# Patient Record
Sex: FEMALE | Race: WHITE | NOT HISPANIC OR LATINO | Employment: FULL TIME | URBAN - METROPOLITAN AREA
[De-identification: names, ages, dates, MRNs, and addresses within clinical notes are randomized per-mention and may not be internally consistent; named-entity substitution may affect disease eponyms.]

---

## 2021-06-30 ENCOUNTER — APPOINTMENT (OUTPATIENT)
Dept: GENERAL RADIOLOGY | Facility: HOSPITAL | Age: 51
End: 2021-06-30

## 2021-06-30 ENCOUNTER — APPOINTMENT (OUTPATIENT)
Dept: CT IMAGING | Facility: HOSPITAL | Age: 51
End: 2021-06-30

## 2021-06-30 ENCOUNTER — HOSPITAL ENCOUNTER (OUTPATIENT)
Facility: HOSPITAL | Age: 51
Setting detail: OBSERVATION
Discharge: HOME | End: 2021-07-02
Attending: EMERGENCY MEDICINE | Admitting: FAMILY MEDICINE

## 2021-06-30 ENCOUNTER — APPOINTMENT (OUTPATIENT)
Dept: CARDIOLOGY | Facility: HOSPITAL | Age: 51
End: 2021-06-30

## 2021-06-30 DIAGNOSIS — R11.0 NAUSEA: ICD-10-CM

## 2021-06-30 DIAGNOSIS — I26.90 ACUTE SEPTIC PULMONARY EMBOLISM WITHOUT ACUTE COR PULMONALE (HCC): ICD-10-CM

## 2021-06-30 DIAGNOSIS — I26.99 OTHER ACUTE PULMONARY EMBOLISM, UNSPECIFIED WHETHER ACUTE COR PULMONALE PRESENT (HCC): Primary | ICD-10-CM

## 2021-06-30 LAB
ALBUMIN SERPL-MCNC: 4 G/DL (ref 3.5–5.2)
ALBUMIN/GLOB SERPL: 1.4 G/DL
ALP SERPL-CCNC: 93 U/L (ref 39–117)
ALT SERPL W P-5'-P-CCNC: 7 U/L (ref 1–33)
ANION GAP SERPL CALCULATED.3IONS-SCNC: 9 MMOL/L (ref 5–15)
AST SERPL-CCNC: 15 U/L (ref 1–32)
B-HCG UR QL: NEGATIVE
BASOPHILS # BLD AUTO: 0.06 10*3/MM3 (ref 0–0.2)
BASOPHILS NFR BLD AUTO: 0.9 % (ref 0–1.5)
BH CV ECHO MEAS - BSA(HAYCOCK): 2.1 M^2
BH CV ECHO MEAS - BSA: 2 M^2
BH CV ECHO MEAS - BZI_BMI: 32.3 KILOGRAMS/M^2
BH CV ECHO MEAS - BZI_METRIC_HEIGHT: 167.6 CM
BH CV ECHO MEAS - BZI_METRIC_WEIGHT: 90.7 KG
BH CV LOW VAS LEFT PERONEAL VESSEL: 1
BH CV LOWER VASCULAR LEFT COMMON FEMORAL AUGMENT: NORMAL
BH CV LOWER VASCULAR LEFT COMMON FEMORAL COMPRESS: NORMAL
BH CV LOWER VASCULAR LEFT COMMON FEMORAL PHASIC: NORMAL
BH CV LOWER VASCULAR LEFT COMMON FEMORAL SPONT: NORMAL
BH CV LOWER VASCULAR LEFT DISTAL FEMORAL AUGMENT: NORMAL
BH CV LOWER VASCULAR LEFT DISTAL FEMORAL COMPRESS: NORMAL
BH CV LOWER VASCULAR LEFT DISTAL FEMORAL PHASIC: NORMAL
BH CV LOWER VASCULAR LEFT DISTAL FEMORAL SPONT: NORMAL
BH CV LOWER VASCULAR LEFT GASTRONEMIUS COMPRESS: NORMAL
BH CV LOWER VASCULAR LEFT GREATER SAPH AK COMPRESS: NORMAL
BH CV LOWER VASCULAR LEFT GREATER SAPH BK COMPRESS: NORMAL
BH CV LOWER VASCULAR LEFT LESSER SAPH COMPRESS: NORMAL
BH CV LOWER VASCULAR LEFT MID FEMORAL AUGMENT: NORMAL
BH CV LOWER VASCULAR LEFT MID FEMORAL COMPRESS: NORMAL
BH CV LOWER VASCULAR LEFT MID FEMORAL PHASIC: NORMAL
BH CV LOWER VASCULAR LEFT MID FEMORAL SPONT: NORMAL
BH CV LOWER VASCULAR LEFT PERONEAL AUGMENT: NORMAL
BH CV LOWER VASCULAR LEFT PERONEAL COMPRESS: NORMAL
BH CV LOWER VASCULAR LEFT PERONEAL THROMBUS: NORMAL
BH CV LOWER VASCULAR LEFT POPLITEAL AUGMENT: NORMAL
BH CV LOWER VASCULAR LEFT POPLITEAL COMPRESS: NORMAL
BH CV LOWER VASCULAR LEFT POPLITEAL PHASIC: NORMAL
BH CV LOWER VASCULAR LEFT POPLITEAL SPONT: NORMAL
BH CV LOWER VASCULAR LEFT POSTERIOR TIBIAL AUGMENT: NORMAL
BH CV LOWER VASCULAR LEFT POSTERIOR TIBIAL COMPRESS: NORMAL
BH CV LOWER VASCULAR LEFT PROFUNDA FEMORAL AUGMENT: NORMAL
BH CV LOWER VASCULAR LEFT PROFUNDA FEMORAL PHASIC: NORMAL
BH CV LOWER VASCULAR LEFT PROFUNDA FEMORAL SPONT: NORMAL
BH CV LOWER VASCULAR LEFT PROXIMAL FEMORAL AUGMENT: NORMAL
BH CV LOWER VASCULAR LEFT PROXIMAL FEMORAL COMPRESS: NORMAL
BH CV LOWER VASCULAR LEFT PROXIMAL FEMORAL PHASIC: NORMAL
BH CV LOWER VASCULAR LEFT PROXIMAL FEMORAL SPONT: NORMAL
BH CV LOWER VASCULAR LEFT SAPHENOFEMORAL JUNCTION AUGMENT: NORMAL
BH CV LOWER VASCULAR LEFT SAPHENOFEMORAL JUNCTION COMPRESS: NORMAL
BH CV LOWER VASCULAR LEFT SAPHENOFEMORAL JUNCTION PHASIC: NORMAL
BH CV LOWER VASCULAR LEFT SAPHENOFEMORAL JUNCTION SPONT: NORMAL
BH CV LOWER VASCULAR RIGHT COMMON FEMORAL AUGMENT: NORMAL
BH CV LOWER VASCULAR RIGHT COMMON FEMORAL COMPRESS: NORMAL
BH CV LOWER VASCULAR RIGHT COMMON FEMORAL PHASIC: NORMAL
BH CV LOWER VASCULAR RIGHT COMMON FEMORAL SPONT: NORMAL
BH CV LOWER VASCULAR RIGHT DISTAL FEMORAL AUGMENT: NORMAL
BH CV LOWER VASCULAR RIGHT DISTAL FEMORAL COMPRESS: NORMAL
BH CV LOWER VASCULAR RIGHT DISTAL FEMORAL PHASIC: NORMAL
BH CV LOWER VASCULAR RIGHT DISTAL FEMORAL SPONT: NORMAL
BH CV LOWER VASCULAR RIGHT GASTRONEMIUS COMPRESS: NORMAL
BH CV LOWER VASCULAR RIGHT GREATER SAPH AK COMPRESS: NORMAL
BH CV LOWER VASCULAR RIGHT GREATER SAPH BK COMPRESS: NORMAL
BH CV LOWER VASCULAR RIGHT LESSER SAPH COMPRESS: NORMAL
BH CV LOWER VASCULAR RIGHT MID FEMORAL AUGMENT: NORMAL
BH CV LOWER VASCULAR RIGHT MID FEMORAL COMPRESS: NORMAL
BH CV LOWER VASCULAR RIGHT MID FEMORAL PHASIC: NORMAL
BH CV LOWER VASCULAR RIGHT MID FEMORAL SPONT: NORMAL
BH CV LOWER VASCULAR RIGHT PERONEAL AUGMENT: NORMAL
BH CV LOWER VASCULAR RIGHT PERONEAL COMPRESS: NORMAL
BH CV LOWER VASCULAR RIGHT POPLITEAL AUGMENT: NORMAL
BH CV LOWER VASCULAR RIGHT POPLITEAL COMPRESS: NORMAL
BH CV LOWER VASCULAR RIGHT POPLITEAL PHASIC: NORMAL
BH CV LOWER VASCULAR RIGHT POPLITEAL SPONT: NORMAL
BH CV LOWER VASCULAR RIGHT POSTERIOR TIBIAL AUGMENT: NORMAL
BH CV LOWER VASCULAR RIGHT POSTERIOR TIBIAL COMPRESS: NORMAL
BH CV LOWER VASCULAR RIGHT PROFUNDA FEMORAL AUGMENT: NORMAL
BH CV LOWER VASCULAR RIGHT PROFUNDA FEMORAL PHASIC: NORMAL
BH CV LOWER VASCULAR RIGHT PROFUNDA FEMORAL SPONT: NORMAL
BH CV LOWER VASCULAR RIGHT PROXIMAL FEMORAL AUGMENT: NORMAL
BH CV LOWER VASCULAR RIGHT PROXIMAL FEMORAL COMPRESS: NORMAL
BH CV LOWER VASCULAR RIGHT PROXIMAL FEMORAL PHASIC: NORMAL
BH CV LOWER VASCULAR RIGHT PROXIMAL FEMORAL SPONT: NORMAL
BH CV LOWER VASCULAR RIGHT SAPHENOFEMORAL JUNCTION AUGMENT: NORMAL
BH CV LOWER VASCULAR RIGHT SAPHENOFEMORAL JUNCTION COMPRESS: NORMAL
BH CV LOWER VASCULAR RIGHT SAPHENOFEMORAL JUNCTION PHASIC: NORMAL
BH CV LOWER VASCULAR RIGHT SAPHENOFEMORAL JUNCTION SPONT: NORMAL
BH CV LOWER VASCULAR RIGHT SOLEAL COMPRESS: NORMAL
BILIRUB SERPL-MCNC: 1.3 MG/DL (ref 0–1.2)
BUN SERPL-MCNC: 8 MG/DL (ref 6–20)
BUN/CREAT SERPL: 10.7 (ref 7–25)
CALCIUM SPEC-SCNC: 9.2 MG/DL (ref 8.6–10.5)
CHLORIDE SERPL-SCNC: 108 MMOL/L (ref 98–107)
CK SERPL-CCNC: 32 U/L (ref 20–180)
CO2 SERPL-SCNC: 26 MMOL/L (ref 22–29)
CREAT SERPL-MCNC: 0.75 MG/DL (ref 0.57–1)
CRP SERPL-MCNC: 2.15 MG/DL (ref 0–0.5)
D DIMER PPP FEU-MCNC: 0.69 MCGFEU/ML (ref 0–0.56)
DEPRECATED RDW RBC AUTO: 42.7 FL (ref 37–54)
EOSINOPHIL # BLD AUTO: 0.14 10*3/MM3 (ref 0–0.4)
EOSINOPHIL NFR BLD AUTO: 2.1 % (ref 0.3–6.2)
ERYTHROCYTE [DISTWIDTH] IN BLOOD BY AUTOMATED COUNT: 12.3 % (ref 12.3–15.4)
GFR SERPL CREATININE-BSD FRML MDRD: 81 ML/MIN/1.73
GLOBULIN UR ELPH-MCNC: 2.8 GM/DL
GLUCOSE SERPL-MCNC: 103 MG/DL (ref 65–99)
HCT VFR BLD AUTO: 43.1 % (ref 34–46.6)
HGB BLD-MCNC: 14.3 G/DL (ref 12–15.9)
HOLD SPECIMEN: NORMAL
IMM GRANULOCYTES # BLD AUTO: 0.03 10*3/MM3 (ref 0–0.05)
IMM GRANULOCYTES NFR BLD AUTO: 0.4 % (ref 0–0.5)
INTERNAL NEGATIVE CONTROL: NORMAL
INTERNAL POSITIVE CONTROL: NORMAL
LIPASE SERPL-CCNC: 25 U/L (ref 13–60)
LYMPHOCYTES # BLD AUTO: 2.04 10*3/MM3 (ref 0.7–3.1)
LYMPHOCYTES NFR BLD AUTO: 30 % (ref 19.6–45.3)
Lab: NORMAL
MAGNESIUM SERPL-MCNC: 1.9 MG/DL (ref 1.6–2.6)
MCH RBC QN AUTO: 31.3 PG (ref 26.6–33)
MCHC RBC AUTO-ENTMCNC: 33.2 G/DL (ref 31.5–35.7)
MCV RBC AUTO: 94.3 FL (ref 79–97)
MONOCYTES # BLD AUTO: 0.88 10*3/MM3 (ref 0.1–0.9)
MONOCYTES NFR BLD AUTO: 13 % (ref 5–12)
NEUTROPHILS NFR BLD AUTO: 3.64 10*3/MM3 (ref 1.7–7)
NEUTROPHILS NFR BLD AUTO: 53.6 % (ref 42.7–76)
NRBC BLD AUTO-RTO: 0 /100 WBC (ref 0–0.2)
NT-PROBNP SERPL-MCNC: 83 PG/ML (ref 0–900)
PLATELET # BLD AUTO: 206 10*3/MM3 (ref 140–450)
PMV BLD AUTO: 10.1 FL (ref 6–12)
POTASSIUM SERPL-SCNC: 4 MMOL/L (ref 3.5–5.2)
PROT SERPL-MCNC: 6.8 G/DL (ref 6–8.5)
QT INTERVAL: 394 MS
QT INTERVAL: 416 MS
QTC INTERVAL: 445 MS
QTC INTERVAL: 445 MS
RBC # BLD AUTO: 4.57 10*6/MM3 (ref 3.77–5.28)
SODIUM SERPL-SCNC: 143 MMOL/L (ref 136–145)
TROPONIN T SERPL-MCNC: <0.01 NG/ML (ref 0–0.03)
TROPONIN T SERPL-MCNC: <0.01 NG/ML (ref 0–0.03)
WBC # BLD AUTO: 6.79 10*3/MM3 (ref 3.4–10.8)
WHOLE BLOOD HOLD SPECIMEN: NORMAL

## 2021-06-30 PROCEDURE — 99284 EMERGENCY DEPT VISIT MOD MDM: CPT

## 2021-06-30 PROCEDURE — 93005 ELECTROCARDIOGRAM TRACING: CPT | Performed by: EMERGENCY MEDICINE

## 2021-06-30 PROCEDURE — 0 IOPAMIDOL PER 1 ML: Performed by: EMERGENCY MEDICINE

## 2021-06-30 PROCEDURE — 93970 EXTREMITY STUDY: CPT

## 2021-06-30 PROCEDURE — G0378 HOSPITAL OBSERVATION PER HR: HCPCS

## 2021-06-30 PROCEDURE — 93970 EXTREMITY STUDY: CPT | Performed by: INTERNAL MEDICINE

## 2021-06-30 PROCEDURE — 93005 ELECTROCARDIOGRAM TRACING: CPT

## 2021-06-30 PROCEDURE — 81025 URINE PREGNANCY TEST: CPT | Performed by: EMERGENCY MEDICINE

## 2021-06-30 PROCEDURE — 85379 FIBRIN DEGRADATION QUANT: CPT | Performed by: NURSE PRACTITIONER

## 2021-06-30 PROCEDURE — 83880 ASSAY OF NATRIURETIC PEPTIDE: CPT | Performed by: EMERGENCY MEDICINE

## 2021-06-30 PROCEDURE — 86140 C-REACTIVE PROTEIN: CPT | Performed by: NURSE PRACTITIONER

## 2021-06-30 PROCEDURE — 71045 X-RAY EXAM CHEST 1 VIEW: CPT

## 2021-06-30 PROCEDURE — 83690 ASSAY OF LIPASE: CPT | Performed by: EMERGENCY MEDICINE

## 2021-06-30 PROCEDURE — 85025 COMPLETE CBC W/AUTO DIFF WBC: CPT | Performed by: EMERGENCY MEDICINE

## 2021-06-30 PROCEDURE — 80053 COMPREHEN METABOLIC PANEL: CPT | Performed by: EMERGENCY MEDICINE

## 2021-06-30 PROCEDURE — 99220 PR INITIAL OBSERVATION CARE/DAY 70 MINUTES: CPT | Performed by: FAMILY MEDICINE

## 2021-06-30 PROCEDURE — 83735 ASSAY OF MAGNESIUM: CPT | Performed by: NURSE PRACTITIONER

## 2021-06-30 PROCEDURE — 63710000001 ONDANSETRON PER 8 MG: Performed by: EMERGENCY MEDICINE

## 2021-06-30 PROCEDURE — 82550 ASSAY OF CK (CPK): CPT | Performed by: NURSE PRACTITIONER

## 2021-06-30 PROCEDURE — 84484 ASSAY OF TROPONIN QUANT: CPT | Performed by: EMERGENCY MEDICINE

## 2021-06-30 PROCEDURE — 71275 CT ANGIOGRAPHY CHEST: CPT

## 2021-06-30 RX ORDER — OXYCODONE AND ACETAMINOPHEN 7.5; 325 MG/1; MG/1
1 TABLET ORAL ONCE
Status: COMPLETED | OUTPATIENT
Start: 2021-06-30 | End: 2021-06-30

## 2021-06-30 RX ORDER — ONDANSETRON 2 MG/ML
4 INJECTION INTRAMUSCULAR; INTRAVENOUS EVERY 6 HOURS PRN
Status: DISCONTINUED | OUTPATIENT
Start: 2021-06-30 | End: 2021-07-02 | Stop reason: HOSPADM

## 2021-06-30 RX ORDER — OLANZAPINE 10 MG/1
10 TABLET ORAL NIGHTLY
Status: DISCONTINUED | OUTPATIENT
Start: 2021-06-30 | End: 2021-07-02 | Stop reason: HOSPADM

## 2021-06-30 RX ORDER — LISINOPRIL 10 MG/1
10 TABLET ORAL DAILY
COMMUNITY

## 2021-06-30 RX ORDER — SODIUM CHLORIDE 0.9 % (FLUSH) 0.9 %
10 SYRINGE (ML) INJECTION AS NEEDED
Status: DISCONTINUED | OUTPATIENT
Start: 2021-06-30 | End: 2021-07-02 | Stop reason: HOSPADM

## 2021-06-30 RX ORDER — ONDANSETRON 4 MG/1
4 TABLET, FILM COATED ORAL ONCE
Status: COMPLETED | OUTPATIENT
Start: 2021-06-30 | End: 2021-06-30

## 2021-06-30 RX ORDER — SODIUM CHLORIDE 0.9 % (FLUSH) 0.9 %
10 SYRINGE (ML) INJECTION EVERY 12 HOURS SCHEDULED
Status: DISCONTINUED | OUTPATIENT
Start: 2021-06-30 | End: 2021-07-02 | Stop reason: HOSPADM

## 2021-06-30 RX ORDER — ACETAMINOPHEN 160 MG/5ML
650 SOLUTION ORAL EVERY 4 HOURS PRN
Status: DISCONTINUED | OUTPATIENT
Start: 2021-06-30 | End: 2021-07-02 | Stop reason: HOSPADM

## 2021-06-30 RX ORDER — ONDANSETRON 4 MG/1
4 TABLET, FILM COATED ORAL EVERY 6 HOURS PRN
Status: DISCONTINUED | OUTPATIENT
Start: 2021-06-30 | End: 2021-07-02 | Stop reason: HOSPADM

## 2021-06-30 RX ORDER — ESZOPICLONE 3 MG/1
3 TABLET, FILM COATED ORAL NIGHTLY
Status: DISCONTINUED | OUTPATIENT
Start: 2021-06-30 | End: 2021-07-02 | Stop reason: HOSPADM

## 2021-06-30 RX ORDER — LISINOPRIL 10 MG/1
10 TABLET ORAL ONCE
Status: COMPLETED | OUTPATIENT
Start: 2021-06-30 | End: 2021-06-30

## 2021-06-30 RX ORDER — TRAMADOL HYDROCHLORIDE 50 MG/1
50 TABLET ORAL EVERY 6 HOURS PRN
Status: DISCONTINUED | OUTPATIENT
Start: 2021-06-30 | End: 2021-07-02 | Stop reason: HOSPADM

## 2021-06-30 RX ORDER — ASPIRIN 81 MG/1
324 TABLET, CHEWABLE ORAL ONCE
Status: COMPLETED | OUTPATIENT
Start: 2021-06-30 | End: 2021-06-30

## 2021-06-30 RX ORDER — ACETAMINOPHEN 325 MG/1
650 TABLET ORAL EVERY 4 HOURS PRN
Status: DISCONTINUED | OUTPATIENT
Start: 2021-06-30 | End: 2021-07-02 | Stop reason: HOSPADM

## 2021-06-30 RX ORDER — ACETAMINOPHEN 650 MG/1
650 SUPPOSITORY RECTAL EVERY 4 HOURS PRN
Status: DISCONTINUED | OUTPATIENT
Start: 2021-06-30 | End: 2021-07-02 | Stop reason: HOSPADM

## 2021-06-30 RX ORDER — NITROGLYCERIN 0.4 MG/1
0.4 TABLET SUBLINGUAL
Status: DISCONTINUED | OUTPATIENT
Start: 2021-06-30 | End: 2021-07-02 | Stop reason: HOSPADM

## 2021-06-30 RX ORDER — LISINOPRIL 10 MG/1
10 TABLET ORAL DAILY
Status: DISCONTINUED | OUTPATIENT
Start: 2021-07-01 | End: 2021-07-02 | Stop reason: HOSPADM

## 2021-06-30 RX ADMIN — NITROGLYCERIN 0.4 MG: 0.4 TABLET SUBLINGUAL at 08:22

## 2021-06-30 RX ADMIN — LIDOCAINE HYDROCHLORIDE: 20 SOLUTION ORAL; TOPICAL at 09:05

## 2021-06-30 RX ADMIN — SODIUM CHLORIDE 500 ML: 9 INJECTION, SOLUTION INTRAVENOUS at 11:03

## 2021-06-30 RX ADMIN — TRAMADOL HYDROCHLORIDE 50 MG: 50 TABLET, FILM COATED ORAL at 23:04

## 2021-06-30 RX ADMIN — LISINOPRIL 10 MG: 10 TABLET ORAL at 08:17

## 2021-06-30 RX ADMIN — OLANZAPINE 10 MG: 10 TABLET, FILM COATED ORAL at 20:30

## 2021-06-30 RX ADMIN — IOPAMIDOL 70 ML: 755 INJECTION, SOLUTION INTRAVENOUS at 11:21

## 2021-06-30 RX ADMIN — OXYCODONE HYDROCHLORIDE AND ACETAMINOPHEN 1 TABLET: 7.5; 325 TABLET ORAL at 11:03

## 2021-06-30 RX ADMIN — SODIUM CHLORIDE, PRESERVATIVE FREE 10 ML: 5 INJECTION INTRAVENOUS at 20:30

## 2021-06-30 RX ADMIN — ESZOPICLONE 3 MG: 3 TABLET ORAL at 20:30

## 2021-06-30 RX ADMIN — APIXABAN 5 MG: 5 TABLET, FILM COATED ORAL at 20:30

## 2021-06-30 RX ADMIN — ONDANSETRON HYDROCHLORIDE 4 MG: 4 TABLET, FILM COATED ORAL at 11:03

## 2021-06-30 RX ADMIN — ASPIRIN 81 MG CHEWABLE TABLET 324 MG: 81 TABLET CHEWABLE at 07:08

## 2021-06-30 RX ADMIN — TRAMADOL HYDROCHLORIDE 50 MG: 50 TABLET, FILM COATED ORAL at 15:56

## 2021-07-01 LAB
ANION GAP SERPL CALCULATED.3IONS-SCNC: 8 MMOL/L (ref 5–15)
BASOPHILS # BLD AUTO: 0.03 10*3/MM3 (ref 0–0.2)
BASOPHILS NFR BLD AUTO: 0.5 % (ref 0–1.5)
BUN SERPL-MCNC: 8 MG/DL (ref 6–20)
BUN/CREAT SERPL: 11.4 (ref 7–25)
CALCIUM SPEC-SCNC: 9 MG/DL (ref 8.6–10.5)
CHLORIDE SERPL-SCNC: 105 MMOL/L (ref 98–107)
CO2 SERPL-SCNC: 26 MMOL/L (ref 22–29)
CREAT SERPL-MCNC: 0.7 MG/DL (ref 0.57–1)
DEPRECATED RDW RBC AUTO: 43.8 FL (ref 37–54)
EOSINOPHIL # BLD AUTO: 0.13 10*3/MM3 (ref 0–0.4)
EOSINOPHIL NFR BLD AUTO: 2 % (ref 0.3–6.2)
ERYTHROCYTE [DISTWIDTH] IN BLOOD BY AUTOMATED COUNT: 12.3 % (ref 12.3–15.4)
GFR SERPL CREATININE-BSD FRML MDRD: 88 ML/MIN/1.73
GLUCOSE SERPL-MCNC: 97 MG/DL (ref 65–99)
HCT VFR BLD AUTO: 39 % (ref 34–46.6)
HCT VFR BLD AUTO: 41.1 % (ref 34–46.6)
HGB BLD-MCNC: 12.6 G/DL (ref 12–15.9)
HGB BLD-MCNC: 13 G/DL (ref 12–15.9)
IMM GRANULOCYTES # BLD AUTO: 0.02 10*3/MM3 (ref 0–0.05)
IMM GRANULOCYTES NFR BLD AUTO: 0.3 % (ref 0–0.5)
LYMPHOCYTES # BLD AUTO: 1.66 10*3/MM3 (ref 0.7–3.1)
LYMPHOCYTES NFR BLD AUTO: 25.7 % (ref 19.6–45.3)
MCH RBC QN AUTO: 31 PG (ref 26.6–33)
MCHC RBC AUTO-ENTMCNC: 32.3 G/DL (ref 31.5–35.7)
MCV RBC AUTO: 95.8 FL (ref 79–97)
MONOCYTES # BLD AUTO: 0.81 10*3/MM3 (ref 0.1–0.9)
MONOCYTES NFR BLD AUTO: 12.5 % (ref 5–12)
NEUTROPHILS NFR BLD AUTO: 3.81 10*3/MM3 (ref 1.7–7)
NEUTROPHILS NFR BLD AUTO: 59 % (ref 42.7–76)
NRBC BLD AUTO-RTO: 0 /100 WBC (ref 0–0.2)
PLATELET # BLD AUTO: 192 10*3/MM3 (ref 140–450)
PMV BLD AUTO: 10.4 FL (ref 6–12)
POTASSIUM SERPL-SCNC: 4 MMOL/L (ref 3.5–5.2)
RBC # BLD AUTO: 4.07 10*6/MM3 (ref 3.77–5.28)
SODIUM SERPL-SCNC: 139 MMOL/L (ref 136–145)
WBC # BLD AUTO: 6.46 10*3/MM3 (ref 3.4–10.8)

## 2021-07-01 PROCEDURE — 25010000002 ONDANSETRON PER 1 MG: Performed by: FAMILY MEDICINE

## 2021-07-01 PROCEDURE — 96374 THER/PROPH/DIAG INJ IV PUSH: CPT

## 2021-07-01 PROCEDURE — 99225 PR SBSQ OBSERVATION CARE/DAY 25 MINUTES: CPT | Performed by: FAMILY MEDICINE

## 2021-07-01 PROCEDURE — 85018 HEMOGLOBIN: CPT | Performed by: FAMILY MEDICINE

## 2021-07-01 PROCEDURE — G0378 HOSPITAL OBSERVATION PER HR: HCPCS

## 2021-07-01 PROCEDURE — 85014 HEMATOCRIT: CPT | Performed by: FAMILY MEDICINE

## 2021-07-01 PROCEDURE — 85025 COMPLETE CBC W/AUTO DIFF WBC: CPT | Performed by: FAMILY MEDICINE

## 2021-07-01 PROCEDURE — 80048 BASIC METABOLIC PNL TOTAL CA: CPT | Performed by: FAMILY MEDICINE

## 2021-07-01 RX ORDER — ONDANSETRON 4 MG/1
4 TABLET, ORALLY DISINTEGRATING ORAL EVERY 8 HOURS PRN
Qty: 30 TABLET | Refills: 0 | Status: SHIPPED | OUTPATIENT
Start: 2021-07-01 | End: 2022-01-12 | Stop reason: HOSPADM

## 2021-07-01 RX ORDER — TRAMADOL HYDROCHLORIDE 50 MG/1
50 TABLET ORAL EVERY 6 HOURS PRN
Qty: 24 TABLET | Refills: 0 | Status: SHIPPED | OUTPATIENT
Start: 2021-07-01 | End: 2021-07-08

## 2021-07-01 RX ADMIN — LISINOPRIL 10 MG: 10 TABLET ORAL at 09:32

## 2021-07-01 RX ADMIN — TRAMADOL HYDROCHLORIDE 50 MG: 50 TABLET, FILM COATED ORAL at 19:22

## 2021-07-01 RX ADMIN — APIXABAN 10 MG: 5 TABLET, FILM COATED ORAL at 09:32

## 2021-07-01 RX ADMIN — ONDANSETRON 4 MG: 2 INJECTION INTRAMUSCULAR; INTRAVENOUS at 11:13

## 2021-07-01 RX ADMIN — OLANZAPINE 10 MG: 10 TABLET, FILM COATED ORAL at 20:02

## 2021-07-01 RX ADMIN — TRAMADOL HYDROCHLORIDE 50 MG: 50 TABLET, FILM COATED ORAL at 05:23

## 2021-07-01 RX ADMIN — SODIUM CHLORIDE, PRESERVATIVE FREE 10 ML: 5 INJECTION INTRAVENOUS at 20:03

## 2021-07-01 RX ADMIN — ESZOPICLONE 3 MG: 3 TABLET ORAL at 20:02

## 2021-07-01 RX ADMIN — APIXABAN 10 MG: 5 TABLET, FILM COATED ORAL at 20:02

## 2021-07-01 RX ADMIN — SODIUM CHLORIDE, PRESERVATIVE FREE 10 ML: 5 INJECTION INTRAVENOUS at 09:33

## 2021-07-01 RX ADMIN — TRAMADOL HYDROCHLORIDE 50 MG: 50 TABLET, FILM COATED ORAL at 13:24

## 2021-07-01 NOTE — NURSING NOTE
Patient scheduled to discharge today but patient did not feel comfortable leaving, set to discharge tomorrow.  C/o of pain and nausea/vomiting throughout the shift, medicated per MAR.  Remains on RA, NSR on tele, VSS.  H&H completed, WNL.  Continue to monitor.

## 2021-07-01 NOTE — PLAN OF CARE
Goal Outcome Evaluation:    VSS. Patient treated with PRN pain medication x1. Patient rested well after PRN pain medication and verbalized no further needs. Will continue to monitor.      Problem: Adult Inpatient Plan of Care  Goal: Plan of Care Review  Outcome: Ongoing, Progressing  Goal: Patient-Specific Goal (Individualized)  Outcome: Ongoing, Progressing  Goal: Absence of Hospital-Acquired Illness or Injury  Outcome: Ongoing, Progressing  Intervention: Identify and Manage Fall Risk  Recent Flowsheet Documentation  Taken 7/1/2021 0200 by Edmond Wheeler RN  Safety Promotion/Fall Prevention:   activity supervised   assistive device/personal items within reach   clutter free environment maintained   safety round/check completed   room organization consistent  Taken 7/1/2021 0000 by Edmond Wheeler RN  Safety Promotion/Fall Prevention:   activity supervised   clutter free environment maintained   assistive device/personal items within reach   safety round/check completed   room organization consistent  Taken 6/30/2021 2200 by Edmond Wheeler RN  Safety Promotion/Fall Prevention:   activity supervised   assistive device/personal items within reach   clutter free environment maintained   safety round/check completed   room organization consistent  Taken 6/30/2021 2000 by Edmond Wheeler RN  Safety Promotion/Fall Prevention:   activity supervised   assistive device/personal items within reach   clutter free environment maintained   safety round/check completed   room organization consistent  Intervention: Prevent Skin Injury  Recent Flowsheet Documentation  Taken 7/1/2021 0200 by Edmond Wheeler RN  Body Position: position changed independently  Skin Protection: adhesive use limited  Taken 7/1/2021 0000 by Edmond Wheeler RN  Body Position: position changed independently  Skin Protection: adhesive use limited  Taken 6/30/2021 2200 by Edmond Wheeler RN  Body Position: position changed independently  Skin Protection:  adhesive use limited  Taken 6/30/2021 2000 by Edmond Wheeler RN  Body Position: position changed independently  Skin Protection: adhesive use limited  Intervention: Prevent and Manage VTE (venous thromboembolism) Risk  Recent Flowsheet Documentation  Taken 6/30/2021 2000 by Edmond Wheeler RN  VTE Prevention/Management: bleeding risk factor(s) identified  Goal: Optimal Comfort and Wellbeing  Outcome: Ongoing, Progressing  Intervention: Provide Person-Centered Care  Recent Flowsheet Documentation  Taken 6/30/2021 2000 by Edmond Wheeler RN  Trust Relationship/Rapport: care explained  Goal: Readiness for Transition of Care  Outcome: Ongoing, Progressing     Problem: Hypertension Comorbidity  Goal: Blood Pressure in Desired Range  Outcome: Ongoing, Progressing

## 2021-07-01 NOTE — PROGRESS NOTES
[facility]  Clinical Decision Unit  PROGRESS NOTE    Patient Name: Kamila Garcia  : 1970  MRN: [id number]    Admission Date and Time: 2021  7:08 AM  Primary Care Physician: Latricia Mishra APRN    Subjective   Subjective     CC:  F/u PE    HPI:  Patient states she is nauseated, she is vomiting blood (x1) and she is anxious about going home. She continues to have pain on her left side.     Review of Systems  Gen- No fevers, chills  CV- No chest pain, palpitations  Resp- No cough, dyspnea  GI- + N/V / No D, abd pain          Objective   Objective     Vital Signs:   Temp:  [99.3 °F (37.4 °C)-100.5 °F (38.1 °C)] 99.3 °F (37.4 °C)  Heart Rate:  [76-84] 76  Resp:  [16-18] 18  BP: (112-136)/(65-80) 120/80     Physical Exam:  Constitutional: No acute distress, awake, alert  HENT: NCAT, mucous membranes moist  Respiratory: Clear to auscultation bilaterally, respiratory effort normal   Cardiovascular: RRR, no murmurs, rubs, or gallops  Gastrointestinal: Positive bowel sounds, soft, nontender, nondistended  Musculoskeletal: No bilateral ankle edema  Psychiatric: Flat affect  Neurologic: Oriented x 3, strength symmetric in all extremities, speech clear  Skin: No rashes      Result Review:  I have personally reviewed the results from the time of admission and agree with these findings:  [x]  Laboratory  []  Radiology  []  EKG/Telemetry   []  Pathology  []  Old records  []  Other:  Most notable findings include:    Assessment/Plan   Assessment / Plan     Active Hospital Problems    Diagnosis  POA   • Acute pulmonary embolism (CMS/HCC) [I26.99]  Yes      Resolved Hospital Problems   No resolved problems to display.        Brief course to date:  Kamila Garcia is a 51 y.o. female who presented with PE. Started on eliquis. Had vomiting and nausea and the following day.     Plan:  Continue eliquis  PRN zofran     DVT prophylaxis:  Medical DVT prophylaxis orders are  present.     Discharge readiness criteria:   1. Tomorrow if tolerating po     Yazmin Whitley,   07/01/21

## 2021-07-01 NOTE — PROGRESS NOTES
Patient is on Apixaban.  Education provided on apixaban on 7/1/21 verbally and in writing.  Information provided includes effects of medication, drug-drug and drug-food interactions, and signs/symptoms of bleeding and clotting.  Patient verbalized understanding through teach back.  All pertinent questions were answered.     Portia Lujan, PharmD

## 2021-07-01 NOTE — PLAN OF CARE
Problem: Adult Inpatient Plan of Care  Goal: Plan of Care Review  Outcome: Ongoing, Progressing  Flowsheets (Taken 7/1/2021 1007)  Outcome Summary: Pt discharged to home with eliquis  Goal: Patient-Specific Goal (Individualized)  Outcome: Ongoing, Progressing  Goal: Absence of Hospital-Acquired Illness or Injury  Outcome: Ongoing, Progressing  Goal: Optimal Comfort and Wellbeing  Outcome: Ongoing, Progressing  Goal: Readiness for Transition of Care  Outcome: Ongoing, Progressing   Goal Outcome Evaluation:              Outcome Summary: Pt discharged to home with eliquis

## 2021-07-02 VITALS
HEART RATE: 75 BPM | WEIGHT: 200 LBS | OXYGEN SATURATION: 94 % | HEIGHT: 66 IN | SYSTOLIC BLOOD PRESSURE: 107 MMHG | BODY MASS INDEX: 32.14 KG/M2 | DIASTOLIC BLOOD PRESSURE: 66 MMHG | TEMPERATURE: 99.6 F | RESPIRATION RATE: 16 BRPM

## 2021-07-02 PROCEDURE — G0378 HOSPITAL OBSERVATION PER HR: HCPCS

## 2021-07-02 PROCEDURE — 99217 PR OBSERVATION CARE DISCHARGE MANAGEMENT: CPT | Performed by: FAMILY MEDICINE

## 2021-07-02 RX ADMIN — TRAMADOL HYDROCHLORIDE 50 MG: 50 TABLET, FILM COATED ORAL at 13:18

## 2021-07-02 RX ADMIN — SODIUM CHLORIDE, PRESERVATIVE FREE 10 ML: 5 INJECTION INTRAVENOUS at 09:10

## 2021-07-02 RX ADMIN — ACETAMINOPHEN 650 MG: 325 TABLET, FILM COATED ORAL at 00:12

## 2021-07-02 RX ADMIN — APIXABAN 10 MG: 5 TABLET, FILM COATED ORAL at 09:10

## 2021-07-02 RX ADMIN — TRAMADOL HYDROCHLORIDE 50 MG: 50 TABLET, FILM COATED ORAL at 07:29

## 2021-07-02 RX ADMIN — LISINOPRIL 10 MG: 10 TABLET ORAL at 09:10

## 2021-07-02 NOTE — PLAN OF CARE
Goal Outcome Evaluation:  Pt resting comfortably at this time with no c/o pain. PRN pain medication being administered as requested and ordered.    NSR per telemetry. VSS.

## 2021-07-02 NOTE — CASE MANAGEMENT/SOCIAL WORK
Discharge Planning Assessment  [facility]     Patient Name: Kamila Garcia  MRN: [id number]  Today's Date: 7/2/2021    Admit Date: 6/30/2021    Discharge Needs Assessment     Row Name 07/02/21 1227       Living Environment    Lives With  spouse    Current Living Arrangements  home/apartment/condo    Primary Care Provided by  self    Provides Primary Care For  no one    Family Caregiver if Needed  spouse    Quality of Family Relationships  helpful;involved;supportive    Able to Return to Prior Arrangements  yes       Transition Planning    Patient/Family Anticipates Transition to  home with family    Patient/Family Anticipated Services at Transition  none    Transportation Anticipated  family or friend will provide       Discharge Needs Assessment    Readmission Within the Last 30 Days  no previous admission in last 30 days    Equipment Currently Used at Home  none    Concerns to be Addressed  discharge planning    Equipment Needed After Discharge  none        Discharge Plan     Row Name 07/02/21 1229       Plan    Plan  Home    Patient/Family in Agreement with Plan  yes    Plan Comments  Patient has discharge orders for home. She is being discharged on Eliquis for PEs. Met with patient in the room and provided patient a coupon for a $10 copay. She denies other discharge needs. No other DC needs noted.    Final Discharge Disposition Code  01 - home or self-care        Continued Care and Services - Admitted Since 6/30/2021    Coordination has not been started for this encounter.       Expected Discharge Date and Time     Expected Discharge Date Expected Discharge Time    Jul 1, 2021         Demographic Summary     Row Name 07/02/21 1226       General Information    Admission Type  observation    Referral Source  admission list    Reason for Consult  discharge planning    General Information Comments  Patient confirms her PCP is RAFFY Garduno, and that she has medical and rx coverage through [facility]. She  denies having a POA or LW.        Functional Status     Row Name 07/02/21 1227       Functional Status    Usual Activity Tolerance  good        Psychosocial    No documentation.       Abuse/Neglect    No documentation.       Legal    No documentation.       Substance Abuse    No documentation.       Patient Forms    No documentation.           Lani Ansari RN

## 2021-07-08 NOTE — DISCHARGE SUMMARY
[facility] Medicine Services  Clinical Decision Unit  DISCHARGE SUMMARY    Patient Name: Kamila Garcia  : 1970  MRN: [id number]    Admission Date and Time: 2021  7:08 AM  Discharge Date and Time:  21    Primary Care Physician: Latricia Mishra APRN    Hospital Course     Active Hospital Problems    Diagnosis  POA   • Acute pulmonary embolism (CMS/HCC) [I26.99]  Yes      Resolved Hospital Problems   No resolved problems to display.          Brief CDU Course:  Kamila Garcia is a 51 y.o. female with PMH of insomnia that presented to the ED with chest pain. She was found to have PE on CTA. Doppler of Left Lower Extremity showed DVT. She was started on eliquis and tolerated well.       Key Discharge Recommendations:  Patient may need hypercoagulable work up at some point.     Discharge Evaluation     Vital Signs:        Physical Exam:  Constitutional: No acute distress, awake, alert  HENT: NCAT, mucous membranes moist  Respiratory: Clear to auscultation bilaterally, respiratory effort normal   Cardiovascular: RRR, no murmurs, rubs, or gallops  Gastrointestinal: Positive bowel sounds, soft, nontender, nondistended  Musculoskeletal: No bilateral ankle edema  Psychiatric: Appropriate affect, cooperative  Neurologic: Oriented x 3, strength symmetric in all extremities, Cranial Nerves grossly intact to confrontation, speech clear  Skin: No rashes      Pertinent  and/or Most Recent Results     Result Review:  I have personally reviewed the results from the time of this admission to 21 9:30 AM EDT and agree with these findings:  [x]  Laboratory  []  Microbiology  [x]  Radiology  []  EKG/Telemetry   []  Cardiology/Vascular   []  Pathology  []  Old records  []  Other:  Most notable findings include:LAB RESULTS:      Lab 21  1435   HEMOGLOBIN 13.0   HEMATOCRIT 41.1                               Microbiology Results (last 10 days)     ** No results found for the last  240 hours. **          No radiology results for the last 3 days    Results for orders placed during the hospital encounter of 06/30/21    Duplex Venous Lower Extremity - Bilateral CAR    Interpretation Summary  · Left lower extremity deep vein thrombosis noted in the peroneal vein. Chronicity of the thrombosis is uncertain.      Results for orders placed during the hospital encounter of 06/30/21    Duplex Venous Lower Extremity - Bilateral CAR    Interpretation Summary  · Left lower extremity deep vein thrombosis noted in the peroneal vein. Chronicity of the thrombosis is uncertain.            Plan for Follow-up of Pending Labs/Results:       Discharge Medications and Follow-Up        Discharge Medications      New Medications      Instructions Start Date   Eliquis 5 MG tablet tablet  Generic drug: apixaban   10 mg, Oral, Every 12 Hours Scheduled      Eliquis 5 MG tablet tablet  Generic drug: apixaban   Start On 7/8: Take 1 tablet by mouth Every 12 (Twelve) Hours.      traMADol 50 MG tablet  Commonly known as: ULTRAM   50 mg, Oral, Every 6 Hours PRN         Continue These Medications      Instructions Start Date   lisinopril 10 MG tablet  Commonly known as: PRINIVIL,ZESTRIL   10 mg, Oral, Daily      Lunesta 3 MG tablet  Generic drug: eszopiclone   3 mg, Oral, Nightly, Take immediately before bedtime       OLANZapine 10 MG tablet  Commonly known as: zyPREXA   10 mg, Oral, Nightly      ondansetron ODT 4 MG disintegrating tablet  Commonly known as: Zofran ODT   4 mg, Translingual, Every 8 Hours PRN               No future appointments.    Additional Instructions for the Follow-ups that You Need to Schedule     Discharge Follow-up with PCP   As directed       Currently Documented PCP:    Latricia Mishra APRN    PCP Phone Number:    [phone]     Follow Up Details: 1 week         Discharge Follow-up with PCP   As directed       Currently Documented PCP:    Latricia Mishra APRN    PCP Phone Number:    [phone]      Follow Up Details: 1-2 weeks                     Time Spent on Discharge: I spent  40  minutes on this discharge activity which included: face-to-face encounter with the patient, reviewing the data in the system, coordination of the care with the nursing staff as well as consultants, documentation, and entering orders.

## 2021-07-09 ENCOUNTER — APPOINTMENT (OUTPATIENT)
Dept: GENERAL RADIOLOGY | Facility: HOSPITAL | Age: 51
End: 2021-07-09

## 2021-07-09 ENCOUNTER — HOSPITAL ENCOUNTER (EMERGENCY)
Facility: HOSPITAL | Age: 51
Discharge: HOME | End: 2021-07-09
Attending: EMERGENCY MEDICINE | Admitting: EMERGENCY MEDICINE

## 2021-07-09 ENCOUNTER — APPOINTMENT (OUTPATIENT)
Dept: CARDIOLOGY | Facility: HOSPITAL | Age: 51
End: 2021-07-09

## 2021-07-09 VITALS
WEIGHT: 200 LBS | OXYGEN SATURATION: 99 % | HEART RATE: 55 BPM | BODY MASS INDEX: 32.14 KG/M2 | DIASTOLIC BLOOD PRESSURE: 87 MMHG | HEIGHT: 66 IN | RESPIRATION RATE: 18 BRPM | TEMPERATURE: 97 F | SYSTOLIC BLOOD PRESSURE: 136 MMHG

## 2021-07-09 DIAGNOSIS — M79.605 PAIN OF LEFT LOWER EXTREMITY: Primary | ICD-10-CM

## 2021-07-09 DIAGNOSIS — I82.4Z2 DEEP VEIN THROMBOSIS (DVT) OF DISTAL VEIN OF LEFT LOWER EXTREMITY, UNSPECIFIED CHRONICITY (HCC): ICD-10-CM

## 2021-07-09 DIAGNOSIS — Z86.711 HISTORY OF PULMONARY EMBOLUS (PE): ICD-10-CM

## 2021-07-09 LAB
ALBUMIN SERPL-MCNC: 4 G/DL (ref 3.5–5.2)
ALBUMIN/GLOB SERPL: 1 G/DL
ALP SERPL-CCNC: 93 U/L (ref 39–117)
ALT SERPL W P-5'-P-CCNC: 12 U/L (ref 1–33)
ANION GAP SERPL CALCULATED.3IONS-SCNC: 8 MMOL/L (ref 5–15)
AST SERPL-CCNC: 27 U/L (ref 1–32)
BASOPHILS # BLD AUTO: 0.06 10*3/MM3 (ref 0–0.2)
BASOPHILS NFR BLD AUTO: 1 % (ref 0–1.5)
BILIRUB SERPL-MCNC: 0.4 MG/DL (ref 0–1.2)
BUN SERPL-MCNC: 9 MG/DL (ref 6–20)
BUN/CREAT SERPL: 10.2 (ref 7–25)
CALCIUM SPEC-SCNC: 9.7 MG/DL (ref 8.6–10.5)
CHLORIDE SERPL-SCNC: 109 MMOL/L (ref 98–107)
CO2 SERPL-SCNC: 26 MMOL/L (ref 22–29)
CREAT SERPL-MCNC: 0.88 MG/DL (ref 0.57–1)
D DIMER PPP FEU-MCNC: 2.2 MCGFEU/ML (ref 0–0.56)
DEPRECATED RDW RBC AUTO: 39.2 FL (ref 37–54)
EOSINOPHIL # BLD AUTO: 0.3 10*3/MM3 (ref 0–0.4)
EOSINOPHIL NFR BLD AUTO: 5 % (ref 0.3–6.2)
ERYTHROCYTE [DISTWIDTH] IN BLOOD BY AUTOMATED COUNT: 11.9 % (ref 12.3–15.4)
GFR SERPL CREATININE-BSD FRML MDRD: 67 ML/MIN/1.73
GLOBULIN UR ELPH-MCNC: 4 GM/DL
GLUCOSE SERPL-MCNC: 173 MG/DL (ref 65–99)
HCT VFR BLD AUTO: 44.8 % (ref 34–46.6)
HGB BLD-MCNC: 14.9 G/DL (ref 12–15.9)
HOLD SPECIMEN: NORMAL
IMM GRANULOCYTES # BLD AUTO: 0.01 10*3/MM3 (ref 0–0.05)
IMM GRANULOCYTES NFR BLD AUTO: 0.2 % (ref 0–0.5)
LYMPHOCYTES # BLD AUTO: 1.68 10*3/MM3 (ref 0.7–3.1)
LYMPHOCYTES NFR BLD AUTO: 28 % (ref 19.6–45.3)
MCH RBC QN AUTO: 30.1 PG (ref 26.6–33)
MCHC RBC AUTO-ENTMCNC: 33.3 G/DL (ref 31.5–35.7)
MCV RBC AUTO: 90.5 FL (ref 79–97)
MONOCYTES # BLD AUTO: 0.55 10*3/MM3 (ref 0.1–0.9)
MONOCYTES NFR BLD AUTO: 9.2 % (ref 5–12)
NEUTROPHILS NFR BLD AUTO: 3.41 10*3/MM3 (ref 1.7–7)
NEUTROPHILS NFR BLD AUTO: 56.6 % (ref 42.7–76)
NRBC BLD AUTO-RTO: 0 /100 WBC (ref 0–0.2)
NT-PROBNP SERPL-MCNC: 195.8 PG/ML (ref 0–900)
PLATELET # BLD AUTO: 395 10*3/MM3 (ref 140–450)
PMV BLD AUTO: 10.4 FL (ref 6–12)
POTASSIUM SERPL-SCNC: 4 MMOL/L (ref 3.5–5.2)
PROT SERPL-MCNC: 8 G/DL (ref 6–8.5)
QT INTERVAL: 410 MS
QTC INTERVAL: 439 MS
RBC # BLD AUTO: 4.95 10*6/MM3 (ref 3.77–5.28)
SODIUM SERPL-SCNC: 143 MMOL/L (ref 136–145)
TROPONIN T SERPL-MCNC: <0.01 NG/ML (ref 0–0.03)
WBC # BLD AUTO: 6.01 10*3/MM3 (ref 3.4–10.8)
WHOLE BLOOD HOLD SPECIMEN: NORMAL

## 2021-07-09 PROCEDURE — 83880 ASSAY OF NATRIURETIC PEPTIDE: CPT | Performed by: EMERGENCY MEDICINE

## 2021-07-09 PROCEDURE — 93971 EXTREMITY STUDY: CPT | Performed by: INTERNAL MEDICINE

## 2021-07-09 PROCEDURE — 93005 ELECTROCARDIOGRAM TRACING: CPT

## 2021-07-09 PROCEDURE — 85379 FIBRIN DEGRADATION QUANT: CPT | Performed by: EMERGENCY MEDICINE

## 2021-07-09 PROCEDURE — 71045 X-RAY EXAM CHEST 1 VIEW: CPT

## 2021-07-09 PROCEDURE — 93971 EXTREMITY STUDY: CPT

## 2021-07-09 PROCEDURE — 93005 ELECTROCARDIOGRAM TRACING: CPT | Performed by: EMERGENCY MEDICINE

## 2021-07-09 PROCEDURE — 85025 COMPLETE CBC W/AUTO DIFF WBC: CPT | Performed by: EMERGENCY MEDICINE

## 2021-07-09 PROCEDURE — 80053 COMPREHEN METABOLIC PANEL: CPT | Performed by: EMERGENCY MEDICINE

## 2021-07-09 PROCEDURE — 99284 EMERGENCY DEPT VISIT MOD MDM: CPT

## 2021-07-09 PROCEDURE — 84484 ASSAY OF TROPONIN QUANT: CPT | Performed by: EMERGENCY MEDICINE

## 2021-07-09 RX ORDER — SODIUM CHLORIDE 0.9 % (FLUSH) 0.9 %
10 SYRINGE (ML) INJECTION AS NEEDED
Status: DISCONTINUED | OUTPATIENT
Start: 2021-07-09 | End: 2021-07-09 | Stop reason: HOSPADM

## 2021-07-09 NOTE — ED PROVIDER NOTES
Subjective   Patient is a 51 y.o. female presenting to the ED complaining of leg pain. The patient was seen in the ER on 6/30 for left-sided chest pain and shortness of breath. She was diagnosed with a pulmonary embolism, hospitalized, and placed on Eliquis. During her hospitalization, she was found to have deep vein thrombosis in her left lower extremity. Her primary care physician told her to take off work for a week. She has been limiting her weight bearing on her left leg and performing elevation. She present with to the ED with pain and swelling on her left leg from her mid-calf to mid-thigh. She also reports shortness of breath, but states that this is no worse or better than normal. She denies any chest pain. There are no other acute symptoms at this time.      History provided by:  Patient  Leg Pain  Lower extremity pain location: left leg, mid-calf to mid-thigh.  Injury: no    Pain details:     Radiates to:  Does not radiate    Severity:  Moderate    Onset quality:  Sudden    Timing:  Constant    Progression:  Unchanged  Chronicity:  New  Dislocation: no    Relieved by:  None tried  Worsened by:  Nothing  Ineffective treatments:  None tried      Review of Systems   Respiratory: Positive for shortness of breath.    Musculoskeletal:        Left leg pain from mid-calf to mid-thigh.   All other systems reviewed and are negative.      Past Medical History:   Diagnosis Date   • Hypertension        Allergies   Allergen Reactions   • Hydrocodone Hives       Past Surgical History:   Procedure Laterality Date   • GASTRIC BYPASS  2007       History reviewed. No pertinent family history.    Social History     Socioeconomic History   • Marital status:      Spouse name: Not on file   • Number of children: Not on file   • Years of education: Not on file   • Highest education level: Not on file   Tobacco Use   • Smoking status: Never Smoker   • Smokeless tobacco: Never Used   Vaping Use   • Vaping Use: Never used    Substance and Sexual Activity   • Alcohol use: Yes     Alcohol/week: 2.0 standard drinks     Types: 2 Cans of beer per week   • Drug use: Never   • Sexual activity: Defer         Objective   Physical Exam  Vitals and nursing note reviewed.   Constitutional:       General: She is not in acute distress.     Appearance: Normal appearance.      Comments: Excellent Historian.   HENT:      Head: Normocephalic and atraumatic.      Right Ear: External ear normal.      Left Ear: External ear normal.      Nose: Nose normal.   Eyes:      General: No scleral icterus.     Conjunctiva/sclera: Conjunctivae normal.   Neck:      Comments: No jugular vein distension.  Cardiovascular:      Rate and Rhythm: Normal rate and regular rhythm.      Pulses: Normal pulses.      Heart sounds: Normal heart sounds.   Pulmonary:      Effort: Pulmonary effort is normal.      Breath sounds: Normal breath sounds.   Abdominal:      General: There is no distension.      Palpations: Abdomen is soft.      Tenderness: There is no abdominal tenderness.   Musculoskeletal:         General: Normal range of motion.      Cervical back: Normal range of motion and neck supple.      Comments: Left leg and right leg appear identical in circumference.  No gross circumferential increase to either leg.  Localized tenerness to palpation in left calf, thigh, and behind knee   Skin:     General: Skin is warm and dry.      Findings: No rash.      Comments: No ulceration, redness, streaking, cord, or rash.   Neurological:      Mental Status: She is alert and oriented to person, place, and time.   Psychiatric:         Mood and Affect: Mood normal.         Behavior: Behavior normal.         Procedures         ED Course  ED Course as of Jul 09 1950 Fri Jul 09, 2021 1940 Patient's work-up is reassuring.  Her imaging of her leg shows that her clot though present is now compressible in one portion.  She has good pulses.  Her vital signs have been stable throughout her ED  course, including oxygen saturations in the high 90s on room air.  I discussed with her parameters for concern that would warrant return to the emergency department.  Patient understands and concurs with his outpatient plan of care and close follow-up.  She assures she has ample supply of tramadol at home to help control her pain.  She will investigate acquiring a good set of compression hose for comfort.     [MS]      ED Course User Index  [MS] Diana Lr Nina, RAFFY     Recent Results (from the past 24 hour(s))   ECG 12 Lead    Collection Time: 07/09/21  5:10 PM   Result Value Ref Range    QT Interval 410 ms    QTC Interval 439 ms   Comprehensive Metabolic Panel    Collection Time: 07/09/21  5:58 PM    Specimen: Blood   Result Value Ref Range    Glucose 173 (H) 65 - 99 mg/dL    BUN 9 6 - 20 mg/dL    Creatinine 0.88 0.57 - 1.00 mg/dL    Sodium 143 136 - 145 mmol/L    Potassium 4.0 3.5 - 5.2 mmol/L    Chloride 109 (H) 98 - 107 mmol/L    CO2 26.0 22.0 - 29.0 mmol/L    Calcium 9.7 8.6 - 10.5 mg/dL    Total Protein 8.0 6.0 - 8.5 g/dL    Albumin 4.00 3.50 - 5.20 g/dL    ALT (SGPT) 12 1 - 33 U/L    AST (SGOT) 27 1 - 32 U/L    Alkaline Phosphatase 93 39 - 117 U/L    Total Bilirubin 0.4 0.0 - 1.2 mg/dL    eGFR Non African Amer 67 >60 mL/min/1.73    Globulin 4.0 gm/dL    A/G Ratio 1.0 g/dL    BUN/Creatinine Ratio 10.2 7.0 - 25.0    Anion Gap 8.0 5.0 - 15.0 mmol/L   BNP    Collection Time: 07/09/21  5:58 PM    Specimen: Blood   Result Value Ref Range    proBNP 195.8 0.0 - 900.0 pg/mL   Troponin    Collection Time: 07/09/21  5:58 PM    Specimen: Blood   Result Value Ref Range    Troponin T <0.010 0.000 - 0.030 ng/mL   D-dimer, Quantitative    Collection Time: 07/09/21  5:58 PM    Specimen: Blood   Result Value Ref Range    D-Dimer, Quantitative 2.20 (H) 0.00 - 0.56 MCGFEU/mL   Green Top (Gel)    Collection Time: 07/09/21  5:58 PM   Result Value Ref Range    Extra Tube Hold for add-ons.    Lavender Top    Collection Time:  07/09/21  5:58 PM   Result Value Ref Range    Extra Tube hold for add-on    Gold Top - SST    Collection Time: 07/09/21  5:58 PM   Result Value Ref Range    Extra Tube Hold for add-ons.    CBC Auto Differential    Collection Time: 07/09/21  5:58 PM    Specimen: Blood   Result Value Ref Range    WBC 6.01 3.40 - 10.80 10*3/mm3    RBC 4.95 3.77 - 5.28 10*6/mm3    Hemoglobin 14.9 12.0 - 15.9 g/dL    Hematocrit 44.8 34.0 - 46.6 %    MCV 90.5 79.0 - 97.0 fL    MCH 30.1 26.6 - 33.0 pg    MCHC 33.3 31.5 - 35.7 g/dL    RDW 11.9 (L) 12.3 - 15.4 %    RDW-SD 39.2 37.0 - 54.0 fl    MPV 10.4 6.0 - 12.0 fL    Platelets 395 140 - 450 10*3/mm3    Neutrophil % 56.6 42.7 - 76.0 %    Lymphocyte % 28.0 19.6 - 45.3 %    Monocyte % 9.2 5.0 - 12.0 %    Eosinophil % 5.0 0.3 - 6.2 %    Basophil % 1.0 0.0 - 1.5 %    Immature Grans % 0.2 0.0 - 0.5 %    Neutrophils, Absolute 3.41 1.70 - 7.00 10*3/mm3    Lymphocytes, Absolute 1.68 0.70 - 3.10 10*3/mm3    Monocytes, Absolute 0.55 0.10 - 0.90 10*3/mm3    Eosinophils, Absolute 0.30 0.00 - 0.40 10*3/mm3    Basophils, Absolute 0.06 0.00 - 0.20 10*3/mm3    Immature Grans, Absolute 0.01 0.00 - 0.05 10*3/mm3    nRBC 0.0 0.0 - 0.2 /100 WBC   Duplex venous lower extremity left    Collection Time: 07/09/21  7:27 PM   Result Value Ref Range    BSA 2.0 m^2     CV ECHO ABIODUN - BZI_BMI 32.3 kilograms/m^2     CV ECHO ABIODUN - BSA(HAYCOCK) 2.1 m^2     CV ECHO ABIODUN - BZI_METRIC_WEIGHT 90.7 kg    BH CV ECHO ABIODUN - BZI_METRIC_HEIGHT 167.6 cm    Target HR (85%) 142 bpm    Max. Pred. HR (100%) 167 bpm    Right Common Femoral Spont Y     Right Common Femoral Phasic Y     Right Common Femoral Augment Y     Right Common Femoral Compress C     Right Saphenofemoral Junction Spont Y     Right Saphenofemoral Junction Phasic Y     Right Saphenofemoral Junction Augment Y     Right Saphenofemoral Junction Compress C     Left Common Femoral Spont Y     Left Common Femoral Phasic Y     Left Common Femoral Augment Y     Left  "Common Femoral Compress C     Left Saphenofemoral Junction Spont Y     Left Saphenofemoral Junction Phasic Y     Left Saphenofemoral Junction Augment Y     Left Saphenofemoral Junction Compress C     Left Profunda Femoral Spont Y     Left Profunda Femoral Phasic Y     Left Profunda Femoral Augment Y     Left Proximal Femoral Spont Y     Left Proximal Femoral Phasic Y     Left Proximal Femoral Augment Y     Left Proximal Femoral Compress C     Left Mid Femoral Spont Y     Left Mid Femoral Phasic Y     Left Mid Femoral Augment Y     Left Mid Femoral Compress C     Left Distal Femoral Spont Y     Left Distal Femoral Phasic Y     Left Distal Femoral Augment Y     Left Distal Femoral Compress C     Left Popliteal Spont Y     Left Popliteal Phasic Y     Left Popliteal Augment Y     Left Popliteal Compress C     Left Posterior Tibial Augment Y     Left Posterior Tibial Compress C     Left Peroneal Compress P     Left Peroneal Thrombus C     Left GastronemiusSoleal Compress C     Left Greater Saph AK Compress C     Left Greater Saph BK Compress C     Left Lesser Saph Compress C      Note: In addition to lab results from this visit, the labs listed above may include labs taken at another facility or during a different encounter within the last 24 hours. Please correlate lab times with ED admission and discharge times for further clarification of the services performed during this visit.    XR Chest 1 View   Preliminary Result   1. Mild new pulmonary vascular congestion.   2. Small new left pleural effusion and minimal right effusion. Mild left   basilar discoid atelectasis.                 Vitals:    07/09/21 1659 07/09/21 1810 07/09/21 1939   BP: 138/82     BP Location: Left arm     Patient Position: Sitting     Pulse: 74 69 55   Resp: 18 18 18   Temp: 97 °F (36.1 °C)     TempSrc: Oral     SpO2: 99% 98% 98%   Weight: 90.7 kg (200 lb)     Height: 167.6 cm (66\")       Medications   sodium chloride 0.9 % flush 10 mL (has no " administration in time range)     ECG/EMG Results (last 24 hours)     Procedure Component Value Units Date/Time    ECG 12 Lead [[id number]] Collected: 07/09/21 1710     Updated: 07/09/21 1709        ECG 12 Lead   Final Result   Test Reason : SOA Protocol   Blood Pressure :   */*   mmHG   Vent. Rate :  69 BPM     Atrial Rate :  69 BPM      P-R Int : 144 ms          QRS Dur :  82 ms       QT Int : 410 ms       P-R-T Axes :  25   3  21 degrees      QTc Int : 439 ms      ** Poor data quality, interpretation may be adversely affected   Sinus rhythm with premature atrial complexes   Anterior infarct (cited on or before 30-JUN-2021)   Abnormal ECG   When compared with ECG of 30-JUN-2021 09:01,   premature atrial complexes are now present   Confirmed by SVETLANA CASTANEDA MD ([id number]) on 7/9/2021 6:54:59 PM      Referred By: LUIS A           Confirmed By: SVETLANA CASTANEDA MD                                                 [facility]    Final diagnoses:   Pain of left lower extremity   Deep vein thrombosis (DVT) of distal vein of left lower extremity, unspecified chronicity (CMS/HCC)   History of pulmonary embolus (PE)     DISCHARGE    Patient discharged in stable condition.    Reviewed implications of results, diagnosis, meds, responsibility to follow up, warning signs and symptoms of possible worsening, potential complications and reasons to return to ER.    Patient/Family voiced understanding of above instructions.    Discussed plan for discharge, as there is no emergent indication for admission.  Pt/family is agreeable and understands need for follow up and possible repeat testing.  Pt/family is aware that discharge does not mean that nothing is wrong but that it indicates no emergency is currently present that requires admission and they must continue care with follow-up as given below or with a physician of their choice.     FOLLOW-UP  Latricia Mishra, APRN  [address] [address]  [phone]    Schedule an  appointment as soon as possible for a visit in 2 days  If symptoms worsen         Medication List      Stop    traMADol 50 MG tablet  Commonly known as: ULTRAM            Documentation assistance provided by virgil Rossi.  Information recorded by the virgil was done at my direction and has been verified and validated by me.     Tony Rossi  07/09/21 1817       Diana Lr APRN  07/09/21 1951

## 2021-07-09 NOTE — DISCHARGE INSTRUCTIONS
Continue elevation to help control swelling.  Investigate the best option for compression hose, contacting your medical supply stores.  Continue your current medications and keep your anticipated appointment with Latricia Mishra next week to monitor your recovery.  Thank you and have a good weekend.

## 2021-07-10 LAB
BH CV ECHO MEAS - BSA(HAYCOCK): 2.1 M^2
BH CV ECHO MEAS - BSA: 2 M^2
BH CV ECHO MEAS - BZI_BMI: 32.3 KILOGRAMS/M^2
BH CV ECHO MEAS - BZI_METRIC_HEIGHT: 167.6 CM
BH CV ECHO MEAS - BZI_METRIC_WEIGHT: 90.7 KG
BH CV LOWER VASCULAR LEFT COMMON FEMORAL AUGMENT: NORMAL
BH CV LOWER VASCULAR LEFT COMMON FEMORAL COMPRESS: NORMAL
BH CV LOWER VASCULAR LEFT COMMON FEMORAL PHASIC: NORMAL
BH CV LOWER VASCULAR LEFT COMMON FEMORAL SPONT: NORMAL
BH CV LOWER VASCULAR LEFT DISTAL FEMORAL AUGMENT: NORMAL
BH CV LOWER VASCULAR LEFT DISTAL FEMORAL COMPRESS: NORMAL
BH CV LOWER VASCULAR LEFT DISTAL FEMORAL PHASIC: NORMAL
BH CV LOWER VASCULAR LEFT DISTAL FEMORAL SPONT: NORMAL
BH CV LOWER VASCULAR LEFT GASTRONEMIUS COMPRESS: NORMAL
BH CV LOWER VASCULAR LEFT GREATER SAPH AK COMPRESS: NORMAL
BH CV LOWER VASCULAR LEFT GREATER SAPH BK COMPRESS: NORMAL
BH CV LOWER VASCULAR LEFT LESSER SAPH COMPRESS: NORMAL
BH CV LOWER VASCULAR LEFT MID FEMORAL AUGMENT: NORMAL
BH CV LOWER VASCULAR LEFT MID FEMORAL COMPRESS: NORMAL
BH CV LOWER VASCULAR LEFT MID FEMORAL PHASIC: NORMAL
BH CV LOWER VASCULAR LEFT MID FEMORAL SPONT: NORMAL
BH CV LOWER VASCULAR LEFT PERONEAL COMPRESS: NORMAL
BH CV LOWER VASCULAR LEFT PERONEAL THROMBUS: NORMAL
BH CV LOWER VASCULAR LEFT POPLITEAL AUGMENT: NORMAL
BH CV LOWER VASCULAR LEFT POPLITEAL COMPRESS: NORMAL
BH CV LOWER VASCULAR LEFT POPLITEAL PHASIC: NORMAL
BH CV LOWER VASCULAR LEFT POPLITEAL SPONT: NORMAL
BH CV LOWER VASCULAR LEFT POSTERIOR TIBIAL AUGMENT: NORMAL
BH CV LOWER VASCULAR LEFT POSTERIOR TIBIAL COMPRESS: NORMAL
BH CV LOWER VASCULAR LEFT PROFUNDA FEMORAL AUGMENT: NORMAL
BH CV LOWER VASCULAR LEFT PROFUNDA FEMORAL PHASIC: NORMAL
BH CV LOWER VASCULAR LEFT PROFUNDA FEMORAL SPONT: NORMAL
BH CV LOWER VASCULAR LEFT PROXIMAL FEMORAL AUGMENT: NORMAL
BH CV LOWER VASCULAR LEFT PROXIMAL FEMORAL COMPRESS: NORMAL
BH CV LOWER VASCULAR LEFT PROXIMAL FEMORAL PHASIC: NORMAL
BH CV LOWER VASCULAR LEFT PROXIMAL FEMORAL SPONT: NORMAL
BH CV LOWER VASCULAR LEFT SAPHENOFEMORAL JUNCTION AUGMENT: NORMAL
BH CV LOWER VASCULAR LEFT SAPHENOFEMORAL JUNCTION COMPRESS: NORMAL
BH CV LOWER VASCULAR LEFT SAPHENOFEMORAL JUNCTION PHASIC: NORMAL
BH CV LOWER VASCULAR LEFT SAPHENOFEMORAL JUNCTION SPONT: NORMAL
BH CV LOWER VASCULAR RIGHT COMMON FEMORAL AUGMENT: NORMAL
BH CV LOWER VASCULAR RIGHT COMMON FEMORAL COMPRESS: NORMAL
BH CV LOWER VASCULAR RIGHT COMMON FEMORAL PHASIC: NORMAL
BH CV LOWER VASCULAR RIGHT COMMON FEMORAL SPONT: NORMAL
BH CV LOWER VASCULAR RIGHT SAPHENOFEMORAL JUNCTION AUGMENT: NORMAL
BH CV LOWER VASCULAR RIGHT SAPHENOFEMORAL JUNCTION COMPRESS: NORMAL
BH CV LOWER VASCULAR RIGHT SAPHENOFEMORAL JUNCTION PHASIC: NORMAL
BH CV LOWER VASCULAR RIGHT SAPHENOFEMORAL JUNCTION SPONT: NORMAL
MAXIMAL PREDICTED HEART RATE: 167 BPM
STRESS TARGET HR: 142 BPM

## 2022-01-09 LAB
ALBUMIN SERPL-MCNC: 4.1 G/DL (ref 3.5–5.2)
ALBUMIN/GLOB SERPL: 1.6 G/DL
ALP SERPL-CCNC: 80 U/L (ref 39–117)
ALT SERPL W P-5'-P-CCNC: 9 U/L (ref 1–33)
ANION GAP SERPL CALCULATED.3IONS-SCNC: 12 MMOL/L (ref 5–15)
AST SERPL-CCNC: 19 U/L (ref 1–32)
BASOPHILS # BLD AUTO: 0.07 10*3/MM3 (ref 0–0.2)
BASOPHILS NFR BLD AUTO: 0.9 % (ref 0–1.5)
BILIRUB SERPL-MCNC: 0.4 MG/DL (ref 0–1.2)
BUN SERPL-MCNC: 11 MG/DL (ref 6–20)
BUN/CREAT SERPL: 14.5 (ref 7–25)
CALCIUM SPEC-SCNC: 8.7 MG/DL (ref 8.6–10.5)
CHLORIDE SERPL-SCNC: 107 MMOL/L (ref 98–107)
CO2 SERPL-SCNC: 21 MMOL/L (ref 22–29)
CREAT SERPL-MCNC: 0.76 MG/DL (ref 0.57–1)
DEPRECATED RDW RBC AUTO: 41.6 FL (ref 37–54)
EOSINOPHIL # BLD AUTO: 0.2 10*3/MM3 (ref 0–0.4)
EOSINOPHIL NFR BLD AUTO: 2.5 % (ref 0.3–6.2)
ERYTHROCYTE [DISTWIDTH] IN BLOOD BY AUTOMATED COUNT: 12.7 % (ref 12.3–15.4)
GFR SERPL CREATININE-BSD FRML MDRD: 80 ML/MIN/1.73
GLOBULIN UR ELPH-MCNC: 2.5 GM/DL
GLUCOSE SERPL-MCNC: 90 MG/DL (ref 65–99)
HCT VFR BLD AUTO: 42.5 % (ref 34–46.6)
HGB BLD-MCNC: 14.6 G/DL (ref 12–15.9)
IMM GRANULOCYTES # BLD AUTO: 0.03 10*3/MM3 (ref 0–0.05)
IMM GRANULOCYTES NFR BLD AUTO: 0.4 % (ref 0–0.5)
LIPASE SERPL-CCNC: 75 U/L (ref 13–60)
LYMPHOCYTES # BLD AUTO: 2.37 10*3/MM3 (ref 0.7–3.1)
LYMPHOCYTES NFR BLD AUTO: 30.2 % (ref 19.6–45.3)
MCH RBC QN AUTO: 30.7 PG (ref 26.6–33)
MCHC RBC AUTO-ENTMCNC: 34.4 G/DL (ref 31.5–35.7)
MCV RBC AUTO: 89.5 FL (ref 79–97)
MONOCYTES # BLD AUTO: 0.75 10*3/MM3 (ref 0.1–0.9)
MONOCYTES NFR BLD AUTO: 9.6 % (ref 5–12)
NEUTROPHILS NFR BLD AUTO: 4.43 10*3/MM3 (ref 1.7–7)
NEUTROPHILS NFR BLD AUTO: 56.4 % (ref 42.7–76)
NRBC BLD AUTO-RTO: 0 /100 WBC (ref 0–0.2)
NT-PROBNP SERPL-MCNC: 61 PG/ML (ref 0–900)
PLATELET # BLD AUTO: 304 10*3/MM3 (ref 140–450)
PMV BLD AUTO: 10 FL (ref 6–12)
POTASSIUM SERPL-SCNC: 4.3 MMOL/L (ref 3.5–5.2)
PROT SERPL-MCNC: 6.6 G/DL (ref 6–8.5)
RBC # BLD AUTO: 4.75 10*6/MM3 (ref 3.77–5.28)
SODIUM SERPL-SCNC: 140 MMOL/L (ref 136–145)
TROPONIN T SERPL-MCNC: <0.01 NG/ML (ref 0–0.03)
WBC NRBC COR # BLD: 7.85 10*3/MM3 (ref 3.4–10.8)

## 2022-01-09 PROCEDURE — 83690 ASSAY OF LIPASE: CPT

## 2022-01-09 PROCEDURE — 81025 URINE PREGNANCY TEST: CPT | Performed by: STUDENT IN AN ORGANIZED HEALTH CARE EDUCATION/TRAINING PROGRAM

## 2022-01-09 PROCEDURE — 85379 FIBRIN DEGRADATION QUANT: CPT | Performed by: STUDENT IN AN ORGANIZED HEALTH CARE EDUCATION/TRAINING PROGRAM

## 2022-01-09 PROCEDURE — 83880 ASSAY OF NATRIURETIC PEPTIDE: CPT

## 2022-01-09 PROCEDURE — 85025 COMPLETE CBC W/AUTO DIFF WBC: CPT

## 2022-01-09 PROCEDURE — 80053 COMPREHEN METABOLIC PANEL: CPT

## 2022-01-09 PROCEDURE — 93005 ELECTROCARDIOGRAM TRACING: CPT | Performed by: STUDENT IN AN ORGANIZED HEALTH CARE EDUCATION/TRAINING PROGRAM

## 2022-01-09 PROCEDURE — G0378 HOSPITAL OBSERVATION PER HR: HCPCS

## 2022-01-09 PROCEDURE — 99285 EMERGENCY DEPT VISIT HI MDM: CPT

## 2022-01-09 PROCEDURE — 84484 ASSAY OF TROPONIN QUANT: CPT

## 2022-01-09 RX ORDER — ASPIRIN 81 MG/1
324 TABLET, CHEWABLE ORAL ONCE
Status: COMPLETED | OUTPATIENT
Start: 2022-01-09 | End: 2022-01-10

## 2022-01-09 RX ORDER — SODIUM CHLORIDE 0.9 % (FLUSH) 0.9 %
10 SYRINGE (ML) INJECTION AS NEEDED
Status: DISCONTINUED | OUTPATIENT
Start: 2022-01-09 | End: 2022-01-12 | Stop reason: HOSPADM

## 2022-01-10 ENCOUNTER — APPOINTMENT (OUTPATIENT)
Dept: CARDIOLOGY | Facility: HOSPITAL | Age: 52
End: 2022-01-10

## 2022-01-10 ENCOUNTER — APPOINTMENT (OUTPATIENT)
Dept: GENERAL RADIOLOGY | Facility: HOSPITAL | Age: 52
End: 2022-01-10

## 2022-01-10 ENCOUNTER — APPOINTMENT (OUTPATIENT)
Dept: CT IMAGING | Facility: HOSPITAL | Age: 52
End: 2022-01-10

## 2022-01-10 ENCOUNTER — HOSPITAL ENCOUNTER (OUTPATIENT)
Facility: HOSPITAL | Age: 52
Discharge: HOME | End: 2022-01-12
Attending: STUDENT IN AN ORGANIZED HEALTH CARE EDUCATION/TRAINING PROGRAM | Admitting: INTERNAL MEDICINE

## 2022-01-10 ENCOUNTER — APPOINTMENT (OUTPATIENT)
Dept: ULTRASOUND IMAGING | Facility: HOSPITAL | Age: 52
End: 2022-01-10

## 2022-01-10 DIAGNOSIS — R07.9 CHEST PAIN, UNSPECIFIED TYPE: ICD-10-CM

## 2022-01-10 DIAGNOSIS — Z12.11 SCREEN FOR COLON CANCER: Primary | ICD-10-CM

## 2022-01-10 DIAGNOSIS — Z86.711 HISTORY OF PULMONARY EMBOLISM: ICD-10-CM

## 2022-01-10 DIAGNOSIS — Z86.718 HISTORY OF DVT (DEEP VEIN THROMBOSIS): ICD-10-CM

## 2022-01-10 PROBLEM — I10 HYPERTENSION: Status: ACTIVE | Noted: 2022-01-10

## 2022-01-10 PROBLEM — G47.00 INSOMNIA: Status: ACTIVE | Noted: 2022-01-10

## 2022-01-10 LAB
ANION GAP SERPL CALCULATED.3IONS-SCNC: 6 MMOL/L (ref 5–15)
B-HCG UR QL: NEGATIVE
BASOPHILS # BLD AUTO: 0.05 10*3/MM3 (ref 0–0.2)
BASOPHILS NFR BLD AUTO: 1 % (ref 0–1.5)
BH CV NUCLEAR PRIOR STUDY: 3
BH CV REST NUCLEAR ISOTOPE DOSE: 29.9 MCI
BH CV STRESS BP STAGE 1: NORMAL
BH CV STRESS BP STAGE 4: NORMAL
BH CV STRESS COMMENTS STAGE 1: NORMAL
BH CV STRESS DOSE REGADENOSON STAGE 1: 0.4
BH CV STRESS DURATION MIN STAGE 1: 1
BH CV STRESS DURATION MIN STAGE 2: 1
BH CV STRESS DURATION MIN STAGE 3: 1
BH CV STRESS DURATION MIN STAGE 4: 1
BH CV STRESS DURATION SEC STAGE 1: 0
BH CV STRESS DURATION SEC STAGE 2: 0
BH CV STRESS DURATION SEC STAGE 3: 0
BH CV STRESS DURATION SEC STAGE 4: 0
BH CV STRESS HR STAGE 1: 94
BH CV STRESS HR STAGE 2: 90
BH CV STRESS HR STAGE 3: 86
BH CV STRESS HR STAGE 4: 85
BH CV STRESS NUCLEAR ISOTOPE DOSE: 29.9 MCI
BH CV STRESS O2 STAGE 1: 98
BH CV STRESS O2 STAGE 2: 100
BH CV STRESS O2 STAGE 3: 100
BH CV STRESS O2 STAGE 4: 100
BH CV STRESS PROTOCOL 1: NORMAL
BH CV STRESS RECOVERY BP: NORMAL MMHG
BH CV STRESS RECOVERY HR: 80 BPM
BH CV STRESS RECOVERY O2: 99 %
BH CV STRESS STAGE 1: 1
BH CV STRESS STAGE 2: 2
BH CV STRESS STAGE 3: 3
BH CV STRESS STAGE 4: 4
BUN SERPL-MCNC: 12 MG/DL (ref 6–20)
BUN/CREAT SERPL: 15.8 (ref 7–25)
CALCIUM SPEC-SCNC: 8.8 MG/DL (ref 8.6–10.5)
CHLORIDE SERPL-SCNC: 107 MMOL/L (ref 98–107)
CHOLEST SERPL-MCNC: 160 MG/DL (ref 0–200)
CO2 SERPL-SCNC: 26 MMOL/L (ref 22–29)
CREAT SERPL-MCNC: 0.76 MG/DL (ref 0.57–1)
CRP SERPL-MCNC: <0.3 MG/DL (ref 0–0.5)
D DIMER PPP FEU-MCNC: <0.27 MCGFEU/ML (ref 0–0.56)
DEPRECATED RDW RBC AUTO: 44.1 FL (ref 37–54)
EOSINOPHIL # BLD AUTO: 0.18 10*3/MM3 (ref 0–0.4)
EOSINOPHIL NFR BLD AUTO: 3.4 % (ref 0.3–6.2)
ERYTHROCYTE [DISTWIDTH] IN BLOOD BY AUTOMATED COUNT: 13 % (ref 12.3–15.4)
EXPIRATION DATE: NORMAL
FLUAV RNA RESP QL NAA+PROBE: NOT DETECTED
FLUBV RNA RESP QL NAA+PROBE: NOT DETECTED
GFR SERPL CREATININE-BSD FRML MDRD: 80 ML/MIN/1.73
GLUCOSE SERPL-MCNC: 91 MG/DL (ref 65–99)
HCT VFR BLD AUTO: 38.9 % (ref 34–46.6)
HDLC SERPL-MCNC: 82 MG/DL (ref 40–60)
HGB BLD-MCNC: 12.9 G/DL (ref 12–15.9)
HOLD SPECIMEN: NORMAL
IMM GRANULOCYTES # BLD AUTO: 0.01 10*3/MM3 (ref 0–0.05)
IMM GRANULOCYTES NFR BLD AUTO: 0.2 % (ref 0–0.5)
INTERNAL NEGATIVE CONTROL: NEGATIVE
INTERNAL POSITIVE CONTROL: POSITIVE
LDLC SERPL CALC-MCNC: 64 MG/DL (ref 0–100)
LDLC/HDLC SERPL: 0.78 {RATIO}
LV EF NUC BP: 76 %
LYMPHOCYTES # BLD AUTO: 2.23 10*3/MM3 (ref 0.7–3.1)
LYMPHOCYTES NFR BLD AUTO: 42.6 % (ref 19.6–45.3)
Lab: NORMAL
MAGNESIUM SERPL-MCNC: 2.4 MG/DL (ref 1.6–2.6)
MAXIMAL PREDICTED HEART RATE: 167 BPM
MCH RBC QN AUTO: 30.7 PG (ref 26.6–33)
MCHC RBC AUTO-ENTMCNC: 33.2 G/DL (ref 31.5–35.7)
MCV RBC AUTO: 92.6 FL (ref 79–97)
MONOCYTES # BLD AUTO: 0.6 10*3/MM3 (ref 0.1–0.9)
MONOCYTES NFR BLD AUTO: 11.5 % (ref 5–12)
NEUTROPHILS NFR BLD AUTO: 2.17 10*3/MM3 (ref 1.7–7)
NEUTROPHILS NFR BLD AUTO: 41.3 % (ref 42.7–76)
NRBC BLD AUTO-RTO: 0 /100 WBC (ref 0–0.2)
PERCENT MAX PREDICTED HR: 56.89 %
PLATELET # BLD AUTO: 244 10*3/MM3 (ref 140–450)
PMV BLD AUTO: 10.1 FL (ref 6–12)
POTASSIUM SERPL-SCNC: 4.7 MMOL/L (ref 3.5–5.2)
QT INTERVAL: 440 MS
QTC INTERVAL: 446 MS
RBC # BLD AUTO: 4.2 10*6/MM3 (ref 3.77–5.28)
RSV RNA NPH QL NAA+NON-PROBE: NOT DETECTED
SARS-COV-2 RNA RESP QL NAA+PROBE: NOT DETECTED
SODIUM SERPL-SCNC: 139 MMOL/L (ref 136–145)
STRESS BASELINE BP: NORMAL MMHG
STRESS BASELINE HR: 63 BPM
STRESS O2 SAT REST: 100 %
STRESS PERCENT HR: 67 %
STRESS POST ESTIMATED WORKLOAD: 1 METS
STRESS POST EXERCISE DUR MIN: 4 MIN
STRESS POST EXERCISE DUR SEC: 0 SEC
STRESS POST O2 SAT PEAK: 100 %
STRESS POST PEAK BP: NORMAL MMHG
STRESS POST PEAK HR: 95 BPM
STRESS TARGET HR: 142 BPM
T4 FREE SERPL-MCNC: 1.21 NG/DL (ref 0.93–1.7)
TRIGL SERPL-MCNC: 70 MG/DL (ref 0–150)
TROPONIN T SERPL-MCNC: <0.01 NG/ML (ref 0–0.03)
TROPONIN T SERPL-MCNC: <0.01 NG/ML (ref 0–0.03)
TSH SERPL DL<=0.05 MIU/L-ACNC: 9.57 UIU/ML (ref 0.27–4.2)
VLDLC SERPL-MCNC: 14 MG/DL (ref 5–40)
WBC NRBC COR # BLD: 5.24 10*3/MM3 (ref 3.4–10.8)
WHOLE BLOOD HOLD SPECIMEN: NORMAL
WHOLE BLOOD HOLD SPECIMEN: NORMAL

## 2022-01-10 PROCEDURE — 93306 TTE W/DOPPLER COMPLETE: CPT

## 2022-01-10 PROCEDURE — 78492 MYOCRD IMG PET MLT RST&STRS: CPT | Performed by: INTERNAL MEDICINE

## 2022-01-10 PROCEDURE — 96374 THER/PROPH/DIAG INJ IV PUSH: CPT

## 2022-01-10 PROCEDURE — G0378 HOSPITAL OBSERVATION PER HR: HCPCS

## 2022-01-10 PROCEDURE — 93017 CV STRESS TEST TRACING ONLY: CPT

## 2022-01-10 PROCEDURE — 93018 CV STRESS TEST I&R ONLY: CPT | Performed by: INTERNAL MEDICINE

## 2022-01-10 PROCEDURE — 76705 ECHO EXAM OF ABDOMEN: CPT

## 2022-01-10 PROCEDURE — 93005 ELECTROCARDIOGRAM TRACING: CPT | Performed by: STUDENT IN AN ORGANIZED HEALTH CARE EDUCATION/TRAINING PROGRAM

## 2022-01-10 PROCEDURE — 99220 PR INITIAL OBSERVATION CARE/DAY 70 MINUTES: CPT | Performed by: INTERNAL MEDICINE

## 2022-01-10 PROCEDURE — A9555 RB82 RUBIDIUM: HCPCS | Performed by: NURSE PRACTITIONER

## 2022-01-10 PROCEDURE — 96375 TX/PRO/DX INJ NEW DRUG ADDON: CPT

## 2022-01-10 PROCEDURE — 84439 ASSAY OF FREE THYROXINE: CPT | Performed by: INTERNAL MEDICINE

## 2022-01-10 PROCEDURE — 80061 LIPID PANEL: CPT | Performed by: NURSE PRACTITIONER

## 2022-01-10 PROCEDURE — 93005 ELECTROCARDIOGRAM TRACING: CPT | Performed by: NURSE PRACTITIONER

## 2022-01-10 PROCEDURE — 84443 ASSAY THYROID STIM HORMONE: CPT | Performed by: NURSE PRACTITIONER

## 2022-01-10 PROCEDURE — 85025 COMPLETE CBC W/AUTO DIFF WBC: CPT | Performed by: NURSE PRACTITIONER

## 2022-01-10 PROCEDURE — 25010000002 MORPHINE PER 10 MG: Performed by: INTERNAL MEDICINE

## 2022-01-10 PROCEDURE — 25010000002 REGADENOSON 0.4 MG/5ML SOLUTION: Performed by: NURSE PRACTITIONER

## 2022-01-10 PROCEDURE — 99204 OFFICE O/P NEW MOD 45 MIN: CPT | Performed by: INTERNAL MEDICINE

## 2022-01-10 PROCEDURE — 71046 X-RAY EXAM CHEST 2 VIEWS: CPT

## 2022-01-10 PROCEDURE — 0 IOPAMIDOL PER 1 ML: Performed by: STUDENT IN AN ORGANIZED HEALTH CARE EDUCATION/TRAINING PROGRAM

## 2022-01-10 PROCEDURE — 96376 TX/PRO/DX INJ SAME DRUG ADON: CPT

## 2022-01-10 PROCEDURE — 93010 ELECTROCARDIOGRAM REPORT: CPT | Performed by: INTERNAL MEDICINE

## 2022-01-10 PROCEDURE — 96361 HYDRATE IV INFUSION ADD-ON: CPT

## 2022-01-10 PROCEDURE — 78492 MYOCRD IMG PET MLT RST&STRS: CPT

## 2022-01-10 PROCEDURE — 87637 SARSCOV2&INF A&B&RSV AMP PRB: CPT | Performed by: STUDENT IN AN ORGANIZED HEALTH CARE EDUCATION/TRAINING PROGRAM

## 2022-01-10 PROCEDURE — 84484 ASSAY OF TROPONIN QUANT: CPT | Performed by: NURSE PRACTITIONER

## 2022-01-10 PROCEDURE — 25010000002 KETOROLAC TROMETHAMINE PER 15 MG: Performed by: STUDENT IN AN ORGANIZED HEALTH CARE EDUCATION/TRAINING PROGRAM

## 2022-01-10 PROCEDURE — 80048 BASIC METABOLIC PNL TOTAL CA: CPT | Performed by: NURSE PRACTITIONER

## 2022-01-10 PROCEDURE — 0 RUBIDIUM CHLORIDE: Performed by: NURSE PRACTITIONER

## 2022-01-10 PROCEDURE — 71275 CT ANGIOGRAPHY CHEST: CPT

## 2022-01-10 PROCEDURE — 93971 EXTREMITY STUDY: CPT

## 2022-01-10 PROCEDURE — 83735 ASSAY OF MAGNESIUM: CPT | Performed by: NURSE PRACTITIONER

## 2022-01-10 PROCEDURE — C9803 HOPD COVID-19 SPEC COLLECT: HCPCS | Performed by: STUDENT IN AN ORGANIZED HEALTH CARE EDUCATION/TRAINING PROGRAM

## 2022-01-10 PROCEDURE — 86140 C-REACTIVE PROTEIN: CPT | Performed by: INTERNAL MEDICINE

## 2022-01-10 PROCEDURE — 84484 ASSAY OF TROPONIN QUANT: CPT | Performed by: STUDENT IN AN ORGANIZED HEALTH CARE EDUCATION/TRAINING PROGRAM

## 2022-01-10 RX ORDER — FAMOTIDINE 20 MG/1
40 TABLET, FILM COATED ORAL DAILY
Status: DISCONTINUED | OUTPATIENT
Start: 2022-01-10 | End: 2022-01-12 | Stop reason: HOSPADM

## 2022-01-10 RX ORDER — ASPIRIN 325 MG
325 TABLET ORAL DAILY
Status: DISCONTINUED | OUTPATIENT
Start: 2022-01-10 | End: 2022-01-12 | Stop reason: HOSPADM

## 2022-01-10 RX ORDER — MORPHINE SULFATE 4 MG/ML
3 INJECTION, SOLUTION INTRAMUSCULAR; INTRAVENOUS ONCE
Status: COMPLETED | OUTPATIENT
Start: 2022-01-10 | End: 2022-01-10

## 2022-01-10 RX ORDER — ONDANSETRON 4 MG/1
4 TABLET, FILM COATED ORAL EVERY 6 HOURS PRN
Status: DISCONTINUED | OUTPATIENT
Start: 2022-01-10 | End: 2022-01-12 | Stop reason: HOSPADM

## 2022-01-10 RX ORDER — ACETAMINOPHEN 160 MG/5ML
650 SOLUTION ORAL EVERY 4 HOURS PRN
Status: DISCONTINUED | OUTPATIENT
Start: 2022-01-10 | End: 2022-01-12 | Stop reason: HOSPADM

## 2022-01-10 RX ORDER — SODIUM CHLORIDE 0.9 % (FLUSH) 0.9 %
10 SYRINGE (ML) INJECTION AS NEEDED
Status: DISCONTINUED | OUTPATIENT
Start: 2022-01-10 | End: 2022-01-12 | Stop reason: HOSPADM

## 2022-01-10 RX ORDER — LISINOPRIL 10 MG/1
10 TABLET ORAL DAILY
Status: DISCONTINUED | OUTPATIENT
Start: 2022-01-10 | End: 2022-01-12 | Stop reason: HOSPADM

## 2022-01-10 RX ORDER — LIDOCAINE HYDROCHLORIDE 20 MG/ML
15 SOLUTION OROPHARYNGEAL ONCE
Status: COMPLETED | OUTPATIENT
Start: 2022-01-10 | End: 2022-01-10

## 2022-01-10 RX ORDER — CAFFEINE CITRATE 20 MG/ML
60 SOLUTION INTRAVENOUS ONCE
Status: COMPLETED | OUTPATIENT
Start: 2022-01-10 | End: 2022-01-10

## 2022-01-10 RX ORDER — NITROGLYCERIN 0.4 MG/1
0.4 TABLET SUBLINGUAL
Status: DISCONTINUED | OUTPATIENT
Start: 2022-01-10 | End: 2022-01-12 | Stop reason: HOSPADM

## 2022-01-10 RX ORDER — ACETAMINOPHEN 650 MG/1
650 SUPPOSITORY RECTAL EVERY 4 HOURS PRN
Status: DISCONTINUED | OUTPATIENT
Start: 2022-01-10 | End: 2022-01-12 | Stop reason: HOSPADM

## 2022-01-10 RX ORDER — ONDANSETRON 2 MG/ML
4 INJECTION INTRAMUSCULAR; INTRAVENOUS EVERY 6 HOURS PRN
Status: DISCONTINUED | OUTPATIENT
Start: 2022-01-10 | End: 2022-01-12 | Stop reason: HOSPADM

## 2022-01-10 RX ORDER — KETOROLAC TROMETHAMINE 30 MG/ML
15 INJECTION, SOLUTION INTRAMUSCULAR; INTRAVENOUS ONCE
Status: COMPLETED | OUTPATIENT
Start: 2022-01-10 | End: 2022-01-10

## 2022-01-10 RX ORDER — MORPHINE SULFATE 2 MG/ML
1 INJECTION, SOLUTION INTRAMUSCULAR; INTRAVENOUS EVERY 4 HOURS PRN
Status: DISCONTINUED | OUTPATIENT
Start: 2022-01-10 | End: 2022-01-12 | Stop reason: HOSPADM

## 2022-01-10 RX ORDER — ACETAMINOPHEN 325 MG/1
650 TABLET ORAL EVERY 4 HOURS PRN
Status: DISCONTINUED | OUTPATIENT
Start: 2022-01-10 | End: 2022-01-12 | Stop reason: HOSPADM

## 2022-01-10 RX ORDER — SODIUM CHLORIDE 0.9 % (FLUSH) 0.9 %
10 SYRINGE (ML) INJECTION EVERY 12 HOURS SCHEDULED
Status: DISCONTINUED | OUTPATIENT
Start: 2022-01-10 | End: 2022-01-12 | Stop reason: HOSPADM

## 2022-01-10 RX ORDER — ZOLPIDEM TARTRATE 5 MG/1
5 TABLET ORAL NIGHTLY PRN
Status: DISCONTINUED | OUTPATIENT
Start: 2022-01-10 | End: 2022-01-12 | Stop reason: HOSPADM

## 2022-01-10 RX ORDER — CHOLECALCIFEROL (VITAMIN D3) 125 MCG
5 CAPSULE ORAL NIGHTLY PRN
Status: DISCONTINUED | OUTPATIENT
Start: 2022-01-10 | End: 2022-01-12 | Stop reason: HOSPADM

## 2022-01-10 RX ORDER — OLANZAPINE 5 MG/1
10 TABLET ORAL NIGHTLY
Status: DISCONTINUED | OUTPATIENT
Start: 2022-01-10 | End: 2022-01-12 | Stop reason: HOSPADM

## 2022-01-10 RX ADMIN — MORPHINE SULFATE 1 MG: 2 INJECTION, SOLUTION INTRAMUSCULAR; INTRAVENOUS at 08:33

## 2022-01-10 RX ADMIN — REGADENOSON 0.4 MG: 0.08 INJECTION, SOLUTION INTRAVENOUS at 13:18

## 2022-01-10 RX ADMIN — CAFFEINE CITRATE 60 MG: 20 INJECTION, SOLUTION INTRAVENOUS at 13:40

## 2022-01-10 RX ADMIN — SODIUM CHLORIDE 1000 ML: 9 INJECTION, SOLUTION INTRAVENOUS at 01:09

## 2022-01-10 RX ADMIN — MORPHINE SULFATE 1 MG: 2 INJECTION, SOLUTION INTRAMUSCULAR; INTRAVENOUS at 21:18

## 2022-01-10 RX ADMIN — APIXABAN 5 MG: 5 TABLET, FILM COATED ORAL at 08:33

## 2022-01-10 RX ADMIN — MORPHINE SULFATE 3 MG: 4 INJECTION, SOLUTION INTRAMUSCULAR; INTRAVENOUS at 05:00

## 2022-01-10 RX ADMIN — OLANZAPINE 10 MG: 5 TABLET, FILM COATED ORAL at 21:18

## 2022-01-10 RX ADMIN — ASPIRIN 324 MG: 81 TABLET, CHEWABLE ORAL at 00:32

## 2022-01-10 RX ADMIN — SODIUM CHLORIDE, PRESERVATIVE FREE 10 ML: 5 INJECTION INTRAVENOUS at 08:34

## 2022-01-10 RX ADMIN — RUBIDIUM CHLORIDE RB-82 1 DOSE: 150 INJECTION, SOLUTION INTRAVENOUS at 13:20

## 2022-01-10 RX ADMIN — RUBIDIUM CHLORIDE RB-82 1 DOSE: 150 INJECTION, SOLUTION INTRAVENOUS at 13:08

## 2022-01-10 RX ADMIN — ZOLPIDEM TARTRATE 5 MG: 5 TABLET ORAL at 21:19

## 2022-01-10 RX ADMIN — APIXABAN 5 MG: 5 TABLET, FILM COATED ORAL at 21:18

## 2022-01-10 RX ADMIN — FAMOTIDINE 40 MG: 20 TABLET, FILM COATED ORAL at 08:33

## 2022-01-10 RX ADMIN — LISINOPRIL 10 MG: 10 TABLET ORAL at 08:33

## 2022-01-10 RX ADMIN — ALUMINUM HYDROXIDE, MAGNESIUM HYDROXIDE, AND DIMETHICONE: 400; 400; 40 SUSPENSION ORAL at 01:02

## 2022-01-10 RX ADMIN — ASPIRIN 325 MG ORAL TABLET 325 MG: 325 PILL ORAL at 08:33

## 2022-01-10 RX ADMIN — IOPAMIDOL 80 ML: 755 INJECTION, SOLUTION INTRAVENOUS at 03:24

## 2022-01-10 RX ADMIN — MORPHINE SULFATE 1 MG: 2 INJECTION, SOLUTION INTRAMUSCULAR; INTRAVENOUS at 14:58

## 2022-01-10 RX ADMIN — LIDOCAINE HYDROCHLORIDE 15 ML: 20 SOLUTION ORAL; TOPICAL at 01:03

## 2022-01-10 RX ADMIN — SODIUM CHLORIDE, PRESERVATIVE FREE 10 ML: 5 INJECTION INTRAVENOUS at 21:19

## 2022-01-10 RX ADMIN — KETOROLAC TROMETHAMINE 15 MG: 30 INJECTION, SOLUTION INTRAMUSCULAR at 01:07

## 2022-01-10 RX ADMIN — NITROGLYCERIN 1 INCH: 20 OINTMENT TOPICAL at 04:30

## 2022-01-10 NOTE — H&P
[facility] Medicine Services  HISTORY AND PHYSICAL    Patient Name: Kamila Garcia  : 1970  MRN: [id number]  Primary Care Physician: Latricia Mishra APRN  Date of admission: 1/10/2022    Subjective   Subjective     Chief Complaint:  Chest pain    HPI:  Kamila Garcia is a 51 y.o. female with medical history significant for insomnia, HTN, LLE DVT and PE (dx 21) on Eliquis who presents to the ED tonight for evaluation of chest pain. She describes acute onset of left sided chest pain that is radiating down her left arm and is similar to the pain she had when she previously was diagnosed with a PE. She denies injury, trauma or overuse of the left arm. The pain is not reproducible. The pain only minimally improved with a dose of toradol in the ED. CTa of the chest obtained and was negative. She does have some swelling in the LLE that has been present since her blood clot in , it comes and goes. There is some shortness of breath which began with the chest pain. Otherwise she has no acute complaints, no recent illnesses. No fever or chills, no palpitations, syncope. In the ED tonight her blood pressures has been 150-170/90, she is not sure what her BP runs at home. She does take lisinopril daily and has been compliant with her Eliquis.     COVID Details:        Symptoms: [] NONE [] Fever []  Cough [x] Shortness of breath [] Change in taste or smell  The patient has a COVID HM Topic on their chart, and they are fully vaccinated.    Covid Tests    Common Labsle 1/10/22   COVID19 Not Detected           Review of Systems   Respiratory: Positive for chest tightness and shortness of breath. Negative for wheezing.    Cardiovascular: Positive for chest pain. Negative for palpitations.   All other systems reviewed and are negative.     All other systems reviewed and are negative.     Personal History     Past Medical History:   Diagnosis Date   • DVT (deep venous thrombosis) (HCC)     • History of pulmonary embolus (PE)    • Hypertension        Past Surgical History:   Procedure Laterality Date   • GASTRIC BYPASS         Family History:  family history includes Heart attack in her mother. Otherwise pertinent FHx was reviewed and unremarkable.     Social History:  reports that she has never smoked. She has never used smokeless tobacco. She reports current alcohol use of about 2.0 standard drinks of alcohol per week. She reports that she does not use drugs.  Social History     Social History Narrative   • Not on file       Medications:  OLANZapine, apixaban, eszopiclone, lisinopril, and ondansetron ODT    Allergies   Allergen Reactions   • Hydrocodone Hives       Objective   Objective     Vital Signs:   Temp:  [98.9 °F (37.2 °C)] 98.9 °F (37.2 °C)  Heart Rate:  [62-80] 68  Resp:  [20] 20  BP: (147-171)/(62-95) 167/62    Physical Exam  Constitutional:       General: She is not in acute distress.     Appearance: She is not toxic-appearing.   HENT:      Head: Normocephalic and atraumatic.      Nose: Nose normal.      Mouth/Throat:      Mouth: Mucous membranes are moist.      Pharynx: Oropharynx is clear.   Eyes:      Extraocular Movements: Extraocular movements intact.      Pupils: Pupils are equal, round, and reactive to light.   Cardiovascular:      Rate and Rhythm: Normal rate. Rhythm irregular.      Pulses: Normal pulses.      Heart sounds: Normal heart sounds. No murmur heard.       Comments: Appears to be sinus on the monitor w/ frequent PAC's  Pulmonary:      Effort: Pulmonary effort is normal.      Breath sounds: Normal breath sounds.   Abdominal:      General: Bowel sounds are normal. There is no distension.      Palpations: Abdomen is soft.      Tenderness: There is no abdominal tenderness. There is no guarding.   Musculoskeletal:      Cervical back: Normal range of motion and neck supple.      Left lower le+ Edema present.   Skin:     General: Skin is warm and dry.      Capillary  Refill: Capillary refill takes less than 2 seconds.   Neurological:      General: No focal deficit present.      Mental Status: She is alert.   Psychiatric:         Attention and Perception: Attention and perception normal.         Mood and Affect: Affect is flat.         Speech: Speech normal.         Behavior: Behavior normal. Behavior is cooperative.         Thought Content: Thought content normal.         Cognition and Memory: Cognition normal.         Judgment: Judgment normal.          Result Review:  I have personally reviewed the results from the time of this admission to 01/10/22 4:02 AM EST and agree with these findings:  [x]  Laboratory  []  Microbiology  [x]  Radiology  [x]  EKG/Telemetry   []  Cardiology/Vascular   []  Pathology  [x]  Old records  []  Other:  Most notable findings include:     LAB RESULTS:      Lab 01/10/22  0053 01/09/22  2259   WBC  --  7.85   HEMOGLOBIN  --  14.6   HEMATOCRIT  --  42.5   PLATELETS  --  304   NEUTROS ABS  --  4.43   IMMATURE GRANS (ABS)  --  0.03   LYMPHS ABS  --  2.37   MONOS ABS  --  0.75   EOS ABS  --  0.20   MCV  --  89.5   CRP <0.30  --    D DIMER QUANT  --  <0.27         Lab 01/09/22 2259   SODIUM 140   POTASSIUM 4.3   CHLORIDE 107   CO2 21.0*   ANION GAP 12.0   BUN 11   CREATININE 0.76   GLUCOSE 90   CALCIUM 8.7         Lab 01/09/22 2259   TOTAL PROTEIN 6.6   ALBUMIN 4.10   GLOBULIN 2.5   ALT (SGPT) 9   AST (SGOT) 19   BILIRUBIN 0.4   ALK PHOS 80   LIPASE 75*         Lab 01/10/22  0053 01/09/22  2259   PROBNP  --  61.0   TROPONIN T <0.010 <0.010                   Microbiology Results (last 10 days)     Procedure Component Value - Date/Time    COVID PRE-OP / PRE-PROCEDURE SCREENING ORDER (NO ISOLATION) - Swab, Nasopharynx [[id number]]  (Normal) Collected: 01/10/22 0031    Lab Status: Final result Specimen: Swab from Nasopharynx Updated: 01/10/22 0147    Narrative:      The following orders were created for panel order COVID PRE-OP / PRE-PROCEDURE SCREENING  ORDER (NO ISOLATION) - Swab, Nasopharynx.  Procedure                               Abnormality         Status                     ---------                               -----------         ------                     COVID-19, FLU A/B, RSV P...[[id number]]  Normal              Final result                 Please view results for these tests on the individual orders.    COVID-19, FLU A/B, RSV PCR - Swab, Nasopharynx [[id number]]  (Normal) Collected: 01/10/22 0031    Lab Status: Final result Specimen: Swab from Nasopharynx Updated: 01/10/22 0147     COVID19 Not Detected     Influenza A PCR Not Detected     Influenza B PCR Not Detected     RSV, PCR Not Detected          CT Chest Pulmonary Embolism    Result Date: 1/10/2022  CT CHEST PULMONARY EMBOLISM W CONTRAST INDICATION: Severe left-sided chest pain. TECHNIQUE: CT angiogram of the chest with IV contrast. 3-D reconstructions were obtained and reviewed.   Radiation dose reduction techniques included automated exposure control or exposure modulation based on body size. Count of known CT and cardiac nuc med studies performed in previous 12 months: 1. COMPARISON: 6/30/2021 FINDINGS: No acute pulmonary embolism or aortic dissection is seen. There is no pleural or pericardial effusion. There is no adenopathy. Upper abdominal images show changes of gastric bypass. The lungs are clear except for some minimal dependent atelectasis in the lower lobes. No CT findings present to indicate pneumonia. Note: CT may be negative in the earliest stages of COVID-19. No pneumothorax.     Impression: 1. No pulmonary embolism or aortic dissection. 2. Negative for pneumonia. Signer Name: Peter Beverly MD  Signed: 1/10/2022 3:41 AM  Workstation Name: [email or web address]    XR Chest PA & Lateral    Result Date: 1/10/2022  CR Chest 2 Vws INDICATION:  Chest pain. COMPARISON:  7/9/2021 FINDINGS: PA and lateral views of the chest.  Heart and mediastinal contours are  normal. The lungs are clear. No pneumothorax or pleural effusion.      Impression: No acute cardiopulmonary findings. Signer Name: Peter Beverly MD  Signed: 1/10/2022 12:19 AM  Workstation Name: [email or web address]  [facility] [place]          Assessment/Plan   Assessment & Plan       Chest pain    History of DVT (deep vein thrombosis)    History of pulmonary embolism    Insomnia    Hypertension    Chest pain  - was given aspirin in ED, continue daily aspirin  - given toradol, GI cocktail for symptoms w/o relief  - check tsh, mag d/t frequent PAC's on my exam  - ECHO in am  - trend troponin  - repeat EKG in am  - place 1 inch NTP on chest wall x 1 now  - consider cardiology consult if symptoms persist    HTN  - continue lisinopril    History of LLE DVT / PE on Eliquis  - CTa negative   - continue Eliquis  - venous duplex LLE    Insomnia  - taking zyprexa and lunesta at night  - continue zyprexa  - melatonin prn    DVT prophylaxis:  SCDS    CODE STATUS:    Code Status (Patient has no pulse and is not breathing): CPR (Attempt to Resuscitate)  Medical Interventions (Patient has pulse or is breathing): Full Support      This note has been completed as part of a split-shared workflow.   Signature: Electronically signed by RAFFY Warren, 01/10/22, 4:27 AM EST          Attending   Admission Attestation       I have seen and examined the patient, performing an independent face-to-face diagnostic evaluation with plan of care reviewed and developed with the advanced practice clinician (APC).      Brief Summary Statement:   Kamila Garcia is a 51 y.o. female with past medical of left lower extremity DVT/PE in June 2021, insomnia, essential hypertension, who presents to the ER with complaints of acute onset of left-sided chest pain.    Patient reports acute onset of left-sided chest pain tonight with radiation down her left arm.  She reports this pain is similar to when she had a PE in June and became very  concerned for this.  She reports compliance with her Eliquis but still was concerned as the pain was persisting.  She reports minimal improvement with Toradol, no effect with GI cocktail.  Reports the pain is constant.  Rated 8 out of 10 in severity.  No alleviating factors.  CTA without evidence of new PE or dissection.  Patient noted to have elevated blood pressure in the ER.  Reports some associated shortness of breath but no other acute complaints.  EKG without any acute ischemic changes, troponin negative x2.  Despite multiple interventions, patient continues to have persistent chest pain.    Remainder of detailed HPI is as noted by APC and has been reviewed and/or edited by me for completeness.    Attending Physical Exam:  Constitutional: Awake, alert, appears uncomfortable, anxious  Eyes: PERRLA, sclerae anicteric, no conjunctival injection  HENT: NCAT, mucous membranes moist  Neck: Supple, no thyromegaly, no lymphadenopathy, trachea midline  Respiratory: Clear to auscultation bilaterally, nonlabored respirations   Cardiovascular: RRR, no murmurs, rubs, or gallops, palpable pedal pulses bilaterally  Gastrointestinal: Positive bowel sounds, soft, nontender, nondistended  Musculoskeletal: No bilateral ankle edema, no clubbing or cyanosis to extremities  Psychiatric: Anxious affect, cooperative  Neurologic: Oriented x 3, strength symmetric in all extremities, Cranial Nerves grossly intact to confrontation, speech clear  Skin: No rashes      Brief Assessment/Plan :  See detailed assessment and plan developed with APC which I have reviewed and/or edited for completeness.        Admission Status: I believe that this patient meets OBSERVATION status, however if further evaluation or treatment plans warrant, status may change.  Based upon current information, I predict patient's care encounter to be less than or equal to 2 midnights.        Jamshid Gramajo,   01/10/22

## 2022-01-10 NOTE — PROGRESS NOTES
[facility] Medicine Services  PROGRESS NOTE    Patient Name: Kamila Garcia  : 1970  MRN: [id number]    Date of Admission: 1/10/2022  Primary Care Physician: Latricia Mishra APRN    Subjective   Subjective     CC:  Chest pain    HPI:  Pt states that morphine and NTG paste really helped her chest pain- had recurrence when PRN morphine wasn't ordered but now feels better after having this restarted. Spouse Vianca is at bedside.     ROS:  Gen- No fevers, chills  CV- No chest pain, palpitations  Resp- No cough, dyspnea  GI- No N/V/D, abd pain        Objective   Objective     Vital Signs:   Temp:  [98.9 °F (37.2 °C)] 98.9 °F (37.2 °C)  Heart Rate:  [61-80] 61  Resp:  [18-20] 18  BP: (131-171)/(59-96) 131/59     Physical Exam:  Constitutional: No acute distress, awake, alert  HENT: NCAT, mucous membranes moist  Respiratory: Clear to auscultation bilaterally, respiratory effort normal   Cardiovascular: RRR, no murmurs, rubs, or gallops  Gastrointestinal: Positive bowel sounds, soft, nontender, nondistended  Musculoskeletal: No bilateral ankle edema  Psychiatric: Appropriate affect, cooperative  Neurologic: Oriented x 3, strength symmetric in all extremities, Cranial Nerves grossly intact to confrontation, speech clear  Skin: No rashes    Results Reviewed:  LAB RESULTS:      Lab 01/10/22  0053 01/09/22  2259   WBC  --  7.85   HEMOGLOBIN  --  14.6   HEMATOCRIT  --  42.5   PLATELETS  --  304   NEUTROS ABS  --  4.43   IMMATURE GRANS (ABS)  --  0.03   LYMPHS ABS  --  2.37   MONOS ABS  --  0.75   EOS ABS  --  0.20   MCV  --  89.5   CRP <0.30  --    D DIMER QUANT  --  <0.27         Lab 01/10/22  0053 01/09/22  2259   SODIUM  --  140   POTASSIUM  --  4.3   CHLORIDE  --  107   CO2  --  21.0*   ANION GAP  --  12.0   BUN  --  11   CREATININE  --  0.76   GLUCOSE  --  90   CALCIUM  --  8.7   MAGNESIUM 2.4  --    TSH 9.570*  --          Lab 22   TOTAL PROTEIN 6.6   ALBUMIN 4.10   GLOBULIN  2.5   ALT (SGPT) 9   AST (SGOT) 19   BILIRUBIN 0.4   ALK PHOS 80   LIPASE 75*         Lab 01/10/22  0053 01/09/22 2259   PROBNP  --  61.0   TROPONIN T <0.010 <0.010                 Brief Urine Lab Results  (Last result in the past 365 days)      Color   Clarity   Blood   Leuk Est   Nitrite   Protein   CREAT   Urine HCG        01/10/22 0052               Negative             Microbiology Results Abnormal     Procedure Component Value - Date/Time    COVID PRE-OP / PRE-PROCEDURE SCREENING ORDER (NO ISOLATION) - Swab, Nasopharynx [[id number]]  (Normal) Collected: 01/10/22 0031    Lab Status: Final result Specimen: Swab from Nasopharynx Updated: 01/10/22 0147    Narrative:      The following orders were created for panel order COVID PRE-OP / PRE-PROCEDURE SCREENING ORDER (NO ISOLATION) - Swab, Nasopharynx.  Procedure                               Abnormality         Status                     ---------                               -----------         ------                     COVID-19, FLU A/B, RSV P...[[id number]]  Normal              Final result                 Please view results for these tests on the individual orders.    COVID-19, FLU A/B, RSV PCR - Swab, Nasopharynx [[id number]]  (Normal) Collected: 01/10/22 0031    Lab Status: Final result Specimen: Swab from Nasopharynx Updated: 01/10/22 0147     COVID19 Not Detected     Influenza A PCR Not Detected     Influenza B PCR Not Detected     RSV, PCR Not Detected          CT Chest Pulmonary Embolism    Result Date: 1/10/2022  CT CHEST PULMONARY EMBOLISM W CONTRAST INDICATION: Severe left-sided chest pain. TECHNIQUE: CT angiogram of the chest with IV contrast. 3-D reconstructions were obtained and reviewed.   Radiation dose reduction techniques included automated exposure control or exposure modulation based on body size. Count of known CT and cardiac nuc med studies performed in previous 12 months: 1. COMPARISON: 6/30/2021 FINDINGS: No acute pulmonary embolism or  aortic dissection is seen. There is no pleural or pericardial effusion. There is no adenopathy. Upper abdominal images show changes of gastric bypass. The lungs are clear except for some minimal dependent atelectasis in the lower lobes. No CT findings present to indicate pneumonia. Note: CT may be negative in the earliest stages of COVID-19. No pneumothorax.     Impression: 1. No pulmonary embolism or aortic dissection. 2. Negative for pneumonia. Signer Name: Peter Beverly MD  Signed: 1/10/2022 3:41 AM  Workstation Name: [facility]    XR Chest PA & Lateral    Result Date: 1/10/2022  CR Chest 2 Vws INDICATION:  Chest pain. COMPARISON:  7/9/2021 FINDINGS: PA and lateral views of the chest.  Heart and mediastinal contours are normal. The lungs are clear. No pneumothorax or pleural effusion.      Impression: No acute cardiopulmonary findings. Signer Name: Peter Beverly MD  Signed: 1/10/2022 12:19 AM  Workstation Name: [facility]          I have reviewed the medications:  Scheduled Meds:   Continuous Infusions:   PRN Meds:.•  sodium chloride    Assessment/Plan   Assessment & Plan     Active Hospital Problems    Diagnosis  POA   • Chest pain [R07.9]  Yes   • History of DVT (deep vein thrombosis) [Z86.718]  Not Applicable   • History of pulmonary embolism [Z86.711]  Yes   • Insomnia [G47.00]  Yes   • Hypertension [I10]  Yes      Resolved Hospital Problems   No resolved problems to display.        Brief Hospital Course to date:  Kamila Garcia is a 51 y.o. female with past medical of left lower extremity DVT/PE in June 2021, insomnia, essential hypertension, who presented to the ER with complaints of acute onset of left-sided chest pain.    Plan:    Chest pain  - was given aspirin in ED, continue daily aspirin  - given toradol, GI cocktail for symptoms w/o relief  -- TSH elevated, will check FT4 as not on thyroid medicaitons  -- Mag and K+ wnl    -- TTE PENDING  -- trend troponin- negative x 2 thus far  -- repeat EKG PENDING   -- NTG paste in place  -- PRN Morphine  -- cardiology consulted      HTN  -- continue lisinopril     History of LLE DVT / PE on Eliquis  -- CTA negative   - continue Eliquis     Insomnia  - taking zyprexa and lunesta at night, continue. Have substituted Ambien for Lunesta due to formulary options  - melatonin prn    DVT prophylaxis:  No DVT prophylaxis order currently exists.            Disposition: I expect the patient to be discharged home tomorrow    CODE STATUS:   Code Status and Medical Interventions:   Ordered at: 01/10/22 0427     Code Status (Patient has no pulse and is not breathing):    CPR (Attempt to Resuscitate)     Medical Interventions (Patient has pulse or is breathing):    Full Support       Vanessa Bang MD  01/10/22

## 2022-01-10 NOTE — CASE MANAGEMENT/SOCIAL WORK
Discharge Planning Assessment  [facility]     Patient Name: Kamila Garcia  MRN: [id number]  Today's Date: 1/10/2022    Admit Date: 1/10/2022     Discharge Needs Assessment     Row Name 01/10/22 1520       Living Environment    Lives With spouse    Name(s) of Who Lives With Patient Wife Vianca Garcia    Current Living Arrangements home/apartment/condo    Primary Care Provided by self    Provides Primary Care For no one    Family Caregiver if Needed spouse    Quality of Family Relationships helpful; involved; supportive    Able to Return to Prior Arrangements yes       Resource/Environmental Concerns    Resource/Environmental Concerns none       Transition Planning    Patient/Family Anticipates Transition to home with family    Patient/Family Anticipated Services at Transition none    Transportation Anticipated family or friend will provide       Discharge Needs Assessment    Readmission Within the Last 30 Days no previous admission in last 30 days    Equipment Currently Used at Home none    Concerns to be Addressed no discharge needs identified    Anticipated Changes Related to Illness none    Equipment Needed After Discharge none               Discharge Plan     Row Name 01/10/22 1521       Plan    Plan Home    Patient/Family in Agreement with Plan yes    Plan Comments   I met with Ms. Garcia today at the bedside to discuss discharge planning. Ms. Garcia lives in [facility] [place] with her wife Vianca. She is independent and works full time. Ms. Garcia denies the use of any DME or therapy. Vianca will transport her home via private vehicle at the time of discharge.        Final Discharge Disposition Code 01 - home or self-care              Demographic Summary     Row Name 01/10/22 1518       General Information    General Information Comments I confirmed Ms. Garcia has [employer].   Primary provider is Latricia AKBAR.   Vaccinated for Covid 19 with booster.   No  advanced directive or living will.               Functional Status     Row Name 01/10/22 1519       Functional Status    Current Activity Tolerance excellent       Functional Status, IADL    Medications independent    Meal Preparation independent    Housekeeping independent    Laundry independent    Shopping independent       Mental Status    General Appearance WDL WDL       Mental Status Summary    Recent Changes in Mental Status/Cognitive Functioning no changes       Employment/    Employment Status employed full-time    Current or Previous Occupation traveling/driving                    Jennifer Ricks RN

## 2022-01-10 NOTE — CONSULTS
Kamila Garcia  [id number]  1970   LOS: 0 days   Patient Care Team:  HEALTHCARE PROVIDER: RAFFY Neil   VASCULAR SURGEON: Lucas Franco MD    Ms. 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    Chief Complaint:  Chest pain    Problem List:  1. Chest pain  a. [facility] 2 day hospitalization for chest pain/SOB, CTA positive for PE, venous duplex with LLE DVT July 2021  b. [facility] January 2022 with CTA chest negative for PE, troponins negative x2, ECG with no acute changes, chest x-ray negative  c. CCS  II-III angina/NYHA class I-II ALONZO symptoms  2. Hypertension  3. Moderate obesity: BMI 32.59  4. Elevated TSH  5. Elevated lipase  6. History of LLE DVT/PE July 2021, chronically anticoagulated with Eliquis, followed by LLE stent October 2021-data deficit  7. Surgical history:   a. Gastric bypass 2007  b. LLE stent       Allergies   Allergen Reactions   • Hydrocodone Hives     Medications Prior to Admission   Medication Sig Dispense Refill Last Dose   • apixaban (ELIQUIS) 5 MG tablet tablet Start On 7/8: Take 1 tablet by mouth Every 12 (Twelve) Hours. 60 tablet 0    • eszopiclone (Lunesta) 3 MG tablet Take 3 mg by mouth Every Night. Take immediately before bedtime      • lisinopril (PRINIVIL,ZESTRIL) 10 MG tablet Take 10 mg by mouth Daily.      • OLANZapine (zyPREXA) 10 MG tablet Take 10 mg by mouth Every Night.      • ondansetron ODT (Zofran ODT) 4 MG disintegrating tablet Place 1 tablet on the tongue Every 8 (Eight) Hours As Needed for Nausea or Vomiting. 30 tablet 0      Scheduled Meds:apixaban, 5 mg, Oral, Q12H  aspirin, 325 mg, Oral, Daily  famotidine, 40 mg, Oral, Daily  lisinopril, 10 mg, Oral, Daily  OLANZapine, 10 mg, Oral, Nightly  sodium chloride, 10 mL, Intravenous, Q12H           History of Present Illness:   This is a 51 year old white female who presents to [facility] ED 1/09/2022 for left sided chest pain with radiation to  her left arm and left side, similar to when she previously had a PE.  She felt like something was sitting on her chest.  She was not doing any activity at the time of developing chest pain.  She had associated mild shortness of breath and a few palpitations and lightheadedness.  She did not have any syncopal episodes and denies any nausea or vomiting.  Her chest x-ray showed no acute cardiopulmonary findings and CTA of the chest negative for PE.  The patient is vaccinated for COVID.  She was given aspirin, Toradol,morphine,  and a GI cocktail, and Nitropaste in the ED.  Troponins have been negative x2, proBNP and D-dimer WNL, TSH 9.57, lipase 75.  The patient had a left lower extremity DVT and PE in July 2021.  She had had a sprained ankle and had been off of her feet for 3 days and is unsure why she developed the blood clots and has not had any genetic testing for blood disorders.  She had a stent placed in her left lower extremity in October 2021 with Dr. Franco.  The patient has never had a stress test, heart catheterizations, or heart monitors in the past.  Remotely she was told that she had a heart murmur.  She denies any CVAs, TIAs, seizures, arrhythmias, COPD, asthma, or claudication.  Her TSH was elevated on admission but she had not been told this in the past.  Patient's mother had a heart attack in her 50s and her father had a stroke in his 60s.  The patient denies any hyperlipidemia or diabetes mellitus.  She had hypertension before she had her gastric bypass in 2007.  It improved after her weight loss but she still is on antihypertensive medication.  She has never been a smoker.  Her chest pain has improved with morphine and nitroglycerin. Her blood pressure at home is normally around 120 systolic.    Cardiac risk factors: hypertension, obesity (BMI >= 30 kg/m2) and sedentary lifestyle.    Social History     Socioeconomic History   • Marital status:    Tobacco Use   • Smoking status: Never  "Smoker   • Smokeless tobacco: Never Used   Vaping Use   • Vaping Use: Never used   Substance and Sexual Activity   • Alcohol use: Yes     Alcohol/week: 2.0 standard drinks     Types: 2 Cans of beer per week   • Drug use: Never   • Sexual activity: Defer     Family History   Problem Relation Age of Onset   • Heart attack Mother        Review of Systems  10 point review of systems was completed, positives outlined in the HPI, and otherwise all other systems are negative.      Objective:       Physical Exam  /74 (BP Location: Right arm, Patient Position: Lying)   Pulse 74   Temp 98.9 °F (37.2 °C) (Oral)   Resp 17   Ht 167.6 cm (66\")   Wt 90.7 kg (200 lb)   SpO2 97%   BMI 32.28 kg/m²       01/09/22 2239   Weight: 90.7 kg (200 lb)     Body mass index is 32.28 kg/m².    Intake/Output Summary (Last 24 hours) at 1/10/2022 0902  Last data filed at 1/10/2022 0420  Gross per 24 hour   Intake 1000 ml   Output --   Net 1000 ml       General Appearance:  Alert, cooperative, no distress, appears stated age   Head:  Normocephalic, without obvious abnormality, atraumatic   Neck: Supple, symmetrical, trachea midline, no adenopathy, thyroid: not enlarged, symmetric, no tenderness/mass/nodules, no carotid bruit or JVD   Lungs:   Clear to auscultation bilaterally, respirations unlabored   Heart:  Regular rate and rhythm, S1, S2 normal, grade 1/6 murmur, rub or gallop   Abdomen:   Soft, non-tender, no masses, no organomegaly, bowel sounds audible x4   Extremities: Trace LLE edema, normal range of motion   Pulses: 2+ and symmetric   Skin: Skin color, texture, turgor normal, no rashes or lesions   Neurologic: Normal       · Cardiographics:    · EKG 1/10/2022:  Normal sinus rhythm  Cannot rule out Anterior infarct (cited on or before 30-JUN-2021)  Abnormal ECG  When compared with ECG of 10-MELINA-2022 01:00, (Unconfirmed)  No significant change was found    · Imaging:    · Chest x-ray 1/10/2022: No acute cardiopulmonary " findings.    · CTA chest 1/10/2022:  1. No pulmonary embolism or aortic dissection.  2. Negative for pneumonia.     Lab Review:     Results from last 7 days   Lab Units 01/09/22  2259   SODIUM mmol/L 140   POTASSIUM mmol/L 4.3   CHLORIDE mmol/L 107   CO2 mmol/L 21.0*   BUN mg/dL 11   CREATININE mg/dL 0.76   GLUCOSE mg/dL 90   CALCIUM mg/dL 8.7     Results from last 7 days   Lab Units 01/09/22  2259   WBC 10*3/mm3 7.85   HEMOGLOBIN g/dL 14.6   HEMATOCRIT % 42.5   PLATELETS 10*3/mm3 304     Results from last 7 days   Lab Units 01/10/22  0053 01/09/22  2259   TROPONIN T ng/mL <0.010 <0.010     · Magnesium 2.4  · D dimer <0.3  · proBNP 61  · Lipase 75  · TSH 9.57     Assessment:   Patient with chest pain/SOB with negative troponins x2, CTA chest negative for PE, no acute ST-T changes on ECG. We will screen her with a Cardiac PET and echocardiogram to rule out focal obstructive CAD and to assess heart structure/function. She is chronically anticoagulated with Eliquis for LLE DVT/PE. There has a pending FLP but no results yet. The patient's lipase and TSH was elevated on admission; hospitalist to address. Doubt ACS. She continues on Eliquis and option of pursuing LHC today isproblematic.      Plan:   1. NTG SL PRN at discharge  2. Cardiac PET today  3. Echocardiogram  4. May consider Imdur 30mg daily  5. Review FLP results when available  6. Review LLE venous duplex when available  7. Defer LHC at this time  8. Elevated TSH and lipase needs work up and treatment  9. ECG, BMP in morning  10. Gallbladder U/S    Discussed with patient and family member in room.     Scribed for Brooks Alexis MD by Allyson Bhagat, APRN. 1/10/2022  09:44 EST     I have seen and examined the patient, case was discussed with the physician extender, reviewed the above note, necessary changes were made and I agree with the final note.     Brooks Alexis MD [facility]

## 2022-01-11 LAB
ANION GAP SERPL CALCULATED.3IONS-SCNC: 9 MMOL/L (ref 5–15)
BASOPHILS # BLD AUTO: 0.05 10*3/MM3 (ref 0–0.2)
BASOPHILS NFR BLD AUTO: 1.1 % (ref 0–1.5)
BH CV ECHO MEAS - AO MAX PG (FULL): 6 MMHG
BH CV ECHO MEAS - AO MAX PG: 10.5 MMHG
BH CV ECHO MEAS - AO MEAN PG (FULL): 3.1 MMHG
BH CV ECHO MEAS - AO MEAN PG: 5.1 MMHG
BH CV ECHO MEAS - AO ROOT AREA (BSA CORRECTED): 1.6
BH CV ECHO MEAS - AO ROOT AREA: 8.5 CM^2
BH CV ECHO MEAS - AO ROOT DIAM: 3.3 CM
BH CV ECHO MEAS - AO V2 MAX: 161.8 CM/SEC
BH CV ECHO MEAS - AO V2 MEAN: 103.9 CM/SEC
BH CV ECHO MEAS - AO V2 VTI: 39.8 CM
BH CV ECHO MEAS - ASC AORTA: 2.7 CM
BH CV ECHO MEAS - AVA(I,A): 2 CM^2
BH CV ECHO MEAS - AVA(I,D): 2 CM^2
BH CV ECHO MEAS - AVA(V,A): 2.3 CM^2
BH CV ECHO MEAS - AVA(V,D): 2.3 CM^2
BH CV ECHO MEAS - BSA(HAYCOCK): 2.1 M^2
BH CV ECHO MEAS - BSA: 2 M^2
BH CV ECHO MEAS - BZI_BMI: 32.6 KILOGRAMS/M^2
BH CV ECHO MEAS - BZI_METRIC_HEIGHT: 167.6 CM
BH CV ECHO MEAS - BZI_METRIC_WEIGHT: 91.6 KG
BH CV ECHO MEAS - EDV(CUBED): 67.2 ML
BH CV ECHO MEAS - EDV(MOD-SP2): 57 ML
BH CV ECHO MEAS - EDV(MOD-SP4): 85 ML
BH CV ECHO MEAS - EDV(TEICH): 72.8 ML
BH CV ECHO MEAS - EF(CUBED): 77.8 %
BH CV ECHO MEAS - EF(MOD-BP): 74 %
BH CV ECHO MEAS - EF(MOD-SP2): 61.4 %
BH CV ECHO MEAS - EF(MOD-SP4): 77.6 %
BH CV ECHO MEAS - EF(TEICH): 70.5 %
BH CV ECHO MEAS - ESV(CUBED): 14.9 ML
BH CV ECHO MEAS - ESV(MOD-SP2): 22 ML
BH CV ECHO MEAS - ESV(MOD-SP4): 19 ML
BH CV ECHO MEAS - ESV(TEICH): 21.5 ML
BH CV ECHO MEAS - FS: 39.5 %
BH CV ECHO MEAS - IVS/LVPW: 0.94
BH CV ECHO MEAS - IVSD: 1.2 CM
BH CV ECHO MEAS - LA DIMENSION: 3.7 CM
BH CV ECHO MEAS - LA/AO: 1.1
BH CV ECHO MEAS - LAD MAJOR: 4.8 CM
BH CV ECHO MEAS - LAT PEAK E' VEL: 14.1 CM/SEC
BH CV ECHO MEAS - LATERAL E/E' RATIO: 5.6
BH CV ECHO MEAS - LV DIASTOLIC VOL/BSA (35-75): 42.3 ML/M^2
BH CV ECHO MEAS - LV MASS(C)D: 177.9 GRAMS
BH CV ECHO MEAS - LV MASS(C)DI: 88.6 GRAMS/M^2
BH CV ECHO MEAS - LV MAX PG: 4.5 MMHG
BH CV ECHO MEAS - LV MEAN PG: 1.9 MMHG
BH CV ECHO MEAS - LV SYSTOLIC VOL/BSA (12-30): 9.5 ML/M^2
BH CV ECHO MEAS - LV V1 MAX: 105.6 CM/SEC
BH CV ECHO MEAS - LV V1 MEAN: 62 CM/SEC
BH CV ECHO MEAS - LV V1 VTI: 22.7 CM
BH CV ECHO MEAS - LVIDD: 4.1 CM
BH CV ECHO MEAS - LVIDS: 2.5 CM
BH CV ECHO MEAS - LVLD AP2: 7 CM
BH CV ECHO MEAS - LVLD AP4: 8.4 CM
BH CV ECHO MEAS - LVLS AP2: 6.7 CM
BH CV ECHO MEAS - LVLS AP4: 6.8 CM
BH CV ECHO MEAS - LVOT AREA (M): 3.5 CM^2
BH CV ECHO MEAS - LVOT AREA: 3.5 CM^2
BH CV ECHO MEAS - LVOT DIAM: 2.1 CM
BH CV ECHO MEAS - LVPWD: 1.3 CM
BH CV ECHO MEAS - MED PEAK E' VEL: 7.8 CM/SEC
BH CV ECHO MEAS - MEDIAL E/E' RATIO: 10.1
BH CV ECHO MEAS - MV A MAX VEL: 86.9 CM/SEC
BH CV ECHO MEAS - MV DEC TIME: 0.2 SEC
BH CV ECHO MEAS - MV E MAX VEL: 80.5 CM/SEC
BH CV ECHO MEAS - MV E/A: 0.93
BH CV ECHO MEAS - MV MAX PG: 5.1 MMHG
BH CV ECHO MEAS - MV MEAN PG: 1.9 MMHG
BH CV ECHO MEAS - MV V2 MAX: 112.9 CM/SEC
BH CV ECHO MEAS - MV V2 MEAN: 63.2 CM/SEC
BH CV ECHO MEAS - MV V2 VTI: 51.2 CM
BH CV ECHO MEAS - MVA(VTI): 1.6 CM^2
BH CV ECHO MEAS - PA ACC SLOPE: 454.5 CM/SEC^2
BH CV ECHO MEAS - PA ACC TIME: 0.17 SEC
BH CV ECHO MEAS - PA MAX PG: 4.5 MMHG
BH CV ECHO MEAS - PA PR(ACCEL): 2.2 MMHG
BH CV ECHO MEAS - PA V2 MAX: 105.3 CM/SEC
BH CV ECHO MEAS - RVDD: 1.8 CM
BH CV ECHO MEAS - SI(AO): 168.3 ML/M^2
BH CV ECHO MEAS - SI(CUBED): 26.1 ML/M^2
BH CV ECHO MEAS - SI(LVOT): 40 ML/M^2
BH CV ECHO MEAS - SI(MOD-SP2): 17.4 ML/M^2
BH CV ECHO MEAS - SI(MOD-SP4): 32.9 ML/M^2
BH CV ECHO MEAS - SI(TEICH): 25.6 ML/M^2
BH CV ECHO MEAS - SV(AO): 338.1 ML
BH CV ECHO MEAS - SV(CUBED): 52.3 ML
BH CV ECHO MEAS - SV(LVOT): 80.4 ML
BH CV ECHO MEAS - SV(MOD-SP2): 35 ML
BH CV ECHO MEAS - SV(MOD-SP4): 66 ML
BH CV ECHO MEAS - SV(TEICH): 51.3 ML
BH CV ECHO MEAS - TAPSE (>1.6): 2.5 CM
BH CV ECHO MEAS - TV MAX PG: 1.8 MMHG
BH CV ECHO MEAS - TV V2 MAX: 67.1 CM/SEC
BH CV ECHO MEASUREMENTS AVERAGE E/E' RATIO: 7.35
BH CV LOWER VASCULAR LEFT COMMON FEMORAL AUGMENT: NORMAL
BH CV LOWER VASCULAR LEFT COMMON FEMORAL COMPRESS: NORMAL
BH CV LOWER VASCULAR LEFT COMMON FEMORAL PHASIC: NORMAL
BH CV LOWER VASCULAR LEFT COMMON FEMORAL SPONT: NORMAL
BH CV LOWER VASCULAR LEFT DISTAL FEMORAL COMPRESS: NORMAL
BH CV LOWER VASCULAR LEFT GASTRONEMIUS COMPRESS: NORMAL
BH CV LOWER VASCULAR LEFT GREATER SAPH AK COMPRESS: NORMAL
BH CV LOWER VASCULAR LEFT GREATER SAPH BK COMPRESS: NORMAL
BH CV LOWER VASCULAR LEFT LESSER SAPH COMPRESS: NORMAL
BH CV LOWER VASCULAR LEFT MID FEMORAL AUGMENT: NORMAL
BH CV LOWER VASCULAR LEFT MID FEMORAL COMPRESS: NORMAL
BH CV LOWER VASCULAR LEFT MID FEMORAL PHASIC: NORMAL
BH CV LOWER VASCULAR LEFT MID FEMORAL SPONT: NORMAL
BH CV LOWER VASCULAR LEFT PERONEAL COMPRESS: NORMAL
BH CV LOWER VASCULAR LEFT POPLITEAL AUGMENT: NORMAL
BH CV LOWER VASCULAR LEFT POPLITEAL COMPRESS: NORMAL
BH CV LOWER VASCULAR LEFT POPLITEAL PHASIC: NORMAL
BH CV LOWER VASCULAR LEFT POPLITEAL SPONT: NORMAL
BH CV LOWER VASCULAR LEFT POSTERIOR TIBIAL COMPRESS: NORMAL
BH CV LOWER VASCULAR LEFT PROFUNDA FEMORAL COMPRESS: NORMAL
BH CV LOWER VASCULAR LEFT PROXIMAL FEMORAL COMPRESS: NORMAL
BH CV LOWER VASCULAR LEFT SAPHENOFEMORAL JUNCTION COMPRESS: NORMAL
BH CV LOWER VASCULAR RIGHT COMMON FEMORAL AUGMENT: NORMAL
BH CV LOWER VASCULAR RIGHT COMMON FEMORAL COMPRESS: NORMAL
BH CV LOWER VASCULAR RIGHT COMMON FEMORAL PHASIC: NORMAL
BH CV LOWER VASCULAR RIGHT COMMON FEMORAL SPONT: NORMAL
BH CV VAS BP RIGHT ARM: NORMAL MMHG
BH CV XLRA - RV BASE: 2.9 CM
BH CV XLRA - RV LENGTH: 7.1 CM
BH CV XLRA - RV MID: 2.5 CM
BH CV XLRA - TDI S': 14.8 CM/SEC
BUN SERPL-MCNC: 12 MG/DL (ref 6–20)
BUN/CREAT SERPL: 13.8 (ref 7–25)
CALCIUM SPEC-SCNC: 9.2 MG/DL (ref 8.6–10.5)
CHLORIDE SERPL-SCNC: 110 MMOL/L (ref 98–107)
CO2 SERPL-SCNC: 24 MMOL/L (ref 22–29)
CREAT SERPL-MCNC: 0.87 MG/DL (ref 0.57–1)
DEPRECATED RDW RBC AUTO: 44.8 FL (ref 37–54)
EOSINOPHIL # BLD AUTO: 0.17 10*3/MM3 (ref 0–0.4)
EOSINOPHIL NFR BLD AUTO: 3.9 % (ref 0.3–6.2)
ERYTHROCYTE [DISTWIDTH] IN BLOOD BY AUTOMATED COUNT: 12.9 % (ref 12.3–15.4)
GFR SERPL CREATININE-BSD FRML MDRD: 68 ML/MIN/1.73
GLUCOSE SERPL-MCNC: 90 MG/DL (ref 65–99)
HCT VFR BLD AUTO: 39 % (ref 34–46.6)
HGB BLD-MCNC: 12.7 G/DL (ref 12–15.9)
IMM GRANULOCYTES # BLD AUTO: 0.01 10*3/MM3 (ref 0–0.05)
IMM GRANULOCYTES NFR BLD AUTO: 0.2 % (ref 0–0.5)
LEFT ATRIUM VOLUME INDEX: 22.4 ML/M^2
LEFT ATRIUM VOLUME: 45 ML
LV EF 2D ECHO EST: 70 %
LYMPHOCYTES # BLD AUTO: 1.71 10*3/MM3 (ref 0.7–3.1)
LYMPHOCYTES NFR BLD AUTO: 39.3 % (ref 19.6–45.3)
MAXIMAL PREDICTED HEART RATE: 167 BPM
MCH RBC QN AUTO: 30.8 PG (ref 26.6–33)
MCHC RBC AUTO-ENTMCNC: 32.6 G/DL (ref 31.5–35.7)
MCV RBC AUTO: 94.7 FL (ref 79–97)
MONOCYTES # BLD AUTO: 0.45 10*3/MM3 (ref 0.1–0.9)
MONOCYTES NFR BLD AUTO: 10.3 % (ref 5–12)
NEUTROPHILS NFR BLD AUTO: 1.96 10*3/MM3 (ref 1.7–7)
NEUTROPHILS NFR BLD AUTO: 45.2 % (ref 42.7–76)
NRBC BLD AUTO-RTO: 0 /100 WBC (ref 0–0.2)
PLATELET # BLD AUTO: 207 10*3/MM3 (ref 140–450)
PMV BLD AUTO: 10.4 FL (ref 6–12)
POTASSIUM SERPL-SCNC: 4 MMOL/L (ref 3.5–5.2)
RBC # BLD AUTO: 4.12 10*6/MM3 (ref 3.77–5.28)
SODIUM SERPL-SCNC: 143 MMOL/L (ref 136–145)
STRESS TARGET HR: 142 BPM
WBC NRBC COR # BLD: 4.35 10*3/MM3 (ref 3.4–10.8)

## 2022-01-11 PROCEDURE — 85025 COMPLETE CBC W/AUTO DIFF WBC: CPT | Performed by: INTERNAL MEDICINE

## 2022-01-11 PROCEDURE — 80048 BASIC METABOLIC PNL TOTAL CA: CPT | Performed by: NURSE PRACTITIONER

## 2022-01-11 PROCEDURE — 97165 OT EVAL LOW COMPLEX 30 MIN: CPT

## 2022-01-11 PROCEDURE — G0378 HOSPITAL OBSERVATION PER HR: HCPCS

## 2022-01-11 PROCEDURE — 99225 PR SBSQ OBSERVATION CARE/DAY 25 MINUTES: CPT | Performed by: INTERNAL MEDICINE

## 2022-01-11 PROCEDURE — 25010000002 MORPHINE PER 10 MG: Performed by: INTERNAL MEDICINE

## 2022-01-11 PROCEDURE — 99226 PR SBSQ OBSERVATION CARE/DAY 35 MINUTES: CPT | Performed by: INTERNAL MEDICINE

## 2022-01-11 PROCEDURE — 97161 PT EVAL LOW COMPLEX 20 MIN: CPT

## 2022-01-11 PROCEDURE — 96376 TX/PRO/DX INJ SAME DRUG ADON: CPT

## 2022-01-11 PROCEDURE — 99204 OFFICE O/P NEW MOD 45 MIN: CPT | Performed by: PHYSICIAN ASSISTANT

## 2022-01-11 RX ORDER — OXYCODONE HYDROCHLORIDE AND ACETAMINOPHEN 5; 325 MG/1; MG/1
1 TABLET ORAL EVERY 6 HOURS PRN
Status: DISCONTINUED | OUTPATIENT
Start: 2022-01-11 | End: 2022-01-12 | Stop reason: HOSPADM

## 2022-01-11 RX ORDER — PANTOPRAZOLE SODIUM 40 MG/1
40 TABLET, DELAYED RELEASE ORAL
Status: DISCONTINUED | OUTPATIENT
Start: 2022-01-11 | End: 2022-01-12 | Stop reason: HOSPADM

## 2022-01-11 RX ADMIN — LIDOCAINE HYDROCHLORIDE: 20 SOLUTION TOPICAL at 14:41

## 2022-01-11 RX ADMIN — ASPIRIN 325 MG ORAL TABLET 325 MG: 325 PILL ORAL at 10:11

## 2022-01-11 RX ADMIN — SODIUM CHLORIDE, PRESERVATIVE FREE 10 ML: 5 INJECTION INTRAVENOUS at 20:31

## 2022-01-11 RX ADMIN — OLANZAPINE 10 MG: 5 TABLET, FILM COATED ORAL at 20:30

## 2022-01-11 RX ADMIN — FAMOTIDINE 40 MG: 20 TABLET, FILM COATED ORAL at 10:11

## 2022-01-11 RX ADMIN — PANTOPRAZOLE SODIUM 40 MG: 40 TABLET, DELAYED RELEASE ORAL at 18:27

## 2022-01-11 RX ADMIN — MORPHINE SULFATE 1 MG: 2 INJECTION, SOLUTION INTRAMUSCULAR; INTRAVENOUS at 10:19

## 2022-01-11 RX ADMIN — APIXABAN 5 MG: 5 TABLET, FILM COATED ORAL at 10:11

## 2022-01-11 RX ADMIN — MORPHINE SULFATE 1 MG: 2 INJECTION, SOLUTION INTRAMUSCULAR; INTRAVENOUS at 05:22

## 2022-01-11 RX ADMIN — ZOLPIDEM TARTRATE 5 MG: 5 TABLET ORAL at 20:30

## 2022-01-11 RX ADMIN — LISINOPRIL 10 MG: 10 TABLET ORAL at 10:11

## 2022-01-11 RX ADMIN — MORPHINE SULFATE 1 MG: 2 INJECTION, SOLUTION INTRAMUSCULAR; INTRAVENOUS at 01:05

## 2022-01-11 RX ADMIN — OXYCODONE HYDROCHLORIDE AND ACETAMINOPHEN 1 TABLET: 5; 325 TABLET ORAL at 14:40

## 2022-01-11 RX ADMIN — SODIUM CHLORIDE, PRESERVATIVE FREE 10 ML: 5 INJECTION INTRAVENOUS at 10:11

## 2022-01-11 RX ADMIN — OXYCODONE HYDROCHLORIDE AND ACETAMINOPHEN 1 TABLET: 5; 325 TABLET ORAL at 20:30

## 2022-01-11 NOTE — DISCHARGE SUMMARY
[facility]  DISCHARGE SUMMARY    Patient Name: Kamila Garcia  : 1970  MRN: [id number]    Date of Admission: 1/10/2022 12:14 AM  Date of Discharge:  2022  Primary Care Physician: Latricia Mishra APRN    Consults     Date and Time Order Name Status Description    2022 11:32 AM Inpatient Gastroenterology Consult Completed     1/10/2022  4:51 AM Inpatient Cardiology Consult Completed           Hospital Course     Presenting Problem:   Chest pain [R07.9]    Active Hospital Problems    Diagnosis  POA   • Chest pain [R07.9]  Yes     Priority: High   • History of DVT (deep vein thrombosis) [Z86.718]  Not Applicable   • History of pulmonary embolism [Z86.711]  Yes   • Insomnia [G47.00]  Yes   • Hypertension [I10]  Yes      Resolved Hospital Problems   No resolved problems to display.          Hospital Course:  Kamila Garcia is a 51 y.o. female with past medical of left lower extremity DVT/PE in 2021, insomnia, essential hypertension, who presented to the ER with complaints of acute onset of left-sided chest pain.       Chest pain  - was given aspirin in ED, continue daily aspirin  -- Mag and K+ wnl   -- TTE with normal EF  -- troponins negative  -- repeat EKG unremarkable/without ST changes  -- cardiology consulted  -- pt underwent PET stress test on 1/10 and it was unremarkable/ low risk for ischemia  -- Cardiology advised low dose Imdur as well as PRN NTG    -- consulted GI as suspect GI etiology of chest pain. Pt underwent EGD this am which showed normal post- gastric bypass anatomy. No obvious source of her chest pain complaints.  --continue BID PPI upon d/c  -- prescribed PRN Zofran as well as PRN Oxycodone for short course  -- will d/c home with incentive spirometer as pain sounds pleuritic in etiology    Subclinical Hypothyroidism  --TSH elevated, but FT4 wnl  -- no medication indicated at this time. Would recommend repeat Thyroid function  tests with her PCP within 4-6 weeks     HTN  -- continue lisinopril     History of LLE DVT / PE on Eliquis  -- CTA negative   - continue Eliquis  --repeat duplex on this admission showed no evidence of DVT     Insomnia  - taking zyprexa and lunesta at night, continue. Have substituted Ambien for Lunesta due to formulary options. Pt may resume home regimen upon d/c        Discharge Follow Up Recommendations for outpatient labs/diagnostics:  Follow up with PCP within one week.   Will need repeat TSH and FT4 in four weeks.    Day of Discharge     HPI:   Doing okay this am, still having some pain but no new issues overnight. Requests a work excuse.     Review of Systems  Gen- No fevers, chills  CV- No chest pain, palpitations  Resp- No cough, dyspnea  GI- No N/V/D, abd pain        Vital Signs:   Temp:  [97.2 °F (36.2 °C)-98 °F (36.7 °C)] 97.4 °F (36.3 °C)  Heart Rate:  [51-65] 63  Resp:  [16-18] 16  BP: (107-162)/(66-95) 154/83      Physical Exam:  Constitutional: No acute distress, awake, alert  HENT: NCAT, mucous membranes moist  Respiratory: Clear to auscultation bilaterally, respiratory effort normal   Cardiovascular: RRR, no murmurs, rubs, or gallops  Gastrointestinal: Positive bowel sounds, soft, nontender, nondistended  Musculoskeletal: No bilateral ankle edema  Psychiatric: Appropriate affect, cooperative  Neurologic: Oriented x 3, strength symmetric in all extremities, Cranial Nerves grossly intact to confrontation, speech clear  Skin: No rashes    Pertinent  and/or Most Recent Results     LAB RESULTS:      Lab 01/12/22  0720 01/11/22  0530 01/10/22  1053 01/10/22  0053 01/09/22  2259   WBC 4.50 4.35 5.24  --  7.85   HEMOGLOBIN 12.3 12.7 12.9  --  14.6   HEMATOCRIT 38.6 39.0 38.9  --  42.5   PLATELETS 201 207 244  --  304   NEUTROS ABS 1.77 1.96 2.17  --  4.43   IMMATURE GRANS (ABS) 0.02 0.01 0.01  --  0.03   LYMPHS ABS 1.91 1.71 2.23  --  2.37   MONOS ABS 0.49 0.45 0.60  --  0.75   EOS ABS 0.25 0.17 0.18  --   0.20   MCV 94.4 94.7 92.6  --  89.5   CRP  --   --   --  <0.30  --    D DIMER QUANT  --   --   --   --  <0.27         Lab 01/12/22  0720 01/11/22  0530 01/10/22  1053 01/10/22  0053 01/09/22  2259   SODIUM 142 143 139  --  140   POTASSIUM 3.8 4.0 4.7  --  4.3   CHLORIDE 109* 110* 107  --  107   CO2 25.0 24.0 26.0  --  21.0*   ANION GAP 8.0 9.0 6.0  --  12.0   BUN 9 12 12  --  11   CREATININE 0.86 0.87 0.76  --  0.76   GLUCOSE 96 90 91  --  90   CALCIUM 9.0 9.2 8.8  --  8.7   MAGNESIUM  --   --   --  2.4  --    TSH  --   --   --  9.570*  --          Lab 01/12/22  0720 01/09/22  2259   TOTAL PROTEIN 5.4* 6.6   ALBUMIN 3.30* 4.10   GLOBULIN 2.1 2.5   ALT (SGPT) 8 9   AST (SGOT) 19 19   BILIRUBIN 0.7 0.4   ALK PHOS 65 80   LIPASE  --  75*         Lab 01/10/22  1053 01/10/22  0053 01/09/22  2259   PROBNP  --   --  61.0   TROPONIN T <0.010 <0.010 <0.010         Lab 01/10/22  1053   CHOLESTEROL 160   LDL CHOL 64   HDL CHOL 82*   TRIGLYCERIDES 70             Brief Urine Lab Results  (Last result in the past 365 days)      Color   Clarity   Blood   Leuk Est   Nitrite   Protein   CREAT   Urine HCG        01/10/22 0052               Negative           Microbiology Results (last 10 days)     Procedure Component Value - Date/Time    COVID PRE-OP / PRE-PROCEDURE SCREENING ORDER (NO ISOLATION) - Swab, Nasopharynx [[id number]]  (Normal) Collected: 01/10/22 0031    Lab Status: Final result Specimen: Swab from Nasopharynx Updated: 01/10/22 0147    Narrative:      The following orders were created for panel order COVID PRE-OP / PRE-PROCEDURE SCREENING ORDER (NO ISOLATION) - Swab, Nasopharynx.  Procedure                               Abnormality         Status                     ---------                               -----------         ------                     COVID-19, FLU A/B, RSV P...[[id number]]  Normal              Final result                 Please view results for these tests on the individual orders.    COVID-19, FLU A/B,  RSV PCR - Swab, Nasopharynx [[id number]]  (Normal) Collected: 01/10/22 0031    Lab Status: Final result Specimen: Swab from Nasopharynx Updated: 01/10/22 0147     COVID19 Not Detected     Influenza A PCR Not Detected     Influenza B PCR Not Detected     RSV, PCR Not Detected          Adult Transthoracic Echo Complete W/ Cont if Necessary Per Protocol    Result Date: 1/11/2022  · Left ventricular wall thickness is consistent with mild concentric hypertrophy. · Estimated left ventricular EF = 70% Left ventricular ejection fraction appears to be 61 - 65%. Left ventricular systolic function is normal.      US Gallbladder    Result Date: 1/11/2022  EXAMINATION: US GALLBLADDER-  INDICATION: Chest pain.  TECHNIQUE: Ultrasound right upper quadrant abdomen.  COMPARISON: None.  FINDINGS: Visualized portions of the pancreas are within normal limits.  Liver demonstrates homogeneous appearance of the parenchyma without focal liver lesion.  Gallbladder present without cholelithiasis, gallbladder wall thickening or pericholecystic fluid.  Common bile duct 6 mm in diameter within normal limits at the tierra hepatis region.  Right kidney measures 9.7 cm in length without evidence of hydronephrosis, contour deforming mass or obvious calculi.      Normal right upper quadrant ultrasound.  D:  01/10/2022 E:  01/11/2022    This report was finalized on 1/11/2022 10:12 AM by Dr. Attila Houston.      CT Chest Pulmonary Embolism    Result Date: 1/10/2022  CT CHEST PULMONARY EMBOLISM W CONTRAST INDICATION: Severe left-sided chest pain. TECHNIQUE: CT angiogram of the chest with IV contrast. 3-D reconstructions were obtained and reviewed.   Radiation dose reduction techniques included automated exposure control or exposure modulation based on body size. Count of known CT and cardiac nuc med studies performed in previous 12 months: 1. COMPARISON: 6/30/2021 FINDINGS: No acute pulmonary embolism or aortic dissection is seen. There is no pleural or  "pericardial effusion. There is no adenopathy. Upper abdominal images show changes of gastric bypass. The lungs are clear except for some minimal dependent atelectasis in the lower lobes. No CT findings present to indicate pneumonia. Note: CT may be negative in the earliest stages of COVID-19. No pneumothorax.     1. No pulmonary embolism or aortic dissection. 2. Negative for pneumonia. Signer Name: Peter Beverly MD  Signed: 1/10/2022 3:41 AM  Workstation Name: [facility]  Radiology [facility]    Duplex Venous Lower Extremity - Left CAR    Result Date: 1/11/2022  · Normal left lower extremity venous duplex scan.      XR Chest PA & Lateral    Result Date: 1/10/2022  CR Chest 2 Vws INDICATION:  Chest pain. COMPARISON:  7/9/2021 FINDINGS: PA and lateral views of the chest.  Heart and mediastinal contours are normal. The lungs are clear. No pneumothorax or pleural effusion.      No acute cardiopulmonary findings. Signer Name: Peter Beverly MD  Signed: 1/10/2022 12:19 AM  Workstation Name: [facility]    Stress Test With Pet Myocardial Perfusion    Result Date: 1/10/2022  · Patient reported chest pain grade 5/10 in severity at baseline with reported increased chest pain, dyspnea (oximetry % on RA), and headache after IV Lexiscan injection, which resolved in recovery with IV caffeine injection. · SR with PACs noted in pre-test and recovery. · No significant ST changes noted. · There is no prior study available for comparison. REST: 76% EF STRESS: 81% EF. · Left ventricular ejection fraction is hyperdynamic (calculated EF > 70%); WNL TID = 0.94. · Myocardial perfusion imaging indicates a normal myocardial perfusion study with no evidence of ischemia (quantitative assessment demonstrates 98% WNL myocardial perfusion with 2% distal inferoapical \"scar\"). · Impressions are consistent with a low risk study. · Findings consistent with an acceptable borderline abnormal " pharmacologic ECG stress test. · Qualitative review of the thoracic CT scan slices demonstrates minimal epicardial coronary artery calcification. · Calculated coronary artery flow reserve is 98% within normal limits. · Acceptable probably negative IV Lexiscan hybrid PET cardiac stress scan suggestive of low probability for significant focal obstructive coronary artery disease with preserved systolic left ventricular function (LVEF 0.76).        Results for orders placed during the hospital encounter of 01/10/22    Duplex Venous Lower Extremity - Left CAR    Interpretation Summary  · Normal left lower extremity venous duplex scan.      Results for orders placed during the hospital encounter of 01/10/22    Duplex Venous Lower Extremity - Left CAR    Interpretation Summary  · Normal left lower extremity venous duplex scan.      Results for orders placed during the hospital encounter of 01/10/22    Adult Transthoracic Echo Complete W/ Cont if Necessary Per Protocol    Interpretation Summary  · Left ventricular wall thickness is consistent with mild concentric hypertrophy.  · Estimated left ventricular EF = 70% Left ventricular ejection fraction appears to be 61 - 65%. Left ventricular systolic function is normal.      Plan for Follow-up of Pending Labs/Results:     Discharge Details        Discharge Medications      New Medications      Instructions Start Date   isosorbide mononitrate 30 MG 24 hr tablet  Commonly known as: IMDUR   30 mg, Oral, Every 24 Hours Scheduled      nitroglycerin 0.4 MG SL tablet  Commonly known as: NITROSTAT   0.4 mg, Sublingual, Every 5 Minutes PRN, Take no more than 3 doses in 15 minutes.      ondansetron 4 MG tablet  Commonly known as: ZOFRAN   4 mg, Oral, Every 6 Hours PRN      oxyCODONE-acetaminophen 5-325 MG per tablet  Commonly known as: PERCOCET   1 tablet, Oral, Every 6 Hours PRN      pantoprazole 40 MG EC tablet  Commonly known as: PROTONIX   40 mg, Oral, 2 Times Daily Before Meals          Continue These Medications      Instructions Start Date   Eliquis 5 MG tablet tablet  Generic drug: apixaban   Start On 7/8: Take 1 tablet by mouth Every 12 (Twelve) Hours.      lisinopril 10 MG tablet  Commonly known as: PRINIVIL,ZESTRIL   10 mg, Oral, Daily      Lunesta 3 MG tablet  Generic drug: eszopiclone   3 mg, Oral, Nightly, Take immediately before bedtime       OLANZapine 10 MG tablet  Commonly known as: zyPREXA   10 mg, Oral, Nightly         Stop These Medications    ondansetron ODT 4 MG disintegrating tablet  Commonly known as: Zofran ODT            Allergies   Allergen Reactions   • Hydrocodone Hives         Discharge Disposition:  Home or Self Care    Diet:  Hospital:  Diet Order   Procedures   • Diet Bariatric; Stage 6 + More Grain / Vegetables       Activity:  Activity Instructions    As tolerated           Restrictions or Other Recommendations:         CODE STATUS:    Code Status and Medical Interventions:   Ordered at: 01/10/22 0427     Code Status (Patient has no pulse and is not breathing):    CPR (Attempt to Resuscitate)     Medical Interventions (Patient has pulse or is breathing):    Full Support       No future appointments.    Additional Instructions for the Follow-ups that You Need to Schedule     Ambulatory Referral For Screening Colonoscopy   As directed      Discharge Follow-up with PCP   As directed       Currently Documented PCP:    Latricia Mishra APRN    PCP Phone Number:    [phone]     Follow Up Details: in one week with PCP         Discharge Follow-up with PCP   As directed       Currently Documented PCP:    Latricia Mishra APRN    PCP Phone Number:    [phone]     Follow Up Details: in one week with PCP                     Vanessa Bang MD  01/12/22      Time Spent on Discharge:  I spent  35 minutes on this discharge activity which included: face-to-face encounter with the patient, reviewing the data in the system, coordination of the care with the nursing  staff as well as consultants, documentation, and entering orders.

## 2022-01-11 NOTE — CASE MANAGEMENT/SOCIAL WORK
Continued Stay Note  [facility]     Patient Name: Kamila Garcia  MRN: [id number]  Today's Date: 1/11/2022    Admit Date: 1/10/2022     Discharge Plan     Row Name 01/11/22 1139       Plan    Plan Home    Plan Comments Pt was discussed in MDR today. D/C plan was for pt to D/C home today. Pt is still having CP that radiates down her arm. Dr. Bang is consulting GI today also. No needs identified at this time. CM will continue to follow.    Final Discharge Disposition Code 01 - home or self-care               Discharge Codes    No documentation.                     Pippa Vidal RN

## 2022-01-11 NOTE — PLAN OF CARE
Goal Outcome Evaluation:      VSS, RA, A&Ox4 throughout shift.  PRN pain meds given x3.  No s/s of distress.

## 2022-01-11 NOTE — PROGRESS NOTES
"Kamila [facility]  [id number]  1970   LOS: 0 days   Patient Care Team:  Latricia Mishra APRN as PCP - General (Family Medicine)    Chief Complaint:  CP / HTN / REMOTE PTE    Subjective     Comfortable and awakened from sleep.  She still notes \"hurting pain and soreness\" in the left breast area.  She denies cough, pleurisy, sputum production, increased tachypnea/dyspnea, nausea, emesis, diaphoresis, pain radiation, headache, or focal motor-sensory changes.  Acceptable low appetite and no current GI or  difficulty.  She was told by her vascular surgeon that she needs to remain on apixaban.  No genetic work-up for thrombophilia has been accomplished by her history.  Her remote episode of venous thrombosis and PTE sounds semi-provoked and that by her history she had severe sprain was all of her feet for 3 days prior to the event.  There was a family history of thrombophilia in the patient's father.    Objective     Vital Sign Min/Max for last 24 hours  Temp  Min: 97.6 °F (36.4 °C)  Max: 98 °F (36.7 °C)   BP  Min: 106/67  Max: 142/72   Pulse  Min: 58  Max: 74   Resp  Min: 14  Max: 18   SpO2  Min: 95 %  Max: 100 %      Weight  Min: 91.2 kg (201 lb)  Max: 91.6 kg (201 lb 14.4 oz)         01/10/22  0735 01/10/22  1307   Weight: 91.6 kg (201 lb 14.4 oz) 91.2 kg (201 lb)       No intake or output data in the 24 hours ending 01/11/22 0720    Physical Exam:     General Appearance:    Alert, cooperative, in no acute distress   Lungs:     Clear to auscultation,respirations regular, even and                   unlabored    Heart:    Regular and normal rate, normal S1 and S2, no            murmur, no gallop, no rub, no click   Abdomen:  Extremities:   Soft, non-tender, bowel sounds audible x4    No edema, normal range of motion   Pulses:   Pulses palpable and equal bilaterally      Results Review:   Results from last 7 days   Lab Units 01/10/22  1053 01/09/22  2259 01/09/22  2259   SODIUM mmol/L 139  --  140   POTASSIUM " "mmol/L 4.7  --  4.3   CHLORIDE mmol/L 107  --  107   CO2 mmol/L 26.0  --  21.0*   BUN mg/dL 12  --  11   CREATININE mg/dL 0.76  --  0.76   GLUCOSE mg/dL 91   < > 90   CALCIUM mg/dL 8.8  --  8.7    < > = values in this interval not displayed.     Results from last 7 days   Lab Units 01/11/22  0530 01/10/22  1053 01/09/22  2259   WBC 10*3/mm3 4.35 5.24 7.85   HEMOGLOBIN g/dL 12.7 12.9 14.6   HEMATOCRIT % 39.0 38.9 42.5   PLATELETS 10*3/mm3 207 244 304     Results from last 7 days   Lab Units 01/10/22  1053   CHOLESTEROL mg/dL 160   TRIGLYCERIDES mg/dL 70   HDL CHOL mg/dL 82*   LDL CHOL mg/dL 64     Results from last 7 days   Lab Units 01/10/22  1053 01/10/22  0053 01/09/22  2259   TROPONIN T ng/mL <0.010 <0.010 <0.010     · PET MPS:    · Patient reported chest pain grade 5/10 in severity at baseline with reported increased chest pain, dyspnea (oximetry % on RA), and headache after IV Lexiscan injection, which resolved in recovery with IV caffeine injection.  · SR with PACs noted in pre-test and recovery.  · No significant ST changes noted.  · There is no prior study available for comparison. REST: 76% EF STRESS: 81% EF.  · Left ventricular ejection fraction is hyperdynamic (calculated EF > 70%); WNL TID = 0.94.  · Myocardial perfusion imaging indicates a normal myocardial perfusion study with no evidence of ischemia (quantitative assessment demonstrates 98% WNL myocardial perfusion with 2% distal inferoapical \"scar\").  · Impressions are consistent with a low risk study.  · Findings consistent with an acceptable borderline abnormal pharmacologic ECG stress test.  · Qualitative review of the thoracic CT scan slices demonstrates minimal epicardial coronary artery calcification.  · Calculated coronary artery flow reserve is 98% within normal limits.  · Acceptable probably negative IV Lexiscan hybrid PET cardiac stress scan suggestive of low probability for significant focal obstructive coronary artery disease with " preserved systolic left ventricular function (LVEF 0.76).    · GB US:    FINDINGS:      Visualized portions of the pancreas within normal limits  Liver demonstrates homogeneous appearance of the parenchyma without  focal liver lesion  Gallbladder present without cholelithiasis, gallbladder wall thickening  or pericholecystic fluid  Common bile duct 6 mm in diameter within normal limits at the [place]  hepatis region  Right kidney measures 9.7 cm in length without evidence of  hydronephrosis, contour deforming mass or obvious calculi     IMPRESSION: Normal right upper quadrant ultrasound    · VENOUS DUPLEX / TTE: PENDING    · NO NEW CXR / EKG.    Medication Review: REVIEWED; NO DRIPS.    Assessment/Plan     Preliminary views of her echocardiogram and venous duplex study appear acceptable; reports pending.  Her fasting lipid panel is entirely normal.  At this time I think she has a low probability of significant focal obstructive coronary artery disease or coronary artery vasospasm.  She can be treated with empiric low-dose Imdur 30 mg daily plus or minus nitroglycerin spray at this time and would defer diagnostic coronary angiography.  Doubt the immediate need for EGD at this time.  Hopefully home soon from a cardiology perspective.  Results of the testing reviewed in detail with the patient and she has no concerns or questions at this time.  She will follow-up with her vascular surgeon in 2 weeks and discuss with him the duration of apixaban therapy as well as whether genetic testing would be of benefit.      Chest pain    History of DVT (deep vein thrombosis)    History of pulmonary embolism    Insomnia    Hypertension    01/11/22  07:20 EST

## 2022-01-11 NOTE — THERAPY DISCHARGE NOTE
Acute Care - Occupational Therapy Discharge  [facility]    Patient Name: Kamila Garcia  : 1970    MRN: [id number]                              Today's Date: 2022       Admit Date: 1/10/2022    Visit Dx: No diagnosis found.  Patient Active Problem List   Diagnosis   • Acute pulmonary embolism (HCC)   • Chest pain   • History of DVT (deep vein thrombosis)   • History of pulmonary embolism   • Insomnia   • Hypertension     Past Medical History:   Diagnosis Date   • DVT (deep venous thrombosis) (HCC)    • History of pulmonary embolus (PE)    • Hypertension      Past Surgical History:   Procedure Laterality Date   • GASTRIC BYPASS        General Information     Row Name 22 1127          OT Time and Intention    Document Type discharge evaluation/summary  -CS     Mode of Treatment occupational therapy  -CS     Row Name 22 1127          General Information    Patient Profile Reviewed yes  -CS     Prior Level of Function independent:; all household mobility; ADL's; home management; driving; work; using stairs  reports no DME/AD at baseline  -CS     Existing Precautions/Restrictions no known precautions/restrictions  -CS     Barriers to Rehab none identified  -CS     Row Name 22 1127          Living Environment    Lives With spouse  -CS     Row Name 22 1127          Home Main Entrance    Number of Stairs, Main Entrance three  -CS     Stair Railings, Main Entrance none  -CS     Row Name 22 1127          Stairs Within Home, Primary    Stairs, Within Home, Primary walk-in shower w/ no seating reported  -CS     Number of Stairs, Within Home, Primary none  -CS     Row Name 22 1127          Cognition    Orientation Status (Cognition) oriented x 3  -CS     Row Name 22 112          Safety Issues, Functional Mobility    Impairments Affecting Function (Mobility) pain  -CS           User Key  (r) = Recorded By, (t) = Taken By, (c) = Cosigned By    Initials Name Provider  Type    CS Joni Reyes OT Occupational Therapist               Mobility/ADL's     Row Name 01/11/22 1130          Bed Mobility    Comment (Bed Mobility) UIC upon arrival  -     Row Name 01/11/22 1130          Transfers    Transfers sit-stand transfer  -CS     Comment (Transfers) STS w/ no AD, no LOB, no dizziness  -CS     Sit-Stand Morris (Transfers) independent  -CS     Row Name 01/11/22 1130          Functional Mobility    Functional Mobility- Comment defer to PT for specifics  -CS     Row Name 01/11/22 1130          Activities of Daily Living    BADL Assessment/Intervention lower body dressing  -     Row Name 01/11/22 1130          Lower Body Dressing Assessment/Training    Morris Level (Lower Body Dressing) doff; don; socks; independent  -CS     Position (Lower Body Dressing) unsupported sitting  -           User Key  (r) = Recorded By, (t) = Taken By, (c) = Cosigned By    Initials Name Provider Type    CS Joni Reyes OT Occupational Therapist               Obj/Interventions     Row Name 01/11/22 1131          Sensory Assessment (Somatosensory)    Sensory Assessment (Somatosensory) UE sensation intact  -     Row Name 01/11/22 1131          Vision Assessment/Intervention    Visual Impairment/Limitations WFL; corrective lenses for reading  -     Row Name 01/11/22 1131          Range of Motion Comprehensive    General Range of Motion bilateral upper extremity ROM WFL  -     Row Name 01/11/22 1131          Strength Comprehensive (MMT)    General Manual Muscle Testing (MMT) Assessment no strength deficits identified  -CS     Comment, General Manual Muscle Testing (MMT) Assessment BUE grossly 5/5, no significant asymmetry  -     Row Name 01/11/22 1131          Motor Skills    Motor Skills coordination; functional endurance  -CS     Coordination bilateral; upper extremity; finger to nose; bimanual skills; WFL  -     Functional Endurance RA w/ stable sats  -     Row Name  01/11/22 1131          Balance    Balance Assessment sitting static balance; sitting dynamic balance; standing static balance; standing dynamic balance  -CS     Static Sitting Balance WNL; unsupported; sitting in chair  -CS     Dynamic Sitting Balance WFL; sitting in chair; unsupported  -CS     Static Standing Balance WFL; unsupported; standing  -CS     Dynamic Standing Balance WFL; standing; unsupported  -CS     Balance Interventions sitting; sit to stand; standing; other (see comments); UE activity with balance activity  -CS     Comment, Balance maintained balance w/ mod pertubations in unsupported standing  -CS           User Key  (r) = Recorded By, (t) = Taken By, (c) = Cosigned By    Initials Name Provider Type    CS Joni Reyes, OT Occupational Therapist               Goals/Plan    No documentation.                Clinical Impression     Row Name 01/11/22 1133          Pain Assessment    Additional Documentation Pain Scale: Numbers Pre/Post-Treatment (Group)  -CS     Row Name 01/11/22 1133          Pain Scale: Numbers Pre/Post-Treatment    Pretreatment Pain Rating 5/10  -CS     Posttreatment Pain Rating 5/10  -CS     Pain Location chest  -CS     Pre/Posttreatment Pain Comment tolerated eval  -CS     Pain Intervention(s) Ambulation/increased activity; Repositioned  -CS     Row Name 01/11/22 1133          Plan of Care Review    Plan of Care Reviewed With patient; spouse  -CS     Progress no change  OT eval  -CS     Outcome Summary OT eval completed. Pt presents at baseline for ADL completion w/ BUE strength and coordination WFL. OT signing off, defer to PT for further mobility needs. Rec d/c to home w/ assist prn.  -     Row Name 01/11/22 1133          Therapy Assessment/Plan (OT)    Patient/Family Therapy Goal Statement (OT) return home  -CS     Criteria for Skilled Therapeutic Interventions Met (OT) no; does not meet criteria for skilled intervention  -CS     Therapy Frequency (OT) evaluation only  -CS      Row Name 01/11/22 1133          Therapy Plan Review/Discharge Plan (OT)    Anticipated Discharge Disposition (OT) home; home with assist  -CS     Row Name 01/11/22 1133          Vital Signs    Pre Systolic BP Rehab 150  RN cleared for eval, VSS on RA  -CS     Pre Treatment Diastolic BP 78  -CS     Pretreatment Heart Rate (beats/min) 65  -CS     Pre SpO2 (%) 97  -CS     O2 Delivery Pre Treatment room air  -CS     O2 Delivery Post Treatment room air  -CS     Pre Patient Position Sitting  -CS     Intra Patient Position Standing  -CS     Post Patient Position Sitting  -CS     Row Name 01/11/22 1133          Positioning and Restraints    Pre-Treatment Position sitting in chair/recliner  -CS     Post Treatment Position chair  -CS     In Chair notified nsg; reclined; sitting; call light within reach; encouraged to call for assist; with family/caregiver; legs elevated  no alarm, nursing aware  -CS           User Key  (r) = Recorded By, (t) = Taken By, (c) = Cosigned By    Initials Name Provider Type    CS Joni Reyes, OT Occupational Therapist               Outcome Measures     Row Name 01/11/22 1139          How much help from another is currently needed...    Putting on and taking off regular lower body clothing? 4  -CS     Bathing (including washing, rinsing, and drying) 4  -CS     Toileting (which includes using toilet bed pan or urinal) 4  -CS     Putting on and taking off regular upper body clothing 4  -CS     Taking care of personal grooming (such as brushing teeth) 4  -CS     Eating meals 4  -CS     AM-PAC 6 Clicks Score (OT) 24  -CS     Row Name 01/11/22 1128 01/11/22 0845       How much help from another person do you currently need...    Turning from your back to your side while in flat bed without using bedrails? 4  -CD 4  -MC    Moving from lying on back to sitting on the side of a flat bed without bedrails? 4  -CD 4  -MC    Moving to and from a bed to a chair (including a wheelchair)? 4  -CD 4  -MC     Standing up from a chair using your arms (e.g., wheelchair, bedside chair)? 4  -CD 4  -MC    Climbing 3-5 steps with a railing? 4  -CD 4  -MC    To walk in hospital room? 4  -CD 4  -MC    AM-PAC 6 Clicks Score (PT) 24  - 24  -    Row Name 01/11/22 1139          Functional Assessment    Outcome Measure Options AM-PAC 6 Clicks Daily Activity (OT)  -CS           User Key  (r) = Recorded By, (t) = Taken By, (c) = Cosigned By    Initials Name Provider Type    CD Blanquita Rivera, PT Physical Therapist    Pippa Leonard, RN Registered Nurse    Joni Gonzales, OT Occupational Therapist              Occupational Therapy Education                 Title: PT OT SLP Therapies (Done)     Topic: Occupational Therapy (Done)     Point: Precautions (Done)     Description:   Instruct learner(s) on prescribed precautions during self-care and functional transfers.              Learning Progress Summary           Patient Acceptance, E,D, DU,VU by  at 1/11/2022 1140    Comment: safety awareness   Significant Other Acceptance, E,D, DU,VU by  at 1/11/2022 1140    Comment: safety awareness                               User Key     Initials Effective Dates Name Provider Type Discipline     06/16/21 -  Joni Reyes, OT Occupational Therapist OT              OT Recommendation and Plan  Retired Outcome Summary/Treatment Plan (OT)  Anticipated Discharge Disposition (OT): home, home with assist  Therapy Frequency (OT): evaluation only  Plan of Care Review  Plan of Care Reviewed With: patient, spouse  Progress: no change (OT eval)  Outcome Summary: OT eval completed. Pt presents at baseline for ADL completion w/ BUE strength and coordination WFL. OT signing off, defer to PT for further mobility needs. Rec d/c to home w/ assist prn.  Plan of Care Reviewed With: patient, spouse  Outcome Summary: OT eval completed. Pt presents at baseline for ADL completion w/ BUE strength and coordination WFL. OT signing off, defer to PT for  further mobility needs. Rec d/c to home w/ assist prn.     Time Calculation:    Time Calculation- OT     Row Name 01/11/22 1141             Time Calculation- OT    OT Start Time 1100  -CS      OT Received On 01/11/22  -CS              Untimed Charges    OT Eval/Re-eval Minutes 36  -CS              Total Minutes    Untimed Charges Total Minutes 36  -CS       Total Minutes 36  -CS            User Key  (r) = Recorded By, (t) = Taken By, (c) = Cosigned By    Initials Name Provider Type    CS Joni Reyes OT Occupational Therapist              Therapy Charges for Today     Code Description Service Date Service Provider Modifiers Qty    [id number] HC OT EVAL LOW COMPLEXITY 3 1/11/2022 Joni Reyes OT GO 1               Joni Reyes OT  1/11/2022

## 2022-01-11 NOTE — PLAN OF CARE
Goal Outcome Evaluation:  Plan of Care Reviewed With: patient           Outcome Summary: PT IS LIKELY CLOSE TO BASELINE  WITH FUNCTIONAL MOBILITY BUT C/O CP (NSG AWARE). GAIT IS SLOW AND GUARDED BUT NO OVERT LOB. PT HAS FLAT AFFECT BUT IS ORIENTED AND FOLLOWS ALL COMMANDS. WILL SEE 1-2 MORE VISITS TO INCREASE AMBULATION DISTANCE, PERFORM UP/DOWN STAIRS AND MONITOR VITALS.RECOMMEND HOME AT D/C.

## 2022-01-11 NOTE — PROGRESS NOTES
[facility] Medicine Services  PROGRESS NOTE    Patient Name: Kamila Garcia  : 1970  MRN: [id number]    Date of Admission: 1/10/2022  Primary Care Physician: Latricia Mishra APRN    Subjective   Subjective     CC:  Chest pain    HPI:  Pt still complaining of 7 out 10 substernal chest pain with radiation to her arm. Unaffected by food intake.  States that the only alleviating factor is IV Morphine.      ROS:  Gen- No fevers, chills  CV- as above  Resp- No cough, dyspnea  GI- No N/V/D, abd pain        Objective   Objective     Vital Signs:   Temp:  [97.6 °F (36.4 °C)-98 °F (36.7 °C)] 97.7 °F (36.5 °C)  Heart Rate:  [58-71] 69  Resp:  [14-18] 16  BP: (106-150)/(67-78) 150/78     Physical Exam:  Constitutional: No acute distress, appears very comfortable despite pain of 7 out of 10,  awake, alert  HENT: NCAT, mucous membranes moist  Respiratory: Clear to auscultation bilaterally, respiratory effort normal   Cardiovascular: RRR, no murmurs, rubs, or gallops  Gastrointestinal: Positive bowel sounds, soft, nontender, nondistended  Musculoskeletal: No bilateral ankle edema  Psychiatric: Appropriate affect, cooperative  Neurologic: Oriented x 3, strength symmetric in all extremities, Cranial Nerves grossly intact to confrontation, speech clear  Skin: No rashes    Results Reviewed:  LAB RESULTS:      Lab 22  0530 01/10/22  1053 01/10/22  0053 22  2259   WBC 4.35 5.24  --  7.85   HEMOGLOBIN 12.7 12.9  --  14.6   HEMATOCRIT 39.0 38.9  --  42.5   PLATELETS 207 244  --  304   NEUTROS ABS 1.96 2.17  --  4.43   IMMATURE GRANS (ABS) 0.01 0.01  --  0.03   LYMPHS ABS 1.71 2.23  --  2.37   MONOS ABS 0.45 0.60  --  0.75   EOS ABS 0.17 0.18  --  0.20   MCV 94.7 92.6  --  89.5   CRP  --   --  <0.30  --    D DIMER QUANT  --   --   --  <0.27         Lab 01/11/22  0530 01/10/22  1053 01/10/22  0053 22  4799   SODIUM 143 139  --  140   POTASSIUM 4.0 4.7  --  4.3   CHLORIDE 110* 107  --   107   CO2 24.0 26.0  --  21.0*   ANION GAP 9.0 6.0  --  12.0   BUN 12 12  --  11   CREATININE 0.87 0.76  --  0.76   GLUCOSE 90 91  --  90   CALCIUM 9.2 8.8  --  8.7   MAGNESIUM  --   --  2.4  --    TSH  --   --  9.570*  --          Lab 01/09/22 2259   TOTAL PROTEIN 6.6   ALBUMIN 4.10   GLOBULIN 2.5   ALT (SGPT) 9   AST (SGOT) 19   BILIRUBIN 0.4   ALK PHOS 80   LIPASE 75*         Lab 01/10/22  1053 01/10/22  0053 01/09/22  2259   PROBNP  --   --  61.0   TROPONIN T <0.010 <0.010 <0.010         Lab 01/10/22  1053   CHOLESTEROL 160   LDL CHOL 64   HDL CHOL 82*   TRIGLYCERIDES 70             Brief Urine Lab Results  (Last result in the past 365 days)      Color   Clarity   Blood   Leuk Est   Nitrite   Protein   CREAT   Urine HCG        01/10/22 0052               Negative             Microbiology Results Abnormal     Procedure Component Value - Date/Time    COVID PRE-OP / PRE-PROCEDURE SCREENING ORDER (NO ISOLATION) - Swab, Nasopharynx [[id number]]  (Normal) Collected: 01/10/22 0031    Lab Status: Final result Specimen: Swab from Nasopharynx Updated: 01/10/22 0147    Narrative:      The following orders were created for panel order COVID PRE-OP / PRE-PROCEDURE SCREENING ORDER (NO ISOLATION) - Swab, Nasopharynx.  Procedure                               Abnormality         Status                     ---------                               -----------         ------                     COVID-19, FLU A/B, RSV P...[[id number]]  Normal              Final result                 Please view results for these tests on the individual orders.    COVID-19, FLU A/B, RSV PCR - Swab, Nasopharynx [[id number]]  (Normal) Collected: 01/10/22 0031    Lab Status: Final result Specimen: Swab from Nasopharynx Updated: 01/10/22 0147     COVID19 Not Detected     Influenza A PCR Not Detected     Influenza B PCR Not Detected     RSV, PCR Not Detected          Adult Transthoracic Echo Complete W/ Cont if Necessary Per Protocol    Result Date:  1/11/2022  · Left ventricular wall thickness is consistent with mild concentric hypertrophy. · Estimated left ventricular EF = 70% Left ventricular ejection fraction appears to be 61 - 65%. Left ventricular systolic function is normal.      US Gallbladder    Result Date: 1/11/2022  EXAMINATION: US GALLBLADDER-  INDICATION: Chest pain.  TECHNIQUE: Ultrasound right upper quadrant abdomen.  COMPARISON: None.  FINDINGS: Visualized portions of the pancreas are within normal limits.  Liver demonstrates homogeneous appearance of the parenchyma without focal liver lesion.  Gallbladder present without cholelithiasis, gallbladder wall thickening or pericholecystic fluid.  Common bile duct 6 mm in diameter within normal limits at the tierra hepatis region.  Right kidney measures 9.7 cm in length without evidence of hydronephrosis, contour deforming mass or obvious calculi.      Impression: Normal right upper quadrant ultrasound.  D:  01/10/2022 E:  01/11/2022    This report was finalized on 1/11/2022 10:12 AM by Dr. Attila Houston.      CT Chest Pulmonary Embolism    Result Date: 1/10/2022  CT CHEST PULMONARY EMBOLISM W CONTRAST INDICATION: Severe left-sided chest pain. TECHNIQUE: CT angiogram of the chest with IV contrast. 3-D reconstructions were obtained and reviewed.   Radiation dose reduction techniques included automated exposure control or exposure modulation based on body size. Count of known CT and cardiac nuc med studies performed in previous 12 months: 1. COMPARISON: 6/30/2021 FINDINGS: No acute pulmonary embolism or aortic dissection is seen. There is no pleural or pericardial effusion. There is no adenopathy. Upper abdominal images show changes of gastric bypass. The lungs are clear except for some minimal dependent atelectasis in the lower lobes. No CT findings present to indicate pneumonia. Note: CT may be negative in the earliest stages of COVID-19. No pneumothorax.     Impression: 1. No pulmonary embolism or  "aortic dissection. 2. Negative for pneumonia. Signer Name: Peter Beverly MD  Signed: 1/10/2022 3:41 AM  Workstation Name: [facility]  [facility]    Duplex Venous Lower Extremity - Left CAR    Result Date: 1/11/2022  · Normal left lower extremity venous duplex scan.      XR Chest PA & Lateral    Result Date: 1/10/2022  CR Chest 2 Vws INDICATION:  Chest pain. COMPARISON:  7/9/2021 FINDINGS: PA and lateral views of the chest.  Heart and mediastinal contours are normal. The lungs are clear. No pneumothorax or pleural effusion.      Impression: No acute cardiopulmonary findings. Signer Name: Peter Beverly MD  Signed: 1/10/2022 12:19 AM  Workstation [facility]: [facility]  [facility]    Stress Test With Pet Myocardial Perfusion    Result Date: 1/10/2022  · Patient reported chest pain grade 5/10 in severity at baseline with reported increased chest pain, dyspnea (oximetry % on RA), and headache after IV Lexiscan injection, which resolved in recovery with IV caffeine injection. · SR with PACs noted in pre-test and recovery. · No significant ST changes noted. · There is no prior study available for comparison. REST: 76% EF STRESS: 81% EF. · Left ventricular ejection fraction is hyperdynamic (calculated EF > 70%); WNL TID = 0.94. · Myocardial perfusion imaging indicates a normal myocardial perfusion study with no evidence of ischemia (quantitative assessment demonstrates 98% WNL myocardial perfusion with 2% distal inferoapical \"scar\"). · Impressions are consistent with a low risk study. · Findings consistent with an acceptable borderline abnormal pharmacologic ECG stress test. · Qualitative review of the thoracic CT scan slices demonstrates minimal epicardial coronary artery calcification. · Calculated coronary artery flow reserve is 98% within normal limits. · Acceptable probably negative IV Lexiscan hybrid PET cardiac stress scan suggestive of low probability for " significant focal obstructive coronary artery disease with preserved systolic left ventricular function (LVEF 0.76).        Results for orders placed during the hospital encounter of 01/10/22    Adult Transthoracic Echo Complete W/ Cont if Necessary Per Protocol    Interpretation Summary  · Left ventricular wall thickness is consistent with mild concentric hypertrophy.  · Estimated left ventricular EF = 70% Left ventricular ejection fraction appears to be 61 - 65%. Left ventricular systolic function is normal.      I have reviewed the medications:  Scheduled Meds:GI cocktail, , Oral, Once  apixaban, 5 mg, Oral, Q12H  aspirin, 325 mg, Oral, Daily  famotidine, 40 mg, Oral, Daily  lisinopril, 10 mg, Oral, Daily  OLANZapine, 10 mg, Oral, Nightly  pantoprazole, 40 mg, Oral, BID AC  sodium chloride, 10 mL, Intravenous, Q12H      Continuous Infusions:   PRN Meds:.•  acetaminophen **OR** acetaminophen **OR** acetaminophen  •  melatonin  •  Morphine  •  nitroglycerin  •  ondansetron **OR** ondansetron  •  oxyCODONE-acetaminophen  •  sodium chloride  •  sodium chloride  •  zolpidem    Assessment/Plan   Assessment & Plan     Active Hospital Problems    Diagnosis  POA   • Chest pain [R07.9]  Yes   • History of DVT (deep vein thrombosis) [Z86.718]  Not Applicable   • History of pulmonary embolism [Z86.711]  Yes   • Insomnia [G47.00]  Yes   • Hypertension [I10]  Yes      Resolved Hospital Problems   No resolved problems to display.        Brief Hospital Course to date:  Kamila Garcia is a 51 y.o. female with past medical of left lower extremity DVT/PE in June 2021, insomnia, essential hypertension, who presented to the ER with complaints of acute onset of left-sided chest pain.    Plan:    Chest pain/ Substernal Pain   - was given aspirin in ED, continue daily aspirin  - given toradol, GI cocktail for symptoms w/o relief  -- TSH elevated, with normal FT4  -- Mag and K+ wnl   -- TTE unremarkable  -- troponins negative  --  repeat EKGs with no ST changes  -- PRN Morphine, added PO pain medications  -- cardiology consulted, advised low dose Imdur on d/c  --stress test unremarkable  -- pt still reporting 7 out of 10 pain despite appearing quite comfortable.  Although pain is unrelated to food, would like to rule out GI etiology as pt does not feel she is ready for discharge today.  Added PO PPI BID.  Attempted another GI cocktail. Consult GI for possible EGD in am.  Appreciate their assistance  -- RUQ U/S unremarkable     HTN  -- continue lisinopril     History of LLE DVT / PE on Eliquis  -- CTA negative   - continue Eliquis     Insomnia  - taking zyprexa and lunesta at night, at home. Have substituted Ambien for Lunesta due to formulary options  - melatonin prn    DVT prophylaxis:  Medical DVT prophylaxis orders are present.       AM-PAC 6 Clicks Score (PT): 24 (01/11/22 1128)    Disposition: I expect the patient to be discharged home tomorrow pending GI evaluation    CODE STATUS:   Code Status and Medical Interventions:   Ordered at: 01/10/22 0427     Code Status (Patient has no pulse and is not breathing):    CPR (Attempt to Resuscitate)     Medical Interventions (Patient has pulse or is breathing):    Full Support       Vanessa Bang MD  01/11/22

## 2022-01-11 NOTE — PLAN OF CARE
Problem: Adult Inpatient Plan of Care  Goal: Plan of Care Review  Recent Flowsheet Documentation  Taken 1/11/2022 1133 by Joni Reyes OT  Progress: (OT eval) no change  Plan of Care Reviewed With:   patient   spouse  Outcome Summary: OT jesus completed. Pt presents at baseline for ADL completion w/ BUE strength and coordination WFL. OT signing off, defer to PT for further mobility needs. Rec d/c to home w/ assist prn.

## 2022-01-11 NOTE — THERAPY EVALUATION
Patient Name: Kamila Garcia  : 1970    MRN: [id number]                              Today's Date: 2022       Admit Date: 1/10/2022    Visit Dx: No diagnosis found.  Patient Active Problem List   Diagnosis   • Acute pulmonary embolism (HCC)   • Chest pain   • History of DVT (deep vein thrombosis)   • History of pulmonary embolism   • Insomnia   • Hypertension     Past Medical History:   Diagnosis Date   • DVT (deep venous thrombosis) (HCC)    • History of pulmonary embolus (PE)    • Hypertension      Past Surgical History:   Procedure Laterality Date   • GASTRIC BYPASS        General Information     Row Name 22 [facility]          Physical Therapy Time and Intention    Document Type evaluation  -CD     Mode of Treatment physical therapy  -CD     Row Name 22 111          General Information    Patient Profile Reviewed yes  -CD     Prior Level of Function independent:; community mobility; all household mobility; driving; using stairs; ADL's; work  -CD     Existing Precautions/Restrictions no known precautions/restrictions  -CD     Barriers to Rehab none identified  -CD     Row Name 22 1112          Living Environment    Lives With spouse  -CD     Row Name 22 1112          Home Main Entrance    Number of Stairs, Main Entrance three  -CD     Stair Railings, Main Entrance none  -CD     Row Name 22 1112          Stairs Within Home, Primary    Number of Stairs, Within Home, Primary none  -CD     Row Name 22 111          Cognition    Orientation Status (Cognition) oriented x 3  -CD     Row Name 22 111          Safety Issues, Functional Mobility    Impairments Affecting Function (Mobility) other (see comments)  C/O CP - NSG AWARE.  -CD     Comment, Safety Issues/Impairments (Mobility) NO SAFETY CONCERNS. FLAT AFFECT.  -CD           User Key  (r) = Recorded By, (t) = Taken By, (c) = Cosigned By    Initials Name Provider Type    CD Blanquita Rivera PT Physical Therapist                Mobility     Row Name 01/11/22 1114          Bed Mobility    Bed Mobility supine-sit  -CD     Supine-Sit [place] (Bed Mobility) independent  -CD     Row Name 01/11/22 1114          Sit-Stand Transfer    Sit-Stand [place] (Transfers) independent  -CD     Row Name 01/11/22 1114          Gait/Stairs (Locomotion)    [place] Level (Gait) standby assist  -CD     Distance in Feet (Gait) 40 FEET.  -CD     Deviations/Abnormal Patterns (Gait) gait speed decreased  -CD     Comment (Gait/Stairs) PT AMBULATED TO BATHROOM AND BACK TO RECLINER. NO LOB BUT SLOW AND GUARDED.  DECLINED GAIT FURTHER DISTANCE OR UP/DOWN STEPS DUE TO NOT FEELING WELL. NSG AWARE OF C/O CP. VITALS STABLE ON RA.  -CD           User Key  (r) = Recorded By, (t) = Taken By, (c) = Cosigned By    Initials Name Provider Type    CD Blanquita Rivera PT Physical Therapist               Obj/Interventions     Row Name 01/11/22 1120          Range of Motion Comprehensive    General Range of Motion bilateral lower extremity ROM WNL  -CD     Row Name 01/11/22 1120          Strength Comprehensive (MMT)    General Manual Muscle Testing (MMT) Assessment no strength deficits identified  -CD     Row Name 01/11/22 1120          Motor Skills    Motor Skills coordination  -CD     Coordination gross motor deficit; bilateral; lower extremity; heel to shin; WFL  -CD     Row Name 01/11/22 1120          Balance    Balance Assessment sitting static balance; standing static balance; sitting dynamic balance  -CD     Static Sitting Balance WFL  -CD     Dynamic Sitting Balance WFL  -CD     Static Standing Balance WFL  -CD     Comment, Balance DEMONSTRATED BACKWARDS GAIT, TURNING 360 FEET AND RETRIEVING ITEM FROM FLOOR WITHOUT LOB BUT SLOW AND GUARDED.  -CD           User Key  (r) = Recorded By, (t) = Taken By, (c) = Cosigned By    Initials Name Provider Type    Blanquita Pickering PT Physical Therapist               Goals/Plan     Row Name 01/11/22 1127           Gait Training Goal 1 (PT)    Activity/Assistive Device (Gait Training Goal 1, PT) gait (walking locomotion)  -CD     [place] Level (Gait Training Goal 1, PT) independent  -CD     Distance (Gait Training Goal 1, PT) 600 FEET  -CD     Time Frame (Gait Training Goal 1, PT) long term goal (LTG); 1 week  -CD     Row Name 01/11/22 1127          Stairs Goal 1 (PT)    Activity/Assistive Device (Stairs Goal 1, PT) ascending stairs; descending stairs  -CD     [place] Level/Cues Needed (Stairs Goal 1, PT) independent  -CD     Number of Stairs (Stairs Goal 1, PT) 12  -CD     Time Frame (Stairs Goal 1, PT) long term goal (LTG); 1 week  -CD           User Key  (r) = Recorded By, (t) = Taken By, (c) = Cosigned By    Initials Name Provider Type    CD Blanquita Rivera, PT Physical Therapist               Clinical Impression     Row Name 01/11/22 1123          Pain    Additional Documentation Pain Scale: Numbers Pre/Post-Treatment (Group)  -CD     Row Name 01/11/22 1123          Pain Scale: Numbers Pre/Post-Treatment    Pretreatment Pain Rating 5/10  -CD     Posttreatment Pain Rating 5/10  -CD     Pain Location - Side Left  -CD     Pain Location chest  L SIDE  -CD     Row Name 01/11/22 1123          Plan of Care Review    Plan of Care Reviewed With patient  -CD     Outcome Summary PT IS LIKELY CLOSE TO BASELINE  WITH FUNCTIONAL MOBILITY BUT C/O CP (NSG AWARE). GAIT IS SLOW AND GUARDED BUT NO OVERT LOB. PT HAS FLAT AFFECT BUT IS ORIENTED AND FOLLOWS ALL COMMANDS. WILL SEE 1-2 MORE VISITS TO INCREASE AMBULATION DISTANCE, PERFORM UP/DOWN STAIRS AND MONITOR VITALS.  -CD     Row Name 01/11/22 1123          Therapy Assessment/Plan (PT)    Patient/Family Therapy Goals Statement (PT) TO RESOLVE PAIN.  -CD     Rehab Potential (PT) good, to achieve stated therapy goals  -CD     Criteria for Skilled Interventions Met (PT) yes  -CD     Predicted Duration of Therapy Intervention (PT) 1 WEEK.  -CD     Row Name 01/11/22 1123           Vital Signs    Pre Systolic BP Rehab 130  -CD     Pre Treatment Diastolic BP 79  -CD     Post Systolic BP Rehab 150  -CD     Post Treatment Diastolic BP 78  -CD     Posttreatment Heart Rate (beats/min) 61  -CD     Pre SpO2 (%) 98  -CD     O2 Delivery Pre Treatment supplemental O2  -CD     O2 Delivery Intra Treatment supplemental O2  -CD     Post SpO2 (%) 98  -CD     O2 Delivery Post Treatment supplemental O2  -CD     Pre Patient Position Supine  -CD     Intra Patient Position Standing  -CD     Post Patient Position Sitting  -CD     Row Name 01/11/22 1123          Positioning and Restraints    Pre-Treatment Position in bed  -CD     Post Treatment Position chair  -CD     In Chair reclined; call light within reach; encouraged to call for assist; notified nsg  -CD           User Key  (r) = Recorded By, (t) = Taken By, (c) = Cosigned By    Initials Name Provider Type    Blanquita Pickering, PT Physical Therapist               Outcome Measures     Row Name 01/11/22 1128          How much help from another person do you currently need...    Turning from your back to your side while in flat bed without using bedrails? 4  -CD     Moving from lying on back to sitting on the side of a flat bed without bedrails? 4  -CD     Moving to and from a bed to a chair (including a wheelchair)? 4  -CD     Standing up from a chair using your arms (e.g., wheelchair, bedside chair)? 4  -CD     Climbing 3-5 steps with a railing? 4  -CD     To walk in hospital room? 4  -CD     AM-PAC 6 Clicks Score (PT) 24  -CD           User Key  (r) = Recorded By, (t) = Taken By, (c) = Cosigned By    Initials Name Provider Type    Blanquita Pickering, PT Physical Therapist                             Physical Therapy Education                 Title: PT OT SLP Therapies (Done)     Topic: Physical Therapy (Done)     Point: Precautions (Done)     Learning Progress Summary           Patient Acceptance, E, VU by CD at 1/11/2022 1128    Comment: BENEFITS OF OOB  ACTIVITY. SAFETY WITH MOBILITY.                               User Key     Initials Effective Dates Name Provider Type Discipline    CD 06/16/21 -  Blanquita Rivera PT Physical Therapist PT              PT Recommendation and Plan  Planned Therapy Interventions (PT): gait training, stair training  Plan of Care Reviewed With: patient  Outcome Summary: PT IS LIKELY CLOSE TO BASELINE  WITH FUNCTIONAL MOBILITY BUT C/O CP (NSG AWARE). GAIT IS SLOW AND GUARDED BUT NO OVERT LOB. PT HAS FLAT AFFECT BUT IS ORIENTED AND FOLLOWS ALL COMMANDS. WILL SEE 1-2 MORE VISITS TO INCREASE AMBULATION DISTANCE, PERFORM UP/DOWN STAIRS AND MONITOR VITALS.     Time Calculation:    PT Charges     Row Name 01/11/22 1130             Time Calculation    Start Time 1041  -CD      PT Received On 01/11/22  -CD      PT Goal Re-Cert Due Date 01/21/22  -CD              Untimed Charges    PT Eval/Re-eval Minutes 49  -CD              Total Minutes    Untimed Charges Total Minutes 49  -CD       Total Minutes 49  -CD            User Key  (r) = Recorded By, (t) = Taken By, (c) = Cosigned By    Initials Name Provider Type    CD Blanquita Rivera PT Physical Therapist              Therapy Charges for Today     Code Description Service Date Service Provider Modifiers Qty    [id number] HC PT EVAL LOW COMPLEXITY 4 1/11/2022 Blanquita Rivera PT GP 1          PT G-Codes  AM-PAC 6 Clicks Score (PT): 24    Blanquita Rivera PT  1/11/2022

## 2022-01-11 NOTE — PLAN OF CARE
Goal Outcome Evaluation:  Plan of Care Reviewed With: patient, spouse A&O x's 4. NSR noted on tele. SPO2 97% on RA. CO mid sternal chest pain radiating to the left arm. MD is aware. Pain 5-6 out of 10. PRN pain meds administered per MAR. Pt to be NPO after MN for EGD in the am. Consent obtained and placed on the chart. Voiced no other needs at this time. Continue to monitor,

## 2022-01-12 ENCOUNTER — ANESTHESIA EVENT (OUTPATIENT)
Dept: GASTROENTEROLOGY | Facility: HOSPITAL | Age: 52
End: 2022-01-12

## 2022-01-12 ENCOUNTER — ANESTHESIA (OUTPATIENT)
Dept: GASTROENTEROLOGY | Facility: HOSPITAL | Age: 52
End: 2022-01-12

## 2022-01-12 VITALS
HEART RATE: 63 BPM | DIASTOLIC BLOOD PRESSURE: 83 MMHG | HEIGHT: 66 IN | BODY MASS INDEX: 32.3 KG/M2 | SYSTOLIC BLOOD PRESSURE: 154 MMHG | TEMPERATURE: 97.4 F | WEIGHT: 201 LBS | OXYGEN SATURATION: 96 % | RESPIRATION RATE: 16 BRPM

## 2022-01-12 LAB
ALBUMIN SERPL-MCNC: 3.3 G/DL (ref 3.5–5.2)
ALBUMIN/GLOB SERPL: 1.6 G/DL
ALP SERPL-CCNC: 65 U/L (ref 39–117)
ALT SERPL W P-5'-P-CCNC: 8 U/L (ref 1–33)
ANION GAP SERPL CALCULATED.3IONS-SCNC: 8 MMOL/L (ref 5–15)
AST SERPL-CCNC: 19 U/L (ref 1–32)
BASOPHILS # BLD AUTO: 0.06 10*3/MM3 (ref 0–0.2)
BASOPHILS NFR BLD AUTO: 1.3 % (ref 0–1.5)
BILIRUB SERPL-MCNC: 0.7 MG/DL (ref 0–1.2)
BUN SERPL-MCNC: 9 MG/DL (ref 6–20)
BUN/CREAT SERPL: 10.5 (ref 7–25)
CALCIUM SPEC-SCNC: 9 MG/DL (ref 8.6–10.5)
CHLORIDE SERPL-SCNC: 109 MMOL/L (ref 98–107)
CO2 SERPL-SCNC: 25 MMOL/L (ref 22–29)
CREAT SERPL-MCNC: 0.86 MG/DL (ref 0.57–1)
DEPRECATED RDW RBC AUTO: 44.1 FL (ref 37–54)
EOSINOPHIL # BLD AUTO: 0.25 10*3/MM3 (ref 0–0.4)
EOSINOPHIL NFR BLD AUTO: 5.6 % (ref 0.3–6.2)
ERYTHROCYTE [DISTWIDTH] IN BLOOD BY AUTOMATED COUNT: 12.9 % (ref 12.3–15.4)
GFR SERPL CREATININE-BSD FRML MDRD: 69 ML/MIN/1.73
GLOBULIN UR ELPH-MCNC: 2.1 GM/DL
GLUCOSE SERPL-MCNC: 96 MG/DL (ref 65–99)
HCT VFR BLD AUTO: 38.6 % (ref 34–46.6)
HGB BLD-MCNC: 12.3 G/DL (ref 12–15.9)
IMM GRANULOCYTES # BLD AUTO: 0.02 10*3/MM3 (ref 0–0.05)
IMM GRANULOCYTES NFR BLD AUTO: 0.4 % (ref 0–0.5)
LYMPHOCYTES # BLD AUTO: 1.91 10*3/MM3 (ref 0.7–3.1)
LYMPHOCYTES NFR BLD AUTO: 42.4 % (ref 19.6–45.3)
MCH RBC QN AUTO: 30.1 PG (ref 26.6–33)
MCHC RBC AUTO-ENTMCNC: 31.9 G/DL (ref 31.5–35.7)
MCV RBC AUTO: 94.4 FL (ref 79–97)
MONOCYTES # BLD AUTO: 0.49 10*3/MM3 (ref 0.1–0.9)
MONOCYTES NFR BLD AUTO: 10.9 % (ref 5–12)
NEUTROPHILS NFR BLD AUTO: 1.77 10*3/MM3 (ref 1.7–7)
NEUTROPHILS NFR BLD AUTO: 39.4 % (ref 42.7–76)
NRBC BLD AUTO-RTO: 0 /100 WBC (ref 0–0.2)
PLATELET # BLD AUTO: 201 10*3/MM3 (ref 140–450)
PMV BLD AUTO: 10.2 FL (ref 6–12)
POTASSIUM SERPL-SCNC: 3.8 MMOL/L (ref 3.5–5.2)
PROT SERPL-MCNC: 5.4 G/DL (ref 6–8.5)
QT INTERVAL: 384 MS
QT INTERVAL: 438 MS
QTC INTERVAL: 433 MS
QTC INTERVAL: 437 MS
RBC # BLD AUTO: 4.09 10*6/MM3 (ref 3.77–5.28)
SODIUM SERPL-SCNC: 142 MMOL/L (ref 136–145)
WBC NRBC COR # BLD: 4.5 10*3/MM3 (ref 3.4–10.8)

## 2022-01-12 PROCEDURE — 0 LIDOCAINE 1 % SOLUTION: Performed by: NURSE ANESTHETIST, CERTIFIED REGISTERED

## 2022-01-12 PROCEDURE — G0378 HOSPITAL OBSERVATION PER HR: HCPCS

## 2022-01-12 PROCEDURE — 44360 SMALL BOWEL ENDOSCOPY: CPT | Performed by: INTERNAL MEDICINE

## 2022-01-12 PROCEDURE — 99217 PR OBSERVATION CARE DISCHARGE MANAGEMENT: CPT | Performed by: INTERNAL MEDICINE

## 2022-01-12 PROCEDURE — 85025 COMPLETE CBC W/AUTO DIFF WBC: CPT | Performed by: INTERNAL MEDICINE

## 2022-01-12 PROCEDURE — 80053 COMPREHEN METABOLIC PANEL: CPT | Performed by: INTERNAL MEDICINE

## 2022-01-12 PROCEDURE — 25010000002 PROPOFOL 10 MG/ML EMULSION: Performed by: NURSE ANESTHETIST, CERTIFIED REGISTERED

## 2022-01-12 PROCEDURE — 99225 PR SBSQ OBSERVATION CARE/DAY 25 MINUTES: CPT | Performed by: INTERNAL MEDICINE

## 2022-01-12 RX ORDER — LIDOCAINE HYDROCHLORIDE 10 MG/ML
0.5 INJECTION, SOLUTION EPIDURAL; INFILTRATION; INTRACAUDAL; PERINEURAL ONCE AS NEEDED
Status: DISCONTINUED | OUTPATIENT
Start: 2022-01-12 | End: 2022-01-12 | Stop reason: HOSPADM

## 2022-01-12 RX ORDER — FENTANYL CITRATE 50 UG/ML
50 INJECTION, SOLUTION INTRAMUSCULAR; INTRAVENOUS
Status: DISCONTINUED | OUTPATIENT
Start: 2022-01-12 | End: 2022-01-12 | Stop reason: HOSPADM

## 2022-01-12 RX ORDER — SODIUM CHLORIDE 0.9 % (FLUSH) 0.9 %
10 SYRINGE (ML) INJECTION AS NEEDED
Status: DISCONTINUED | OUTPATIENT
Start: 2022-01-12 | End: 2022-01-12 | Stop reason: HOSPADM

## 2022-01-12 RX ORDER — SODIUM CHLORIDE 0.9 % (FLUSH) 0.9 %
10 SYRINGE (ML) INJECTION EVERY 12 HOURS SCHEDULED
Status: DISCONTINUED | OUTPATIENT
Start: 2022-01-12 | End: 2022-01-12 | Stop reason: HOSPADM

## 2022-01-12 RX ORDER — LIDOCAINE HYDROCHLORIDE 10 MG/ML
INJECTION, SOLUTION INFILTRATION; PERINEURAL AS NEEDED
Status: DISCONTINUED | OUTPATIENT
Start: 2022-01-12 | End: 2022-01-12 | Stop reason: SURG

## 2022-01-12 RX ORDER — MIDAZOLAM HYDROCHLORIDE 1 MG/ML
1 INJECTION INTRAMUSCULAR; INTRAVENOUS
Status: DISCONTINUED | OUTPATIENT
Start: 2022-01-12 | End: 2022-01-12 | Stop reason: HOSPADM

## 2022-01-12 RX ORDER — NITROGLYCERIN 0.4 MG/1
0.4 TABLET SUBLINGUAL
Qty: 30 TABLET | Refills: 0 | Status: SHIPPED | OUTPATIENT
Start: 2022-01-12

## 2022-01-12 RX ORDER — ISOSORBIDE MONONITRATE 30 MG/1
30 TABLET, EXTENDED RELEASE ORAL
Qty: 30 TABLET | Refills: 0 | Status: SHIPPED | OUTPATIENT
Start: 2022-01-12 | End: 2022-03-02 | Stop reason: SDUPTHER

## 2022-01-12 RX ORDER — ONDANSETRON 2 MG/ML
4 INJECTION INTRAMUSCULAR; INTRAVENOUS ONCE AS NEEDED
Status: DISCONTINUED | OUTPATIENT
Start: 2022-01-12 | End: 2022-01-12 | Stop reason: HOSPADM

## 2022-01-12 RX ORDER — PROPOFOL 10 MG/ML
VIAL (ML) INTRAVENOUS AS NEEDED
Status: DISCONTINUED | OUTPATIENT
Start: 2022-01-12 | End: 2022-01-12 | Stop reason: SURG

## 2022-01-12 RX ORDER — SODIUM CHLORIDE, SODIUM LACTATE, POTASSIUM CHLORIDE, CALCIUM CHLORIDE 600; 310; 30; 20 MG/100ML; MG/100ML; MG/100ML; MG/100ML
9 INJECTION, SOLUTION INTRAVENOUS CONTINUOUS
Status: DISCONTINUED | OUTPATIENT
Start: 2022-01-12 | End: 2022-01-12 | Stop reason: HOSPADM

## 2022-01-12 RX ORDER — DEXAMETHASONE SODIUM PHOSPHATE 4 MG/ML
8 INJECTION, SOLUTION INTRA-ARTICULAR; INTRALESIONAL; INTRAMUSCULAR; INTRAVENOUS; SOFT TISSUE ONCE AS NEEDED
Status: DISCONTINUED | OUTPATIENT
Start: 2022-01-12 | End: 2022-01-12 | Stop reason: HOSPADM

## 2022-01-12 RX ORDER — ISOSORBIDE MONONITRATE 30 MG/1
30 TABLET, EXTENDED RELEASE ORAL
Status: DISCONTINUED | OUTPATIENT
Start: 2022-01-12 | End: 2022-01-12 | Stop reason: HOSPADM

## 2022-01-12 RX ORDER — ONDANSETRON 4 MG/1
4 TABLET, FILM COATED ORAL EVERY 6 HOURS PRN
Qty: 30 TABLET | Refills: 0 | Status: SHIPPED | OUTPATIENT
Start: 2022-01-12

## 2022-01-12 RX ORDER — FAMOTIDINE 10 MG/ML
20 INJECTION, SOLUTION INTRAVENOUS ONCE
Status: DISCONTINUED | OUTPATIENT
Start: 2022-01-12 | End: 2022-01-12 | Stop reason: HOSPADM

## 2022-01-12 RX ORDER — PANTOPRAZOLE SODIUM 40 MG/1
40 TABLET, DELAYED RELEASE ORAL
Qty: 60 TABLET | Refills: 0 | Status: SHIPPED | OUTPATIENT
Start: 2022-01-12

## 2022-01-12 RX ORDER — OXYCODONE HYDROCHLORIDE AND ACETAMINOPHEN 5; 325 MG/1; MG/1
1 TABLET ORAL EVERY 6 HOURS PRN
Qty: 10 TABLET | Refills: 0 | Status: SHIPPED | OUTPATIENT
Start: 2022-01-12 | End: 2022-01-18

## 2022-01-12 RX ORDER — FAMOTIDINE 20 MG/1
20 TABLET, FILM COATED ORAL ONCE
Status: DISCONTINUED | OUTPATIENT
Start: 2022-01-12 | End: 2022-01-12 | Stop reason: HOSPADM

## 2022-01-12 RX ADMIN — OXYCODONE HYDROCHLORIDE AND ACETAMINOPHEN 1 TABLET: 5; 325 TABLET ORAL at 02:32

## 2022-01-12 RX ADMIN — APIXABAN 5 MG: 5 TABLET, FILM COATED ORAL at 10:49

## 2022-01-12 RX ADMIN — Medication 10 ML: at 10:52

## 2022-01-12 RX ADMIN — OXYCODONE HYDROCHLORIDE AND ACETAMINOPHEN 1 TABLET: 5; 325 TABLET ORAL at 10:48

## 2022-01-12 RX ADMIN — SODIUM CHLORIDE, PRESERVATIVE FREE 10 ML: 5 INJECTION INTRAVENOUS at 10:51

## 2022-01-12 RX ADMIN — PANTOPRAZOLE SODIUM 40 MG: 40 TABLET, DELAYED RELEASE ORAL at 10:48

## 2022-01-12 RX ADMIN — SODIUM CHLORIDE, PRESERVATIVE FREE 10 ML: 5 INJECTION INTRAVENOUS at 10:52

## 2022-01-12 RX ADMIN — LISINOPRIL 10 MG: 10 TABLET ORAL at 10:49

## 2022-01-12 RX ADMIN — SODIUM CHLORIDE, POTASSIUM CHLORIDE, SODIUM LACTATE AND CALCIUM CHLORIDE 9 ML/HR: 600; 310; 30; 20 INJECTION, SOLUTION INTRAVENOUS at 09:00

## 2022-01-12 RX ADMIN — PANTOPRAZOLE SODIUM 40 MG: 40 TABLET, DELAYED RELEASE ORAL at 06:51

## 2022-01-12 RX ADMIN — ISOSORBIDE MONONITRATE 30 MG: 30 TABLET, EXTENDED RELEASE ORAL at 10:48

## 2022-01-12 RX ADMIN — PROPOFOL 50 MG: 10 INJECTION, EMULSION INTRAVENOUS at 09:32

## 2022-01-12 RX ADMIN — PROPOFOL 50 MG: 10 INJECTION, EMULSION INTRAVENOUS at 09:31

## 2022-01-12 RX ADMIN — ASPIRIN 325 MG ORAL TABLET 325 MG: 325 PILL ORAL at 10:49

## 2022-01-12 RX ADMIN — LIDOCAINE HYDROCHLORIDE 100 MG: 10 INJECTION, SOLUTION INFILTRATION; PERINEURAL at 09:30

## 2022-01-12 RX ADMIN — PROPOFOL 50 MG: 10 INJECTION, EMULSION INTRAVENOUS at 09:30

## 2022-01-12 RX ADMIN — PROPOFOL 50 MG: 10 INJECTION, EMULSION INTRAVENOUS at 09:33

## 2022-01-12 RX ADMIN — FAMOTIDINE 40 MG: 20 TABLET, FILM COATED ORAL at 10:48

## 2022-01-12 NOTE — PLAN OF CARE
Goal Outcome Evaluation:   VSS, RA, A&Ox4 throughout shift.  PRN pain meds given x2.  NPO since midnight.  No s/s of distress noted. NSR to SB

## 2022-01-12 NOTE — ANESTHESIA POSTPROCEDURE EVALUATION
Patient: Kamila Garcia    Procedure Summary     Date: 01/12/22 Room / Location:  [facility]    Anesthesia Start: 0925 Anesthesia Stop: 0941    Procedure: ESOPHAGOGASTRODUODENOSCOPY (N/A ) Diagnosis:       Chest pain, unspecified type      (Chest pain, unspecified type [R07.9])    Surgeons: Brunner, Mark I, MD Provider: Jani Pichardo MD    Anesthesia Type: general ASA Status: 2          Anesthesia Type: general    Vitals  Vitals Value Taken Time   /70 01/12/22 0955   Temp 98 °F (36.7 °C) 01/12/22 0939   Pulse 64 01/12/22 0955   Resp 16 01/12/22 0939   SpO2 96 % 01/12/22 0955           Post Anesthesia Care and Evaluation    Patient location during evaluation: PACU  Patient participation: complete - patient participated  Level of consciousness: awake and alert  Pain management: adequate  Airway patency: patent  Anesthetic complications: No anesthetic complications  PONV Status: none  Cardiovascular status: hemodynamically stable and acceptable  Respiratory status: nonlabored ventilation, acceptable and nasal cannula  Hydration status: acceptable

## 2022-01-12 NOTE — BRIEF OP NOTE
ESOPHAGOGASTRODUODENOSCOPY  Progress Note    Kamila Jose  1/12/2022    EGD shows normal gastric bypass anatomy. No cause of chest pain identified.    Since patient's chest pain is worse with inspiration, recommend incentive spirometer.    Patient is stable for discharge from GI standpoint.    Mark I. Brunner, MD     Date: 1/12/2022  Time: 09:43 EST

## 2022-01-12 NOTE — CASE MANAGEMENT/SOCIAL WORK
Case Management Discharge Note      Final Note: Ms. Garcia is medically ready for discharge today. Her wife Cassidy will be available to transport her home by private vehicle. No discharge needs identified.                      Final Discharge Disposition Code: 01 - home or self-care

## 2022-01-12 NOTE — ANESTHESIA PREPROCEDURE EVALUATION
Anesthesia Evaluation     Patient summary reviewed and Nursing notes reviewed   no history of anesthetic complications:  NPO Solid Status: > 8 hours  NPO Liquid Status: > 2 hours           Airway   Mallampati: II  TM distance: >3 FB  Neck ROM: full  No difficulty expected  Dental - normal exam     Pulmonary - normal exam    breath sounds clear to auscultation  (+) pulmonary embolism,   Cardiovascular - normal exam    ECG reviewed  PT is on anticoagulation therapy  Rhythm: regular  Rate: normal    (+) hypertension, angina (Chest pain, ruled out for cardiac etiology), DVT,     ROS comment: 1/22 Echo:  · Left ventricular wall thickness is consistent with mild concentric hypertrophy.  · Estimated left ventricular EF = 70% Left ventricular ejection fraction appears to be 61 - 65%. Left ventricular systolic function is normal.        Neuro/Psych- negative ROS  GI/Hepatic/Renal/Endo - negative ROS     Musculoskeletal (-) negative ROS    Abdominal    Substance History      OB/GYN          Other - negative ROS                       Anesthesia Plan    ASA 2     general     intravenous induction     Anesthetic plan, all risks, benefits, and alternatives have been provided, discussed and informed consent has been obtained with: patient.    Plan discussed with CRNA.

## 2022-01-12 NOTE — PROGRESS NOTES
Kamila Sharp [facility]  [id number]  1970   LOS: 0 days   Patient Care Team:  Latricia Mishra APRN as PCP - General (Family Medicine)    Chief Complaint: Follow-up on CP / HTN / REMOTE PTE    Subjective      The patient had some chest pain yesterday.  She has not received nitroglycerin sublingual.  She is supposed to have a EGD this morning.  She denies any shortness of breath, palpitations, abdominal pain, nausea, vomiting, or diarrhea. Her stress test, echocardiogram, and gallbladder ultrasound were acceptable.    Objective     Vital Sign Min/Max for last 24 hours  Temp  Min: 97.5 °F (36.4 °C)  Max: 97.9 °F (36.6 °C)   BP  Min: 114/67  Max: 150/78   Pulse  Min: 51  Max: 69   Resp  Min: 16  Max: 18   SpO2  Min: 95 %  Max: 98 %               01/10/22  0735 01/10/22  1307   Weight: 91.6 kg (201 lb 14.4 oz) 91.2 kg (201 lb)         Intake/Output Summary (Last 24 hours) at 1/12/2022 0757  Last data filed at 1/11/2022 1430  Gross per 24 hour   Intake 480 ml   Output --   Net 480 ml       Physical Exam:     General Appearance:    Alert, cooperative, in no acute distress   Lungs:     Clear to auscultation,respirations regular, even and                   unlabored    Heart:    Regular and normal rate, normal S1 and S2, no            murmur, no gallop, no rub, no click   Abdomen:  Extremities:   Soft, non-tender, bowel sounds audible x4    No edema, normal range of motion   Pulses:   Pulses palpable and equal bilaterally      Results Review:   Results from last 7 days   Lab Units 01/11/22  0530 01/10/22  1053 01/10/22  1053 01/09/22  2259 01/09/22  2259   SODIUM mmol/L 143  --  139  --  140   POTASSIUM mmol/L 4.0  --  4.7  --  4.3   CHLORIDE mmol/L 110*  --  107  --  107   CO2 mmol/L 24.0  --  26.0  --  21.0*   BUN mg/dL 12  --  12  --  11   CREATININE mg/dL 0.87  --  0.76  --  0.76   GLUCOSE mg/dL 90   < > 91   < > 90   CALCIUM mg/dL 9.2  --  8.8  --  8.7    < > = values in this interval not displayed.     Results from last  7 days   Lab Units 01/12/22  0720 01/11/22  0530 01/10/22  1053   WBC 10*3/mm3 4.50 4.35 5.24   HEMOGLOBIN g/dL 12.3 12.7 12.9   HEMATOCRIT % 38.6 39.0 38.9   PLATELETS 10*3/mm3 201 207 244     Results from last 7 days   Lab Units 01/10/22  1053   CHOLESTEROL mg/dL 160   TRIGLYCERIDES mg/dL 70   HDL CHOL mg/dL 82*   LDL CHOL mg/dL 64     Results from last 7 days   Lab Units 01/10/22  1053 01/10/22  0053 01/09/22  2259   TROPONIN T ng/mL <0.010 <0.010 <0.010     · No new chest x-ray or ECG to review    Medication Review: REVIEWED; NO DRIPS.    Assessment/Plan      Patient had acceptable echocardiogram, cardiac PET, and had a normal gallbladder ultrasound. She will follow-up with her vascular surgeon in 2 weeks and discuss with him the duration of apixaban therapy as well as whether genetic testing would be of benefit.  We will start Imdur 30 mg daily.  The patient will have an EGD today 1/12/2022.  Plans per GI to have an outpatient colonoscopy.  Eliquis held yesterday in preparation for EGD today. Would resume Eliquis post procedure.  Doubt ACS, cardiac status stable, and okay from a cardiac standpoint for discharge when GI okay's.  If refractory symptoms develop would consider coronary CTA versus LHC, particularly if EGD unremarkable and there are no additional findings of cause for her disabling chest pain syndrome.  Her symptoms are suggestive of musculoskeletal chest discomfort and if EGD unremarkable would consider a trial of Relafen 500 mg BID with PPI coverage.  Would recommend the following cardiac medications at discharge:    · Eliquis 5 mg twice daily  · Imdur 30 mg daily  · NTG spray as needed  · Consideration of empiric PPI  · Defer ASA  · Lisinopril 10 mg daily  · Follow-up with her vascular surgeon in 2 weeks  · Return for [facility] cardiology follow-up as needed    We will standby and be available to see during current hospitalization as requested.  Thanks.      Chest pain    History of DVT (deep vein  thrombosis)    History of pulmonary embolism    Insomnia    Hypertension    Electronically signed by RAFFY Sharp, 01/12/22, 8:06 AM EST.    I have seen and examined the patient, case was discussed with the physician extender, reviewed the above note, necessary changes were made and I agree with the final note.     Brooks Alexis MD [facility]    01/12/22  07:57 EST

## 2022-01-13 NOTE — ED PROVIDER NOTES
EMERGENCY DEPARTMENT ENCOUNTER    Pt Name: Kamila Garcia  MRN: [id number]  Pt :   1970  Room Number:  S301/1  Date of encounter:  2022  PCP: Latricia Mishra APRN  ED Provider: Scott Taylor MD    Historian: Patient      HPI:  Chief Complaint: Chest pain        Context: Kamila Garcia is a 51 y.o. female patient who presents because of acute left-sided chest pain that started about 4 hours ago.  She says she was at rest when it started and she is describing moderate, tight, left-sided chest pain that radiates to the left axilla and left shoulder as well.  She has a history of DVT and PE back in the summer and says the pain is similar to that and that is her main concern.  Pain gets slightly eased with leaning forward but she notices no exertional component to it.  Denies recent fevers or illnesses.  The pain is not pleuritic but does wax and wane with time.  She knows of nothing else that makes it better.  No other complaints at this time.     PAST MEDICAL HISTORY  Past Medical History:   Diagnosis Date   • DVT (deep venous thrombosis) (HCC)    • History of pulmonary embolus (PE)    • Hypertension          PAST SURGICAL HISTORY  Past Surgical History:   Procedure Laterality Date   • GASTRIC BYPASS           FAMILY HISTORY  Family History   Problem Relation Age of Onset   • Heart attack Mother          SOCIAL HISTORY  Social History     Socioeconomic History   • Marital status:    Tobacco Use   • Smoking status: Never Smoker   • Smokeless tobacco: Never Used   Vaping Use   • Vaping Use: Never used   Substance and Sexual Activity   • Alcohol use: Yes     Alcohol/week: 2.0 standard drinks     Types: 2 Cans of beer per week   • Drug use: Never   • Sexual activity: Defer         ALLERGIES  Hydrocodone        REVIEW OF SYSTEMS  Review of Systems       All systems reviewed and negative except for those discussed in HPI.       PHYSICAL EXAM    I have reviewed the triage vital signs and  nursing notes.    ED Triage Vitals [01/09/22 2239]   Temp Heart Rate Resp BP SpO2   98.9 °F (37.2 °C) 80 20 147/95 98 %      Temp src Heart Rate Source Patient Position BP Location FiO2 (%)   Oral Monitor Sitting Left arm --       Physical Exam  GENERAL:   Appears uncomfortable but in no acute distress  HENT: Nares patent.  EYES: No scleral icterus.  Extraocular movements intact  CV: Regular rhythm, regular rate.  RESPIRATORY: Normal effort.  No audible wheezes, rales or rhonchi.  ABDOMEN: Soft, nontender  MUSCULOSKELETAL: No deformities.   NEURO: Alert, moves all extremities, follows commands.  SKIN: Warm, dry, no rash visualized.        LAB RESULTS  Recent Results (from the past 24 hour(s))   Comprehensive Metabolic Panel    Collection Time: 01/12/22  7:20 AM    Specimen: Blood   Result Value Ref Range    Glucose 96 65 - 99 mg/dL    BUN 9 6 - 20 mg/dL    Creatinine 0.86 0.57 - 1.00 mg/dL    Sodium 142 136 - 145 mmol/L    Potassium 3.8 3.5 - 5.2 mmol/L    Chloride 109 (H) 98 - 107 mmol/L    CO2 25.0 22.0 - 29.0 mmol/L    Calcium 9.0 8.6 - 10.5 mg/dL    Total Protein 5.4 (L) 6.0 - 8.5 g/dL    Albumin 3.30 (L) 3.50 - 5.20 g/dL    ALT (SGPT) 8 1 - 33 U/L    AST (SGOT) 19 1 - 32 U/L    Alkaline Phosphatase 65 39 - 117 U/L    Total Bilirubin 0.7 0.0 - 1.2 mg/dL    eGFR Non African Amer 69 >60 mL/min/1.73    Globulin 2.1 gm/dL    A/G Ratio 1.6 g/dL    BUN/Creatinine Ratio 10.5 7.0 - 25.0    Anion Gap 8.0 5.0 - 15.0 mmol/L   CBC Auto Differential    Collection Time: 01/12/22  7:20 AM    Specimen: Blood   Result Value Ref Range    WBC 4.50 3.40 - 10.80 10*3/mm3    RBC 4.09 3.77 - 5.28 10*6/mm3    Hemoglobin 12.3 12.0 - 15.9 g/dL    Hematocrit 38.6 34.0 - 46.6 %    MCV 94.4 79.0 - 97.0 fL    MCH 30.1 26.6 - 33.0 pg    MCHC 31.9 31.5 - 35.7 g/dL    RDW 12.9 12.3 - 15.4 %    RDW-SD 44.1 37.0 - 54.0 fl    MPV 10.2 6.0 - 12.0 fL    Platelets 201 140 - 450 10*3/mm3    Neutrophil % 39.4 (L) 42.7 - 76.0 %    Lymphocyte % 42.4 19.6  - 45.3 %    Monocyte % 10.9 5.0 - 12.0 %    Eosinophil % 5.6 0.3 - 6.2 %    Basophil % 1.3 0.0 - 1.5 %    Immature Grans % 0.4 0.0 - 0.5 %    Neutrophils, Absolute 1.77 1.70 - 7.00 10*3/mm3    Lymphocytes, Absolute 1.91 0.70 - 3.10 10*3/mm3    Monocytes, Absolute 0.49 0.10 - 0.90 10*3/mm3    Eosinophils, Absolute 0.25 0.00 - 0.40 10*3/mm3    Basophils, Absolute 0.06 0.00 - 0.20 10*3/mm3    Immature Grans, Absolute 0.02 0.00 - 0.05 10*3/mm3    nRBC 0.0 0.0 - 0.2 /100 WBC       If labs were ordered, I independently reviewed the results.        RADIOLOGY  No Radiology Exams Resulted Within Past 24 Hours    I ordered and reviewed the above noted radiographic studies.      I viewed images of chest x-ray and CT PE neither of which reveal any evidence of pneumonia, blood clot, vascular injury or other abnormalities that I can appreciate.  See radiologist's dictation for official interpretation.        PROCEDURES    Procedures    ECG 12 Lead   Final Result   Test Reason : Chest Pain   Blood Pressure :   */*   mmHG   Vent. Rate :  62 BPM     Atrial Rate :  62 BPM      P-R Int : 150 ms          QRS Dur :  82 ms       QT Int : 440 ms       P-R-T Axes :  60  -7   8 degrees      QTc Int : 446 ms      Normal sinus rhythm   Cannot rule out Anterior infarct (cited on or before 30-JUN-2021)   Abnormal ECG   When compared with ECG of 10-MELINA-2022 01:00, (Unconfirmed)   No significant change was found   Confirmed by DIANNE AGOSTO MD ([id number]) on 1/10/2022 6:04:19 PM      Referred By:            Confirmed By: DIANNE AGOSTO MD      ECG 12 Lead   Final Result   Test Reason : chest pain   Blood Pressure :   */*   mmHG   Vent. Rate :  59 BPM     Atrial Rate :  59 BPM      P-R Int : 148 ms          QRS Dur :  80 ms       QT Int : 438 ms       P-R-T Axes :  48  -8  23 degrees      QTc Int : 433 ms      Sinus bradycardia   Minimal voltage criteria for LVH, may be normal variant   Cannot rule out Anterior infarct (cited on or before 30-JUN-2021)    Abnormal ECG   When compared with ECG of 09-JAN-2022 22:53, (Unconfirmed)   No significant change was found   Confirmed by MARLIN WANG ([id number]) on 1/12/2022 6:28:57 AM      Referred By: EDMD           Confirmed By: MARLIN WANG      ECG 12 Lead   Final Result   Test Reason : chest pain   Blood Pressure :   */*   mmHG   Vent. Rate :  78 BPM     Atrial Rate :  78 BPM      P-R Int : 148 ms          QRS Dur :  76 ms       QT Int : 384 ms       P-R-T Axes :  36  -3  37 degrees      QTc Int : 437 ms      Normal sinus rhythm   Cannot rule out Anterior infarct (cited on or before 30-JUN-2021)   Abnormal ECG   When compared with ECG of 09-JUL-2021 17:10,   premature atrial complexes are no longer present   Borderline criteria for Inferior infarct are no longer present   Confirmed by MARLIN WANG ([id number]) on 1/12/2022 6:28:54 AM      Referred By:            Confirmed By: MARLIN WANG          MEDICATIONS GIVEN IN ER    Medications   aspirin chewable tablet 324 mg (324 mg Oral Given 1/10/22 0032)   ketorolac (TORADOL) injection 15 mg (15 mg Intravenous Given 1/10/22 0107)   aluminum-magnesium hydroxide-simethicone (MAALOX MAX) 400-400-40 MG/5ML 15 mL suspension ( Oral Given 1/10/22 0102)   sodium chloride 0.9 % bolus 1,000 mL (0 mL Intravenous Stopped 1/10/22 0420)   Lidocaine Viscous HCl (XYLOCAINE) 2 % solution 15 mL (15 mL Mouth/Throat Given 1/10/22 0103)   iopamidol (ISOVUE-370) 76 % injection 100 mL (80 mL Intravenous Given 1/10/22 0324)   nitroglycerin (NITROSTAT) ointment 1 inch (1 inch Topical Given 1/10/22 0430)   Morphine sulfate (PF) injection 3 mg (3 mg Intravenous Given 1/10/22 0500)   regadenoson (LEXISCAN) injection 0.4 mg (0.4 mg Intravenous Given 1/10/22 1318)   caffeine citrated (CAFCIT) injection 60 mg (60 mg Intravenous Given 1/10/22 1340)   rubidium chloride (CARDIOGEN) injection 1 dose (1 dose Intravenous Given 1/10/22 1308)   rubidium chloride (CARDIOGEN) injection 1 dose (1 dose  Intravenous Given 1/10/22 1320)   aluminum-magnesium hydroxide-simethicone (MAALOX MAX) 400-400-40 MG/5ML 15 mL, Lidocaine Viscous HCl (XYLOCAINE) 2 % 15 mL suspension ( Oral Given 1/11/22 1441)         PROGRESS, DATA ANALYSIS, CONSULTS, AND MEDICAL DECISION MAKING    All labs have been independently reviewed by me.  All radiology studies have been reviewed by me and the radiologist dictating the report.   EKG's have been independently viewed and interpreted by me.      Differential diagnoses: Pulmonary embolism, myocardial infarction, pneumonia, aortic dissection, angina, GERD      ED Course as of 01/12/22 2302   Mon [place] 10, 2022   0038 In summary this is a patient who presents because of acute left-sided chest pain that started about 4 hours ago.  She says she was at rest when it started and she is describing moderate, tight, left-sided chest pain that radiates to the left axilla and left shoulder as well.  She has a history of DVT and PE back in the summer and says the pain is similar to that and that is her main concern.  Pain gets slightly eased with leaning forward but she notices no exertional component to it.  Denies recent fevers or illnesses.  The pain is not pleuritic but does wax and wane with time.  She knows of nothing else that makes it better.  No other complaints at this time. [CC]   0039 Patient arrived awake and alert mildly hypertensive but otherwise vitals within normal limits. [CC]   0039   Initial EKG generally reassuring actually improved from previous EKG.  Initial troponin is not elevated.  BNP is not elevated other labs generally reassuring and not actionable.  At this point D-dimer and Covid swab are still pending.  Treating symptomatically with GI cocktail, Toradol, fluids.  She is already received aspirin.  Will reevaluate pending D-dimer and Covid swab. [CC]      ED Course User Index  [CC] Scott Taylor MD       She remains symptomatic upon reevaluation adding on morphine.   Repeat EKG is stable.  COVID and flu are negative.  D-dimer is negative but added on CT PE which does not reveal any acute findings.  Upon reevaluation she continues to complain of significant chest pain heart score is 5.  She is still in pain and moderate risk so discussed with Dr. Wood for hospital admission and cardiology evaluation.      AS OF 23:02 EST VITALS:    BP - 154/83  HR - 63  TEMP - 97.4 °F (36.3 °C) (Oral)  O2 SATS - 96%                  DIAGNOSIS  Final diagnoses:   Chest pain, unspecified type   History of DVT (deep vein thrombosis)   History of pulmonary embolism         DISPOSITION  Admit             Scott Taylor MD  01/12/22 6783

## 2022-01-27 ENCOUNTER — APPOINTMENT (OUTPATIENT)
Dept: GENERAL RADIOLOGY | Facility: HOSPITAL | Age: 52
End: 2022-01-27

## 2022-01-27 ENCOUNTER — APPOINTMENT (OUTPATIENT)
Dept: CT IMAGING | Facility: HOSPITAL | Age: 52
End: 2022-01-27

## 2022-01-27 ENCOUNTER — OFFICE VISIT (OUTPATIENT)
Dept: CARDIOLOGY | Facility: HOSPITAL | Age: 52
End: 2022-01-27

## 2022-01-27 ENCOUNTER — HOSPITAL ENCOUNTER (OUTPATIENT)
Dept: CARDIOLOGY | Facility: HOSPITAL | Age: 52
Discharge: HOME | End: 2022-01-27

## 2022-01-27 ENCOUNTER — HOSPITAL ENCOUNTER (EMERGENCY)
Facility: HOSPITAL | Age: 52
Discharge: HOME | End: 2022-01-27
Attending: EMERGENCY MEDICINE | Admitting: EMERGENCY MEDICINE

## 2022-01-27 VITALS
BODY MASS INDEX: 32.14 KG/M2 | DIASTOLIC BLOOD PRESSURE: 71 MMHG | WEIGHT: 200 LBS | RESPIRATION RATE: 16 BRPM | TEMPERATURE: 98.5 F | HEART RATE: 73 BPM | HEIGHT: 66 IN | OXYGEN SATURATION: 99 % | SYSTOLIC BLOOD PRESSURE: 134 MMHG

## 2022-01-27 VITALS
HEIGHT: 66 IN | WEIGHT: 206.25 LBS | BODY MASS INDEX: 33.15 KG/M2 | RESPIRATION RATE: 20 BRPM | TEMPERATURE: 97.1 F | SYSTOLIC BLOOD PRESSURE: 163 MMHG | DIASTOLIC BLOOD PRESSURE: 74 MMHG | OXYGEN SATURATION: 99 % | HEART RATE: 65 BPM

## 2022-01-27 DIAGNOSIS — R55 SYNCOPE, UNSPECIFIED SYNCOPE TYPE: ICD-10-CM

## 2022-01-27 DIAGNOSIS — I10 PRIMARY HYPERTENSION: ICD-10-CM

## 2022-01-27 DIAGNOSIS — R07.9 CHEST PAIN, UNSPECIFIED TYPE: Primary | ICD-10-CM

## 2022-01-27 DIAGNOSIS — R55 SYNCOPE, UNSPECIFIED SYNCOPE TYPE: Primary | ICD-10-CM

## 2022-01-27 DIAGNOSIS — R00.2 PALPITATIONS: ICD-10-CM

## 2022-01-27 DIAGNOSIS — R07.9 CHEST PAIN, UNSPECIFIED TYPE: ICD-10-CM

## 2022-01-27 DIAGNOSIS — R11.2 NAUSEA VOMITING AND DIARRHEA: ICD-10-CM

## 2022-01-27 DIAGNOSIS — R19.7 NAUSEA VOMITING AND DIARRHEA: ICD-10-CM

## 2022-01-27 LAB
ALBUMIN SERPL-MCNC: 4 G/DL (ref 3.5–5.2)
ALBUMIN/GLOB SERPL: 1.5 G/DL
ALP SERPL-CCNC: 79 U/L (ref 39–117)
ALT SERPL W P-5'-P-CCNC: 8 U/L (ref 1–33)
ANION GAP SERPL CALCULATED.3IONS-SCNC: 13 MMOL/L (ref 5–15)
AST SERPL-CCNC: 21 U/L (ref 1–32)
B-HCG UR QL: NEGATIVE
BASOPHILS # BLD AUTO: 0.03 10*3/MM3 (ref 0–0.2)
BASOPHILS NFR BLD AUTO: 0.6 % (ref 0–1.5)
BILIRUB SERPL-MCNC: 0.9 MG/DL (ref 0–1.2)
BUN SERPL-MCNC: 7 MG/DL (ref 6–20)
BUN/CREAT SERPL: 8.1 (ref 7–25)
CALCIUM SPEC-SCNC: 9.3 MG/DL (ref 8.6–10.5)
CHLORIDE SERPL-SCNC: 106 MMOL/L (ref 98–107)
CO2 SERPL-SCNC: 22 MMOL/L (ref 22–29)
CREAT SERPL-MCNC: 0.86 MG/DL (ref 0.57–1)
DEPRECATED RDW RBC AUTO: 41.1 FL (ref 37–54)
EOSINOPHIL # BLD AUTO: 0.07 10*3/MM3 (ref 0–0.4)
EOSINOPHIL NFR BLD AUTO: 1.4 % (ref 0.3–6.2)
ERYTHROCYTE [DISTWIDTH] IN BLOOD BY AUTOMATED COUNT: 12.5 % (ref 12.3–15.4)
EXPIRATION DATE: NORMAL
FLUAV RNA RESP QL NAA+PROBE: NOT DETECTED
FLUBV RNA RESP QL NAA+PROBE: NOT DETECTED
GFR SERPL CREATININE-BSD FRML MDRD: 69 ML/MIN/1.73
GLOBULIN UR ELPH-MCNC: 2.7 GM/DL
GLUCOSE SERPL-MCNC: 107 MG/DL (ref 65–99)
HCT VFR BLD AUTO: 42 % (ref 34–46.6)
HGB BLD-MCNC: 14.2 G/DL (ref 12–15.9)
HOLD SPECIMEN: NORMAL
IMM GRANULOCYTES # BLD AUTO: 0.02 10*3/MM3 (ref 0–0.05)
IMM GRANULOCYTES NFR BLD AUTO: 0.4 % (ref 0–0.5)
INTERNAL NEGATIVE CONTROL: NORMAL
INTERNAL POSITIVE CONTROL: NORMAL
LIPASE SERPL-CCNC: 29 U/L (ref 13–60)
LYMPHOCYTES # BLD AUTO: 1.16 10*3/MM3 (ref 0.7–3.1)
LYMPHOCYTES NFR BLD AUTO: 23.5 % (ref 19.6–45.3)
Lab: NORMAL
MCH RBC QN AUTO: 30.6 PG (ref 26.6–33)
MCHC RBC AUTO-ENTMCNC: 33.8 G/DL (ref 31.5–35.7)
MCV RBC AUTO: 90.5 FL (ref 79–97)
MONOCYTES # BLD AUTO: 0.38 10*3/MM3 (ref 0.1–0.9)
MONOCYTES NFR BLD AUTO: 7.7 % (ref 5–12)
NEUTROPHILS NFR BLD AUTO: 3.28 10*3/MM3 (ref 1.7–7)
NEUTROPHILS NFR BLD AUTO: 66.4 % (ref 42.7–76)
NRBC BLD AUTO-RTO: 0 /100 WBC (ref 0–0.2)
NT-PROBNP SERPL-MCNC: 119.6 PG/ML (ref 0–900)
PLATELET # BLD AUTO: 274 10*3/MM3 (ref 140–450)
PMV BLD AUTO: 10.3 FL (ref 6–12)
POTASSIUM SERPL-SCNC: 4.2 MMOL/L (ref 3.5–5.2)
PROT SERPL-MCNC: 6.7 G/DL (ref 6–8.5)
RBC # BLD AUTO: 4.64 10*6/MM3 (ref 3.77–5.28)
SARS-COV-2 RNA RESP QL NAA+PROBE: NOT DETECTED
SODIUM SERPL-SCNC: 141 MMOL/L (ref 136–145)
TROPONIN T SERPL-MCNC: <0.01 NG/ML (ref 0–0.03)
TROPONIN T SERPL-MCNC: <0.01 NG/ML (ref 0–0.03)
WBC NRBC COR # BLD: 4.94 10*3/MM3 (ref 3.4–10.8)
WHOLE BLOOD HOLD SPECIMEN: NORMAL
WHOLE BLOOD HOLD SPECIMEN: NORMAL

## 2022-01-27 PROCEDURE — 87636 SARSCOV2 & INF A&B AMP PRB: CPT | Performed by: PHYSICIAN ASSISTANT

## 2022-01-27 PROCEDURE — 93005 ELECTROCARDIOGRAM TRACING: CPT | Performed by: EMERGENCY MEDICINE

## 2022-01-27 PROCEDURE — 71045 X-RAY EXAM CHEST 1 VIEW: CPT

## 2022-01-27 PROCEDURE — 25010000002 ONDANSETRON PER 1 MG: Performed by: PHYSICIAN ASSISTANT

## 2022-01-27 PROCEDURE — 99214 OFFICE O/P EST MOD 30 MIN: CPT | Performed by: NURSE PRACTITIONER

## 2022-01-27 PROCEDURE — 93270 REMOTE 30 DAY ECG REV/REPORT: CPT

## 2022-01-27 PROCEDURE — 0 IOPAMIDOL PER 1 ML: Performed by: EMERGENCY MEDICINE

## 2022-01-27 PROCEDURE — 99284 EMERGENCY DEPT VISIT MOD MDM: CPT

## 2022-01-27 PROCEDURE — 85025 COMPLETE CBC W/AUTO DIFF WBC: CPT | Performed by: EMERGENCY MEDICINE

## 2022-01-27 PROCEDURE — 84484 ASSAY OF TROPONIN QUANT: CPT | Performed by: EMERGENCY MEDICINE

## 2022-01-27 PROCEDURE — 96374 THER/PROPH/DIAG INJ IV PUSH: CPT

## 2022-01-27 PROCEDURE — 83690 ASSAY OF LIPASE: CPT | Performed by: EMERGENCY MEDICINE

## 2022-01-27 PROCEDURE — 93005 ELECTROCARDIOGRAM TRACING: CPT | Performed by: PHYSICIAN ASSISTANT

## 2022-01-27 PROCEDURE — 81025 URINE PREGNANCY TEST: CPT | Performed by: EMERGENCY MEDICINE

## 2022-01-27 PROCEDURE — 80053 COMPREHEN METABOLIC PANEL: CPT | Performed by: EMERGENCY MEDICINE

## 2022-01-27 PROCEDURE — 83880 ASSAY OF NATRIURETIC PEPTIDE: CPT | Performed by: EMERGENCY MEDICINE

## 2022-01-27 PROCEDURE — 70450 CT HEAD/BRAIN W/O DYE: CPT

## 2022-01-27 PROCEDURE — 71275 CT ANGIOGRAPHY CHEST: CPT

## 2022-01-27 RX ORDER — ASPIRIN 81 MG/1
324 TABLET, CHEWABLE ORAL ONCE
Status: COMPLETED | OUTPATIENT
Start: 2022-01-27 | End: 2022-01-27

## 2022-01-27 RX ORDER — ACETAMINOPHEN 325 MG/1
650 TABLET ORAL ONCE
Status: COMPLETED | OUTPATIENT
Start: 2022-01-27 | End: 2022-01-27

## 2022-01-27 RX ORDER — ONDANSETRON 2 MG/ML
4 INJECTION INTRAMUSCULAR; INTRAVENOUS ONCE
Status: COMPLETED | OUTPATIENT
Start: 2022-01-27 | End: 2022-01-27

## 2022-01-27 RX ORDER — SODIUM CHLORIDE 0.9 % (FLUSH) 0.9 %
10 SYRINGE (ML) INJECTION AS NEEDED
Status: DISCONTINUED | OUTPATIENT
Start: 2022-01-27 | End: 2022-01-27 | Stop reason: HOSPADM

## 2022-01-27 RX ADMIN — SODIUM CHLORIDE 1000 ML: 9 INJECTION, SOLUTION INTRAVENOUS at 10:37

## 2022-01-27 RX ADMIN — ACETAMINOPHEN 650 MG: 325 TABLET, FILM COATED ORAL at 12:14

## 2022-01-27 RX ADMIN — ASPIRIN 324 MG: 81 TABLET, CHEWABLE ORAL at 10:16

## 2022-01-27 RX ADMIN — IOPAMIDOL 85 ML: 755 INJECTION, SOLUTION INTRAVENOUS at 11:28

## 2022-01-27 RX ADMIN — ONDANSETRON 4 MG: 2 INJECTION INTRAMUSCULAR; INTRAVENOUS at 10:35

## 2022-01-27 NOTE — PROGRESS NOTES
[facility]  Heart and Valve [facility]    Chief Complaint  Loss of Consciousness, Establish Care, and Chest Pain    Subjective    History of Present Illness {CC  Problem List  Visit  Diagnosis   Encounters  Notes  Medications  Labs  Result Review Imaging  Media :23}     Kamila Garcia is a 51 y.o. female with DVT/PE, hypertension and gastric who presents today for evaluation of syncope and chest pain at the request of Jayshree HERNDON. Patient presented to the ED today with chest pain and syncope. She reports that she developed nausea, diarrhea around 4:30. She reports a left sided chest pain that radiated down her left arm. She reports it has been constant since this morning. She reports the diarrhea stopped and it was very mild. She reports the only thing she remembers is getting out of bed this morning and her wife waking her up.  Her wife is present and reports that she found her on the floor.  She was not diaphoretic, but appeared to be cool to touch.  Patient reports that chest pain is worse with deep breathing. Not worse with exertion. Nitro did help slightly.  She had a normal CTA chest in the ED today.  She notes chronic intermittent palpitations that recently have been worsening.    She was admitted 1/10-1/12 with chest pain. She was evaluated by Dr. Alexis and had a normal echo and cardiac PET.  She was started on Imdur 30 mg daily.  She reports the chest pain had resolved upon discharge but then resumed today.  She had a normal EGD while hospitalized.  Has not had colonoscopy yet       Cardiac risks: HTN, obesity, family history    Objective     Vital Signs:   Vitals:    01/27/22 1415 01/27/22 1416 01/27/22 1417   BP: 175/81 151/77 163/74   BP Location: Right arm Left arm Left arm   Patient Position: Sitting Standing Sitting   Cuff Size: Adult Adult Adult   Pulse: 64 81 65   Resp:   20   Temp: 97.1 °F (36.2 °C) 97.1 °F (36.2 °C) 97.1 °F (36.2 °C)   TempSrc: Temporal  "Temporal Temporal   SpO2: 99% 98% 99%   Weight:   93.6 kg (206 lb 4 oz)   Height:   167.6 cm (66\")     Body mass index is 33.29 kg/m².  Physical Exam  Vitals reviewed.   Constitutional:       Appearance: Normal appearance.   HENT:      Head: Normocephalic.   Neck:      Vascular: No carotid bruit.   Cardiovascular:      Rate and Rhythm: Normal rate and regular rhythm.      Pulses: Normal pulses.      Heart sounds: Normal heart sounds, S1 normal and S2 normal. No murmur heard.      Pulmonary:      Effort: Pulmonary effort is normal. No respiratory distress.      Breath sounds: Normal breath sounds.   Chest:      Chest wall: No tenderness.   Abdominal:      General: Abdomen is flat.      Palpations: Abdomen is soft.   Musculoskeletal:      Cervical back: Neck supple.      Right lower leg: No edema.      Left lower leg: No edema.   Skin:     General: Skin is warm and dry.   Neurological:      General: No focal deficit present.      Mental Status: She is alert and oriented to person, place, and time. Mental status is at baseline.   Psychiatric:         Mood and Affect: Mood normal.         Behavior: Behavior normal.         Thought Content: Thought content normal.              Result Review  Data Reviewed:{ Labs  Result Review  Imaging  Med Tab  Media :23}   ECG 12 Lead (01/27/2022 12:40)  ECG 12 Lead (01/27/2022 10:04)  Troponin (01/27/2022 12:17)  COVID PRE-OP / PRE-PROCEDURE SCREENING ORDER (NO ISOLATION) - Swab, Nasopharynx (01/27/2022 10:31)  POCT pregnancy, urine (01/27/2022 10:56)  BNP (01/27/2022 10:26)  Lipase (01/27/2022 10:26)  Comprehensive Metabolic Panel (01/27/2022 10:26)  Troponin (01/27/2022 10:26)  CBC & Differential (01/27/2022 10:26)    Consultant notes Jayshree Velasquez PA-c, Dr. Alexis and Recent hospitalization notes             Assessment and Plan {CC Problem List  Visit Diagnosis  ROS  Review (Popup)  Health Maintenance  Quality  BestPractice  Medications  SmartSets  SnapShot Encounters "  Media :23}   1. Chest pain, unspecified type  She has some ongoing atypical symptoms, but etiology unclear. Considering recent syncope and persistent symptoms despite normal PET MPI we will proceed with coronary CTA  - CT Angiogram Coronary; Future    2. Syncope, unspecified syncope type  Unclear etiology of symptoms.  Possible vasovagal event versus orthostatic hypotension.  We will do 30-day monitor to rule out arrhythmias  - Cardiac Event Monitor; Future  - CT Angiogram Coronary; Future    3. Palpitations    - Cardiac Event Monitor; Future  - CT Angiogram Coronary; Future    4. Primary hypertension  Mildly elevated today, but she reports usually controlled.  Continue to monitor and bring readings to follow up        Follow Up {Instructions Charge Capture  Follow-up Communications :23}   Return in about 6 weeks (around 3/10/2022) for Office follow up, Monitor results.    Patient was given instructions and counseling regarding her condition or for health maintenance advice. Please see specific information pulled into the AVS if appropriate.  Advised to call the Heart and Valve Center with any questions, concerns, or worsening symptoms.

## 2022-01-27 NOTE — PROGRESS NOTES
[facility] Heart Monitor Documentation    Kamila Garcia  1970  [id number]  01/27/22      [] ZIO XT Patch  Model [id number] Prescribed for N/A Days    · Serial Number: (N + 9 Digits) N   · Apply-By Date on Box:   · USPS Tracking Number:   · USPS Tracking        [x] [employer] MINI PLUS Mobile Cardiac Telemetry  Model BGMINIPLUS Prescribed for 30 Days    · Serial Number: (BGM + 7 Digits) [id number]  · Shipped-By Date on Box: 01/17/22  · UPS Tracking Number: [id number]  · UPS Tracking      [] [employer] MINI Holter Monitor  Model BGMINIEL Prescribed for  Days    · Serial Number: (7 Digits)   · Shipped-By Date on Box:   · UPS Tracking Number: [id number]  · UPS Tracking        This monitor was applied to the patient's chest and checked for proper functioning.  Ms. Kamila Garcia was instructed in the proper use of this monitor.  She was given the opportunity to ask questions and left the office with the device 's instruction manual.    Marylin Page [facility], 15:23 EST, 01/27/22                  [facility]IT[facility] 8[facility]8.[facility]

## 2022-01-31 LAB
QT INTERVAL: 428 MS
QT INTERVAL: 440 MS
QTC INTERVAL: 441 MS
QTC INTERVAL: 442 MS

## 2022-02-25 ENCOUNTER — HOSPITAL ENCOUNTER (OUTPATIENT)
Dept: CT IMAGING | Facility: HOSPITAL | Age: 52
Discharge: HOME | End: 2022-02-25
Admitting: NURSE PRACTITIONER

## 2022-02-25 VITALS
OXYGEN SATURATION: 96 % | HEIGHT: 66 IN | SYSTOLIC BLOOD PRESSURE: 143 MMHG | DIASTOLIC BLOOD PRESSURE: 85 MMHG | WEIGHT: 205.6 LBS | BODY MASS INDEX: 33.04 KG/M2 | HEART RATE: 55 BPM | TEMPERATURE: 97.3 F | RESPIRATION RATE: 18 BRPM

## 2022-02-25 DIAGNOSIS — R07.9 CHEST PAIN, UNSPECIFIED TYPE: ICD-10-CM

## 2022-02-25 DIAGNOSIS — R00.2 PALPITATIONS: ICD-10-CM

## 2022-02-25 DIAGNOSIS — R55 SYNCOPE, UNSPECIFIED SYNCOPE TYPE: ICD-10-CM

## 2022-02-25 PROCEDURE — 82565 ASSAY OF CREATININE: CPT

## 2022-02-25 PROCEDURE — 0 IOPAMIDOL PER 1 ML: Performed by: NURSE PRACTITIONER

## 2022-02-25 PROCEDURE — 75574 CT ANGIO HRT W/3D IMAGE: CPT

## 2022-02-25 RX ORDER — SODIUM CHLORIDE 0.9 % (FLUSH) 0.9 %
10 SYRINGE (ML) INJECTION AS NEEDED
Status: DISCONTINUED | OUTPATIENT
Start: 2022-02-25 | End: 2022-02-26 | Stop reason: HOSPADM

## 2022-02-25 RX ORDER — METOPROLOL TARTRATE 50 MG/1
100 TABLET, FILM COATED ORAL ONCE AS NEEDED
Status: COMPLETED | OUTPATIENT
Start: 2022-02-25 | End: 2022-02-25

## 2022-02-25 RX ORDER — METOPROLOL TARTRATE 50 MG/1
50 TABLET, FILM COATED ORAL ONCE AS NEEDED
Status: COMPLETED | OUTPATIENT
Start: 2022-02-25 | End: 2022-02-25

## 2022-02-25 RX ORDER — NITROGLYCERIN 0.4 MG/1
0.4 TABLET SUBLINGUAL ONCE
Status: COMPLETED | OUTPATIENT
Start: 2022-02-25 | End: 2022-02-25

## 2022-02-25 RX ORDER — SODIUM CHLORIDE 0.9 % (FLUSH) 0.9 %
3 SYRINGE (ML) INJECTION EVERY 12 HOURS SCHEDULED
Status: DISCONTINUED | OUTPATIENT
Start: 2022-02-25 | End: 2022-02-26 | Stop reason: HOSPADM

## 2022-02-25 RX ORDER — LIDOCAINE HYDROCHLORIDE 10 MG/ML
5 INJECTION, SOLUTION EPIDURAL; INFILTRATION; INTRACAUDAL; PERINEURAL AS NEEDED
Status: DISCONTINUED | OUTPATIENT
Start: 2022-02-25 | End: 2022-02-26 | Stop reason: HOSPADM

## 2022-02-25 RX ADMIN — METOPROLOL TARTRATE 50 MG: 50 TABLET, FILM COATED ORAL at 10:23

## 2022-02-25 RX ADMIN — NITROGLYCERIN 0.4 MG: 0.4 TABLET SUBLINGUAL at 11:30

## 2022-02-25 RX ADMIN — IOPAMIDOL 65 ML: 755 INJECTION, SOLUTION INTRAVENOUS at 11:38

## 2022-02-25 NOTE — DISCHARGE INSTRUCTIONS
MAKE SURE TO DRINK PLENTY OF FLUIDS OF FOR THE NEXT 48 HOURS TO FLUSH THE CONTRAST DYE THROUGH YOUR KIDNEYS TO PROTECT RENAL FUNCTION.

## 2022-02-27 LAB — CREAT BLDA-MCNC: 0.8 MG/DL (ref 0.6–1.3)

## 2022-03-01 ENCOUNTER — TELEPHONE (OUTPATIENT)
Dept: CARDIOLOGY | Facility: HOSPITAL | Age: 52
End: 2022-03-01

## 2022-03-01 NOTE — TELEPHONE ENCOUNTER
Attempted to contact patient again with CTA results. Will send results to [facility] with comments   No

## 2022-03-01 NOTE — TELEPHONE ENCOUNTER
Called patient and discussed results of CTA. Overall low plaque burden, mild to moderate narrowing of RCA noted. Advised her to keep fu next week to discuss monitor results. Will get her an appt in the future with cardiology for ongoing follow up of mild to moderate CAD. She verbalized understanding with no further questions or concerns

## 2022-03-01 NOTE — TELEPHONE ENCOUNTER
----- Message from Lissett Elizabeth MA sent at 3/1/2022 10:02 AM EST -----  Pt returned your call.

## 2022-03-02 ENCOUNTER — OFFICE VISIT (OUTPATIENT)
Dept: CARDIOLOGY | Facility: CLINIC | Age: 52
End: 2022-03-02

## 2022-03-02 VITALS
WEIGHT: 201 LBS | BODY MASS INDEX: 32.3 KG/M2 | SYSTOLIC BLOOD PRESSURE: 152 MMHG | DIASTOLIC BLOOD PRESSURE: 86 MMHG | HEART RATE: 81 BPM | OXYGEN SATURATION: 99 % | HEIGHT: 66 IN

## 2022-03-02 DIAGNOSIS — R07.9 CHEST PAIN, UNSPECIFIED TYPE: Primary | ICD-10-CM

## 2022-03-02 DIAGNOSIS — R00.2 PALPITATIONS: ICD-10-CM

## 2022-03-02 DIAGNOSIS — I10 PRIMARY HYPERTENSION: ICD-10-CM

## 2022-03-02 PROCEDURE — 99214 OFFICE O/P EST MOD 30 MIN: CPT | Performed by: INTERNAL MEDICINE

## 2022-03-02 RX ORDER — ISOSORBIDE MONONITRATE 30 MG/1
30 TABLET, EXTENDED RELEASE ORAL
Qty: 90 TABLET | Refills: 3 | Status: SHIPPED | OUTPATIENT
Start: 2022-03-02

## 2022-03-02 NOTE — H&P (VIEW-ONLY)
Subjective:     Encounter Date:03/02/2022    Patient ID: Kamila Garcia is a 51 y.o.  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    HEALTHCARE PROVIDER: RAFFY Gilbert  VASCULAR SURGEON: Lucas Franco MD    Chief Complaint:   Chief Complaint   Patient presents with   • Chest Pain       Problem List:  1. Chest pain  a. Acceptable probably negative IV Lexiscan hybrid PET cardiac stress scan suggestive of low probability for significant focal obstructive coronary artery disease with preserved systolic left ventricular function (LVEF 0.76).  b. Echocardiogram, January 2022: Mild LVH. LVEF 0.60.  c. CT Cardiac calcium score 6.4, February 2022  2. Palpitations with event monitor pending, February 2022  3. Essential hypertension  4. Moderate obesity: BMI 33.18  1. 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 Left lower extremity deep vein thrombosis noted in the peroneal vein. Chronicity of the thrombosis is uncertain, June 2021  b. A limited left lower extremity DVT is seen involving the peroneal vein with partial compression noted, July 2021  c. Normal left lower extremity venous duplex scan, January 2022  5. Elevated TSH  6. Elevated lipase  7. Insomnia  8. [facility] 3-day admission, January 2022, for chest pain. She had an unremarkable stress test and EGD. Her pain was felt to be pleuritic.  9. Surgical history:  a. Gastric bypass 2007  b. LLE stent    Allergies   Allergen Reactions   • Hydrocodone Hives       Current Outpatient Medications:   •  apixaban (ELIQUIS) 5 MG tablet tablet, Start On 7/8: Take 1 tablet by mouth Every 12 (Twelve) Hours., Disp: 60 tablet, Rfl: 0  •  eszopiclone (Lunesta) 3 MG tablet, Take 3 mg by mouth Every Night. Take immediately before bedtime, Disp: , Rfl:   •  isosorbide mononitrate (IMDUR) 30 MG 24 hr tablet, Take 1 tablet by mouth Daily., Disp: 90  "tablet, Rfl: 3  •  lisinopril (PRINIVIL,ZESTRIL) 10 MG tablet, Take 10 mg by mouth Daily., Disp: , Rfl:   •  nitroglycerin (NITROSTAT) 0.4 MG SL tablet, Place 1 tablet under the tongue Every 5 (Five) Minutes As Needed for Chest Pain. Take no more than 3 doses in 15 minutes., Disp: 30 tablet, Rfl: 0  •  OLANZapine (zyPREXA) 10 MG tablet, Take 10 mg by mouth Every Night., Disp: , Rfl:   •  ondansetron (ZOFRAN) 4 MG tablet, Take 1 tablet by mouth Every 6 (Six) Hours As Needed for Nausea or Vomiting., Disp: 30 tablet, Rfl: 0  •  pantoprazole (PROTONIX) 40 MG EC tablet, Take 1 tablet by mouth 2 (Two) Times a Day Before Meals., Disp: 60 tablet, Rfl: 0    History of Present Illness: Patient returns for scheduled 6-week follow up. She presented to [facility] ED in January 2022 for chest pain. Her imaging was normal including CT Head, thoracic CTA, and chest x-ray. Her troponin was normal. She was evaluated by Ms. Ramirez who recommended she return to our office in 6 weeks.  Patient states she was informed by Ms. Stacy that she has coronary artery blockages.  Patient has continued to have some intermittent although less notable left precordial chest pain that is exacerbated by movement as well as deep inspiration but is relieved by rest.  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  She denies accompanying tachypnea/dyspnea, diaphoresis, nausea, emesis, or sustained tachycardia palpitation.  She is quite concerned about her \"coronary blockage\" and requests cardiac catheterization studies.  No symptoms of CHF, TIA, or claudication.  She has not been using Eliquis.      ROS   Obtained and negative except as outlined in problem list and HPI.    Procedures       Objective:       Vitals:    03/02/22 1045 03/02/22 1047   BP: 147/93 152/86   BP Location: Right arm Right arm   Patient Position: Standing    Pulse: 93 81   SpO2: 99% 99%   Weight: 91.2 kg (201 lb) 91.2 kg (201 lb)   Height: 167.6 cm (66\") 167.6 cm (66\")   Recheck bp right " arm sitting 136/82  Body mass index is 32.44 kg/m².  Last weight: 206 lbs    Vitals reviewed.   Constitutional:       Appearance: Well-developed.   Neck:      Thyroid: No thyromegaly.      Vascular: No carotid bruit or JVD.      Lymphadenopathy: No cervical adenopathy.   Pulmonary:      Effort: Pulmonary effort is normal.      Breath sounds: Normal breath sounds. No wheezing. No rhonchi. No rales.   Cardiovascular:      Regular rhythm.      No gallop. No S3 gallop.   Pulses:     Dorsalis pedis: 2+ bilaterally.     Posterior tibial: 2+ bilaterally.  Abdominal:      Palpations: Abdomen is soft. There is no abdominal mass.      Tenderness: There is no abdominal tenderness.   Musculoskeletal: Normal range of motion. Skin:     General: Skin is warm and dry.      Findings: No rash.   Neurological:      Mental Status: Alert and oriented to person, place, and time.           Lab Review:   Lab Results   Component Value Date    GLUCOSE 107 (H) 01/27/2022    BUN 7 01/27/2022    CREATININE 0.80 02/25/2022    EGFRIFNONA 69 01/27/2022    BCR 8.1 01/27/2022     01/27/2022    K 4.2 01/27/2022     01/27/2022    MG 2.4 01/10/2022    CO2 22.0 01/27/2022    CALCIUM 9.3 01/27/2022    ALBUMIN 4.00 01/27/2022    AST 21 01/27/2022    ALT 8 01/27/2022       Lab Results   Component Value Date    WBC 4.94 01/27/2022    HGB 14.2 01/27/2022    HCT 42.0 01/27/2022    MCV 90.5 01/27/2022     01/27/2022       Lab Results   Component Value Date    TSH 9.570 (H) 01/10/2022       Lab Results   Component Value Date    CHOL 160 01/10/2022     Lab Results   Component Value Date    TRIG 70 01/10/2022     Lab Results   Component Value Date    HDL 82 (H) 01/10/2022     Lab Results   Component Value Date    LDL 64 01/10/2022     XR Chest, 1/27/2022:  No acute cardiopulmonary findings.    CT Head, 1/27/2022:  No acute intracranial findings.    CTA Chest, 1/27/2022:  No evidence of pulmonary embolism. No acute intrathoracic findings. The  lungs are clear.    CTA Coronary, 2/25/2022:  1. Total calcium score of 6.4, considered minimal, and having low correlation with significant coronary artery disease.  2. Mild to moderate RCA origin stenosis.  3. Focal calcification at the origin of a small left diagonal branch of the LAD, with probably mild origin stenosis.        Assessment:       Overall continued acceptable course with no new interim cardiopulmonary complaints with suboptimal functional status. We will defer additional diagnostic or therapeutic intervention from a cardiac perspective at this time other than I advised patient to start her Imdur and undergo diagnostic coronary angiography +/- intervention. She was informed by Ms. Ramirez she has CAD and the patient wishes to have further assessment. Her symptoms are suggestive of musculoskeletal cause.     Diagnosis Plan   1. Chest pain, unspecified type  Case Request Cath Lab: Left Heart Cath   2. Palpitations  No recurrent breakthrough symptoms, preliminary event monitor results acceptable   3. Primary hypertension  Labile readings, initiate Imdur 30mg daily          Plan:         1. Patient to continue current medications and close follow up with the above providers.  2. Tentative cardiology follow up in 6 weeks or patient may return sooner PRN.    I, Brooks Alexis MD, [facility], personally performed the services described in this documentation as scribed by the above named individual in my presence, and it is both accurate and complete. At 13:06 EST on 03/02/2022

## 2022-03-02 NOTE — PROGRESS NOTES
Subjective:     Encounter Date:03/02/2022    Patient ID: Kamila Garcia is a 51 y.o.  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    HEALTHCARE PROVIDER: RAFFY Gilbert  VASCULAR SURGEON: Lucas Franco MD    Chief Complaint:   Chief Complaint   Patient presents with   • Chest Pain       Problem List:  1. Chest pain  a. Acceptable probably negative IV Lexiscan hybrid PET cardiac stress scan suggestive of low probability for significant focal obstructive coronary artery disease with preserved systolic left ventricular function (LVEF 0.76).  b. Echocardiogram, January 2022: Mild LVH. LVEF 0.60.  c. CT Cardiac calcium score 6.4, February 2022  2. Palpitations with event monitor pending, February 2022  3. Essential hypertension  4. Moderate obesity: BMI 33.18  1. 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 Left lower extremity deep vein thrombosis noted in the peroneal vein. Chronicity of the thrombosis is uncertain, June 2021  b. A limited left lower extremity DVT is seen involving the peroneal vein with partial compression noted, July 2021  c. Normal left lower extremity venous duplex scan, January 2022  5. Elevated TSH  6. Elevated lipase  7. Insomnia  8. [facility] 3-day admission, January 2022, for chest pain. She had an unremarkable stress test and EGD. Her pain was felt to be pleuritic.  9. Surgical history:  a. Gastric bypass 2007  b. LLE stent    Allergies   Allergen Reactions   • Hydrocodone Hives       Current Outpatient Medications:   •  apixaban (ELIQUIS) 5 MG tablet tablet, Start On 7/8: Take 1 tablet by mouth Every 12 (Twelve) Hours., Disp: 60 tablet, Rfl: 0  •  eszopiclone (Lunesta) 3 MG tablet, Take 3 mg by mouth Every Night. Take immediately before bedtime, Disp: , Rfl:   •  isosorbide mononitrate (IMDUR) 30 MG 24 hr tablet, Take 1 tablet by mouth Daily., Disp: 90  "tablet, Rfl: 3  •  lisinopril (PRINIVIL,ZESTRIL) 10 MG tablet, Take 10 mg by mouth Daily., Disp: , Rfl:   •  nitroglycerin (NITROSTAT) 0.4 MG SL tablet, Place 1 tablet under the tongue Every 5 (Five) Minutes As Needed for Chest Pain. Take no more than 3 doses in 15 minutes., Disp: 30 tablet, Rfl: 0  •  OLANZapine (zyPREXA) 10 MG tablet, Take 10 mg by mouth Every Night., Disp: , Rfl:   •  ondansetron (ZOFRAN) 4 MG tablet, Take 1 tablet by mouth Every 6 (Six) Hours As Needed for Nausea or Vomiting., Disp: 30 tablet, Rfl: 0  •  pantoprazole (PROTONIX) 40 MG EC tablet, Take 1 tablet by mouth 2 (Two) Times a Day Before Meals., Disp: 60 tablet, Rfl: 0    History of Present Illness: Patient returns for scheduled 6-week follow up. She presented to [facility] ED in January 2022 for chest pain. Her imaging was normal including CT Head, thoracic CTA, and chest x-ray. Her troponin was normal. She was evaluated by Ms. Ramirez who recommended she return to our office in 6 weeks.  Patient states she was informed by Ms. Stacy that she has coronary artery blockages.  Patient has continued to have some intermittent although less notable left precordial chest pain that is exacerbated by movement as well as deep inspiration but is relieved by rest.  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  She denies accompanying tachypnea/dyspnea, diaphoresis, nausea, emesis, or sustained tachycardia palpitation.  She is quite concerned about her \"coronary blockage\" and requests cardiac catheterization studies.  No symptoms of CHF, TIA, or claudication.  She has not been using Eliquis.      ROS   Obtained and negative except as outlined in problem list and HPI.    Procedures       Objective:       Vitals:    03/02/22 1045 03/02/22 1047   BP: 147/93 152/86   BP Location: Right arm Right arm   Patient Position: Standing    Pulse: 93 81   SpO2: 99% 99%   Weight: 91.2 kg (201 lb) 91.2 kg (201 lb)   Height: 167.6 cm (66\") 167.6 cm (66\")   Recheck bp right " arm sitting 136/82  Body mass index is 32.44 kg/m².  Last weight: 206 lbs    Vitals reviewed.   Constitutional:       Appearance: Well-developed.   Neck:      Thyroid: No thyromegaly.      Vascular: No carotid bruit or JVD.      Lymphadenopathy: No cervical adenopathy.   Pulmonary:      Effort: Pulmonary effort is normal.      Breath sounds: Normal breath sounds. No wheezing. No rhonchi. No rales.   Cardiovascular:      Regular rhythm.      No gallop. No S3 gallop.   Pulses:     Dorsalis pedis: 2+ bilaterally.     Posterior tibial: 2+ bilaterally.  Abdominal:      Palpations: Abdomen is soft. There is no abdominal mass.      Tenderness: There is no abdominal tenderness.   Musculoskeletal: Normal range of motion. Skin:     General: Skin is warm and dry.      Findings: No rash.   Neurological:      Mental Status: Alert and oriented to person, place, and time.           Lab Review:   Lab Results   Component Value Date    GLUCOSE 107 (H) 01/27/2022    BUN 7 01/27/2022    CREATININE 0.80 02/25/2022    EGFRIFNONA 69 01/27/2022    BCR 8.1 01/27/2022     01/27/2022    K 4.2 01/27/2022     01/27/2022    MG 2.4 01/10/2022    CO2 22.0 01/27/2022    CALCIUM 9.3 01/27/2022    ALBUMIN 4.00 01/27/2022    AST 21 01/27/2022    ALT 8 01/27/2022       Lab Results   Component Value Date    WBC 4.94 01/27/2022    HGB 14.2 01/27/2022    HCT 42.0 01/27/2022    MCV 90.5 01/27/2022     01/27/2022       Lab Results   Component Value Date    TSH 9.570 (H) 01/10/2022       Lab Results   Component Value Date    CHOL 160 01/10/2022     Lab Results   Component Value Date    TRIG 70 01/10/2022     Lab Results   Component Value Date    HDL 82 (H) 01/10/2022     Lab Results   Component Value Date    LDL 64 01/10/2022     XR Chest, 1/27/2022:  No acute cardiopulmonary findings.    CT Head, 1/27/2022:  No acute intracranial findings.    CTA Chest, 1/27/2022:  No evidence of pulmonary embolism. No acute intrathoracic findings. The  lungs are clear.    CTA Coronary, 2/25/2022:  1. Total calcium score of 6.4, considered minimal, and having low correlation with significant coronary artery disease.  2. Mild to moderate RCA origin stenosis.  3. Focal calcification at the origin of a small left diagonal branch of the LAD, with probably mild origin stenosis.        Assessment:       Overall continued acceptable course with no new interim cardiopulmonary complaints with suboptimal functional status. We will defer additional diagnostic or therapeutic intervention from a cardiac perspective at this time other than I advised patient to start her Imdur and undergo diagnostic coronary angiography +/- intervention. She was informed by Ms. Ramirez she has CAD and the patient wishes to have further assessment. Her symptoms are suggestive of musculoskeletal cause.     Diagnosis Plan   1. Chest pain, unspecified type  Case Request Cath Lab: Left Heart Cath   2. Palpitations  No recurrent breakthrough symptoms, preliminary event monitor results acceptable   3. Primary hypertension  Labile readings, initiate Imdur 30mg daily          Plan:         1. Patient to continue current medications and close follow up with the above providers.  2. Tentative cardiology follow up in 6 weeks or patient may return sooner PRN.    I, Brooks Alexis MD, [facility], personally performed the services described in this documentation as scribed by the above named individual in my presence, and it is both accurate and complete. At 13:06 EST on 03/02/2022

## 2022-03-03 PROBLEM — R00.2 PALPITATIONS: Status: ACTIVE | Noted: 2022-03-03

## 2022-03-04 PROCEDURE — 93272 ECG/REVIEW INTERPRET ONLY: CPT | Performed by: INTERNAL MEDICINE

## 2022-03-09 ENCOUNTER — PREP FOR SURGERY (OUTPATIENT)
Dept: OTHER | Facility: HOSPITAL | Age: 52
End: 2022-03-09

## 2022-03-09 RX ORDER — ASPIRIN 81 MG/1
324 TABLET, CHEWABLE ORAL ONCE
Status: CANCELLED | OUTPATIENT
Start: 2022-03-09 | End: 2022-03-09

## 2022-03-09 RX ORDER — ASPIRIN 81 MG/1
81 TABLET ORAL DAILY
Status: CANCELLED | OUTPATIENT
Start: 2022-03-10

## 2022-03-09 RX ORDER — NITROGLYCERIN 0.4 MG/1
0.4 TABLET SUBLINGUAL
Status: CANCELLED | OUTPATIENT
Start: 2022-03-09

## 2022-03-09 RX ORDER — SODIUM CHLORIDE 0.9 % (FLUSH) 0.9 %
3 SYRINGE (ML) INJECTION EVERY 12 HOURS SCHEDULED
Status: CANCELLED | OUTPATIENT
Start: 2022-03-09

## 2022-03-09 RX ORDER — SODIUM CHLORIDE 0.9 % (FLUSH) 0.9 %
10 SYRINGE (ML) INJECTION AS NEEDED
Status: CANCELLED | OUTPATIENT
Start: 2022-03-09

## 2022-03-09 RX ORDER — ACETAMINOPHEN 325 MG/1
650 TABLET ORAL EVERY 4 HOURS PRN
Status: CANCELLED | OUTPATIENT
Start: 2022-03-09

## 2022-03-11 ENCOUNTER — HOSPITAL ENCOUNTER (OUTPATIENT)
Facility: HOSPITAL | Age: 52
Setting detail: HOSPITAL OUTPATIENT SURGERY
Discharge: HOME | End: 2022-03-11
Attending: INTERNAL MEDICINE | Admitting: INTERNAL MEDICINE

## 2022-03-11 VITALS
SYSTOLIC BLOOD PRESSURE: 131 MMHG | BODY MASS INDEX: 31.79 KG/M2 | RESPIRATION RATE: 18 BRPM | TEMPERATURE: 97.4 F | DIASTOLIC BLOOD PRESSURE: 87 MMHG | WEIGHT: 197.8 LBS | OXYGEN SATURATION: 97 % | HEIGHT: 66 IN | HEART RATE: 72 BPM

## 2022-03-11 DIAGNOSIS — R00.2 PALPITATIONS: ICD-10-CM

## 2022-03-11 DIAGNOSIS — R07.9 CHEST PAIN, UNSPECIFIED TYPE: ICD-10-CM

## 2022-03-11 LAB
ALBUMIN SERPL-MCNC: 4.2 G/DL (ref 3.5–5.2)
ALBUMIN/GLOB SERPL: 1.6 G/DL
ALP SERPL-CCNC: 86 U/L (ref 39–117)
ALT SERPL W P-5'-P-CCNC: 7 U/L (ref 1–33)
ANION GAP SERPL CALCULATED.3IONS-SCNC: 12 MMOL/L (ref 5–15)
AST SERPL-CCNC: 17 U/L (ref 1–32)
B-HCG UR QL: NEGATIVE
BILIRUB SERPL-MCNC: 1.2 MG/DL (ref 0–1.2)
BUN SERPL-MCNC: 9 MG/DL (ref 6–20)
BUN/CREAT SERPL: 11 (ref 7–25)
CALCIUM SPEC-SCNC: 9.4 MG/DL (ref 8.6–10.5)
CHLORIDE SERPL-SCNC: 105 MMOL/L (ref 98–107)
CHOLEST SERPL-MCNC: 190 MG/DL (ref 0–200)
CO2 SERPL-SCNC: 23 MMOL/L (ref 22–29)
CREAT SERPL-MCNC: 0.82 MG/DL (ref 0.57–1)
DEPRECATED RDW RBC AUTO: 41.7 FL (ref 37–54)
EGFRCR SERPLBLD CKD-EPI 2021: 85.7 ML/MIN/1.73
ERYTHROCYTE [DISTWIDTH] IN BLOOD BY AUTOMATED COUNT: 12.7 % (ref 12.3–15.4)
GLOBULIN UR ELPH-MCNC: 2.6 GM/DL
GLUCOSE SERPL-MCNC: 92 MG/DL (ref 65–99)
HCT VFR BLD AUTO: 43.4 % (ref 34–46.6)
HDLC SERPL-MCNC: 103 MG/DL (ref 40–60)
HGB BLD-MCNC: 14.4 G/DL (ref 12–15.9)
LDLC SERPL CALC-MCNC: 73 MG/DL (ref 0–100)
LDLC/HDLC SERPL: 0.7 {RATIO}
MCH RBC QN AUTO: 30.3 PG (ref 26.6–33)
MCHC RBC AUTO-ENTMCNC: 33.2 G/DL (ref 31.5–35.7)
MCV RBC AUTO: 91.2 FL (ref 79–97)
PLATELET # BLD AUTO: 232 10*3/MM3 (ref 140–450)
PMV BLD AUTO: 10.2 FL (ref 6–12)
POTASSIUM SERPL-SCNC: 3.9 MMOL/L (ref 3.5–5.2)
PROT SERPL-MCNC: 6.8 G/DL (ref 6–8.5)
RBC # BLD AUTO: 4.76 10*6/MM3 (ref 3.77–5.28)
SODIUM SERPL-SCNC: 140 MMOL/L (ref 136–145)
TRIGL SERPL-MCNC: 77 MG/DL (ref 0–150)
VLDLC SERPL-MCNC: 14 MG/DL (ref 5–40)
WBC NRBC COR # BLD: 4.44 10*3/MM3 (ref 3.4–10.8)

## 2022-03-11 PROCEDURE — 93458 L HRT ARTERY/VENTRICLE ANGIO: CPT | Performed by: INTERNAL MEDICINE

## 2022-03-11 PROCEDURE — C1769 GUIDE WIRE: HCPCS | Performed by: INTERNAL MEDICINE

## 2022-03-11 PROCEDURE — 85027 COMPLETE CBC AUTOMATED: CPT | Performed by: PHYSICIAN ASSISTANT

## 2022-03-11 PROCEDURE — 80053 COMPREHEN METABOLIC PANEL: CPT | Performed by: PHYSICIAN ASSISTANT

## 2022-03-11 PROCEDURE — C1894 INTRO/SHEATH, NON-LASER: HCPCS | Performed by: INTERNAL MEDICINE

## 2022-03-11 PROCEDURE — 25010000002 FENTANYL CITRATE (PF) 50 MCG/ML SOLUTION: Performed by: INTERNAL MEDICINE

## 2022-03-11 PROCEDURE — 25010000002 HEPARIN (PORCINE) PER 1000 UNITS: Performed by: INTERNAL MEDICINE

## 2022-03-11 PROCEDURE — 25010000002 MIDAZOLAM PER 1 MG: Performed by: INTERNAL MEDICINE

## 2022-03-11 PROCEDURE — 0 IOPAMIDOL PER 1 ML: Performed by: INTERNAL MEDICINE

## 2022-03-11 PROCEDURE — 81025 URINE PREGNANCY TEST: CPT | Performed by: PHYSICIAN ASSISTANT

## 2022-03-11 PROCEDURE — 80061 LIPID PANEL: CPT | Performed by: PHYSICIAN ASSISTANT

## 2022-03-11 RX ORDER — NITROGLYCERIN 0.4 MG/1
0.4 TABLET SUBLINGUAL
Status: DISCONTINUED | OUTPATIENT
Start: 2022-03-11 | End: 2022-03-11 | Stop reason: HOSPADM

## 2022-03-11 RX ORDER — ACETAMINOPHEN 325 MG/1
650 TABLET ORAL EVERY 4 HOURS PRN
Status: DISCONTINUED | OUTPATIENT
Start: 2022-03-11 | End: 2022-03-11 | Stop reason: HOSPADM

## 2022-03-11 RX ORDER — MIDAZOLAM HYDROCHLORIDE 1 MG/ML
INJECTION INTRAMUSCULAR; INTRAVENOUS AS NEEDED
Status: DISCONTINUED | OUTPATIENT
Start: 2022-03-11 | End: 2022-03-11 | Stop reason: HOSPADM

## 2022-03-11 RX ORDER — NITROGLYCERIN 400 UG/1
SPRAY ORAL AS NEEDED
Status: DISCONTINUED | OUTPATIENT
Start: 2022-03-11 | End: 2022-03-11 | Stop reason: HOSPADM

## 2022-03-11 RX ORDER — LIDOCAINE HYDROCHLORIDE 10 MG/ML
INJECTION, SOLUTION EPIDURAL; INFILTRATION; INTRACAUDAL; PERINEURAL AS NEEDED
Status: DISCONTINUED | OUTPATIENT
Start: 2022-03-11 | End: 2022-03-11 | Stop reason: HOSPADM

## 2022-03-11 RX ORDER — SODIUM CHLORIDE 9 MG/ML
1 INJECTION, SOLUTION INTRAVENOUS CONTINUOUS
Status: ACTIVE | OUTPATIENT
Start: 2022-03-11 | End: 2022-03-11

## 2022-03-11 RX ORDER — SODIUM CHLORIDE 0.9 % (FLUSH) 0.9 %
3 SYRINGE (ML) INJECTION EVERY 12 HOURS SCHEDULED
Status: DISCONTINUED | OUTPATIENT
Start: 2022-03-11 | End: 2022-03-11 | Stop reason: HOSPADM

## 2022-03-11 RX ORDER — FENTANYL CITRATE 50 UG/ML
INJECTION, SOLUTION INTRAMUSCULAR; INTRAVENOUS AS NEEDED
Status: DISCONTINUED | OUTPATIENT
Start: 2022-03-11 | End: 2022-03-11 | Stop reason: HOSPADM

## 2022-03-11 RX ORDER — SODIUM CHLORIDE 9 MG/ML
3 INJECTION, SOLUTION INTRAVENOUS CONTINUOUS
Status: ACTIVE | OUTPATIENT
Start: 2022-03-11 | End: 2022-03-11

## 2022-03-11 RX ORDER — SODIUM CHLORIDE 0.9 % (FLUSH) 0.9 %
10 SYRINGE (ML) INJECTION AS NEEDED
Status: DISCONTINUED | OUTPATIENT
Start: 2022-03-11 | End: 2022-03-11 | Stop reason: HOSPADM

## 2022-03-11 RX ORDER — ASPIRIN 81 MG/1
324 TABLET, CHEWABLE ORAL ONCE
Status: COMPLETED | OUTPATIENT
Start: 2022-03-11 | End: 2022-03-11

## 2022-03-11 RX ORDER — ASPIRIN 81 MG/1
81 TABLET ORAL DAILY
Status: DISCONTINUED | OUTPATIENT
Start: 2022-03-12 | End: 2022-03-11 | Stop reason: HOSPADM

## 2022-03-11 RX ADMIN — ASPIRIN 81 MG 324 MG: 81 TABLET ORAL at 12:30

## 2022-03-11 RX ADMIN — SODIUM CHLORIDE 3 ML/KG/HR: 9 INJECTION, SOLUTION INTRAVENOUS at 12:31

## 2022-03-11 NOTE — INTERVAL H&P NOTE
H&P reviewed. The patient was examined and there are no changes to the H&P.      Vitals and Labs today:  There were no vitals filed for this visit.    Labs today pending    The risks, benefits, and alternative options of cardiac catheterization were discussed with the patient.  The risks include death, MI, stroke, infection, vascular injury requiring surgical repair and/or blood transfusion, coronary dissection, renal dysfunction/failure, allergic reaction, emergent CABG.  If PCI is needed there is a 1-2% risk of emergent CABG.  The patient is agreeable for cardiac catheterization, possible PCI or CABG. Plan is to proceed with cardiac catheterization and possible PCI.     Peter Anguiano M.D., F.A.C.C.  Interventional Cardiology  03/11/22  14:47 EST

## 2024-04-05 ENCOUNTER — APPOINTMENT (OUTPATIENT)
Dept: CT IMAGING | Facility: HOSPITAL | Age: 54
End: 2024-04-05
Payer: COMMERCIAL

## 2024-04-05 ENCOUNTER — HOSPITAL ENCOUNTER (EMERGENCY)
Facility: HOSPITAL | Age: 54
Discharge: HOME | End: 2024-04-05
Attending: EMERGENCY MEDICINE
Payer: COMMERCIAL

## 2024-04-05 ENCOUNTER — APPOINTMENT (OUTPATIENT)
Dept: GENERAL RADIOLOGY | Facility: HOSPITAL | Age: 54
End: 2024-04-05
Payer: COMMERCIAL

## 2024-04-05 VITALS
BODY MASS INDEX: 31.78 KG/M2 | OXYGEN SATURATION: 97 % | WEIGHT: 197.75 LBS | TEMPERATURE: 98.8 F | RESPIRATION RATE: 16 BRPM | SYSTOLIC BLOOD PRESSURE: 154 MMHG | HEIGHT: 66 IN | DIASTOLIC BLOOD PRESSURE: 83 MMHG | HEART RATE: 67 BPM

## 2024-04-05 DIAGNOSIS — R07.9 ACUTE CHEST PAIN: Primary | ICD-10-CM

## 2024-04-05 DIAGNOSIS — S09.90XA ACUTE HEAD INJURY, INITIAL ENCOUNTER: ICD-10-CM

## 2024-04-05 DIAGNOSIS — R55 SYNCOPE, UNSPECIFIED SYNCOPE TYPE: ICD-10-CM

## 2024-04-05 LAB
ALBUMIN SERPL-MCNC: 3.9 G/DL (ref 3.5–5.2)
ALBUMIN/GLOB SERPL: 1.5 G/DL
ALP SERPL-CCNC: 88 U/L (ref 39–117)
ALT SERPL W P-5'-P-CCNC: 8 U/L (ref 1–33)
ANION GAP SERPL CALCULATED.3IONS-SCNC: 7 MMOL/L (ref 5–15)
AST SERPL-CCNC: 23 U/L (ref 1–32)
BACTERIA UR QL AUTO: NORMAL /HPF
BASOPHILS # BLD AUTO: 0.06 10*3/MM3 (ref 0–0.2)
BASOPHILS NFR BLD AUTO: 1.3 % (ref 0–1.5)
BILIRUB SERPL-MCNC: 0.7 MG/DL (ref 0–1.2)
BILIRUB UR QL STRIP: NEGATIVE
BUN SERPL-MCNC: 10 MG/DL (ref 6–20)
BUN/CREAT SERPL: 13.3 (ref 7–25)
CALCIUM SPEC-SCNC: 9 MG/DL (ref 8.6–10.5)
CHLORIDE SERPL-SCNC: 105 MMOL/L (ref 98–107)
CLARITY UR: CLEAR
CO2 SERPL-SCNC: 25 MMOL/L (ref 22–29)
COLOR UR: YELLOW
CREAT SERPL-MCNC: 0.75 MG/DL (ref 0.57–1)
DEPRECATED RDW RBC AUTO: 43.9 FL (ref 37–54)
EGFRCR SERPLBLD CKD-EPI 2021: 94.2 ML/MIN/1.73
EOSINOPHIL # BLD AUTO: 0.18 10*3/MM3 (ref 0–0.4)
EOSINOPHIL NFR BLD AUTO: 3.9 % (ref 0.3–6.2)
ERYTHROCYTE [DISTWIDTH] IN BLOOD BY AUTOMATED COUNT: 13.3 % (ref 12.3–15.4)
GLOBULIN UR ELPH-MCNC: 2.6 GM/DL
GLUCOSE SERPL-MCNC: 105 MG/DL (ref 65–99)
GLUCOSE UR STRIP-MCNC: NEGATIVE MG/DL
HCT VFR BLD AUTO: 39.5 % (ref 34–46.6)
HGB BLD-MCNC: 12.3 G/DL (ref 12–15.9)
HGB UR QL STRIP.AUTO: ABNORMAL
HOLD SPECIMEN: NORMAL
HYALINE CASTS UR QL AUTO: NORMAL /LPF
IMM GRANULOCYTES # BLD AUTO: 0.01 10*3/MM3 (ref 0–0.05)
IMM GRANULOCYTES NFR BLD AUTO: 0.2 % (ref 0–0.5)
KETONES UR QL STRIP: NEGATIVE
LEUKOCYTE ESTERASE UR QL STRIP.AUTO: NEGATIVE
LYMPHOCYTES # BLD AUTO: 1.19 10*3/MM3 (ref 0.7–3.1)
LYMPHOCYTES NFR BLD AUTO: 25.8 % (ref 19.6–45.3)
MAGNESIUM SERPL-MCNC: 2 MG/DL (ref 1.6–2.6)
MCH RBC QN AUTO: 27.8 PG (ref 26.6–33)
MCHC RBC AUTO-ENTMCNC: 31.1 G/DL (ref 31.5–35.7)
MCV RBC AUTO: 89.2 FL (ref 79–97)
MONOCYTES # BLD AUTO: 0.47 10*3/MM3 (ref 0.1–0.9)
MONOCYTES NFR BLD AUTO: 10.2 % (ref 5–12)
NEUTROPHILS NFR BLD AUTO: 2.7 10*3/MM3 (ref 1.7–7)
NEUTROPHILS NFR BLD AUTO: 58.6 % (ref 42.7–76)
NITRITE UR QL STRIP: NEGATIVE
NRBC BLD AUTO-RTO: 0 /100 WBC (ref 0–0.2)
PH UR STRIP.AUTO: 5.5 [PH] (ref 5–8)
PLATELET # BLD AUTO: 288 10*3/MM3 (ref 140–450)
PMV BLD AUTO: 9.8 FL (ref 6–12)
POTASSIUM SERPL-SCNC: 4.5 MMOL/L (ref 3.5–5.2)
PROT SERPL-MCNC: 6.5 G/DL (ref 6–8.5)
PROT UR QL STRIP: NEGATIVE
RBC # BLD AUTO: 4.43 10*6/MM3 (ref 3.77–5.28)
RBC # UR STRIP: NORMAL /HPF
REF LAB TEST METHOD: NORMAL
SODIUM SERPL-SCNC: 137 MMOL/L (ref 136–145)
SP GR UR STRIP: 1.01 (ref 1–1.03)
SQUAMOUS #/AREA URNS HPF: NORMAL /HPF
TROPONIN T SERPL HS-MCNC: 11 NG/L
TROPONIN T SERPL HS-MCNC: 8 NG/L
UROBILINOGEN UR QL STRIP: ABNORMAL
WBC # UR STRIP: NORMAL /HPF
WBC NRBC COR # BLD AUTO: 4.61 10*3/MM3 (ref 3.4–10.8)
WHOLE BLOOD HOLD COAG: NORMAL
WHOLE BLOOD HOLD SPECIMEN: NORMAL

## 2024-04-05 PROCEDURE — 81001 URINALYSIS AUTO W/SCOPE: CPT | Performed by: EMERGENCY MEDICINE

## 2024-04-05 PROCEDURE — 93005 ELECTROCARDIOGRAM TRACING: CPT | Performed by: EMERGENCY MEDICINE

## 2024-04-05 PROCEDURE — 96375 TX/PRO/DX INJ NEW DRUG ADDON: CPT

## 2024-04-05 PROCEDURE — 85025 COMPLETE CBC W/AUTO DIFF WBC: CPT | Performed by: EMERGENCY MEDICINE

## 2024-04-05 PROCEDURE — 25810000003 SODIUM CHLORIDE 0.9 % SOLUTION: Performed by: NURSE PRACTITIONER

## 2024-04-05 PROCEDURE — 25010000002 ONDANSETRON PER 1 MG: Performed by: EMERGENCY MEDICINE

## 2024-04-05 PROCEDURE — 93005 ELECTROCARDIOGRAM TRACING: CPT | Performed by: NURSE PRACTITIONER

## 2024-04-05 PROCEDURE — 96374 THER/PROPH/DIAG INJ IV PUSH: CPT

## 2024-04-05 PROCEDURE — 36415 COLL VENOUS BLD VENIPUNCTURE: CPT

## 2024-04-05 PROCEDURE — 83735 ASSAY OF MAGNESIUM: CPT | Performed by: EMERGENCY MEDICINE

## 2024-04-05 PROCEDURE — 80053 COMPREHEN METABOLIC PANEL: CPT | Performed by: EMERGENCY MEDICINE

## 2024-04-05 PROCEDURE — 25010000002 MORPHINE PER 10 MG: Performed by: EMERGENCY MEDICINE

## 2024-04-05 PROCEDURE — 71275 CT ANGIOGRAPHY CHEST: CPT

## 2024-04-05 PROCEDURE — 84484 ASSAY OF TROPONIN QUANT: CPT | Performed by: NURSE PRACTITIONER

## 2024-04-05 PROCEDURE — 25510000001 IOPAMIDOL PER 1 ML: Performed by: EMERGENCY MEDICINE

## 2024-04-05 PROCEDURE — 70450 CT HEAD/BRAIN W/O DYE: CPT

## 2024-04-05 PROCEDURE — 99285 EMERGENCY DEPT VISIT HI MDM: CPT

## 2024-04-05 PROCEDURE — 93005 ELECTROCARDIOGRAM TRACING: CPT

## 2024-04-05 PROCEDURE — 84484 ASSAY OF TROPONIN QUANT: CPT | Performed by: EMERGENCY MEDICINE

## 2024-04-05 PROCEDURE — 71045 X-RAY EXAM CHEST 1 VIEW: CPT

## 2024-04-05 RX ORDER — SODIUM CHLORIDE 0.9 % (FLUSH) 0.9 %
10 SYRINGE (ML) INJECTION AS NEEDED
Status: DISCONTINUED | OUTPATIENT
Start: 2024-04-05 | End: 2024-04-05 | Stop reason: HOSPADM

## 2024-04-05 RX ORDER — ONDANSETRON 2 MG/ML
4 INJECTION INTRAMUSCULAR; INTRAVENOUS ONCE
Status: COMPLETED | OUTPATIENT
Start: 2024-04-05 | End: 2024-04-05

## 2024-04-05 RX ORDER — MORPHINE SULFATE 4 MG/ML
4 INJECTION, SOLUTION INTRAMUSCULAR; INTRAVENOUS ONCE
Status: COMPLETED | OUTPATIENT
Start: 2024-04-05 | End: 2024-04-05

## 2024-04-05 RX ORDER — NITROGLYCERIN 0.4 MG/1
0.4 TABLET SUBLINGUAL
Status: DISCONTINUED | OUTPATIENT
Start: 2024-04-05 | End: 2024-04-05 | Stop reason: HOSPADM

## 2024-04-05 RX ADMIN — MORPHINE SULFATE 4 MG: 4 INJECTION, SOLUTION INTRAMUSCULAR; INTRAVENOUS at 10:45

## 2024-04-05 RX ADMIN — ONDANSETRON 4 MG: 2 INJECTION INTRAMUSCULAR; INTRAVENOUS at 10:45

## 2024-04-05 RX ADMIN — NITROGLYCERIN 1 INCH: 20 OINTMENT TOPICAL at 10:43

## 2024-04-05 RX ADMIN — SODIUM CHLORIDE 1000 ML: 9 INJECTION, SOLUTION INTRAVENOUS at 10:43

## 2024-04-05 RX ADMIN — IOPAMIDOL 80 ML: 755 INJECTION, SOLUTION INTRAVENOUS at 11:04

## 2024-04-05 NOTE — Clinical Note
[facility] EMERGENCY DEPARTMENT  [address]  [facility] [address]  Phone: [phone]    Kamila Garcia was seen and treated in our emergency department on 4/5/2024.  She may return to work on 04/08/2024.         Thank you for choosing [facility].    Brendan Ballard APRN

## 2024-04-05 NOTE — ED PROVIDER NOTES
Subjective   History of Present Illness  Patient presents to the ER with chest pain and an episode of syncope while at work today.  Tells me she does have a history of pulmonary embolism and DVTs around a year or so ago.  She has not been taking her Eliquis and has been off of it for the last month.  Fairly reassuring heart cath with normal coronaries March 22.  She also reports hitting her head with a headache.        Review of Systems    Past Medical History:   Diagnosis Date    Anxiety and depression     Chest pain     Disease of thyroid gland     reports slightly elevated when in hospital    DVT (deep venous thrombosis)     History of pulmonary embolus (PE)     Hypertension     Migraines     Shortness of breath     with chest pain episodes       Allergies   Allergen Reactions    Hydrocodone Hives       Past Surgical History:   Procedure Laterality Date    CARDIAC CATHETERIZATION Left 3/11/2022    Procedure: Left Heart Cath;  Surgeon: Peter Anguiano MD;  Location:  [facility] CATH INVASIVE LOCATION;  Service: Cardiology;  Laterality: Left;  Hold Eliquis 2 days prior to [facility]    ENDOSCOPY N/A 1/12/2022    Procedure: ESOPHAGOGASTRODUODENOSCOPY;  Surgeon: Brunner, Mark I, MD;  Location:  [facility] ENDOSCOPY;  Service: Gastroenterology;  Laterality: N/A;    GASTRIC BYPASS  2007    lap cm en y    IR STENT PLACEMENT Left 10/2021    xin surgical in cath lab placed stent in left leg       Family History   Problem Relation Age of Onset    Heart attack Mother 50    Cancer Mother     Stroke Father     Breast cancer Maternal Grandmother     No Known Problems Maternal Grandfather     No Known Problems Paternal Grandmother     No Known Problems Paternal Grandfather        Social History     Socioeconomic History    Marital status:    Tobacco Use    Smoking status: Never    Smokeless tobacco: Never   Vaping Use    Vaping status: Never Used   Substance and Sexual Activity    Alcohol use: Yes     Alcohol/week: 4.0 standard  drinks of alcohol     Types: 4 Cans of beer per week     Comment: daily    Drug use: Never    Sexual activity: Defer           Objective   Physical Exam  Constitutional:       Appearance: She is well-developed.   HENT:      Head: Normocephalic and atraumatic.      Right Ear: External ear normal.      Left Ear: External ear normal.      Nose: Nose normal.   Eyes:      Conjunctiva/sclera: Conjunctivae normal.      Pupils: Pupils are equal, round, and reactive to light.   Cardiovascular:      Rate and Rhythm: Normal rate and regular rhythm.      Heart sounds: Normal heart sounds.   Pulmonary:      Effort: Pulmonary effort is normal.      Breath sounds: Normal breath sounds.   Abdominal:      General: Bowel sounds are normal.      Palpations: Abdomen is soft.   Musculoskeletal:         General: Normal range of motion.      Cervical back: Normal range of motion and neck supple.   Skin:     General: Skin is warm and dry.   Neurological:      Mental Status: She is alert and oriented to person, place, and time.   Psychiatric:         Behavior: Behavior normal.         Thought Content: Thought content normal.         Judgment: Judgment normal.         Procedures           ED Course  ED Course as of 04/05/24 1626   Fri Apr 05, 2024   1144 Awaiting second troponin [JM]   1622 2 negative troponins.  2 negative EKGs.  Patient resting comfortably.  She has been in the ER for around 7 hours.  Negative CTA chest and head.  I will make a referral for [facility] clinic as well.  Agreeable with going home.  Thankful for the thorough workup given here at [facility] ER.  They also understand that they are well aware the signs of worse condition particular with worsening chest pain difficulty breathing another near syncopal episode fever chills etc. [EDMAR]      ED Course User Index  [EDMAR] Brendan Ballard APRN                HEART Score: 2                  CT Angiogram Chest   Final Result   Impression:      1. No evidence of pulmonary embolism      2.  No acute pulmonary process               CT brain   Technique: Multiple axial CT images obtained through the brain without contrast. Multiplanar reformats performed      COMPARISON: CT brain dated 1/27/2022      FINDINGS: There is no evidence of hemorrhage. There is no mass effect or midline shift. Mild diffuse brain atrophy      There is no extracerebral collection.      Ventricles are normal in size and configuration for patient's stated age.       Posterior fossa is within normal limits.      Calvarium and skull base appear intact.  Visualized sinuses show no air fluid levels. Visualized orbits are unremarkable.      IMPRESSION:   No acute intracranial abnormality               Electronically Signed: Avtar Auguste MD     4/5/2024 11:20 AM EDT     Workstation ID: [id number]      CT Head Without Contrast   Final Result   Impression:      1. No evidence of pulmonary embolism      2. No acute pulmonary process               CT brain   Technique: Multiple axial CT images obtained through the brain without contrast. Multiplanar reformats performed      COMPARISON: CT brain dated 1/27/2022      FINDINGS: There is no evidence of hemorrhage. There is no mass effect or midline shift. Mild diffuse brain atrophy      There is no extracerebral collection.      Ventricles are normal in size and configuration for patient's stated age.       Posterior fossa is within normal limits.      Calvarium and skull base appear intact.  Visualized sinuses show no air fluid levels. Visualized orbits are unremarkable.      IMPRESSION:   No acute intracranial abnormality               Electronically Signed: Avtar Auguste MD     4/5/2024 11:20 AM EDT     Workstation ID: [id number]      XR Chest 1 View   Final Result   Impression:   No acute cardiopulmonary abnormality.         Electronically Signed: Marilyn Ang MD     4/5/2024 9:29 AM EDT     Workstation ID: [id number]                    Medical Decision Making  Problems Addressed:  Acute  chest pain: complicated acute illness or injury  Acute head injury, initial encounter: complicated acute illness or injury  Syncope, unspecified syncope type: complicated acute illness or injury    Amount and/or Complexity of Data Reviewed  External Data Reviewed: labs, radiology, ECG and notes.     Details: EKG interpreted by myself with no acute  Labs: ordered. Decision-making details documented in ED Course.  Radiology: ordered. Decision-making details documented in ED Course.  ECG/medicine tests: ordered. Decision-making details documented in ED Course.    Risk  Prescription drug management.        Final diagnoses:   Acute chest pain   Syncope, unspecified syncope type   Acute head injury, initial encounter       ED Disposition  ED Disposition       ED Disposition   Discharge    Condition   Stable    Comment   --               [facility] CARDIOLOGY  [address]  [address]  [facility] [id number] [phone]  Schedule an appointment as soon as possible for a visit       Latricia Mishra, APRN  [address]  [address]  [facility] [address]  [phone]    Schedule an appointment as soon as possible for a visit       [facility] EMERGENCY DEPARTMENT  [address] [address] [phone]    If symptoms worsen         Medication List      No changes were made to your prescriptions during this visit.            Brendan Ballard, APRN  04/05/24 [id number]

## 2024-04-06 LAB
QT INTERVAL: 406 MS
QT INTERVAL: 438 MS
QTC INTERVAL: 453 MS
QTC INTERVAL: 455 MS

## 2024-04-09 ENCOUNTER — APPOINTMENT (OUTPATIENT)
Dept: CT IMAGING | Facility: HOSPITAL | Age: 54
DRG: 103 | End: 2024-04-09
Payer: COMMERCIAL

## 2024-04-09 ENCOUNTER — HOSPITAL ENCOUNTER (INPATIENT)
Facility: HOSPITAL | Age: 54
LOS: 7 days | Discharge: NURSING, REHAB OR LONG-TERM CARE | DRG: 103 | End: 2024-04-16
Attending: EMERGENCY MEDICINE | Admitting: STUDENT IN AN ORGANIZED HEALTH CARE EDUCATION/TRAINING PROGRAM
Payer: COMMERCIAL

## 2024-04-09 ENCOUNTER — APPOINTMENT (OUTPATIENT)
Dept: MRI IMAGING | Facility: HOSPITAL | Age: 54
DRG: 103 | End: 2024-04-09
Payer: COMMERCIAL

## 2024-04-09 ENCOUNTER — APPOINTMENT (OUTPATIENT)
Dept: GENERAL RADIOLOGY | Facility: HOSPITAL | Age: 54
DRG: 103 | End: 2024-04-09
Payer: COMMERCIAL

## 2024-04-09 DIAGNOSIS — R20.0 NUMBNESS AND TINGLING OF LEFT SIDE OF FACE: Primary | ICD-10-CM

## 2024-04-09 DIAGNOSIS — R20.2 NUMBNESS AND TINGLING OF LEFT SIDE OF FACE: Primary | ICD-10-CM

## 2024-04-09 DIAGNOSIS — R13.11 ORAL PHASE DYSPHAGIA: ICD-10-CM

## 2024-04-09 DIAGNOSIS — R41.841 COGNITIVE COMMUNICATION DEFICIT: ICD-10-CM

## 2024-04-09 PROBLEM — F10.90 ALCOHOL USE: Chronic | Status: ACTIVE | Noted: 2024-04-09

## 2024-04-09 PROBLEM — E03.9 HYPOTHYROIDISM: Chronic | Status: ACTIVE | Noted: 2024-04-09

## 2024-04-09 PROBLEM — Z78.9 ALCOHOL USE: Chronic | Status: ACTIVE | Noted: 2024-04-09

## 2024-04-09 PROBLEM — E66.9 OBESITY (BMI 30-39.9): Chronic | Status: ACTIVE | Noted: 2024-04-09

## 2024-04-09 PROBLEM — I16.0 HYPERTENSIVE URGENCY: Status: ACTIVE | Noted: 2024-04-09

## 2024-04-09 PROBLEM — F41.9 ANXIETY AND DEPRESSION: Chronic | Status: ACTIVE | Noted: 2024-04-09

## 2024-04-09 PROBLEM — F32.A ANXIETY AND DEPRESSION: Chronic | Status: ACTIVE | Noted: 2024-04-09

## 2024-04-09 PROBLEM — R09.89 SUSPECTED CEREBROVASCULAR ACCIDENT (CVA): Status: ACTIVE | Noted: 2024-04-09

## 2024-04-09 LAB
ALBUMIN SERPL-MCNC: 4.2 G/DL (ref 3.5–5.2)
ALBUMIN/GLOB SERPL: 1.4 G/DL
ALP SERPL-CCNC: 91 U/L (ref 39–117)
ALT SERPL W P-5'-P-CCNC: 8 U/L (ref 1–33)
ANION GAP SERPL CALCULATED.3IONS-SCNC: 11 MMOL/L (ref 5–15)
APTT PPP: 27 SECONDS (ref 60–90)
AST SERPL-CCNC: 23 U/L (ref 1–32)
BACTERIA UR QL AUTO: ABNORMAL /HPF
BASOPHILS # BLD AUTO: 0.05 10*3/MM3 (ref 0–0.2)
BASOPHILS NFR BLD AUTO: 1.1 % (ref 0–1.5)
BILIRUB SERPL-MCNC: 0.6 MG/DL (ref 0–1.2)
BILIRUB UR QL STRIP: NEGATIVE
BUN SERPL-MCNC: 12 MG/DL (ref 6–20)
BUN/CREAT SERPL: 15 (ref 7–25)
CALCIUM SPEC-SCNC: 8.4 MG/DL (ref 8.6–10.5)
CHLORIDE SERPL-SCNC: 106 MMOL/L (ref 98–107)
CLARITY UR: CLEAR
CO2 SERPL-SCNC: 24 MMOL/L (ref 22–29)
COLOR UR: YELLOW
CREAT SERPL-MCNC: 0.8 MG/DL (ref 0.57–1)
DEPRECATED RDW RBC AUTO: 41.5 FL (ref 37–54)
EGFRCR SERPLBLD CKD-EPI 2021: 87.1 ML/MIN/1.73
EOSINOPHIL # BLD AUTO: 0.21 10*3/MM3 (ref 0–0.4)
EOSINOPHIL NFR BLD AUTO: 4.7 % (ref 0.3–6.2)
ERYTHROCYTE [DISTWIDTH] IN BLOOD BY AUTOMATED COUNT: 13.2 % (ref 12.3–15.4)
GLOBULIN UR ELPH-MCNC: 3.1 GM/DL
GLUCOSE BLDC GLUCOMTR-MCNC: 114 MG/DL (ref 70–130)
GLUCOSE SERPL-MCNC: 110 MG/DL (ref 65–99)
GLUCOSE UR STRIP-MCNC: NEGATIVE MG/DL
HCT VFR BLD AUTO: 40.8 % (ref 34–46.6)
HGB BLD-MCNC: 13 G/DL (ref 12–15.9)
HGB UR QL STRIP.AUTO: ABNORMAL
HOLD SPECIMEN: NORMAL
HYALINE CASTS UR QL AUTO: ABNORMAL /LPF
IMM GRANULOCYTES # BLD AUTO: 0.01 10*3/MM3 (ref 0–0.05)
IMM GRANULOCYTES NFR BLD AUTO: 0.2 % (ref 0–0.5)
INR PPP: 1.06 (ref 0.89–1.12)
KETONES UR QL STRIP: NEGATIVE
LEUKOCYTE ESTERASE UR QL STRIP.AUTO: ABNORMAL
LYMPHOCYTES # BLD AUTO: 1.3 10*3/MM3 (ref 0.7–3.1)
LYMPHOCYTES NFR BLD AUTO: 29.3 % (ref 19.6–45.3)
MAGNESIUM SERPL-MCNC: 2 MG/DL (ref 1.6–2.6)
MCH RBC QN AUTO: 27.4 PG (ref 26.6–33)
MCHC RBC AUTO-ENTMCNC: 31.9 G/DL (ref 31.5–35.7)
MCV RBC AUTO: 86.1 FL (ref 79–97)
MONOCYTES # BLD AUTO: 0.45 10*3/MM3 (ref 0.1–0.9)
MONOCYTES NFR BLD AUTO: 10.1 % (ref 5–12)
NEUTROPHILS NFR BLD AUTO: 2.42 10*3/MM3 (ref 1.7–7)
NEUTROPHILS NFR BLD AUTO: 54.6 % (ref 42.7–76)
NITRITE UR QL STRIP: NEGATIVE
NRBC BLD AUTO-RTO: 0 /100 WBC (ref 0–0.2)
NT-PROBNP SERPL-MCNC: 273 PG/ML (ref 0–900)
PH UR STRIP.AUTO: 5.5 [PH] (ref 5–8)
PLATELET # BLD AUTO: 277 10*3/MM3 (ref 140–450)
PMV BLD AUTO: 9.4 FL (ref 6–12)
POTASSIUM SERPL-SCNC: 4.2 MMOL/L (ref 3.5–5.2)
PROT SERPL-MCNC: 7.3 G/DL (ref 6–8.5)
PROT UR QL STRIP: NEGATIVE
PROTHROMBIN TIME: 13.9 SECONDS (ref 12.2–14.5)
QT INTERVAL: 424 MS
QTC INTERVAL: 464 MS
RBC # BLD AUTO: 4.74 10*6/MM3 (ref 3.77–5.28)
RBC # UR STRIP: ABNORMAL /HPF
REF LAB TEST METHOD: ABNORMAL
SODIUM SERPL-SCNC: 141 MMOL/L (ref 136–145)
SP GR UR STRIP: 1.01 (ref 1–1.03)
SQUAMOUS #/AREA URNS HPF: ABNORMAL /HPF
TROPONIN T SERPL HS-MCNC: 9 NG/L
UROBILINOGEN UR QL STRIP: ABNORMAL
WBC # UR STRIP: ABNORMAL /HPF
WBC NRBC COR # BLD AUTO: 4.44 10*3/MM3 (ref 3.4–10.8)
WHOLE BLOOD HOLD COAG: NORMAL
WHOLE BLOOD HOLD SPECIMEN: NORMAL

## 2024-04-09 PROCEDURE — 3E03317 INTRODUCTION OF OTHER THROMBOLYTIC INTO PERIPHERAL VEIN, PERCUTANEOUS APPROACH: ICD-10-PCS | Performed by: STUDENT IN AN ORGANIZED HEALTH CARE EDUCATION/TRAINING PROGRAM

## 2024-04-09 PROCEDURE — 70450 CT HEAD/BRAIN W/O DYE: CPT

## 2024-04-09 PROCEDURE — 85610 PROTHROMBIN TIME: CPT

## 2024-04-09 PROCEDURE — 99223 1ST HOSP IP/OBS HIGH 75: CPT

## 2024-04-09 PROCEDURE — 70498 CT ANGIOGRAPHY NECK: CPT

## 2024-04-09 PROCEDURE — 83735 ASSAY OF MAGNESIUM: CPT | Performed by: EMERGENCY MEDICINE

## 2024-04-09 PROCEDURE — 71045 X-RAY EXAM CHEST 1 VIEW: CPT

## 2024-04-09 PROCEDURE — 0042T HC CT CEREBRAL PERFUSION W/WO CONTRAST: CPT

## 2024-04-09 PROCEDURE — 99285 EMERGENCY DEPT VISIT HI MDM: CPT

## 2024-04-09 PROCEDURE — 81001 URINALYSIS AUTO W/SCOPE: CPT

## 2024-04-09 PROCEDURE — 83880 ASSAY OF NATRIURETIC PEPTIDE: CPT

## 2024-04-09 PROCEDURE — 80053 COMPREHEN METABOLIC PANEL: CPT | Performed by: EMERGENCY MEDICINE

## 2024-04-09 PROCEDURE — 82948 REAGENT STRIP/BLOOD GLUCOSE: CPT

## 2024-04-09 PROCEDURE — 85730 THROMBOPLASTIN TIME PARTIAL: CPT

## 2024-04-09 PROCEDURE — 99222 1ST HOSP IP/OBS MODERATE 55: CPT | Performed by: INTERNAL MEDICINE

## 2024-04-09 PROCEDURE — 25010000002 MAGNESIUM SULFATE 2 GM/50ML SOLUTION

## 2024-04-09 PROCEDURE — 92523 SPEECH SOUND LANG COMPREHEN: CPT

## 2024-04-09 PROCEDURE — 70496 CT ANGIOGRAPHY HEAD: CPT

## 2024-04-09 PROCEDURE — 92610 EVALUATE SWALLOWING FUNCTION: CPT

## 2024-04-09 PROCEDURE — 25010000002 LABETALOL 5 MG/ML SOLUTION: Performed by: EMERGENCY MEDICINE

## 2024-04-09 PROCEDURE — 85025 COMPLETE CBC W/AUTO DIFF WBC: CPT | Performed by: EMERGENCY MEDICINE

## 2024-04-09 PROCEDURE — 4A03X5D MEASUREMENT OF ARTERIAL FLOW, INTRACRANIAL, EXTERNAL APPROACH: ICD-10-PCS | Performed by: RADIOLOGY

## 2024-04-09 PROCEDURE — 25010000002 LABETALOL 5 MG/ML SOLUTION

## 2024-04-09 PROCEDURE — 25510000001 IOPAMIDOL PER 1 ML: Performed by: EMERGENCY MEDICINE

## 2024-04-09 PROCEDURE — 25010000002 MORPHINE PER 10 MG: Performed by: EMERGENCY MEDICINE

## 2024-04-09 PROCEDURE — 93005 ELECTROCARDIOGRAM TRACING: CPT

## 2024-04-09 PROCEDURE — 25010000002 PROCHLORPERAZINE 10 MG/2ML SOLUTION

## 2024-04-09 PROCEDURE — 93005 ELECTROCARDIOGRAM TRACING: CPT | Performed by: EMERGENCY MEDICINE

## 2024-04-09 PROCEDURE — 25010000002 ACETAMINOPHEN 10 MG/ML SOLUTION

## 2024-04-09 PROCEDURE — 84484 ASSAY OF TROPONIN QUANT: CPT | Performed by: EMERGENCY MEDICINE

## 2024-04-09 PROCEDURE — 25010000002 TENECTEPLASE PER 50 MG

## 2024-04-09 PROCEDURE — 70551 MRI BRAIN STEM W/O DYE: CPT

## 2024-04-09 RX ORDER — SODIUM CHLORIDE 0.9 % (FLUSH) 0.9 %
10 SYRINGE (ML) INJECTION AS NEEDED
Status: DISCONTINUED | OUTPATIENT
Start: 2024-04-09 | End: 2024-04-10 | Stop reason: SDUPTHER

## 2024-04-09 RX ORDER — SODIUM CHLORIDE 0.9 % (FLUSH) 0.9 %
10 SYRINGE (ML) INJECTION ONCE
Status: COMPLETED | OUTPATIENT
Start: 2024-04-09 | End: 2024-04-09

## 2024-04-09 RX ORDER — ASPIRIN 300 MG/1
300 SUPPOSITORY RECTAL DAILY
Status: DISCONTINUED | OUTPATIENT
Start: 2024-04-10 | End: 2024-04-10

## 2024-04-09 RX ORDER — ASPIRIN 325 MG
325 TABLET ORAL DAILY
Status: DISCONTINUED | OUTPATIENT
Start: 2024-04-10 | End: 2024-04-10

## 2024-04-09 RX ORDER — LABETALOL HYDROCHLORIDE 5 MG/ML
10 INJECTION, SOLUTION INTRAVENOUS ONCE
Qty: 4 ML | Refills: 0 | Status: COMPLETED | OUTPATIENT
Start: 2024-04-09 | End: 2024-04-09

## 2024-04-09 RX ORDER — LABETALOL HYDROCHLORIDE 5 MG/ML
20 INJECTION, SOLUTION INTRAVENOUS ONCE
Status: COMPLETED | OUTPATIENT
Start: 2024-04-09 | End: 2024-04-09

## 2024-04-09 RX ORDER — MAGNESIUM SULFATE HEPTAHYDRATE 40 MG/ML
2 INJECTION, SOLUTION INTRAVENOUS ONCE
Status: COMPLETED | OUTPATIENT
Start: 2024-04-09 | End: 2024-04-09

## 2024-04-09 RX ORDER — NITROGLYCERIN 0.4 MG/1
0.4 TABLET SUBLINGUAL
Status: DISCONTINUED | OUTPATIENT
Start: 2024-04-09 | End: 2024-04-16 | Stop reason: HOSPADM

## 2024-04-09 RX ORDER — SODIUM CHLORIDE 9 MG/ML
40 INJECTION, SOLUTION INTRAVENOUS AS NEEDED
Status: DISCONTINUED | OUTPATIENT
Start: 2024-04-09 | End: 2024-04-16 | Stop reason: HOSPADM

## 2024-04-09 RX ORDER — ATORVASTATIN CALCIUM 40 MG/1
80 TABLET, FILM COATED ORAL NIGHTLY
Status: DISCONTINUED | OUTPATIENT
Start: 2024-04-09 | End: 2024-04-10

## 2024-04-09 RX ORDER — ASPIRIN 325 MG
325 TABLET ORAL ONCE
Status: COMPLETED | OUTPATIENT
Start: 2024-04-09 | End: 2024-04-09

## 2024-04-09 RX ORDER — SODIUM CHLORIDE 0.9 % (FLUSH) 0.9 %
10 SYRINGE (ML) INJECTION
Status: COMPLETED | OUTPATIENT
Start: 2024-04-09 | End: 2024-04-09

## 2024-04-09 RX ORDER — SODIUM CHLORIDE 0.9 % (FLUSH) 0.9 %
10 SYRINGE (ML) INJECTION EVERY 12 HOURS SCHEDULED
Status: DISCONTINUED | OUTPATIENT
Start: 2024-04-09 | End: 2024-04-16 | Stop reason: HOSPADM

## 2024-04-09 RX ORDER — PROCHLORPERAZINE EDISYLATE 5 MG/ML
10 INJECTION INTRAMUSCULAR; INTRAVENOUS ONCE
Status: COMPLETED | OUTPATIENT
Start: 2024-04-09 | End: 2024-04-09

## 2024-04-09 RX ORDER — ACETAMINOPHEN 10 MG/ML
1000 INJECTION, SOLUTION INTRAVENOUS ONCE
Status: COMPLETED | OUTPATIENT
Start: 2024-04-09 | End: 2024-04-09

## 2024-04-09 RX ORDER — MORPHINE SULFATE 4 MG/ML
4 INJECTION, SOLUTION INTRAMUSCULAR; INTRAVENOUS ONCE
Status: COMPLETED | OUTPATIENT
Start: 2024-04-09 | End: 2024-04-09

## 2024-04-09 RX ORDER — SODIUM CHLORIDE 0.9 % (FLUSH) 0.9 %
10 SYRINGE (ML) INJECTION AS NEEDED
Status: DISCONTINUED | OUTPATIENT
Start: 2024-04-09 | End: 2024-04-16 | Stop reason: HOSPADM

## 2024-04-09 RX ORDER — ARIPIPRAZOLE 15 MG/1
15 TABLET ORAL DAILY
COMMUNITY
End: 2024-04-16 | Stop reason: HOSPADM

## 2024-04-09 RX ADMIN — PROCHLORPERAZINE EDISYLATE 10 MG: 5 INJECTION INTRAMUSCULAR; INTRAVENOUS at 12:51

## 2024-04-09 RX ADMIN — Medication 10 ML: at 11:47

## 2024-04-09 RX ADMIN — ACETAMINOPHEN 1000 MG: 10 INJECTION INTRAVENOUS at 17:13

## 2024-04-09 RX ADMIN — SODIUM CHLORIDE 800 MG: 9 INJECTION, SOLUTION INTRAVENOUS at 23:47

## 2024-04-09 RX ADMIN — MORPHINE SULFATE 4 MG: 4 INJECTION, SOLUTION INTRAMUSCULAR; INTRAVENOUS at 09:13

## 2024-04-09 RX ADMIN — Medication 22 MG: at 11:48

## 2024-04-09 RX ADMIN — IOPAMIDOL 115 ML: 755 INJECTION, SOLUTION INTRAVENOUS at 11:32

## 2024-04-09 RX ADMIN — LABETALOL HYDROCHLORIDE 20 MG: 5 INJECTION INTRAVENOUS at 11:39

## 2024-04-09 RX ADMIN — Medication 10 ML: at 20:15

## 2024-04-09 RX ADMIN — LABETALOL HYDROCHLORIDE 10 MG: 5 INJECTION INTRAVENOUS at 11:28

## 2024-04-09 RX ADMIN — NITROGLYCERIN 0.4 MG: 0.4 TABLET SUBLINGUAL at 10:54

## 2024-04-09 RX ADMIN — ATORVASTATIN CALCIUM 80 MG: 40 TABLET, FILM COATED ORAL at 20:15

## 2024-04-09 RX ADMIN — MAGNESIUM SULFATE HEPTAHYDRATE 2 G: 2 INJECTION, SOLUTION INTRAVENOUS at 12:51

## 2024-04-09 RX ADMIN — NITROGLYCERIN 0.4 MG: 0.4 TABLET SUBLINGUAL at 10:07

## 2024-04-09 RX ADMIN — Medication 10 ML: at 11:48

## 2024-04-09 RX ADMIN — ASPIRIN 325 MG: 325 TABLET ORAL at 09:13

## 2024-04-09 RX ADMIN — NICARDIPINE HYDROCHLORIDE 5 MG/HR: 0.1 INJECTION INTRAVENOUS at 11:39

## 2024-04-09 NOTE — PLAN OF CARE
Goal Outcome Evaluation:                     Anticipated Discharge Disposition (SLP): unknown, anticipate therapy at next level of care    SLP Diagnosis: mild, cognitive-linguistic disorder, functional speech/language skills (04/09/24 1605)    SLP Swallowing Diagnosis: R/O pharyngeal dysphagia (04/09/24 1605)

## 2024-04-09 NOTE — ED NOTES
[facility]    Nursing Report ED to Floor:  Mental status: a&ox4  Ambulatory status: ambulatory   Oxygen Therapy:  room air   Cardiac Rhythm: NSR  Admitted from: home/ed  Safety Concerns:  neuro checks d/t TNK  Social Issues: n/a  ED Room #:  9    ED Nurse Phone Extension - [phone] or may call [phone].      HPI:   Chief Complaint   Patient presents with    Syncope       Past Medical History:  Past Medical History:   Diagnosis Date    Anxiety and depression     Chest pain     Disease of thyroid gland     reports slightly elevated when in hospital    DVT (deep venous thrombosis)     History of pulmonary embolus (PE)     Hypertension     Migraines     Shortness of breath     with chest pain episodes        Past Surgical History:  Past Surgical History:   Procedure Laterality Date    CARDIAC CATHETERIZATION Left 3/11/2022    Procedure: Left Heart Cath;  Surgeon: Peter Anguiano MD;  Location:  [facility] CATH INVASIVE LOCATION;  Service: Cardiology;  Laterality: Left;  Hold Eliquis 2 days prior to [facility]    ENDOSCOPY N/A 1/12/2022    Procedure: ESOPHAGOGASTRODUODENOSCOPY;  Surgeon: Brunner, Mark I, MD;  Location:  [facility] ENDOSCOPY;  Service: Gastroenterology;  Laterality: N/A;    GASTRIC BYPASS  2007    lap cm en y    IR STENT PLACEMENT Left 10/2021    xin surgical in cath lab placed stent in left leg        Admitting Doctor:   No admitting provider for patient encounter.    Consulting Provider(s):  Consults       No orders found from 3/11/2024 to 4/10/2024.             Admitting Diagnosis:   The encounter diagnosis was Numbness and tingling of left side of face.    Most Recent Vitals:   Vitals:    04/09/24 1310 04/09/24 1315 04/09/24 1322 04/09/24 1330   BP: 133/57 130/64 108/63 134/70   BP Location:       Patient Position:       Pulse: 74 76 76 76   Resp:   16    Temp:       TempSrc:       SpO2: 94% 94% 93% 95%   Weight:       Height:           Active LDAs/IV Access:   Lines, Drains & Airways       Active LDAs        Name Placement date Placement time Site Days    Peripheral IV 04/09/24 0841 Left Antecubital 04/09/24  0841  Antecubital  less than 1    Peripheral IV 04/09/24 1145 Anterior;Distal;Right Forearm 04/09/24  1145  Forearm  less than 1                    Labs (abnormal labs have a star):   Labs Reviewed   COMPREHENSIVE METABOLIC PANEL - Abnormal; Notable for the following components:       Result Value    Glucose 110 (*)     Calcium 8.4 (*)     All other components within normal limits    Narrative:     GFR Normal >60  Chronic Kidney Disease <60  Kidney Failure <15     URINALYSIS W/ MICROSCOPIC IF INDICATED (NO CULTURE) - Abnormal; Notable for the following components:    Blood, UA Trace (*)     Leuk Esterase, UA Trace (*)     All other components within normal limits   URINALYSIS, MICROSCOPIC ONLY - Abnormal; Notable for the following components:    WBC, UA 6-10 (*)     Squamous Epithelial Cells, UA 3-6 (*)     All other components within normal limits   APTT - Abnormal; Notable for the following components:    PTT 27.0 (*)     All other components within normal limits    Narrative:     PTT = The equivalent PTT values for the therapeutic range of heparin levels at 0.3 to 0.5 U/ml are 60 to 70 seconds.   MAGNESIUM - Normal   SINGLE HS TROPONIN T - Normal    Narrative:     High Sensitive Troponin T Reference Range:  <14.0 ng/L- Negative Female for AMI  <22.0 ng/L- Negative Male for AMI  >=14 - Abnormal Female indicating possible myocardial injury.  >=22 - Abnormal Male indicating possible myocardial injury.   Clinicians would have to utilize clinical acumen, EKG, Troponin, and serial changes to determine if it is an Acute Myocardial Infarction or myocardial injury due to an underlying chronic condition.        CBC WITH AUTO DIFFERENTIAL - Normal   BNP (IN-HOUSE) - Normal    Narrative:     This assay is used as an aid in the diagnosis of individuals suspected of having heart failure. It can be used as an aid in the  diagnosis of acute decompensated heart failure (ADHF) in patients presenting with signs and symptoms of ADHF to the emergency department (ED). In addition, NT-proBNP of <300 pg/mL indicates ADHF is not likely.    Age Range Result Interpretation  NT-proBNP Concentration (pg/mL:      <50             Positive            >450                   Gray                 300-450                    Negative             <300    50-75           Positive            >900                  Gray                300-900                  Negative            <300      >75             Positive            >1800                  Gray                300-1800                  Negative            <300   PROTIME-INR - Normal   RAINBOW DRAW    Narrative:     The following orders were created for panel order [place] Draw.  Procedure                               Abnormality         Status                     ---------                               -----------         ------                     Green Top (Gel)[[id number]]                                  Final result               Lavender Top[[id number]]                                     Final result               Gold Top - SST[[id number]]                                   Final result               Cheema Top[[id number]]                                         Final result               Light Blue Top[[id number]]                                   Final result                 Please view results for these tests on the individual orders.   CBC AND DIFFERENTIAL    Narrative:     The following orders were created for panel order CBC & Differential.  Procedure                               Abnormality         Status                     ---------                               -----------         ------                     CBC Auto Differential[[id number]]        Normal              Final result                 Please view results for these tests on the individual orders.   GREEN TOP   LAVENDER TOP   GOLD  TOP - SST   GRAY TOP   LIGHT BLUE TOP       Meds Given in ED:   Medications   sodium chloride 0.9 % flush 10 mL (has no administration in time range)   nitroglycerin (NITROSTAT) SL tablet 0.4 mg (0.4 mg Sublingual Given 4/9/24 1054)   niCARdipine (CARDENE) 20 mg in 200 mL NS infusion (5 mg/hr Intravenous Rate/Dose Change 4/9/24 1323)   Morphine sulfate (PF) injection 4 mg (4 mg Intravenous Given 4/9/24 0913)   aspirin tablet 325 mg (325 mg Oral Given 4/9/24 0913)   sodium chloride 0.9 % flush 10 mL (10 mL Intravenous Given 4/9/24 1147)     Followed by   tenecteplase for stroke (TNKASE) injection 22 mg (22 mg Intravenous Given 4/9/24 1148)     Followed by   sodium chloride 0.9 % flush 10 mL (10 mL Intravenous Given 4/9/24 1148)   labetalol (NORMODYNE,TRANDATE) injection 10 mg (10 mg Intravenous Given 4/9/24 1128)   iopamidol (ISOVUE-370) 76 % injection 150 mL (115 mL Intravenous Given 4/9/24 1132)   labetalol (NORMODYNE,TRANDATE) injection 20 mg (20 mg Intravenous Given 4/9/24 1139)   magnesium sulfate 2g/50 mL (PREMIX) infusion (2 g Intravenous New Bag 4/9/24 1251)   prochlorperazine (COMPAZINE) injection 10 mg (10 mg Intravenous Given 4/9/24 1251)     niCARdipine, 5-15 mg/hr, Last Rate: 5 mg/hr (04/09/24 1323)         Last NIH score:  Interval: baseline  1a. Level of Consciousness: 0-->Alert, keenly responsive  1b. LOC Questions: 0-->Answers both questions correctly  1c. LOC Commands: 0-->Performs both tasks correctly  2. Best Gaze: 0-->Normal  3. Visual: 0-->No visual loss  4. Facial Palsy: 1-->Minor paralysis (flattened nasolabial fold, asymmetry on smiling)  5a. Motor Arm, Left: 1-->Drift, limb holds 90 (or 45) degrees, but drifts down before full 10 seconds, does not hit bed or other support  5b. Motor Arm, Right: 0-->No drift, limb holds 90 (or 45) degrees for full 10 secs  6a. Motor Leg, Left: 1-->Drift, leg falls by the end of the 5-sec period but does not hit bed  6b. Motor Leg, Right: 0-->No drift, leg  holds 30 degree position for full 5 secs  7. Limb Ataxia: 0-->Absent  8. Sensory: 1-->Mild-to-moderate sensory loss, patient feels pinprick is less sharp or is dull on the affected side, or there is a loss of superficial pain with pinprick, but patient is aware of being touched  9. Best Language: 0-->No aphasia, normal  10. Dysarthria: 0-->Normal  11. Extinction and Inattention (formerly Neglect): 0-->No abnormality    Total (NIH Stroke Scale): 4     Dysphagia screening results:  Patient Factors Component (Dysphagia:Stroke or Rule-out)  Best Eye Response: 4-->(E4) spontaneous (04/09/24 1227)  Best Motor Response: 6-->(M6) obeys commands (04/09/24 1227)  Best Verbal Response: 5-->(V5) oriented (04/09/24 1227)  Del Coma Scale Score: 15 (04/09/24 1227)  Is there Facial Asymmetry/Weakness?: Yes (04/09/24 1227)  Is there Tongue Asymmetry/Weakness?: No (04/09/24 1227)  Is there Palatal Asymmetry/Weakness?: No (04/09/24 1227)  Patient Assessment Result: (!) Fail (04/09/24 1227)     Del Coma Scale:  No data recorded     CIWA:        Restraint Type:            Isolation Status:  No active isolations

## 2024-04-09 NOTE — ED PROVIDER NOTES
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  Now she complains of left-sided chest pain that is rather severe, rating to the left arm, complains of nausea and left neck discomfort.  Patient also reports episode of syncope 4 days ago on Friday.  She complains of left-sided headache, which she had the left side of her head, she has been prescribed anticoagulation but is not currently compliant taking this.  Patient is awake, alert, but ill-appearing.    Review of Systems   Constitutional: Negative.    Respiratory:  Positive for chest tightness.    Cardiovascular:  Positive for chest pain and palpitations.   Gastrointestinal:  Positive for nausea.   Genitourinary: Negative.    Musculoskeletal: Negative.    Skin: Negative.    Neurological:  Positive for syncope and headaches.       Past Medical History:   Diagnosis Date    Anxiety and depression     Chest pain     Disease of thyroid gland     reports slightly elevated when in hospital    DVT (deep venous thrombosis)     History of pulmonary embolus (PE)     Hypertension     Migraines     Shortness of breath     with chest pain episodes       Allergies   Allergen Reactions    Hydrocodone Hives       Past Surgical History:   Procedure Laterality Date    CARDIAC CATHETERIZATION Left 3/11/2022    Procedure: Left Heart Cath;  Surgeon: Peter Anguiano MD;  Location:  [facility] CATH INVASIVE LOCATION;  Service: Cardiology;  Laterality: Left;  Hold Eliquis 2 days prior to [facility]    ENDOSCOPY N/A 1/12/2022    Procedure: ESOPHAGOGASTRODUODENOSCOPY;  Surgeon: Brunner, Mark I, MD;  Location:  [facility];  Service: Gastroenterology;  Laterality: N/A;    GASTRIC BYPASS  2007    lap cm en y    IR STENT PLACEMENT  Left 10/2021    xin surgical in cath lab placed stent in left leg       Family History   Problem Relation Age of Onset    Heart attack Mother 50    Cancer Mother     Stroke Father     Breast cancer Maternal Grandmother     No Known Problems Maternal Grandfather     No Known Problems Paternal Grandmother     No Known Problems Paternal Grandfather        Social History     Socioeconomic History    Marital status:    Tobacco Use    Smoking status: Never    Smokeless tobacco: Never   Vaping Use    Vaping status: Never Used   Substance and Sexual Activity    Alcohol use: Yes     Alcohol/week: 4.0 standard drinks of alcohol     Types: 4 Cans of beer per week     Comment: daily    Drug use: Never    Sexual activity: Defer           Objective   Physical Exam  Constitutional:       Appearance: Normal appearance. She is ill-appearing.   HENT:      Head: Normocephalic.   Eyes:      Extraocular Movements: Extraocular movements intact.      Pupils: Pupils are equal, round, and reactive to light.   Cardiovascular:      Rate and Rhythm: Normal rate and regular rhythm.   Pulmonary:      Effort: Pulmonary effort is normal.   Abdominal:      General: Abdomen is flat.      Palpations: Abdomen is soft.   Musculoskeletal:         General: Normal range of motion.      Cervical back: Normal range of motion. No tenderness.   Skin:     General: Skin is warm and dry.      Capillary Refill: Capillary refill takes less than 2 seconds.   Neurological:      General: No focal deficit present.      Mental Status: She is oriented to person, place, and time.         Procedures           ED Course  ED Course as of 04/09/24 1718   Tue Apr 09, 2024   0856 Initially evaluated ED room 9, reporting concerning symptoms. [JH]   0942 Serum chemistry without abnormality, troponin negative, CBC negative. [JH]   0942 CT head without acute abnormality. [JH]   1030 Urinalysis without abnormality.  Plain film chest also without abnormality. [JH]   1108  Reevaluated the patient for the symptoms that had began approximately 10 minutes ago.  Include numbness to the left side of the face, left arm, chest discomfort.  Stroke neurology contacted.   [JH]   1117 Has been made a stroke alert after evaluation by Ale from neurology, she is in transport to CT now. [JH]   1144 Noted patient's coags which were negative. [JH]   1154 Bleed, stroke coordinator, followed up  Multimodal antihypertensives, as well as her TNK, will be admitted to the ICU per protocol.  Patient becoming more hypertensive in the scanner, being given [JH]   1159 Intensivist, Dr. Wilder paged for planned admission. [JH]   1201 Dr. Curran is examining the patient at bedside. [JH]   1203 Noted CT imaging studies per stroke protocol without acute abnormality. [JH]   1211 Dr. Wilder called back, communicated patient's history, presentation workup, and stroke evaluation with TNK administration, patient will be admitted. [JH]   1215 I evaluated the patient personally at approximately 12 PM Eastern time.  She was accompanied at the bedside by her spouse Josephine tariq in the conversation as well.  We talked about the need for ICU admission.  We talked about TNKase and the decision made by the neurology team, who ordered, which patient agrees with.  All questions were answered.  Patient will be admitted to the ICU accordingly. [EMMA]      ED Course User Index  [EMMA] Ace Curran MD  [JH] Finn Meza, RAFFY                                             Medical Decision Making  Given patient's concerning symptoms this morning both last week, my differential diagnosis includes vasovagal episode, cannot exclude acute coronary syndrome, cardiac arrhythmia, intracranial abnormality, electrolyte derangement, urinary tract infection.  Patient will have some screening labs, urinalysis, plain film imaging chest, CT imaging the head, twelve-lead ECG, be given empiric dosing of aspirin, nitroglycerin, morphine  and be reevaluated, with ongoing cardiac telemetry monitoring.  Disposition will be considered including cardiology consultation.  Patient agreeable to plan as explained.    Problems Addressed:  Numbness and tingling of left side of face: complicated acute illness or injury    Amount and/or Complexity of Data Reviewed  Labs: ordered.  Radiology: ordered.  ECG/medicine tests: ordered.    Risk  OTC drugs.  Prescription drug management.  Decision regarding hospitalization.        Final diagnoses:   Numbness and tingling of left side of face       ED Disposition  ED Disposition       ED Disposition   Decision to Admit    Condition   --    Comment   Level of Care: Critical Care [6]   Diagnosis: Suspected cerebrovascular accident (CVA) [[id number]]                 No follow-up provider specified.       Medication List      No changes were made to your prescriptions during this visit.            Finn Meza, APRN  04/09/24 [id number]

## 2024-04-09 NOTE — THERAPY EVALUATION
Acute Care - Speech Language Pathology   Swallow Initial Evaluation [facility]  Clinical Swallow Evaluation  Cognitive-Communication Evaluation       Patient Name: Kamila Garcia  : 1970  MRN: [id number]  Today's Date: 2024               Admit Date: 2024    Visit Dx:     ICD-10-CM ICD-9-CM   1. Numbness and tingling of left side of face  R20.0 782.0    R20.2    2. Cognitive communication deficit  R41.841 799.52     Patient Active Problem List   Diagnosis    Acute pulmonary embolism    Chest pain    H/O LLE DVT & PE (2021)    History of pulmonary embolism    Insomnia    Hypertension    Palpitations    Suspected cerebrovascular accident (CVA)    Hypertensive urgency    Obesity (BMI 30-39.9)    ETOH use    Anxiety and depression     Past Medical History:   Diagnosis Date    Anxiety and depression     Chest pain     Disease of thyroid gland     reports slightly elevated when in hospital    DVT (deep venous thrombosis)     ETOH use 2024    History of pulmonary embolus (PE)     Hypertension     Hypothyroidism 2024    Migraines     Obesity (BMI 30-39.9) 2024    Shortness of breath     with chest pain episodes     Past Surgical History:   Procedure Laterality Date    CARDIAC CATHETERIZATION Left 3/11/2022    Procedure: Left Heart Cath;  Surgeon: Peter Anguiano MD;  Location: [facility] CATH INVASIVE LOCATION;  Service: Cardiology;  Laterality: Left;  Hold Eliquis 2 days prior to [facility]    ENDOSCOPY N/A 2022    Procedure: ESOPHAGOGASTRODUODENOSCOPY;  Surgeon: Brunner, Mark I, MD;  Location: [facility] ENDOSCOPY;  Service: Gastroenterology;  Laterality: N/A;    GASTRIC BYPASS      lap cm en y    IR STENT PLACEMENT Left 10/2021    xin surgical in cath lab placed stent in left leg       SLP Recommendation and Plan  SLP Swallowing Diagnosis: R/O pharyngeal dysphagia (24 1605)  SLP Diet Recommendation: soft to chew textures, whole, thin liquids (24  1605)  Recommended Precautions and Strategies: general aspiration precautions, small bites of food and sips of liquid, check mouth frequently for oral residue/pocketing (04/09/24 1605)  SLP Rec. for Method of Medication Administration: meds whole, with thin liquids, meds crushed, with puree, as tolerated (04/09/24 1605)     Monitor for Signs of Aspiration: yes, notify SLP if any concerns (04/09/24 1605)  Recommended Diagnostics: reassess via clinical swallow evaluation (04/09/24 1605)  Swallow Criteria for Skilled Therapeutic Interventions Met: demonstrates skilled criteria (04/09/24 1605)  Anticipated Discharge Disposition (SLP): unknown, anticipate therapy at next level of care (04/09/24 1605)  Rehab Potential/Prognosis, Swallowing: good, to achieve stated therapy goals (04/09/24 1605)     Predicted Duration Therapy Intervention (Days): until discharge (04/09/24 1605)  Oral Care Recommendations: Oral Care BID/PRN, Toothbrush (04/09/24 1605)                                               SWALLOW EVALUATION (Last 72 Hours)       SLP Adult Swallow Evaluation       Row Name 04/09/24 1605                   Rehab Evaluation    Document Type evaluation  -        Subjective Information no complaints  -        Patient Observations alert;cooperative  -        Patient/Family/Caregiver Comments/Observations Family present  -        Patient Effort good  -        Symptoms Noted During/After Treatment none  -           General Information    Patient Profile Reviewed yes  -        Pertinent History Of Current Problem Adm with c/o numbness/tingling on L side s/p TNK administration. Hx: ETOH use, migraines, HTN, obesity, LLE DVT, PE. Head CT negative for acute intracranial abnormalities, MRI pending  -        Current Method of Nutrition NPO  -        Precautions/Limitations, Vision WFL;for purposes of eval  -        Precautions/Limitations, Hearing WFL;for purposes of eval  -        Prior Level of  Function-Communication [facility]        Prior Level of Function-Swallowing no diet consistency restrictions  -        Plans/Goals Discussed with patient;family;agreed upon  [facility]        Barriers to Rehab none identified  -        Patient's Goals for Discharge patient did not state  -        Family Goals for Discharge family did not state  -           Pain    Additional Documentation Pain Scale: FACES Pre/Post-Treatment (Group)  -           Pain Scale: FACES Pre/Post-Treatment    Pain: FACES Scale, Pretreatment 2-->hurts little bit  -        Posttreatment Pain Rating 2-->hurts little bit  -        Pain Location generalized  -        Pain Location - head  -        Pre/Posttreatment Pain Comment RN aware  -           Oral Motor Structure and Function    Dentition Assessment natural, present and adequate  -        Secretion Management WNL/WFL  [facility]        Mucosal Quality moist, healthy  -           Oral Musculature and Cranial Nerve Assessment    Oral Motor General Assessment [facility]           General Eating/Swallowing Observations    Respiratory Support Currently in Use room air  -        Eating/Swallowing Skills self-fed;fed by SLP  -        Positioning During Eating upright in bed  -        Utensils Used spoon;cup;straw  -        Consistencies Trialed ice chips;thin liquids;pureed;regular textures  -           Clinical Swallow Eval    Oral Prep Phase --  -        Oral Transit --  -        Oral Residue --  -        Pharyngeal Phase no overt signs/symptoms of pharyngeal impairment  -        Clinical Swallow Evaluation Summary No overt s/s of aspiration accross trials of thin liquid, pureed, or regular solid consistencies; even when pushed for consecutive sips of thin. Prolonged manipulation/mastication of regular solid trial, mild residue; pt able to clear w/ liquid wash. Pt w/ difficulty pulling liquid from straw on L, able to pull liquid on R side. Ok to initiate regular diet w/  thin liquids, general aspiration precautions, check for pocketing/residue. Neuro w/u ongoing, SLP will f/u for re-eval to ensure no additonal concerns  -           SLP Evaluation Clinical Impression    SLP Swallowing Diagnosis R/O pharyngeal dysphagia  -        Functional Impact risk of aspiration/pneumonia  -        Rehab Potential/Prognosis, Swallowing good, to achieve stated therapy goals  -        Swallow Criteria for Skilled Therapeutic Interventions Met demonstrates skilled criteria  -           Recommendations    Predicted Duration Therapy Intervention (Days) until discharge  -        SLP Diet Recommendation soft to chew textures;whole;thin liquids  -        Recommended Diagnostics reassess via clinical swallow evaluation  -        Recommended Precautions and Strategies general aspiration precautions;small bites of food and sips of liquid;check mouth frequently for oral residue/pocketing  -        Oral Care Recommendations Oral Care BID/PRN;Toothbrush  -        SLP Rec. for Method of Medication Administration meds whole;with thin liquids;meds crushed;with puree;as tolerated  -        Monitor for Signs of Aspiration yes;notify SLP if any concerns  -        Anticipated Discharge Disposition (SLP) unknown;anticipate therapy at next level of care  -                  User Key  (r) = Recorded By, (t) = Taken By, (c) = Cosigned By      Initials Name Effective Dates     Jo Bang, MS [facility]-SLP 05/12/23 -                     EDUCATION  The patient has been educated in the following areas:   Cognitive Impairment Communication Impairment.        SLP GOALS       Row Name 04/09/24 8930             Patient will demonstrate functional cognitive-linguistic skills for return to discharge environment    [place] Independently  -      Time frame by discharge  -      Progress/Outcomes new goal  -         SLP Diagnostic Treatment     Patient will participate in further assessment in the  following areas cognitive-linguistic;higher-level cognitive-linguistic;reading comprehension;graphic expression  -      Time Frame (Diagnostic) short term goal (STG)  -MH      Progress/Outcomes (Additional Goal 1, SLP) new goal  -         Memory Skills Goal 1 (SLP)    Improve Memory Skills Through Goal 1 (SLP) recalling related word lists immediately;recalling unrelated word lists immediately;80%;with minimal cues (75-90%)  -      Time Frame (Memory Skills Goal 1, SLP) short term goal (STG)  -      Progress/Outcomes (Memory Skills Goal 1, SLP) new goal  -                User Key  (r) = Recorded By, (t) = Taken By, (c) = Cosigned By      Initials Name Provider Type    Jo Santos MS CCC-SLP Speech and Language Pathologist                       Time Calculation:    Time Calculation- SLP       Row Name 24 1654             Time Calculation- SLP    SLP Start Time 1605  -      SLP Received On 24  -         Untimed Charges    [id number] Eval Speech and Production w/ Language Minutes 30  -      [id number] Eval Oral Pharyng Swallow Minutes 24  -MH         Total Minutes    Untimed Charges Total Minutes 54  -MH       Total Minutes 54  -MH                User Key  (r) = Recorded By, (t) = Taken By, (c) = Cosigned By      Initials Name Provider Type     Jo Bang MS CCC-SLP Speech and Language Pathologist                    Therapy Charges for Today       Code Description Service Date Service Provider Modifiers Qty    09963489807 HC ST EVAL ORAL PHARYNG SWALLOW 2 2024 Jo Bang MS CCC-SLP GN 1    89098982584 HC ST EVAL SPEECH AND PROD W LANG  2 2024 Jo Bang MS CCC-SLP GN 1                 Jo Bang MS CCC-SLP  2024   and Acute Care - Speech Language Pathology Initial Evaluation   [place]     Patient Name: Kamila Garcia  : 1970  MRN: [id number]  Today's Date: 2024               Admit Date: 2024     Visit Dx:     ICD-10-CM ICD-9-CM   1. Numbness and tingling of left side of face  R20.0 782.0    R20.2    2. Cognitive communication deficit  R41.841 799.52     Patient Active Problem List   Diagnosis    Acute pulmonary embolism    Chest pain    H/O LLE DVT & PE (July 2021)    History of pulmonary embolism    Insomnia    Hypertension    Palpitations    Suspected cerebrovascular accident (CVA)    Hypertensive urgency    Obesity (BMI 30-39.9)    ETOH use    Anxiety and depression     Past Medical History:   Diagnosis Date    Anxiety and depression     Chest pain     Disease of thyroid gland     reports slightly elevated when in hospital    DVT (deep venous thrombosis)     ETOH use 04/09/2024    History of pulmonary embolus (PE)     Hypertension     Hypothyroidism 04/09/2024    Migraines     Obesity (BMI 30-39.9) 04/09/2024    Shortness of breath     with chest pain episodes     Past Surgical History:   Procedure Laterality Date    CARDIAC CATHETERIZATION Left 3/11/2022    Procedure: Left Heart Cath;  Surgeon: Peter Anguiano MD;  Location:  [facility] CATH INVASIVE LOCATION;  Service: Cardiology;  Laterality: Left;  Hold Eliquis 2 days prior to [facility]    ENDOSCOPY N/A 1/12/2022    Procedure: ESOPHAGOGASTRODUODENOSCOPY;  Surgeon: Brunner, Mark I, MD;  Location:  [facility] ENDOSCOPY;  Service: Gastroenterology;  Laterality: N/A;    GASTRIC BYPASS  2007    lap cm en y    IR STENT PLACEMENT Left 10/2021    xin surgical in cath lab placed stent in left leg       SLP Recommendation and Plan  SLP Diagnosis: mild, cognitive-linguistic disorder, functional speech/language skills (04/09/24 1605)  SLP Diagnosis Comments: Pt presents w/ mild cognitive linguistic deficits per this eval. Speech and language @ simple level appears grossly functional. Pt reporting continued headache, RN aware. SLP will f/u for cog tx & dx tx (04/09/24 1605)        Swallow Criteria for Skilled Therapeutic Interventions Met: demonstrates skilled criteria (04/09/24  1605)  SLC Criteria for Skilled Therapy Interventions Met: yes (04/09/24 1605)  Anticipated Discharge Disposition (SLP): unknown, anticipate therapy at next level of care (04/09/24 1605)        Therapy Frequency (SLP SLC): 3 days per week (04/09/24 1605)  Predicted Duration Therapy Intervention (Days): until discharge (04/09/24 1605)  Oral Care Recommendations: Oral Care BID/PRN, Toothbrush (04/09/24 1605)                                 SLP EVALUATION (Last 72 Hours)       SLP SLC Evaluation       Row Name 04/09/24 1605                   General Information    Prior Level of Function-Communication WF  -        Patient's Goals for Discharge patient did not state  -        Family Goals for Discharge family did not state  -           Comprehension Assessment/Intervention    Comprehension Assessment/Intervention Auditory Comprehension  -           Auditory Comprehension Assessment/Intervention    Auditory Comprehension (Communication) WFL  -           Expression Assessment/Intervention    Expression Assessment/Intervention verbal expression  -           Verbal Expression Assessment/Intervention    Verbal Expression [facility]  -           Motor Speech Assessment/Intervention    Motor Speech Function [facility]  -           Cognitive Assessment Intervention- [facility]    Orientation Status (Cognition) awareness of basic personal information;person;place;time;situation;[facility]        Memory (Cognitive) simple;immediate;mild impairment  -        Attention (Cognitive) selective;sustained;[facility]        Thought Organization (Cognitive) concrete divergent;concrete convergent;[facility]        Reasoning (Cognitive) simple;[facility]        Problem Solving (Cognitive) simple;temporal;[facility]  -           SLP Evaluation Clinical Impressions    SLP Diagnosis mild;cognitive-linguistic disorder;functional speech/language skills  -        SLP Diagnosis Comments Pt presents w/ mild cognitive linguistic deficits per this eval. Speech  and language @ simple level appears grossly functional. Pt reporting continued headache, RN aware. SLP will f/u for cog tx & dx tx  -MH        Rehab Potential/Prognosis good  -        SLC Criteria for Skilled Therapy Interventions Met yes  -        Functional Impact functional impact in ADLs;difficulty in expressing complex messages  -           Recommendations    Therapy Frequency (SLP SLC) 3 days per week  -                  User Key  (r) = Recorded By, (t) = Taken By, (c) = Cosigned By      Initials Name Effective Dates    Jo Santos MS CCC-SLP 05/12/23 -                        EDUCATION  The patient has been educated in the following areas:     Dysphagia (Swallowing Impairment) Modified Diet Instruction.           SLP GOALS       Row Name 04/09/24 1605             Patient will demonstrate functional cognitive-linguistic skills for return to discharge environment    [place] Independently  -      Time frame by discharge  -      Progress/Outcomes new goal  -         SLP Diagnostic Treatment     Patient will participate in further assessment in the following areas cognitive-linguistic;higher-level cognitive-linguistic;reading comprehension;graphic expression  -      Time Frame (Diagnostic) short term goal (STG)  -      Progress/Outcomes (Additional Goal 1, SLP) new goal  -         Memory Skills Goal 1 (SLP)    Improve Memory Skills Through Goal 1 (SLP) recalling related word lists immediately;recalling unrelated word lists immediately;80%;with minimal cues (75-90%)  -      Time Frame (Memory Skills Goal 1, SLP) short term goal (STG)  -      Progress/Outcomes (Memory Skills Goal 1, SLP) new goal  -                User Key  (r) = Recorded By, (t) = Taken By, (c) = Cosigned By      Initials Name Provider Type    Jo Santos MS CCC-SLP Speech and Language Pathologist                            Time Calculation:      Time Calculation- SLP       Row Name 04/09/24 8268              Time Calculation- SLP    SLP Start Time 1605  -      SLP Received On 04/09/24  -         Untimed Charges    [id number] Eval Speech and Production w/ Language Minutes 30  -      [id number] Eval Oral Pharyng Swallow Minutes 24  -         Total Minutes    Untimed Charges Total Minutes 54  -       Total Minutes 54  -MH                User Key  (r) = Recorded By, (t) = Taken By, (c) = Cosigned By      Initials Name Provider Type     Jo Bang, MS CCC-SLP Speech and Language Pathologist                    Therapy Charges for Today       Code Description Service Date Service Provider Modifiers Qty    16802595325 HC ST EVAL ORAL PHARYNG SWALLOW 2 4/9/2024 Jo Bang, MS CCC-SLP GN 1    49608194885 HC ST EVAL SPEECH AND PROD W LANG  2 4/9/2024 Jo Bang, MS MARIANO-SLP GN 1                       MS ANTWAN Shaikh  4/9/2024

## 2024-04-09 NOTE — H&P
ICU ADMISSION NOTE    Chief complaint     Left-sided weakness   syncope      Subjective     Kamila Garcia is a 53 y.o. female who presents to [facility] ED 04/09/24 for evaluation of possible CVA.     Patient has a PMH of ETOH use (4 beers/day), migraines, HTN, obesity, LLE DVT & PE (July 2021, supposed to be on Eliquis however patient reports she stopped taking ~1 month ago), and anxiety & depression.     Patient works as a phlebotomist at [employer] and was at work this morning when she experienced a syncopal event, prompting co-workers to call EMS. She regained consciousness en route to [facility] ED where upon arrival developed numbness & tingling to the left side of her face and left upper extremity and a code stroke was called. Additionally noted to be significantly hypertensive ('s) received IV Labetalol and was placed on Cardene infusion.     She was evaluated by Neurology in the ED with initial NIHSS 4 and CTH negative for an acute intracranial abnormality. Subsequent CTA H/N was negative for flow-limiting stenosis and CTP was negative for LVO. She was ultimately deemed a candidate for thrombolytic therapy with TNKase administered at 1148. She will be admitted to ICU for further evaluation and management.    Time spent: 8 minutes   Electronically signed by Lissett Auguste DNP, RAFFY, 04/09/24, 1:41 PM EDT.     She is currently post TNKase.  She continues to have some numbness on her left face.    Review of Systems  Review of Systems   Constitutional:  Positive for fatigue.   HENT:  Negative for congestion and trouble swallowing.    Eyes:  Negative for visual disturbance.   Respiratory:  Negative for cough and shortness of breath.    Cardiovascular:  Negative for chest pain.   Gastrointestinal:  Negative for abdominal pain.   Endocrine: Negative.    Genitourinary:  Negative for dysuria.   Musculoskeletal:  Negative for arthralgias.   Skin:  Negative for rash.   Allergic/Immunologic: Negative for  environmental allergies.   Neurological:  Positive for dizziness, facial asymmetry, weakness and numbness.   Hematological:  Negative for adenopathy.   Psychiatric/Behavioral:  Positive for dysphoric mood. The patient is nervous/anxious.         Home Medications  (Not in a hospital admission)    Prior to Admission medications    Medication Sig Start Date End Date Taking? Authorizing Provider   apixaban (ELIQUIS) 5 MG tablet tablet Start On 7/8: Take 1 tablet by mouth Every 12 (Twelve) Hours. 7/8/21   Yazmin Whitley,    eszopiclone (LUNESTA) 3 MG tablet Take 3 mg by mouth Every Night. Take immediately before bedtime    Emergency, Nurse ELYSE Israel   isosorbide mononitrate (IMDUR) 30 MG 24 hr tablet Take 1 tablet by mouth Daily. 3/2/22   Brooks Alexis MD   lisinopril (PRINIVIL,ZESTRIL) 10 MG tablet Take 10 mg by mouth Daily.    Provider, MD Irma   nitroglycerin (NITROSTAT) 0.4 MG SL tablet Place 1 tablet under the tongue Every 5 (Five) Minutes As Needed for Chest Pain. Take no more than 3 doses in 15 minutes. 1/12/22   Vanessa Bang MD   OLANZapine (zyPREXA) 10 MG tablet Take 10 mg by mouth Every Night.    Emergency, Nurse ELYSE Israel   ondansetron (ZOFRAN) 4 MG tablet Take 1 tablet by mouth Every 6 (Six) Hours As Needed for Nausea or Vomiting. 1/12/22   Vanessa Bang MD   pantoprazole (PROTONIX) 40 MG EC tablet Take 1 tablet by mouth 2 (Two) Times a Day Before Meals. 1/12/22   Vanessa Bang MD   Patient verbally told me that the only medication she takes are Lunesta 3 mg at night and Zyprexa 10 mg at night for sleep    History  Past Medical History:   Diagnosis Date    Anxiety and depression     Chest pain     Disease of thyroid gland     reports slightly elevated when in hospital    DVT (deep venous thrombosis)     ETOH use 04/09/2024    History of pulmonary embolus (PE)     Hypertension     Hypothyroidism 04/09/2024    Migraines     Obesity (BMI 30-39.9) 04/09/2024     "Shortness of breath     with chest pain episodes     Past Surgical History:   Procedure Laterality Date    CARDIAC CATHETERIZATION Left 3/11/2022    Procedure: Left Heart Cath;  Surgeon: Peter Anguiano MD;  Location:  [facility];  Service: Cardiology;  Laterality: Left;  Hold Eliquis 2 days prior to [facility]    ENDOSCOPY N/A 1/12/2022    Procedure: ESOPHAGOGASTRODUODENOSCOPY;  Surgeon: Brunner, Mark I, MD;  Location:  [facility];  Service: Gastroenterology;  Laterality: N/A;    GASTRIC BYPASS  2007    lap cm en y    IR STENT PLACEMENT Left 10/2021    [facility] in cath lab placed stent in left leg     Family History   Problem Relation Age of Onset    Heart attack Mother 50    Cancer Mother     Stroke Father     Breast cancer Maternal Grandmother     No Known Problems Maternal Grandfather     No Known Problems Paternal Grandmother     No Known Problems Paternal Grandfather      Social History     Tobacco Use    Smoking status: Never    Smokeless tobacco: Never   Vaping Use    Vaping status: Never Used   Substance Use Topics    Alcohol use: Yes     Alcohol/week: 4.0 standard drinks of alcohol     Types: 4 Cans of beer per week     Comment: daily    Drug use: Never     (Not in a hospital admission)    Allergies:  Hydrocodone      Objective     Vital Signs  Blood pressure 137/67, pulse 73, temperature 98.3 °F (36.8 °C), temperature source Oral, resp. rate 16, height 167.6 cm (65.98\"), weight 89.4 kg (197 lb), SpO2 95%.    Physical Exam:  General Appearance:  Overweight middle-aged woman in no distress   Head:  No visible trauma   Eyes:          Conjunctiva pink   Ears:     Throat: Oral mucosa moist   Neck: Trachea midline, supple   Back:      Lungs:   Symmetric chest expansion without wheeze or rhonchi    Heart:  Regular rhythm, S1, S2 auscultated, systolic murmur   Abdomen:   Bowel sounds present, soft   Rectal:     Deferred   Extremities:    No pitting edema or cyanosis   Pulses: Palpable " radial pulses   Skin: Warm and dry without rash   Lymph nodes: No cervical adenopathy   Neurologic: Oriented x 3, speech fluent, face symmetric, tongue midline, I do not appreciate motor drift upper or lower extremities       Results Review:   Lab Results (last 24 hours)       Procedure Component Value Units Date/Time    Wilkinson Draw [[id number]] Collected: 04/09/24 0842    Specimen: Blood Updated: 04/09/24 1245    Narrative:      The following orders were created for panel order Wilkinson Draw.  Procedure                               Abnormality         Status                     ---------                               -----------         ------                     Green Top (Gel)[[id number]]                                  Final result               Lavender Top[[id number]]                                     Final result               Gold Top - SST[[id number]]                                   Final result               Cheema Top[[id number]]                                         Final result               Light Blue Top[[id number]]                                   Final result                 Please view results for these tests on the individual orders.    Gray Top [[id number]] Collected: 04/09/24 0842    Specimen: Blood Updated: 04/09/24 1245     Extra Tube Hold for add-ons.     Comment: Auto resulted.       aPTT [[id number]]  (Abnormal) Collected: 04/09/24 0842    Specimen: Blood Updated: 04/09/24 1131     PTT 27.0 seconds     Narrative:      PTT = The equivalent PTT values for the therapeutic range of heparin levels at 0.3 to 0.5 U/ml are 60 to 70 seconds.    Protime-INR [[id number]]  (Normal) Collected: 04/09/24 0842    Specimen: Blood Updated: 04/09/24 1131     Protime 13.9 Seconds      INR 1.06    BNP [[id number]]  (Normal) Collected: 04/09/24 0842    Specimen: Blood Updated: 04/09/24 1046     proBNP 273.0 pg/mL     Narrative:      This assay is used as an aid in the diagnosis of individuals suspected of having  heart failure. It can be used as an aid in the diagnosis of acute decompensated heart failure (ADHF) in patients presenting with signs and symptoms of ADHF to the emergency department (ED). In addition, NT-proBNP of <300 pg/mL indicates ADHF is not likely.    Age Range Result Interpretation  NT-proBNP Concentration (pg/mL:      <50             Positive            >450                   Gray                 300-450                    Negative             <300    50-75           Positive            >900                  Gray                300-900                  Negative            <300      >75             Positive            >1800                  Gray                300-1800                  Negative            <300    Urinalysis, Microscopic Only - Urine, Clean Catch [[id number]]  (Abnormal) Collected: 04/09/24 1002    Specimen: Urine, Clean Catch Updated: 04/09/24 1040     RBC, UA 0-2 /HPF      WBC, UA 6-10 /HPF      Bacteria, UA Trace /HPF      Squamous Epithelial Cells, UA 3-6 /HPF      Hyaline Casts, UA 0-6 /LPF      Methodology Automated Microscopy    Urinalysis With Microscopic If Indicated (No Culture) - Urine, Clean Catch [[id number]]  (Abnormal) Collected: 04/09/24 1002    Specimen: Urine, Clean Catch Updated: 04/09/24 1040     Color, UA Yellow     Appearance, UA Clear     pH, UA 5.5     Specific Gravity, UA 1.012     Glucose, UA Negative     Ketones, UA Negative     Bilirubin, UA Negative     Blood, UA Trace     Protein, UA Negative     Leuk Esterase, UA Trace     Nitrite, UA Negative     Urobilinogen, UA 0.2 E.U./dL    Green Top (Gel) [[id number]] Collected: 04/09/24 0842    Specimen: Blood Updated: 04/09/24 0945     Extra Tube Hold for add-ons.     Comment: Auto resulted.       Lavender Top [[id number]] Collected: 04/09/24 0842    Specimen: Blood Updated: 04/09/24 0945     Extra Tube hold for add-on     Comment: Auto resulted       Gold Top - SST [[id number]] Collected: 04/09/24 0842    Specimen: Blood  Updated: 04/09/24 0945     Extra Tube Hold for add-ons.     Comment: Auto resulted.       Light Blue Top [[id number]] Collected: 04/09/24 0842    Specimen: Blood Updated: 04/09/24 0945     Extra Tube Hold for add-ons.     Comment: Auto resulted       Comprehensive Metabolic Panel [[id number]]  (Abnormal) Collected: 04/09/24 0842    Specimen: Blood Updated: 04/09/24 0916     Glucose 110 mg/dL      BUN 12 mg/dL      Creatinine 0.80 mg/dL      Sodium 141 mmol/L      Potassium 4.2 mmol/L      Comment: Slight hemolysis detected by analyzer. Result may be falsely elevated.        Chloride 106 mmol/L      CO2 24.0 mmol/L      Calcium 8.4 mg/dL      Total Protein 7.3 g/dL      Albumin 4.2 g/dL      ALT (SGPT) 8 U/L      AST (SGOT) 23 U/L      Alkaline Phosphatase 91 U/L      Total Bilirubin 0.6 mg/dL      Globulin 3.1 gm/dL      Comment: Calculated Result        A/G Ratio 1.4 g/dL      BUN/Creatinine Ratio 15.0     Anion Gap 11.0 mmol/L      eGFR 87.1 mL/min/1.73     Narrative:      GFR Normal >60  Chronic Kidney Disease <60  Kidney Failure <15      Magnesium [[id number]]  (Normal) Collected: 04/09/24 0842    Specimen: Blood Updated: 04/09/24 0916     Magnesium 2.0 mg/dL     Single High Sensitivity Troponin T [[id number]]  (Normal) Collected: 04/09/24 0842    Specimen: Blood Updated: 04/09/24 0914     HS Troponin T 9 ng/L     Narrative:      High Sensitive Troponin T Reference Range:  <14.0 ng/L- Negative Female for AMI  <22.0 ng/L- Negative Male for AMI  >=14 - Abnormal Female indicating possible myocardial injury.  >=22 - Abnormal Male indicating possible myocardial injury.   Clinicians would have to utilize clinical acumen, EKG, Troponin, and serial changes to determine if it is an Acute Myocardial Infarction or myocardial injury due to an underlying chronic condition.         CBC & Differential [[id number]]  (Normal) Collected: 04/09/24 0842    Specimen: Blood Updated: 04/09/24 0849    Narrative:      The following orders  were created for panel order CBC & Differential.  Procedure                               Abnormality         Status                     ---------                               -----------         ------                     CBC Auto Differential[[id number]]        Normal              Final result                 Please view results for these tests on the individual orders.    CBC Auto Differential [[id number]]  (Normal) Collected: 04/09/24 0842    Specimen: Blood Updated: 04/09/24 0849     WBC 4.44 10*3/mm3      RBC 4.74 10*6/mm3      Hemoglobin 13.0 g/dL      Hematocrit 40.8 %      MCV 86.1 fL      MCH 27.4 pg      MCHC 31.9 g/dL      RDW 13.2 %      RDW-SD 41.5 fl      MPV 9.4 fL      Platelets 277 10*3/mm3      Neutrophil % 54.6 %      Lymphocyte % 29.3 %      Monocyte % 10.1 %      Eosinophil % 4.7 %      Basophil % 1.1 %      Immature Grans % 0.2 %      Neutrophils, Absolute 2.42 10*3/mm3      Lymphocytes, Absolute 1.30 10*3/mm3      Monocytes, Absolute 0.45 10*3/mm3      Eosinophils, Absolute 0.21 10*3/mm3      Basophils, Absolute 0.05 10*3/mm3      Immature Grans, Absolute 0.01 10*3/mm3      nRBC 0.0 /100 WBC           Imaging Results (Last 24 Hours)       Procedure Component Value Units Date/Time    CT CEREBRAL PERFUSION WITH & WITHOUT CONTRAST [[id number]] Collected: 04/09/24 1142     Updated: 04/09/24 1150    Narrative:      CT CEREBRAL PERFUSION W WO CONTRAST, CT ANGIOGRAM HEAD W AI ANALYSIS OF LVO, CT ANGIOGRAM NECK    Date of Exam: 4/9/2024 11:21 AM EDT    Indication: Neuro deficit, acute, stroke suspected.     Comparison: None available.    Technique: Axial CT images of the brain were obtained prior to and after the administration of . Core blood volume, core blood flow, mean transit time, and Tmax images were obtained utilizing the Rapid software protocol. A limited CT angiogram of the   head was also performed to measure the blood vessel density.    The radiation dose reduction device was turned on for  each scan per the ALARA (As Low as Reasonably Achievable) protocol.    FINDINGS:    Vascular Findings:    The right common carotid, internal carotid, middle cerebral, anterior cerebral, vertebral, and posterior cerebral arteries are patent without abrupt cut off or aneurysmal dilation.    The left common carotid, internal carotid, middle cerebral, anterior cerebral, vertebral, and posterior cerebral arteries are patent without abrupt cut off or aneurysmal dilation.    Basilar artery appears patent and appears unremarkable.    Non-vascular Findings:    For description of nonvascular intracranial findings, please refer to the noncontrast head CT performed the same date.    No acute abnormality is identified within the visualized soft tissue or bony structures of the neck.    The visualized lung apices are clear.    CT Perfusion:  CBF (<30%) volume: 0 mL  Tmax (>6.0s) volume: 0 mL  Mismatch volume: 0 mL  Mismatch ratio: None          Impression:      1.No acute abnormality identified within the large arteries of the head or neck.  2.Significant stenosis of the bilateral internal carotid arteries.  3.CT perfusion study demonstrates no territorial ischemia or core infarct.        Electronically Signed: Jose Alfredo Tejeda MD    4/9/2024 11:47 AM EDT    Workstation ID: [id number]    CT Angiogram Head w AI Analysis of LVO [[id number]] Collected: 04/09/24 1142     Updated: 04/09/24 1150    Narrative:      CT CEREBRAL PERFUSION W WO CONTRAST, CT ANGIOGRAM HEAD W AI ANALYSIS OF LVO, CT ANGIOGRAM NECK    Date of Exam: 4/9/2024 11:21 AM EDT    Indication: Neuro deficit, acute, stroke suspected.     Comparison: None available.    Technique: Axial CT images of the brain were obtained prior to and after the administration of . Core blood volume, core blood flow, mean transit time, and Tmax images were obtained utilizing the Rapid software protocol. A limited CT angiogram of the   head was also performed to measure the blood vessel  density.    The radiation dose reduction device was turned on for each scan per the ALARA (As Low as Reasonably Achievable) protocol.    FINDINGS:    Vascular Findings:    The right common carotid, internal carotid, middle cerebral, anterior cerebral, vertebral, and posterior cerebral arteries are patent without abrupt cut off or aneurysmal dilation.    The left common carotid, internal carotid, middle cerebral, anterior cerebral, vertebral, and posterior cerebral arteries are patent without abrupt cut off or aneurysmal dilation.    Basilar artery appears patent and appears unremarkable.    Non-vascular Findings:    For description of nonvascular intracranial findings, please refer to the noncontrast head CT performed the same date.    No acute abnormality is identified within the visualized soft tissue or bony structures of the neck.    The visualized lung apices are clear.    CT Perfusion:  CBF (<30%) volume: 0 mL  Tmax (>6.0s) volume: 0 mL  Mismatch volume: 0 mL  Mismatch ratio: None          Impression:      1.No acute abnormality identified within the large arteries of the head or neck.  2.Significant stenosis of the bilateral internal carotid arteries.  3.CT perfusion study demonstrates no territorial ischemia or core infarct.        Electronically Signed: Jose Alfredo Tejeda MD    4/9/2024 11:47 AM EDT    Workstation ID: [id number]    CT Angiogram Neck [[id number]] Collected: 04/09/24 1142     Updated: 04/09/24 1150    Narrative:      CT CEREBRAL PERFUSION W WO CONTRAST, CT ANGIOGRAM HEAD W AI ANALYSIS OF LVO, CT ANGIOGRAM NECK    Date of Exam: 4/9/2024 11:21 AM EDT    Indication: Neuro deficit, acute, stroke suspected.     Comparison: None available.    Technique: Axial CT images of the brain were obtained prior to and after the administration of . Core blood volume, core blood flow, mean transit time, and Tmax images were obtained utilizing the Rapid software protocol. A limited CT angiogram of the   head was also  performed to measure the blood vessel density.    The radiation dose reduction device was turned on for each scan per the ALARA (As Low as Reasonably Achievable) protocol.    FINDINGS:    Vascular Findings:    The right common carotid, internal carotid, middle cerebral, anterior cerebral, vertebral, and posterior cerebral arteries are patent without abrupt cut off or aneurysmal dilation.    The left common carotid, internal carotid, middle cerebral, anterior cerebral, vertebral, and posterior cerebral arteries are patent without abrupt cut off or aneurysmal dilation.    Basilar artery appears patent and appears unremarkable.    Non-vascular Findings:    For description of nonvascular intracranial findings, please refer to the noncontrast head CT performed the same date.    No acute abnormality is identified within the visualized soft tissue or bony structures of the neck.    The visualized lung apices are clear.    CT Perfusion:  CBF (<30%) volume: 0 mL  Tmax (>6.0s) volume: 0 mL  Mismatch volume: 0 mL  Mismatch ratio: None          Impression:      1.No acute abnormality identified within the large arteries of the head or neck.  2.Significant stenosis of the bilateral internal carotid arteries.  3.CT perfusion study demonstrates no territorial ischemia or core infarct.        Electronically Signed: Jose Alfredo Tejeda MD    4/9/2024 11:47 AM EDT    Workstation ID: [id number]    XR Chest 1 View [[id number]] Collected: 04/09/24 1006     Updated: 04/09/24 1010    Narrative:      XR CHEST 1 VW    Date of Exam: 4/9/2024 9:50 AM EDT    Indication: Chest pain, syncope    Comparison: 4/5/2024    Findings:  Cardiomediastinal silhouette is unremarkable.  No airspace disease, pneumothorax, nor pleural effusion. No acute osseous abnormality identified.      Impression:      Impression:  No acute process identified      Electronically Signed: Denis Nelson MD    4/9/2024 10:07 AM EDT    Workstation ID: [id number]    CT Head Without  Contrast [[id number]] Collected: 04/09/24 0925     Updated: 04/09/24 0930    Narrative:      CT HEAD WO CONTRAST    Date of Exam: 4/9/2024 9:15 AM EDT    Indication: Head trauma, minor, normal mental status (Age 18-64y)  Headache, new or worsening (Age >= 50y)  Syncope/presyncope, cerebrovascular cause suspected  Syncope x 2, left parietal head injury, HA.    Comparison:4/5/24    Technique: Axial CT images were obtained of the head without contrast administration.  Automated exposure control and iterative construction methods were used.      FINDINGS:    The brain parenchyma appears unremarkable in volume and morphology.  No significant mass effect, midline shift, intracranial hemorrhage, or hydrocephalus is identified. No extra-axial fluid collection is identified.   The calvarium and overlying soft   tissues are unremarkable. The paranasal sinuses and bilateral mastoid air cells are adequately aerated. The visualized bony orbits, globes, and retrobulbar soft tissues are unremarkable.      Impression:      1.No acute intracranial abnormality is identified.      Electronically Signed: Jose Alfredo Tejeda MD    4/9/2024 9:27 AM EDT    Workstation ID: [id number]             PROBLEM LIST    Left-sided weakness  History of DVT, 2021  Left heart catheterization, 2022 normal coronary arteries    Assessment & Plan     53-year-old woman, non-smoker with previous Joelle-en-Y gastric bypass, presenting with syncope, left-sided weakness and numbness who received TNKase.  Imaging was negative for perfusion defect or bleed.  I saw her post TNKase and did not appreciate any weakness but she was still complaining of numbness of her left face.  This may have been a transient ischemic attack or a small stroke.  She has had 2 episodes of syncope 1 on Friday and 1 today.  Left heart catheterization was normal in 2022 and echo at that time revealed no valvular disease.  I do hear a systolic murmur.    Ischemic stroke protocol  EEG  Monitor for  signs of bleeding  Rocephin daily x 3 for possible UTI  PT, OT, speech therapy    I discussed the patients findings and my recommendations with patient    Natacha Wilder MD  Pulmonary and Critical Care    Time: 40min    Please note that portions of this note were completed with a voice recognition program.

## 2024-04-09 NOTE — CONSULTS
"Stroke Consult Note    Patient Name: Kamila Garcia   MRN: [id number]  Age: 53 y.o.  Sex: female  : 1970    Primary Care Physician: Latricia Mishra APRN  Referring Physician:  Finn AKBAR    TIME STROKE TEAM CALLED: 1108 EST     TIME PATIENT SEEN: 1112 EST    Handedness: Right  Race:       Chief Complaint/Reason for Consultation: Left-sided weakness and numbness    HPI: Ms. Garcia is a 53 year old female with known medical diagnoses of essential hypertension, migraines (in EMR however patient denies), anxiety/depression, obesity, daily EtOH use (reports 4 beers daily), and remote LLE DVT and PE (approximately 1 year ago, on Xarelto until 1 month ago) who presented to the emergency department today after experiencing a syncopal episode while at work in approximately 0830.  The patient tells me that \"the world began to spin\" and then she felt lightheaded prior to passing out.  She reports that she remembers everything prior to passing out and then remembers waking up in the ambulance.  At approximately 1100, while in the emergency department, the patient began to experience tingling/numbness in the left side of her face and upper extremity prompting the APRN to contact the stroke team for further evaluation and recommendations.    The patient tells me she had a similar episode which occurred this past Friday, 2024, where she had chest pain and palpitations, prior to her syncopal episode.  She does feel that she hit her head this morning and tells me that she has mild left neck pain as well as a left-sided headache which she rates 7/10 with associated nausea but no vomiting.  She has worn a Holter monitor in the past however has never been diagnosed with atrial fibrillation.  She reports when she had her DVT/PE last year that they thought it was secondary to immobility after spraining her ankle.  She has never undergone a hypercoagulable workup, had blood clots prior to this, or " experienced TIA/CVA symptoms.      Shortly after arrival to the emergency department the patient underwent a CT of the head without contrast secondary to possible trauma with headache which was negative for hemorrhage in/or acute process.  She was taken back to the CT scanner after experiencing symptoms at 1100 for vascular imaging.  At this time I discussed my recommendation for IV thrombolytic therapy, discussed the risks/benefits with the patient, and completed the IV thrombolytic checklist.  There was a slight delay in TNK administration given patient's elevated blood pressure; initially 211/95 on my assessment.  The patient did receive IV labetalol and was started on a nicardipine drip in the CT scanner.  Upon returning to the ED room her blood pressure was still 206/90 therefore she was given an additional 20 mg of IV labetalol.  This resulted in a blood pressure of 142/97 at 1146 and TNK was administered at 1147.    Last Known Normal Date/Time: 1100 EST     Review of Systems   Constitutional:  Negative for chills, fatigue and fever.   HENT:  Negative for trouble swallowing.    Eyes:  Positive for visual disturbance (blurry vision).   Respiratory:  Positive for chest tightness. Negative for cough and shortness of breath.    Cardiovascular:  Positive for chest pain and palpitations.   Gastrointestinal:  Positive for nausea. Negative for diarrhea and vomiting.   Genitourinary: Negative.  Negative for hematuria.   Musculoskeletal: Negative.  Negative for gait problem.   Skin: Negative.    Neurological:  Positive for dizziness, syncope, weakness, light-headedness, numbness and headaches. Negative for seizures and speech difficulty.   Hematological: Negative.    Psychiatric/Behavioral:  The patient is nervous/anxious.       Past Medical History:   Diagnosis Date    Anxiety and depression     Chest pain     Disease of thyroid gland     reports slightly elevated when in hospital    DVT (deep venous thrombosis)      History of pulmonary embolus (PE)     Hypertension     Migraines     Shortness of breath     with chest pain episodes     Past Surgical History:   Procedure Laterality Date    CARDIAC CATHETERIZATION Left 3/11/2022    Procedure: Left Heart Cath;  Surgeon: Peter Anguiano MD;  Location:  [facility];  Service: Cardiology;  Laterality: Left;  Hold Eliquis 2 days prior to [facility]    ENDOSCOPY N/A 1/12/2022    Procedure: ESOPHAGOGASTRODUODENOSCOPY;  Surgeon: Brunner, Mark I, MD;  Location:  [facility];  Service: Gastroenterology;  Laterality: N/A;    GASTRIC BYPASS  2007    lap cm en y    IR STENT PLACEMENT Left 10/2021    xin surgical in cath lab placed stent in left leg     Family History   Problem Relation Age of Onset    Heart attack Mother 50    Cancer Mother     Stroke Father     Breast cancer Maternal Grandmother     No Known Problems Maternal Grandfather     No Known Problems Paternal Grandmother     No Known Problems Paternal Grandfather      Social History     Socioeconomic History    Marital status:    Tobacco Use    Smoking status: Never    Smokeless tobacco: Never   Vaping Use    Vaping status: Never Used   Substance and Sexual Activity    Alcohol use: Yes     Alcohol/week: 4.0 standard drinks of alcohol     Types: 4 Cans of beer per week     Comment: daily    Drug use: Never    Sexual activity: Defer     Allergies   Allergen Reactions    Hydrocodone Hives     Prior to Admission medications    Medication Sig Start Date End Date Taking? Authorizing Provider   apixaban (ELIQUIS) 5 MG tablet tablet Start On 7/8: Take 1 tablet by mouth Every 12 (Twelve) Hours. 7/8/21   Yazmin Whitley DO   eszopiclone (LUNESTA) 3 MG tablet Take 3 mg by mouth Every Night. Take immediately before bedtime    Emergency, Nurse Lindy, RN   isosorbide mononitrate (IMDUR) 30 MG 24 hr tablet Take 1 tablet by mouth Daily. 3/2/22   Brooks Alexis MD   lisinopril (PRINIVIL,ZESTRIL) 10 MG tablet  Take 10 mg by mouth Daily.    Provider, MD Irma   nitroglycerin (NITROSTAT) 0.4 MG SL tablet Place 1 tablet under the tongue Every 5 (Five) Minutes As Needed for Chest Pain. Take no more than 3 doses in 15 minutes. 1/12/22   Vanessa Bang MD   OLANZapine (zyPREXA) 10 MG tablet Take 10 mg by mouth Every Night.    Emergency, Nurse Lindy, RN   ondansetron (ZOFRAN) 4 MG tablet Take 1 tablet by mouth Every 6 (Six) Hours As Needed for Nausea or Vomiting. 1/12/22   Vanessa Bang MD   pantoprazole (PROTONIX) 40 MG EC tablet Take 1 tablet by mouth 2 (Two) Times a Day Before Meals. 1/12/22   Vanessa Bang MD         Temp:  [98.3 °F (36.8 °C)] 98.3 °F (36.8 °C)  Heart Rate:  [65-80] 80  Resp:  [18] 18  BP: (151-182)/(65-88) 157/83  Neurological Exam  Mental Status  Alert. Oriented to person, place, time and situation. Oriented to person, place, and time. Speech is normal. Language is fluent with no aphasia. Attention and concentration are normal.    Cranial Nerves  CN II: Visual fields full to confrontation.  CN III, IV, VI: Extraocular movements intact bilaterally. Pupils equal round and reactive to light bilaterally.  CN V:  Left: Diminished sensation of the entire left side of the face.  CN VII:  Left: There is central facial weakness. Slight flattening of the nasolabial fold on left.  CN VIII: Hearing appears to be intact bilaterally.  CN XII: Tongue midline without atrophy or fasciculations.    Motor  Normal muscle bulk throughout. Normal muscle tone.  Right upper and lower extremity with no drift, 5/5 strength  Left upper extremity with slight drift, 4+/5 strength  Left lower extremity with slight drift, 4+/5 strength; negative Benz's.    Sensory  Light touch abnormality: Left face and upper extremity.     Coordination  Right: Finger-to-nose normal.Left: Finger-to-nose normal.    Gait    Not observed.      Physical Exam  Vitals and nursing note reviewed.   Constitutional:       General:  She is not in acute distress.     Appearance: She is obese. She is not ill-appearing.   HENT:      Head: Normocephalic.      Mouth/Throat:      Mouth: Mucous membranes are moist.   Eyes:      Extraocular Movements: Extraocular movements intact.      Pupils: Pupils are equal, round, and reactive to light.   Cardiovascular:      Rate and Rhythm: Normal rate.   Pulmonary:      Effort: Pulmonary effort is normal. No respiratory distress.      Comments: On room air  Skin:     General: Skin is warm and dry.   Neurological:      Mental Status: She is alert and oriented to person, place, and time.      Cranial Nerves: Cranial nerve deficit present.      Sensory: Sensory deficit present.      Motor: Weakness present.      Coordination: Coordination normal.   Psychiatric:         Mood and Affect: Mood normal.         Speech: Speech normal.         Behavior: Behavior normal.         Acute Stroke Data    Thrombolytic Inclusion / Exclusion Criteria    Time: 11:38 EDT  Person Administering Scale: RAFFY Huggins    Inclusion Criteria  [x]   18 years of age or greater   [x]   Onset of symptoms < 4.5 hours before beginning treatment (stroke onset = time patient was last seen well or without symptoms).   [x]   Diagnosis of acute ischemic stroke causing measurable disabling deficit (Complete Hemianopia, Any Aphasia, Visual or Sensory Extinction, Any weakness limiting sustained effort against gravity)   []   Any remaining deficit considered potentially disabling in view of patient and practitioner   Exclusion criteria (Do not proceed with Alteplase if any are checked under exclusion criteria)  []   Onset unknown or GREATER than 4.5 hours   []   ICH on CT/MRI   []   CT demonstrates hypodensity representing acute or subacute infarct   []   Significant head trauma or prior stroke in the previous 3 months   []   Symptoms suggestive of subarachnoid hemorrhage   []   History of un-ruptured intracranial aneurysm GREATER than 10  mm   []   Recent intracranial or intraspinal surgery within the last 3 months   []   Arterial puncture at a non-compressible site in the previous 7 days   []   Active internal bleeding   []   Acute bleeding tendency   []   Platelet count LESS than 100,000 for known hematological diseases such as leukemia, thrombocytopenia or chronic cirrhosis   []   Current use of anticoagulant with INR GREATER than 1.7 or PT GREATER than 15 seconds, aPTT GREATER than 40 seconds   []   Heparin received within 48 hours, resulting in abnormally elevated aPTT GREATER than upper limit of normal   []   Current use of direct thrombin inhibitors or direct factor Xa inhibitors in the past 48 hours   []   Elevated blood pressure refractory to treatment (systolic GREATER than 185 mm/Hg or diastolic  GREATER than 110 mm/Hg   []   Suspected infective endocarditis and aortic arch dissection   []   Current use of therapeutic treatment dose of low-molecular-weight heparin (LMWH) within the previous 24 hours   []   Structural GI malignancy or bleed   Relative exclusion for all patients  []   Only minor non-disabling symptoms   []   Pregnancy   []   Seizure at onset with postictal residual neurological impairments   []   Major surgery or previous trauma within past 14 days   []   History of previous spontaneous ICH, intracranial neoplasm, or AV malformation   []   Postpartum (within previous 14 days)   []   Recent GI or urinary tract hemorrhage (within previous 21 days)   []   Recent acute MI (within previous 3 months)   []   History of un-ruptured intracranial aneurysm LESS than 10 mm   []   History of ruptured intracranial aneurysm   []   Blood glucose LESS than 50 mg/dL (2.7 mmol/L)   []   Dural puncture within the last 7 days   []   Known GREATER than 10 cerebral microbleeds   Additional exclusions for patients with symptoms onset between 3 and 4.5 hours.  []   Age > 80.   []   On any anticoagulants regardless of INR  >>> Warfarin (Coumadin),  Heparin, Enoxaparin (Lovenox), fondaparinux (Arixtra), bivalirudin (Angiomax), Argatroban, dabigatran (Pradaxa), rivaroxaban (Xarelto), or apixaban (Eliquis)   []   Severe stroke (NIHSS > 25).   []   History of BOTH diabetes and previous ischemic stroke.   []   The risks and benefits have been discussed with the patient or family related to the administration of IV thrombolytic therapy for stroke symptoms.   []   I have discussed and reviewed the patient's case and imaging with the attending prior to IV thrombolytic therapy.   1147 Time IV thrombolytic administered       Hospital Meds:  Scheduled- labetalol, 10 mg, Intravenous, Once  sodium chloride, 10 mL, Intravenous, Q5 Min   Followed by  tenecteplase for stroke, 0.25 mg/kg, Intravenous, Once   Followed by  sodium chloride, 10 mL, Intravenous, Once      Infusions- niCARdipine, 5-15 mg/hr       PRNs-   nitroglycerin    sodium chloride    Functional Status Prior to Current Stroke/Mi Score: 0    NIH Stroke Scale  Time: 11:38 EDT  Person Administering Scale: RAFFY Huggins    Interval: baseline  1a. Level of Consciousness: 0-->Alert, keenly responsive  1b. LOC Questions: 0-->Answers both questions correctly  1c. LOC Commands: 0-->Performs both tasks correctly  2. Best Gaze: 0-->Normal  3. Visual: 0-->No visual loss  4. Facial Palsy: 1-->Minor paralysis (flattened nasolabial fold, asymmetry on smiling)  5a. Motor Arm, Left: 1-->Drift, limb holds 90 (or 45) degrees, but drifts down before full 10 seconds, does not hit bed or other support  5b. Motor Arm, Right: 0-->No drift, limb holds 90 (or 45) degrees for full 10 secs  6a. Motor Leg, Left: 1-->Drift, leg falls by the end of the 5-sec period but does not hit bed  6b. Motor Leg, Right: 0-->No drift, leg holds 30 degree position for full 5 secs  7. Limb Ataxia: 0-->Absent  8. Sensory: 1-->Mild-to-moderate sensory loss, patient feels pinprick is less sharp or is dull on the affected side, or there is  a loss of superficial pain with pinprick, but patient is aware of being touched  9. Best Language: 0-->No aphasia, normal  10. Dysarthria: 0-->Normal  11. Extinction and Inattention (formerly Neglect): 0-->No abnormality    Total (NIH Stroke Scale): 4      Results Reviewed:  I have personally reviewed current lab, radiology, and data and agree with results.    CT of the head without contrast is negative for hemorrhage and/or acute process.    CTA head/neck is negative for flow-limiting stenosis or LVO.    CTP is negative for LVO.    WBC 4.44  H/H13.0/40.8  Platelets 277  Glucose 110  Creatinine 0.80, BUN 12  Sodium 141  AST 23  ALT 8  Magnesium 2.0  Urinalysis with trace leuk esterase, negative nitrite, negative bacteria      Results for orders placed during the hospital encounter of 01/10/22    Adult Transthoracic Echo Complete W/ Cont if Necessary Per Protocol    Interpretation Summary  · Left ventricular wall thickness is consistent with mild concentric hypertrophy.  · Estimated left ventricular EF = 70% Left ventricular ejection fraction appears to be 61 - 65%. Left ventricular systolic function is normal.       Assessment/Plan:    This is a 53-year-old female with known medical diagnoses of essential hypertension, migraines (in EMR however patient denies), anxiety/depression, obesity, daily EtOH use (reports 4 beers daily), and remote LLE DVT and PE (approximately 1 year ago, on Xarelto until 1 month ago) who presented to the emergency department today after experiencing a syncopal episode while at work in approximately 0830.  At approximately 11:00 the patient experienced an acute onset of left-sided numbness (face and upper extremity) as well as left-sided weakness prompting a code stroke to be initiated.  CT head without contrast was reviewed (images obtained at 915) and is negative for hemorrhage in/or acute process.  The patient was deemed a candidate for IV TNK which was administered at 1147; slight delay  given elevated blood pressure requiring IV labetalol and nicardipine administration.  CTA head/neck and CT perfusion were obtained and were negative for flow-limiting stenosis or LVO.  The patient will be admitted to the neurological ICU for post TNK monitoring and further stroke workup.    Antiplatelet PTA: None  Anticoagulant PTA: None        Left sided weakness and numbness        Headache, left-sided        History of LLE DVT/PE (2023)  Differentials include TIA/CVA verses hypertensive emergency verses complex migraine  -TIA/CVA order set with thrombolytic therapy has been initiated  -N.p.o. until bedside nursing dysphagia screen completed, the patient can be started on a cardiac healthy diet  -MRI of the brain without contrast  -24-hour post TNK CT at 1145 on 4/10, if no evidence of hemorrhage patient can be started on ASA 81 mg  -Activity as tolerated, fall risk precautions  -PT/OT/SLP evaluation  -A1c and lipid panel in a.m.  -TTE in a.m.  -2 g IV magnesium and 10 mg IV Compazine for headache    2.  Essential hypertension  -Strict blood pressure monitoring  -Nicardipine for SBP >180/105    3.  EtOH use  -CIWA precautions per Intensivist  -Avoid sedating medications if possible    Plan of care was discussed with the patient, Finn AKBAR, Dr. Curran, and Dr. Bautista.  Stroke neurology will continue to follow.  Please call with any questions or concerns.  Thank you for this consult.      RAFFY Huggins  April 9, 2024  11:38 EDT

## 2024-04-10 ENCOUNTER — APPOINTMENT (OUTPATIENT)
Dept: CT IMAGING | Facility: HOSPITAL | Age: 54
DRG: 103 | End: 2024-04-10
Payer: COMMERCIAL

## 2024-04-10 ENCOUNTER — APPOINTMENT (OUTPATIENT)
Dept: CARDIOLOGY | Facility: HOSPITAL | Age: 54
DRG: 103 | End: 2024-04-10
Payer: COMMERCIAL

## 2024-04-10 PROBLEM — I63.9 ACUTE CVA (CEREBROVASCULAR ACCIDENT): Status: ACTIVE | Noted: 2024-04-10

## 2024-04-10 PROBLEM — R55 SYNCOPE AND COLLAPSE: Status: ACTIVE | Noted: 2024-04-10

## 2024-04-10 LAB
AMPHET+METHAMPHET UR QL: NEGATIVE
AMPHETAMINES UR QL: NEGATIVE
ANION GAP SERPL CALCULATED.3IONS-SCNC: 11 MMOL/L (ref 5–15)
BARBITURATES UR QL SCN: NEGATIVE
BENZODIAZ UR QL SCN: NEGATIVE
BH CV ECHO MEAS - AO MAX PG: 13.4 MMHG
BH CV ECHO MEAS - AO MEAN PG: 7.5 MMHG
BH CV ECHO MEAS - AO ROOT DIAM: 3 CM
BH CV ECHO MEAS - AO V2 MAX: 183 CM/SEC
BH CV ECHO MEAS - AO V2 VTI: 39.1 CM
BH CV ECHO MEAS - AVA(I,D): 1.77 CM2
BH CV ECHO MEAS - EDV(CUBED): 97.3 ML
BH CV ECHO MEAS - EDV(MOD-SP2): 99.7 ML
BH CV ECHO MEAS - EDV(MOD-SP4): 98.9 ML
BH CV ECHO MEAS - EF(MOD-BP): 53.6 %
BH CV ECHO MEAS - EF(MOD-SP2): 56.5 %
BH CV ECHO MEAS - EF(MOD-SP4): 50.5 %
BH CV ECHO MEAS - ESV(CUBED): 39.4 ML
BH CV ECHO MEAS - ESV(MOD-SP2): 43.4 ML
BH CV ECHO MEAS - ESV(MOD-SP4): 49 ML
BH CV ECHO MEAS - FS: 26 %
BH CV ECHO MEAS - IVS/LVPW: 1 CM
BH CV ECHO MEAS - IVSD: 1.2 CM
BH CV ECHO MEAS - LA DIMENSION: 3.4 CM
BH CV ECHO MEAS - LAT PEAK E' VEL: 13.9 CM/SEC
BH CV ECHO MEAS - LV MASS(C)D: 205 GRAMS
BH CV ECHO MEAS - LV MAX PG: 4.4 MMHG
BH CV ECHO MEAS - LV MEAN PG: 2 MMHG
BH CV ECHO MEAS - LV V1 MAX: 104.5 CM/SEC
BH CV ECHO MEAS - LV V1 VTI: 21.5 CM
BH CV ECHO MEAS - LVIDD: 4.6 CM
BH CV ECHO MEAS - LVIDS: 3.4 CM
BH CV ECHO MEAS - LVOT AREA: 3.2 CM2
BH CV ECHO MEAS - LVOT DIAM: 2.02 CM
BH CV ECHO MEAS - LVPWD: 1.2 CM
BH CV ECHO MEAS - MED PEAK E' VEL: 7.5 CM/SEC
BH CV ECHO MEAS - MV A MAX VEL: 79.3 CM/SEC
BH CV ECHO MEAS - MV DEC SLOPE: 313.7 CM/SEC2
BH CV ECHO MEAS - MV DEC TIME: 0.24 SEC
BH CV ECHO MEAS - MV E MAX VEL: 90 CM/SEC
BH CV ECHO MEAS - MV E/A: 1.14
BH CV ECHO MEAS - MV MAX PG: 5 MMHG
BH CV ECHO MEAS - MV MEAN PG: 2.5 MMHG
BH CV ECHO MEAS - MV P1/2T: 103.3 MSEC
BH CV ECHO MEAS - MV V2 VTI: 40.5 CM
BH CV ECHO MEAS - MVA(P1/2T): 2.13 CM2
BH CV ECHO MEAS - MVA(VTI): 1.7 CM2
BH CV ECHO MEAS - PA ACC TIME: 0.15 SEC
BH CV ECHO MEAS - PA V2 MAX: 105.9 CM/SEC
BH CV ECHO MEAS - RAP SYSTOLE: 8 MMHG
BH CV ECHO MEAS - RVSP: 32 MMHG
BH CV ECHO MEAS - SV(LVOT): 69 ML
BH CV ECHO MEAS - SV(MOD-SP2): 56.3 ML
BH CV ECHO MEAS - SV(MOD-SP4): 49.9 ML
BH CV ECHO MEAS - TAPSE (>1.6): 2.07 CM
BH CV ECHO MEAS - TR MAX PG: 24 MMHG
BH CV ECHO MEAS - TR MAX VEL: 241.9 CM/SEC
BH CV ECHO MEASUREMENTS AVERAGE E/E' RATIO: 8.41
BH CV ECHO SHUNT ASSESSMENT PERFORMED (HIDDEN SCRIPTING): 1
BH CV XLRA - RV BASE: 3.5 CM
BH CV XLRA - RV LENGTH: 8.5 CM
BH CV XLRA - RV MID: 2.6 CM
BH CV XLRA - TDI S': 16.2 CM/SEC
BH CV XLRA MEAS LEFT DIST CCA EDV: 26.9 CM/SEC
BH CV XLRA MEAS LEFT DIST CCA PSV: 91.8 CM/SEC
BH CV XLRA MEAS LEFT DIST ICA EDV: 32.3 CM/SEC
BH CV XLRA MEAS LEFT DIST ICA PSV: 82.6 CM/SEC
BH CV XLRA MEAS LEFT ICA/CCA RATIO: 0.79
BH CV XLRA MEAS LEFT MID CCA EDV: 26 CM/SEC
BH CV XLRA MEAS LEFT MID CCA PSV: 112 CM/SEC
BH CV XLRA MEAS LEFT MID ICA EDV: 32.3 CM/SEC
BH CV XLRA MEAS LEFT MID ICA PSV: 88.2 CM/SEC
BH CV XLRA MEAS LEFT PROX CCA EDV: 24.1 CM/SEC
BH CV XLRA MEAS LEFT PROX CCA PSV: 124 CM/SEC
BH CV XLRA MEAS LEFT PROX ECA PSV: 79.5 CM/SEC
BH CV XLRA MEAS LEFT PROX ICA EDV: 21.1 CM/SEC
BH CV XLRA MEAS LEFT PROX ICA PSV: 70.2 CM/SEC
BH CV XLRA MEAS LEFT PROX SCLA PSV: 207 CM/SEC
BH CV XLRA MEAS LEFT VERTEBRAL A PSV: 72.7 CM/SEC
BH CV XLRA MEAS RIGHT DIST CCA EDV: 21.7 CM/SEC
BH CV XLRA MEAS RIGHT DIST CCA PSV: 65.9 CM/SEC
BH CV XLRA MEAS RIGHT DIST ICA EDV: 48.4 CM/SEC
BH CV XLRA MEAS RIGHT DIST ICA PSV: 154 CM/SEC
BH CV XLRA MEAS RIGHT ICA/CCA RATIO: 0.81
BH CV XLRA MEAS RIGHT MID CCA EDV: 21.1 CM/SEC
BH CV XLRA MEAS RIGHT MID CCA PSV: 99.4 CM/SEC
BH CV XLRA MEAS RIGHT MID ICA EDV: 32.3 CM/SEC
BH CV XLRA MEAS RIGHT MID ICA PSV: 80.1 CM/SEC
BH CV XLRA MEAS RIGHT PROX CCA EDV: 23.5 CM/SEC
BH CV XLRA MEAS RIGHT PROX CCA PSV: 118 CM/SEC
BH CV XLRA MEAS RIGHT PROX ECA PSV: 83.3 CM/SEC
BH CV XLRA MEAS RIGHT PROX ICA EDV: 23.6 CM/SEC
BH CV XLRA MEAS RIGHT PROX ICA PSV: 70.2 CM/SEC
BH CV XLRA MEAS RIGHT PROX SCLA PSV: 130 CM/SEC
BH CV XLRA MEAS RIGHT VERTEBRAL A PSV: 71.5 CM/SEC
BUN SERPL-MCNC: 7 MG/DL (ref 6–20)
BUN/CREAT SERPL: 11.1 (ref 7–25)
BUPRENORPHINE SERPL-MCNC: NEGATIVE NG/ML
CALCIUM SPEC-SCNC: 8.6 MG/DL (ref 8.6–10.5)
CANNABINOIDS SERPL QL: NEGATIVE
CHLORIDE SERPL-SCNC: 108 MMOL/L (ref 98–107)
CHOLEST SERPL-MCNC: 168 MG/DL (ref 0–200)
CO2 SERPL-SCNC: 23 MMOL/L (ref 22–29)
COCAINE UR QL: NEGATIVE
CREAT SERPL-MCNC: 0.63 MG/DL (ref 0.57–1)
DEPRECATED RDW RBC AUTO: 43.3 FL (ref 37–54)
EGFRCR SERPLBLD CKD-EPI 2021: 104.9 ML/MIN/1.73
ERYTHROCYTE [DISTWIDTH] IN BLOOD BY AUTOMATED COUNT: 13.5 % (ref 12.3–15.4)
FENTANYL UR-MCNC: NEGATIVE NG/ML
GEN 5 2HR TROPONIN T REFLEX: 8 NG/L
GLUCOSE BLDC GLUCOMTR-MCNC: 105 MG/DL (ref 70–130)
GLUCOSE SERPL-MCNC: 103 MG/DL (ref 65–99)
HBA1C MFR BLD: 4.9 % (ref 4.8–5.6)
HCT VFR BLD AUTO: 36.6 % (ref 34–46.6)
HDLC SERPL-MCNC: 86 MG/DL (ref 40–60)
HGB BLD-MCNC: 11.7 G/DL (ref 12–15.9)
LDLC SERPL CALC-MCNC: 70 MG/DL (ref 0–100)
LDLC/HDLC SERPL: 0.81 {RATIO}
LEFT ARM BP: NORMAL MMHG
LEFT ATRIUM VOLUME INDEX: 30.9 ML/M2
MCH RBC QN AUTO: 28.2 PG (ref 26.6–33)
MCHC RBC AUTO-ENTMCNC: 32 G/DL (ref 31.5–35.7)
MCV RBC AUTO: 88.2 FL (ref 79–97)
METHADONE UR QL SCN: NEGATIVE
OPIATES UR QL: POSITIVE
OXYCODONE UR QL SCN: NEGATIVE
PCP UR QL SCN: NEGATIVE
PLATELET # BLD AUTO: 257 10*3/MM3 (ref 140–450)
PMV BLD AUTO: 9.8 FL (ref 6–12)
POTASSIUM SERPL-SCNC: 4.1 MMOL/L (ref 3.5–5.2)
RBC # BLD AUTO: 4.15 10*6/MM3 (ref 3.77–5.28)
SODIUM SERPL-SCNC: 142 MMOL/L (ref 136–145)
TRICYCLICS UR QL SCN: NEGATIVE
TRIGL SERPL-MCNC: 62 MG/DL (ref 0–150)
TROPONIN T DELTA: 1 NG/L
TROPONIN T SERPL HS-MCNC: 7 NG/L
VALPROATE SERPL-MCNC: 40.9 MCG/ML (ref 50–125)
VLDLC SERPL-MCNC: 12 MG/DL (ref 5–40)
WBC NRBC COR # BLD AUTO: 4.97 10*3/MM3 (ref 3.4–10.8)

## 2024-04-10 PROCEDURE — 93306 TTE W/DOPPLER COMPLETE: CPT

## 2024-04-10 PROCEDURE — 70450 CT HEAD/BRAIN W/O DYE: CPT

## 2024-04-10 PROCEDURE — 93880 EXTRACRANIAL BILAT STUDY: CPT | Performed by: INTERNAL MEDICINE

## 2024-04-10 PROCEDURE — 92610 EVALUATE SWALLOWING FUNCTION: CPT

## 2024-04-10 PROCEDURE — 99233 SBSQ HOSP IP/OBS HIGH 50: CPT | Performed by: STUDENT IN AN ORGANIZED HEALTH CARE EDUCATION/TRAINING PROGRAM

## 2024-04-10 PROCEDURE — 83036 HEMOGLOBIN GLYCOSYLATED A1C: CPT

## 2024-04-10 PROCEDURE — 85027 COMPLETE CBC AUTOMATED: CPT | Performed by: NURSE PRACTITIONER

## 2024-04-10 PROCEDURE — 93306 TTE W/DOPPLER COMPLETE: CPT | Performed by: INTERNAL MEDICINE

## 2024-04-10 PROCEDURE — 97162 PT EVAL MOD COMPLEX 30 MIN: CPT

## 2024-04-10 PROCEDURE — 80307 DRUG TEST PRSMV CHEM ANLYZR: CPT | Performed by: NURSE PRACTITIONER

## 2024-04-10 PROCEDURE — 80048 BASIC METABOLIC PNL TOTAL CA: CPT | Performed by: NURSE PRACTITIONER

## 2024-04-10 PROCEDURE — 80164 ASSAY DIPROPYLACETIC ACD TOT: CPT | Performed by: NURSE PRACTITIONER

## 2024-04-10 PROCEDURE — 99232 SBSQ HOSP IP/OBS MODERATE 35: CPT | Performed by: INTERNAL MEDICINE

## 2024-04-10 PROCEDURE — 25810000003 SODIUM CHLORIDE 0.9 % SOLUTION: Performed by: NURSE PRACTITIONER

## 2024-04-10 PROCEDURE — 80061 LIPID PANEL: CPT

## 2024-04-10 PROCEDURE — 84484 ASSAY OF TROPONIN QUANT: CPT | Performed by: INTERNAL MEDICINE

## 2024-04-10 PROCEDURE — 93880 EXTRACRANIAL BILAT STUDY: CPT

## 2024-04-10 PROCEDURE — 97166 OT EVAL MOD COMPLEX 45 MIN: CPT

## 2024-04-10 RX ORDER — ARIPIPRAZOLE 15 MG/1
15 TABLET ORAL DAILY
Status: DISCONTINUED | OUTPATIENT
Start: 2024-04-10 | End: 2024-04-11

## 2024-04-10 RX ORDER — OLANZAPINE 5 MG/1
10 TABLET ORAL NIGHTLY
Status: DISCONTINUED | OUTPATIENT
Start: 2024-04-10 | End: 2024-04-16 | Stop reason: HOSPADM

## 2024-04-10 RX ORDER — ACETAMINOPHEN 500 MG
1000 TABLET ORAL EVERY 6 HOURS PRN
Status: DISCONTINUED | OUTPATIENT
Start: 2024-04-10 | End: 2024-04-16 | Stop reason: HOSPADM

## 2024-04-10 RX ORDER — ASPIRIN 81 MG/1
81 TABLET ORAL DAILY
Status: DISCONTINUED | OUTPATIENT
Start: 2024-04-11 | End: 2024-04-16 | Stop reason: HOSPADM

## 2024-04-10 RX ADMIN — ACETAMINOPHEN 1000 MG: 500 TABLET ORAL at 06:10

## 2024-04-10 RX ADMIN — ARIPIPRAZOLE 15 MG: 15 TABLET ORAL at 14:33

## 2024-04-10 RX ADMIN — ACETAMINOPHEN 1000 MG: 500 TABLET ORAL at 15:51

## 2024-04-10 RX ADMIN — ASPIRIN 325 MG: 325 TABLET ORAL at 12:30

## 2024-04-10 RX ADMIN — MAGNESIUM OXIDE TAB 400 MG (241.3 MG ELEMENTAL MG) 400 MG: 400 (241.3 MG) TAB at 08:35

## 2024-04-10 RX ADMIN — Medication 10 ML: at 08:35

## 2024-04-10 RX ADMIN — ACETAMINOPHEN 1000 MG: 500 TABLET ORAL at 22:46

## 2024-04-10 RX ADMIN — OLANZAPINE 10 MG: 5 TABLET, FILM COATED ORAL at 20:57

## 2024-04-10 RX ADMIN — Medication 10 ML: at 20:58

## 2024-04-10 RX ADMIN — SODIUM CHLORIDE 1000 ML: 9 INJECTION, SOLUTION INTRAVENOUS at 05:16

## 2024-04-10 NOTE — PLAN OF CARE
Goal Outcome Evaluation:  Plan of Care Reviewed With: patient        Progress: no change  Outcome Evaluation: OT evaluation completed. The pt presents below her functional baseline with generalized weakness (L>R), decreased activity tolerance, and balance deficits. The pt performed bed mobility with CGA and took ~3-4 steps to the chair with Davy x2, HHA x2 secondary to L sided weakness. The pt will benefit from continued IP OT services to increase the pt's safety and independence during ADLs and functional mobility. Recommend a d/c to IRF for best outcome.      Anticipated Discharge Disposition (OT): inpatient rehabilitation facility

## 2024-04-10 NOTE — PROGRESS NOTES
Intensive Care Follow-up     Hospital:  LOS: 1 day   Ms. Kamila Garcia, 53 y.o. female is followed for:   Suspected cerebrovascular accident (CVA)        Subjective   Interval History:  Patient was seen and examined in the room in the presence of her family.  She states that she is continuing to have left facial numbness as well as left upper extremity numbness and weakness.  She states that she has never had this before.  She is also concerned about her reports of syncope that she experienced yesterday prior to being brought in.  She denies problems with dizziness or lightheadedness when rising from a recumbent position.  She denies anything like this headache in the past.  She states that she does not have photophobia nor is she had any type of aura.  She also complains of chest pressure.  She states that her current headache is across the front of her forehead and is 7 out of 10.    Carotid artery ultrasound is within normal limits as is the echocardiogram.  MRI and follow-up CT scan of the head are within normal limits.    Of note, the patient is not typically compliant with most of her medications including her blood thinner.  Also of note, though she has been off anticoagulation for upwards of 30 days due to cost, she did not demonstrate pulmonary emboli on her most recent CT scan of the chest on April 5.    The patient's past medical, surgical and social history were reviewed and updated in Epic as appropriate.        Objective     Infusions:     Medications:  ARIPiprazole, 15 mg, Oral, Daily  aspirin, 325 mg, Oral, Daily   Or  aspirin, 300 mg, Rectal, Daily  atorvastatin, 80 mg, Oral, Nightly  magnesium oxide, 400 mg, Oral, Daily  OLANZapine, 10 mg, Oral, Nightly  sodium chloride, 10 mL, Intravenous, Q12H        Vital Sign Min/Max for last 24 hours  Temp  Min: 97.8 °F (36.6 °C)  Max: 99.2 °F (37.3 °C)   BP  Min: 93/53  Max: 147/72   Pulse  Min: 59  Max: 80   Resp  Min: 16  Max: 16   SpO2  Min: 93 %  " Max: 99 %   No data recorded       Input/Output for last 24 hour shift  04/09 0701 - 04/10 0700  In: 886.3 [P.O.:476; I.V.:410.3]  Out: 1550 [Urine:1550]      Objective:  General Appearance:  Uncomfortable and in no acute distress.    Vital signs: (most recent): Blood pressure 146/81, pulse 64, temperature 97.8 °F (36.6 °C), temperature source Oral, resp. rate 16, height 167.6 cm (65.98\"), weight 101 kg (222 lb 10.6 oz), SpO2 98%.    HEENT: Normal HEENT exam.    Lungs:  Normal effort and normal respiratory rate.  Breath sounds clear to auscultation.    Heart: Normal rate.  Regular rhythm.  S1 normal and S2 normal.  No murmur.   Chest: Symmetric chest wall expansion.   Abdomen: Abdomen is soft and non-distended.  Bowel sounds are normal.   There is no abdominal tenderness.   There is no mass.   Extremities: Normal range of motion.    Pulses: Distal pulses are intact.    Neurological: Patient is alert and oriented to person, place and time.  Normal strength.    Pupils:  Pupils are equal, round, and reactive to light.  Pupils are equal.   Skin:  Warm.                Results from last 7 days   Lab Units 04/10/24  0432 04/09/24  0842 04/05/24  0932   WBC 10*3/mm3 4.97 4.44 4.61   HEMOGLOBIN g/dL 11.7* 13.0 12.3   PLATELETS 10*3/mm3 257 277 288     Results from last 7 days   Lab Units 04/10/24  0432 04/09/24  0842 04/05/24  0932   SODIUM mmol/L 142 141 137   POTASSIUM mmol/L 4.1 4.2 4.5   CO2 mmol/L 23.0 24.0 25.0   BUN mg/dL 7 12 10   CREATININE mg/dL 0.63 0.80 0.75   MAGNESIUM mg/dL  --  2.0 2.0   GLUCOSE mg/dL 103* 110* 105*     Estimated Creatinine Clearance: 121.1 mL/min (by C-G formula based on SCr of 0.63 mg/dL).            I reviewed the patient's results and images.     Assessment & Plan   Impression        Suspected cerebrovascular accident (CVA)    H/O LLE DVT & PE (July 2021)    Hypertensive urgency    Obesity (BMI 30-39.9)    ETOH use    Anxiety and depression    Syncope and collapse       Plan    "     Patient does not currently appear to be weak in the left upper extremity when challenged.  Is unclear to me what is causing her left facial and left arm numbness.  It is also unclear what could be contributing to her episodes of syncope.  There does not seem to be anything that has brought this on.  There is no evidence of seizure activity.  Current workup including echocardiogram, extensive head imaging, and carotid imaging are negative.  Troponins have been negative.  Will go ahead and have general neurology consult on the case particularly with the complaint of the 7 of 10 headache.  It is possible that this could represent atypical migraine.  Transition to telemetry.    Plan of care and goals reviewed with mulitdisciplinary/antibiotic stewardship team during rounds.   I discussed the patient's findings and my recommendations with patient and nursing staff           Eze Chandler MD, [facility]  Pulmonology and Critical Care Medicine

## 2024-04-10 NOTE — THERAPY RE-EVALUATION
Acute Care - Speech Language Pathology   Swallow Re-Evaluation [facility]     Patient Name: Kamila Garcia  : 1970  MRN: [id number]  Today's Date: 4/10/2024               Admit Date: 2024    Visit Dx:     ICD-10-CM ICD-9-CM   1. Numbness and tingling of left side of face  R20.0 782.0    R20.2    2. Cognitive communication deficit  R41.841 799.52   3. Oral phase dysphagia  R13.11 787.21     Patient Active Problem List   Diagnosis    Acute pulmonary embolism    Chest pain    H/O LLE DVT & PE (2021)    History of pulmonary embolism    Insomnia    Hypertension    Palpitations    Suspected cerebrovascular accident (CVA)    Hypertensive urgency    Obesity (BMI 30-39.9)    ETOH use    Anxiety and depression    Acute CVA (cerebrovascular accident)    Syncope and collapse     Past Medical History:   Diagnosis Date    Anxiety and depression     Chest pain     Disease of thyroid gland     reports slightly elevated when in hospital    DVT (deep venous thrombosis)     ETOH use 2024    History of pulmonary embolus (PE)     Hypertension     Hypothyroidism 2024    Migraines     Obesity (BMI 30-39.9) 2024    Shortness of breath     with chest pain episodes     Past Surgical History:   Procedure Laterality Date    CARDIAC CATHETERIZATION Left 3/11/2022    Procedure: Left Heart Cath;  Surgeon: Peter Anguiano MD;  Location: [facility] CATH INVASIVE LOCATION;  Service: Cardiology;  Laterality: Left;  Hold Eliquis 2 days prior to [facility]    ENDOSCOPY N/A 2022    Procedure: ESOPHAGOGASTRODUODENOSCOPY;  Surgeon: Brunner, Mark I, MD;  Location: [facility] ENDOSCOPY;  Service: Gastroenterology;  Laterality: N/A;    GASTRIC BYPASS      lap cm en y    IR STENT PLACEMENT Left 10/2021    xin surgical in cath lab placed stent in left leg       SLP Recommendation and Plan  SLP Swallowing Diagnosis: mild, oral dysphagia (04/10/24 1600)  SLP Diet Recommendation: soft to chew textures, whole, thin  liquids, other (see comments) (can advance to regular solids if pt prefers) (04/10/24 1600)  Recommended Precautions and Strategies: general aspiration precautions, other (see comments) (place food and liquid on R side of mouth) (04/10/24 1600)  SLP Rec. for Method of Medication Administration: as tolerated (trial whole in applesauce to determine if pt senses improved oral control over pill in mouth.) (04/10/24 1600)     Monitor for Signs of Aspiration: notify SLP if any concerns (04/10/24 1600)     Swallow Criteria for Skilled Therapeutic Interventions Met: demonstrates skilled criteria (04/10/24 1600)  Anticipated Discharge Disposition (SLP): home with assist (04/10/24 1600)  Rehab Potential/Prognosis, Swallowing: good, to achieve stated therapy goals (04/10/24 1600)  Therapy Frequency (Swallow): 5 days per week (04/10/24 1600)  Predicted Duration Therapy Intervention (Days): until discharge (04/10/24 1600)  Oral Care Recommendations: Oral Care BID/PRN, Toothbrush (04/10/24 1600)                                               SWALLOW EVALUATION (Last 72 Hours)       SLP Adult Swallow Evaluation       Row Name 04/10/24 1600 04/09/24 1605                Rehab Evaluation    Document Type re-evaluation  - evaluation  -       Subjective Information no complaints  - no complaints  -       Patient Observations alert;cooperative  - alert;cooperative  -       Patient/Family/Caregiver Comments/Observations -- Family present  -       Patient Effort good  -SM good  -       Comment Attempted AM, pt getting bath  - --       Symptoms Noted During/After Treatment -- none  -          General Information    Patient Profile Reviewed -- yes  -       Pertinent History Of Current Problem -- Adm with c/o numbness/tingling on L side s/p TNK administration. Hx: ETOH use, migraines, HTN, obesity, LLE DVT, PE. Head CT negative for acute intracranial abnormalities, MRI pending  -       Current Method of Nutrition --  NPO  -       Precautions/Limitations, Vision -- WFL;for purposes of eval  -       Precautions/Limitations, Hearing -- WFL;for purposes of eval  -       Prior Level of Function-Communication -- [facility]       Prior Level of Function-Swallowing -- no diet consistency restrictions  [facility]       Plans/Goals Discussed with -- patient;family;agreed upon  [facility]       Barriers to Rehab -- none identified  -       Patient's Goals for Discharge -- patient did not state  -       Family Goals for Discharge -- family did not state  -          Pain    Additional Documentation -- Pain Scale: FACES Pre/Post-Treatment (Group)  -          Pain Scale: FACES Pre/Post-Treatment    Pain: FACES Scale, Pretreatment 2-->hurts little bit  - 2-->hurts little bit  -       Posttreatment Pain Rating 2-->hurts little bit  -SM 2-->hurts little bit  -       Pain Location -- generalized  -       Pain Location - -- head  -       Pre/Posttreatment Pain Comment -- RN aware  -          Oral Motor Structure and Function    Dentition Assessment -- natural, present and adequate  -       Secretion Management -- WNL/WF  -       Mucosal Quality -- moist, healthy  -          Oral Musculature and Cranial Nerve Assessment    Oral Motor General Assessment -- [facility]          General Eating/Swallowing Observations    Respiratory Support Currently in Use -- room air  -       Eating/Swallowing Skills -- self-fed;fed by SLP  -       Positioning During Eating -- upright in bed  -       Utensils Used -- spoon;cup;straw  -       Consistencies Trialed -- ice chips;thin liquids;pureed;regular textures  -          Clinical Swallow Eval    Oral Prep Phase impaired  -SM --  -       Oral Transit impaired  -SM --  -       Oral Residue WFL  - --  -       Pharyngeal Phase no overt signs/symptoms of pharyngeal impairment  - no overt signs/symptoms of pharyngeal impairment  -       Clinical Swallow Evaluation Summary Persistent L  OM numbness. ROM/strength functional. Pt reports has to take liquid and food to R side of mouth otherwise cannot feel it and coughs. No s/s aspiration when observed taking solids and liquids on R. Did not push for tirals on L. Pt feels cognitive improved/baseline. In agreement for SLP to return tomorrow to assess higher level areas.  - No overt s/s of aspiration accross trials of thin liquid, pureed, or regular solid consistencies; even when pushed for consecutive sips of thin. Prolonged manipulation/mastication of regular solid trial, mild residue; pt able to clear w/ liquid wash. Pt w/ difficulty pulling liquid from straw on L, able to pull liquid on R side. Ok to initiate regular diet w/ thin liquids, general aspiration precautions, check for pocketing/residue. Neuro w/u ongoing, SLP will f/u for re-eval to ensure no additonal concerns  -          SLP Evaluation Clinical Impression    SLP Swallowing Diagnosis mild;oral dysphagia  - R/O pharyngeal dysphagia  -       Functional Impact risk of aspiration/pneumonia  - risk of aspiration/pneumonia  -       Rehab Potential/Prognosis, Swallowing good, to achieve stated therapy goals  - good, to achieve stated therapy goals  -       Swallow Criteria for Skilled Therapeutic Interventions Met demonstrates skilled criteria  - demonstrates skilled criteria  -          Recommendations    Therapy Frequency (Swallow) 5 days per week  - --       Predicted Duration Therapy Intervention (Days) until discharge  - until discharge  -       SLP Diet Recommendation soft to chew textures;whole;thin liquids;other (see comments)  can advance to regular solids if pt prefers  - soft to chew textures;whole;thin liquids  -       Recommended Diagnostics -- reassess via clinical swallow evaluation  -       Recommended Precautions and Strategies general aspiration precautions;other (see comments)  place food and liquid on R side of mouth  - general aspiration  precautions;small bites of food and sips of liquid;check mouth frequently for oral residue/pocketing  -       Oral Care Recommendations Oral Care BID/PRN;Toothbrush  - Oral Care BID/PRN;Toothbrush  -       SLP Rec. for Method of Medication Administration as tolerated  trial whole in applesauce to determine if pt senses improved oral control over pill in mouth.  - meds whole;with thin liquids;meds crushed;with puree;as tolerated  -       Monitor for Signs of Aspiration notify SLP if any concerns  - yes;notify SLP if any concerns  -       Anticipated Discharge Disposition (SLP) home with assist  - unknown;anticipate therapy at next level of care  -                 User Key  (r) = Recorded By, (t) = Taken By, (c) = Cosigned By      Initials Name Effective Dates    Emiliana Valentine, MS CCC-SLP 02/03/23 -      Jo Bang, MS CCC-SLP 05/12/23 -                     EDUCATION  The patient has been educated in the following areas:   Cognitive Impairment Communication Impairment Dysphagia (Swallowing Impairment) Modified Diet Instruction.        SLP GOALS       Row Name 04/09/24 1605             Patient will demonstrate functional cognitive-linguistic skills for return to discharge environment    [place] Independently  -      Time frame by discharge  -      Progress/Outcomes new goal  -         SLP Diagnostic Treatment     Patient will participate in further assessment in the following areas cognitive-linguistic;higher-level cognitive-linguistic;reading comprehension;graphic expression  -      Time Frame (Diagnostic) short term goal (STG)  -      Progress/Outcomes (Additional Goal 1, SLP) new goal  -         Memory Skills Goal 1 (SLP)    Improve Memory Skills Through Goal 1 (SLP) recalling related word lists immediately;recalling unrelated word lists immediately;80%;with minimal cues (75-90%)  -      Time Frame (Memory Skills Goal 1, SLP) short term goal (STG)  -       Progress/Outcomes (Memory Skills Goal 1, SLP) new goal  -                User Key  (r) = Recorded By, (t) = Taken By, (c) = Cosigned By      Initials Name Provider Type    Jo Santos MS CCC-SLP Speech and Language Pathologist                       Time Calculation:    Time Calculation- SLP       Row Name 04/10/24 1642             Time Calculation- SLP    SLP Received On 04/10/24  -SM         Untimed Charges    96124-SX Eval Oral Pharyng Swallow Minutes 38  -SM         Total Minutes    Untimed Charges Total Minutes 38  -SM       Total Minutes 38  -SM                User Key  (r) = Recorded By, (t) = Taken By, (c) = Cosigned By      Initials Name Provider Type    Emiliana Valentine MS CCC-SLP Speech and Language Pathologist                    Therapy Charges for Today       Code Description Service Date Service Provider Modifiers Qty    [id number] HC ST EVAL ORAL PHARYNG SWALLOW 3 4/10/2024 Emiliana Villalta MS CCC-SLP GN 1                 Emiliana Villalta MS CCC-SLP  4/10/2024

## 2024-04-10 NOTE — CASE MANAGEMENT/SOCIAL WORK
Discharge Planning Assessment  [facility]     Patient Name: Kamila Garcia  MRN: [id number]  Today's Date: 4/10/2024    Admit Date: 4/9/2024    Plan: IDP   Discharge Needs Assessment       Row Name 04/10/24 1125       Living Environment    People in Home spouse    Name(s) of People in Home Vianca, spouse    Current Living Arrangements home    Potentially Unsafe Housing Conditions unable to assess    In the past 12 months has the electric, gas, oil, or water company threatened to shut off services in your home? No    Primary Care Provided by self    Provides Primary Care For no one    Family Caregiver if Needed spouse    Family Caregiver Names Vianca, wife    Quality of Family Relationships helpful;supportive       Resource/Environmental Concerns    Resource/Environmental Concerns none    Transportation Concerns none       Transportation Needs    In the past 12 months, has lack of transportation kept you from medical appointments or from getting medications? no    In the past 12 months, has lack of transportation kept you from meetings, work, or from getting things needed for daily living? No       Food Insecurity    Within the past 12 months, you worried that your food would run out before you got the money to buy more. Never true    Within the past 12 months, the food you bought just didn't last and you didn't have money to get more. Never true       Transition Planning    Patient/Family Anticipates Transition to home    Patient/Family Anticipated Services at Transition     Transportation Anticipated family or friend will provide       Discharge Needs Assessment    Equipment Currently Used at Home none    Concerns to be Addressed discharge planning                   Discharge Plan       Row Name 04/10/24 1128       Plan    Plan IDP    Patient/Family in Agreement with Plan yes    Plan Comments Spoke with patient and spouse at bedside to initiate discharge planning.  Confirmed their residence in  [place] [facility] [place]; PCP is Latricia Mishra; insurance is [employer].  Patient states she is independent with ADLs and mobility; no DME or HH.  Discharge goal is home.  Patient is considering whether or not she will want referral to [facility].  CM will follow up tomorrow.    Final Discharge Disposition Code 30 - still a patient                  Continued Care and Services - Admitted Since 4/9/2024    No active coordination exists for this encounter.       Expected Discharge Date and Time       Expected Discharge Date Expected Discharge Time    Apr 11, 2024            Demographic Summary       Row Name 04/10/24 1123       General Information    Arrived From emergency department    Referral Source admission list    Reason for Consult discharge planning    Preferred Language English       Contact Information    Permission Granted to Share Info With                    Functional Status       Row Name 04/10/24 1123       Functional Status    Usual Activity Tolerance good       Functional Status, IADL    Medications independent    Meal Preparation independent    Housekeeping independent    Laundry independent    Shopping independent                   Psychosocial    No documentation.                  Abuse/Neglect    No documentation.                  Legal    No documentation.                  Substance Abuse    No documentation.                  Patient Forms    No documentation.                     Rachel Ballard RN

## 2024-04-10 NOTE — CONSULTS
Chart review for diabetes educator consult.    At the time of this review patient A1c is 4.9 , they have no noted history of diabetes and no home medications noted for treatment of diabetes. At this time we do not feel the patient would benefit from diabetes education. Thank you for this consult, should patient needs change please re consult us.

## 2024-04-10 NOTE — THERAPY EVALUATION
Patient Name: Kamila Garcia  : 1970    MRN: [id number]                              Today's Date: 4/10/2024       Admit Date: 2024    Visit Dx:     ICD-10-CM ICD-9-CM   1. Numbness and tingling of left side of face  R20.0 782.0    R20.2    2. Cognitive communication deficit  R41.841 799.52     Patient Active Problem List   Diagnosis    Acute pulmonary embolism    Chest pain    H/O LLE DVT & PE (2021)    History of pulmonary embolism    Insomnia    Hypertension    Palpitations    Suspected cerebrovascular accident (CVA)    Hypertensive urgency    Obesity (BMI 30-39.9)    ETOH use    Anxiety and depression     Past Medical History:   Diagnosis Date    Anxiety and depression     Chest pain     Disease of thyroid gland     reports slightly elevated when in hospital    DVT (deep venous thrombosis)     ETOH use 2024    History of pulmonary embolus (PE)     Hypertension     Hypothyroidism 2024    Migraines     Obesity (BMI 30-39.9) 2024    Shortness of breath     with chest pain episodes     Past Surgical History:   Procedure Laterality Date    CARDIAC CATHETERIZATION Left 3/11/2022    Procedure: Left Heart Cath;  Surgeon: Peter Anguiano MD;  Location:  [facility]ATH [facility];  Service: Cardiology;  Laterality: Left;  Hold Eliquis 2 days prior to [facility]    ENDOSCOPY N/A 2022    Procedure: ESOPHAGOGASTRODUODENOSCOPY;  Surgeon: Brunner, Mark I, MD;  Location:  [facility] ENDOSCOPY;  Service: Gastroenterology;  Laterality: N/A;    GASTRIC BYPASS      lap cm en y    IR STENT PLACEMENT Left 10/2021    xin surgical in cath lab placed stent in left leg      General Information       Row Name 04/10/24 0958          OT Time and Intention    Document Type evaluation  -KF     Mode of Treatment occupational therapy  -KF       Row Name 04/10/24 0958          General Information    Patient Profile Reviewed yes  -KF     Prior Level of Function independent:;all household  mobility;community mobility;bed mobility;ADL's;work;driving  Independent prior, no AD  -KF     Existing Precautions/Restrictions fall;other (see comments)  L sided weakness/numbness  -KF     Barriers to Rehab medically complex;previous functional deficit  -KF       Row Name 04/10/24 0958          Living Environment    People in Home spouse  -KF       Row Name 04/10/24 0958          Home Main Entrance    Number of Stairs, Main Entrance three  -KF     Stair Railings, Main Entrance none  -KF       Row Name 04/10/24 0958          Stairs Within Home, Primary    Stairs, Within Home, Primary Pt states she has a tub shower.  -KF     Number of Stairs, Within Home, Primary none  -KF       Row Name 04/10/24 0958          Cognition    Orientation Status (Cognition) oriented x 3  -KF       Row Name 04/10/24 0958          Safety Issues, Functional Mobility    Safety Issues Affecting Function (Mobility) awareness of need for assistance;insight into deficits/self-awareness;safety precaution awareness;safety precautions follow-through/compliance;sequencing abilities;positioning of assistive device  -KF     Impairments Affecting Function (Mobility) balance;sensation/sensory awareness;endurance/activity tolerance;strength;grasp;pain  -KF               User Key  (r) = Recorded By, (t) = Taken By, (c) = Cosigned By      Initials Name Provider Type    KF Karolina Prabhakar OT Occupational Therapist                     Mobility/ADL's       Row Name 04/10/24 0959          Bed Mobility    Bed Mobility supine-sit  -KF     Supine-Sit [place] (Bed Mobility) contact guard;verbal cues  -KF     Assistive Device (Bed Mobility) bed rails;head of bed elevated  -KF     Comment, (Bed Mobility) Verbal cues for technique and sequence. Pt noted dizziness upon sitting EOB, BP monitored.  -KF       Row Name 04/10/24 0959          Transfers    Transfers bed-chair transfer;sit-stand transfer;stand-sit transfer  -KF     Comment, (Transfers) Pt performed  STS from the EOB and took steps to the chair with Davy x2, HHA x2. L sided weakness noted, spefically at quad during transfer, though no knee buckling noted.  -KF       Row Name 04/10/24 0959          Bed-Chair Transfer    Bed-Chair [place] (Transfers) minimum assist (75% patient effort);2 person assist;verbal cues;nonverbal cues (demo/gesture)  -KF     Assistive Device (Bed-Chair Transfers) other (see comments)  HHA x2  -KF       Row Name 04/10/24 0959          Sit-Stand Transfer    Sit-Stand [place] (Transfers) minimum assist (75% patient effort);2 person assist;verbal cues;nonverbal cues (demo/gesture)  -KF     Assistive Device (Sit-Stand Transfers) other (see comments)  HHA x2  -KF       Row Name 04/10/24 0959          Stand-Sit Transfer    Stand-Sit [place] (Transfers) minimum assist (75% patient effort);2 person assist;verbal cues;nonverbal cues (demo/gesture)  -KF     Assistive Device (Stand-Sit Transfers) other (see comments)  HHA x2  -KF       Row Name 04/10/24 0959          Functional Mobility    Functional Mobility- Comment Defer to PT  -       Row Name 04/10/24 0959          Activities of Daily Living    BADL Assessment/Intervention upper body dressing;lower body dressing  -       Row Name 04/10/24 0959          Upper Body Dressing Assessment/Training    [place] Level (Upper Body Dressing) don;front opening garment;minimum assist (75% patient effort)  -     Position (Upper Body Dressing) edge of bed sitting  -       Row Name 04/10/24 0959          Lower Body Dressing Assessment/Training    [place] Level (Lower Body Dressing) don;socks;dependent (less than 25% patient effort)  -KF     Position (Lower Body Dressing) edge of bed sitting  -KF     Comment, (Lower Body Dressing) Pt deferred attempt to don socks at the EOB secondary to persistent dizziness.  -KF               User Key  (r) = Recorded By, (t) = Taken By, (c) = Cosigned By      Initials Name Provider Type    KF  Karolina Prabhakar OT Occupational Therapist                   Obj/Interventions       Row Name 04/10/24 1001          Sensory Assessment (Somatosensory)    Sensory Assessment (Somatosensory) right UE  -KF     Right UE Sensory Assessment light touch awareness;impaired  -KF       Row Name 04/10/24 1001          Vision Assessment/Intervention    Visual Impairment/Limitations WFL  -KF       Row Name 04/10/24 1001          Range of Motion Comprehensive    General Range of Motion bilateral upper extremity ROM WFL  -KF       Row Name 04/10/24 1001          Strength Comprehensive (MMT)    General Manual Muscle Testing (MMT) Assessment upper extremity strength deficits identified  -KF     Comment, General Manual Muscle Testing (MMT) Assessment RUE grossly 4/5, LUE grossly 3/5  -KF       Row Name 04/10/24 1001          Balance    Balance Assessment sitting static balance;sitting dynamic balance;sit to stand dynamic balance;standing static balance;standing dynamic balance  -KF     Static Sitting Balance standby assist  -KF     Dynamic Sitting Balance contact guard  -KF     Position, Sitting Balance unsupported;sitting edge of bed  -KF     Sit to Stand Dynamic Balance minimal assist;2-person assist  -KF     Static Standing Balance minimal assist;1-person assist  -KF     Dynamic Standing Balance minimal assist;2-person assist  -KF     Position/Device Used, Standing Balance supported  -KF     Balance Interventions sitting;standing;sit to stand;supported;static;dynamic;occupation based/functional task  -KF               User Key  (r) = Recorded By, (t) = Taken By, (c) = Cosigned By      Initials Name Provider Type    KF Karolina Prabhakar OT Occupational Therapist                   Goals/Plan       Row Name 04/10/24 1005          Transfer Goal 1 (OT)    Activity/Assistive Device (Transfer Goal 1, OT) commode;bed-to-chair/chair-to-bed  -KF     [place] Level/Cues Needed (Transfer Goal 1, OT) supervision required  -KF     Time  Frame (Transfer Goal 1, OT) long term goal (LTG);10 days  -KF     Progress/Outcome (Transfer Goal 1, OT) goal ongoing  -KF       Row Name 04/10/24 1005          Dressing Goal 1 (OT)    Activity/Device (Dressing Goal 1, OT) upper body dressing;lower body dressing  -KF     [place]/Cues Needed (Dressing Goal 1, OT) supervision required  -KF     Time Frame (Dressing Goal 1, OT) long term goal (LTG);10 days  -KF     Progress/Outcome (Dressing Goal 1, OT) goal ongoing  -KF       Row Name 04/10/24 1005          Grooming Goal 1 (OT)    Activity/Device (Grooming Goal 1, OT) hair care;oral care;wash face, hands  -KF     [place] (Grooming Goal 1, OT) supervision required  -KF     Time Frame (Grooming Goal 1, OT) long term goal (LTG);10 days  -KF     Strategies/Barriers (Grooming Goal 1, OT) sink side  -KF     Progress/Outcome (Grooming Goal 1, OT) goal ongoing  -KF       Row Name 04/10/24 1005          Strength Goal 1 (OT)    Strength Goal 1 (OT) Pt will increase LUE strength to 4/5 to faciliate greater independence during ADLs and functional mobility.  -KF     Time Frame (Strength Goal 1, OT) long term goal (LTG);10 days  -KF     Progress/Outcome (Strength Goal 1, OT) goal ongoing  -KF       Row Name 04/10/24 1005          Therapy Assessment/Plan (OT)    Planned Therapy Interventions (OT) activity tolerance training;adaptive equipment training;BADL retraining;functional balance retraining;occupation/activity based interventions;patient/caregiver education/training;ROM/therapeutic exercise;strengthening exercise;transfer/mobility retraining  -KF               User Key  (r) = Recorded By, (t) = Taken By, (c) = Cosigned By      Initials Name Provider Type    KF Karolina Prabhakar, OT Occupational Therapist                   Clinical Impression       Row Name 04/10/24 1002          Pain Assessment    Pretreatment Pain Rating 6/10  -KF     Posttreatment Pain Rating 6/10  -KF     Pain Location generalized  -KF     Pain  Location - chest;head  -KF     Pain Intervention(s) Repositioned;Ambulation/increased activity  -       Row Name 04/10/24 1002          Plan of Care Review    Plan of Care Reviewed With patient  -KF     Progress no change  -KF     Outcome Evaluation OT evaluation completed. The pt presents below her functional baseline with generalized weakness (L>R), decreased activity tolerance, and balance deficits. The pt performed bed mobility with CGA and took ~3-4 steps to the chair with Davy x2, HHA x2 secondary to L sided weakness. The pt will benefit from continued IP OT services to increase the pt's safety and independence during ADLs and functional mobility. Recommend a d/c to IRF for best outcome.  -       Row Name 04/10/24 1002          Therapy Assessment/Plan (OT)    Rehab Potential (OT) good, to achieve stated therapy goals  -     Criteria for Skilled Therapeutic Interventions Met (OT) yes;skilled treatment is necessary  -KF     Therapy Frequency (OT) daily  -       Row Name 04/10/24 1002          Therapy Plan Review/Discharge Plan (OT)    Anticipated Discharge Disposition (OT) inpatient rehabilitation facility  -       Row Name 04/10/24 1002          Vital Signs    Pre Systolic BP Rehab 135  supine  -KF     Pre Treatment Diastolic BP 65  -KF     Intra Systolic BP Rehab 143  EOB  -KF     Intra Treatment Diastolic BP 71  -KF     Post Systolic BP Rehab 154  post transfer to chair  -KF     Post Treatment Diastolic BP 72  -KF     Pretreatment Heart Rate (beats/min) 82  -KF     Intratreatment Heart Rate (beats/min) 74  -KF     Posttreatment Heart Rate (beats/min) 67  -KF     Pre SpO2 (%) 99  -KF     O2 Delivery Pre Treatment room air  -KF     Post SpO2 (%) 98  -KF     O2 Delivery Post Treatment room air  -KF     Pre Patient Position Supine  -KF     Intra Patient Position Standing  -KF     Post Patient Position Sitting  -KF       Row Name 04/10/24 1002          Positioning and Restraints    Pre-Treatment Position  in bed  -KF     Post Treatment Position chair  -KF     In Chair sitting;with PT  -KF               User Key  (r) = Recorded By, (t) = Taken By, (c) = Cosigned By      Initials Name Provider Type    Karolina Rajput OT Occupational Therapist                   Outcome Measures       Row Name 04/10/24 1006          How much help from another is currently needed...    Putting on and taking off regular lower body clothing? 2  -KF     Bathing (including washing, rinsing, and drying) 2  -KF     Toileting (which includes using toilet bed pan or urinal) 3  -KF     Putting on and taking off regular upper body clothing 3  -KF     Taking care of personal grooming (such as brushing teeth) 3  -KF     Eating meals 3  -KF     AM-PAC 6 Clicks Score (OT) 16  -KF       Row Name 04/10/24 0800          How much help from another person do you currently need...    Turning from your back to your side while in flat bed without using bedrails? 3  -MP     Moving from lying on back to sitting on the side of a flat bed without bedrails? 2  -MP     Moving to and from a bed to a chair (including a wheelchair)? 2  -MP     Standing up from a chair using your arms (e.g., wheelchair, bedside chair)? 2  -MP     Climbing 3-5 steps with a railing? 2  -MP     To walk in hospital room? 2  -MP     AM-PAC 6 Clicks Score (PT) 13  -MP     Highest Level of Mobility Goal 4 --> Transfer to chair/commode  -MP       Row Name 04/10/24 1006          Functional Assessment    Outcome Measure Options AM-PAC 6 Clicks Daily Activity (OT)  -KF               User Key  (r) = Recorded By, (t) = Taken By, (c) = Cosigned By      Initials Name Provider Type    Terri Guerrier, RN Registered Nurse    Karolina Rajput OT Occupational Therapist                    Occupational Therapy Education       Title: PT OT SLP Therapies (In Progress)       Topic: Occupational Therapy (In Progress)       Point: ADL training (Done)       Description:   Instruct learner(s) on proper  safety adaptation and remediation techniques during self care or transfers.   Instruct in proper use of assistive devices.                  Learning Progress Summary             Patient Acceptance, E,TB, VU,DU by  at 4/10/2024 0853                         Point: Home exercise program (Not Started)       Description:   Instruct learner(s) on appropriate technique for monitoring, assisting and/or progressing therapeutic exercises/activities.                  Learner Progress:  Not documented in this visit.              Point: Precautions (Done)       Description:   Instruct learner(s) on prescribed precautions during self-care and functional transfers.                  Learning Progress Summary             Patient Acceptance, E,TB, VU,DU by  at 4/10/2024 0853                         Point: Body mechanics (Done)       Description:   Instruct learner(s) on proper positioning and spine alignment during self-care, functional mobility activities and/or exercises.                  Learning Progress Summary             Patient Acceptance, E,TB, VU,DU by  at 4/10/2024 0853                                         User Key       Initials Effective Dates Name Provider Type Discipline     08/09/23 -  Karolina Prabhakar OT Occupational Therapist OT                  OT Recommendation and Plan  Planned Therapy Interventions (OT): activity tolerance training, adaptive equipment training, BADL retraining, functional balance retraining, occupation/activity based interventions, patient/caregiver education/training, ROM/therapeutic exercise, strengthening exercise, transfer/mobility retraining  Therapy Frequency (OT): daily  Plan of Care Review  Plan of Care Reviewed With: patient  Progress: no change  Outcome Evaluation: OT evaluation completed. The pt presents below her functional baseline with generalized weakness (L>R), decreased activity tolerance, and balance deficits. The pt performed bed mobility with CGA and took ~3-4  steps to the chair with Davy x2, HHA x2 secondary to L sided weakness. The pt will benefit from continued IP OT services to increase the pt's safety and independence during ADLs and functional mobility. Recommend a d/c to IRF for best outcome.     Time Calculation:   Evaluation Complexity (OT)  Review Occupational Profile/Medical/Therapy History Complexity: expanded/moderate complexity  Assessment, Occupational Performance/Identification of Deficit Complexity: 3-5 performance deficits  Clinical Decision Making Complexity (OT): detailed assessment/moderate complexity  Overall Complexity of Evaluation (OT): moderate complexity     Time Calculation- OT       Row Name 04/10/24 1007             Time Calculation- OT    OT Start Time 0853  -KF      OT Received On 04/10/24  -KF      OT Goal Re-Cert Due Date 04/20/24  -KF         Untimed Charges    OT Eval/Re-eval Minutes 47  -KF         Total Minutes    Untimed Charges Total Minutes 47  -KF       Total Minutes 47  -KF                User Key  (r) = Recorded By, (t) = Taken By, (c) = Cosigned By      Initials Name Provider Type    KF Karolina Prabhakar OT Occupational Therapist                  Therapy Charges for Today       Code Description Service Date Service Provider Modifiers Qty    [id number]  OT EVAL MOD COMPLEXITY 4 4/10/2024 Karolina Prabhakar OT GO 1                 Karolina Prabhakar OT  4/10/2024

## 2024-04-10 NOTE — THERAPY EVALUATION
Patient Name: Kamila Garcia  : 1970    MRN: [id number]                              Today's Date: 4/10/2024       Admit Date: 2024    Visit Dx:     ICD-10-CM ICD-9-CM   1. Numbness and tingling of left side of face  R20.0 782.0    R20.2    2. Cognitive communication deficit  R41.841 799.52     Patient Active Problem List   Diagnosis    Acute pulmonary embolism    Chest pain    H/O LLE DVT & PE (2021)    History of pulmonary embolism    Insomnia    Hypertension    Palpitations    Suspected cerebrovascular accident (CVA)    Hypertensive urgency    Obesity (BMI 30-39.9)    ETOH use    Anxiety and depression     Past Medical History:   Diagnosis Date    Anxiety and depression     Chest pain     Disease of thyroid gland     reports slightly elevated when in hospital    DVT (deep venous thrombosis)     ETOH use 2024    History of pulmonary embolus (PE)     Hypertension     Hypothyroidism 2024    Migraines     Obesity (BMI 30-39.9) 2024    Shortness of breath     with chest pain episodes     Past Surgical History:   Procedure Laterality Date    CARDIAC CATHETERIZATION Left 3/11/2022    Procedure: Left Heart Cath;  Surgeon: Peter Anguiano MD;  Location:  [facility];  Service: Cardiology;  Laterality: Left;  Hold Eliquis 2 days prior to [facility]    ENDOSCOPY N/A 2022    Procedure: ESOPHAGOGASTRODUODENOSCOPY;  Surgeon: Brunner, Mark I, MD;  Location:  [facility] ENDOS[facility];  Service: Gastroenterology;  Laterality: N/A;    GASTRIC BYPASS      lap cm en y    IR STENT PLACEMENT Left 10/2021    xin surgical in cath lab placed stent in left leg      General Information       Row Name 04/10/24 1049          Physical Therapy Time and Intention    Document Type evaluation  -ND     Mode of Treatment physical therapy  -ND       Row Name 04/10/24 1049          General Information    Patient Profile Reviewed yes  -ND     Prior Level of Function independent:;all  household mobility;community mobility;gait;transfer;bed mobility;work  -ND     Existing Precautions/Restrictions fall;other (see comments)  L sided weakness/numbness.  -ND     Barriers to Rehab medically complex  -ND       Row Name 04/10/24 1049          Living Environment    People in Home spouse  -ND       Row Name 04/10/24 1049          Home Main Entrance    Number of Stairs, Main Entrance three  -ND     Stair Railings, Main Entrance none  -ND       Row Name 04/10/24 1049          Stairs Within Home, Primary    Number of Stairs, Within Home, Primary none  -ND       Row Name 04/10/24 1049          Cognition    Orientation Status (Cognition) oriented x 3  -ND       Row Name 04/10/24 1049          Safety Issues, Functional Mobility    Safety Issues Affecting Function (Mobility) awareness of need for assistance;insight into deficits/self-awareness;safety precaution awareness;safety precautions follow-through/compliance;sequencing abilities  -ND     Impairments Affecting Function (Mobility) balance;coordination;endurance/activity tolerance;sensation/sensory awareness;strength  -ND               User Key  (r) = Recorded By, (t) = Taken By, (c) = Cosigned By      Initials Name Provider Type    ND Ale Gomez, PT Physical Therapist                   Mobility       Row Name 04/10/24 1053          Bed Mobility    Comment, (Bed Mobility) Pt sitting UIC with OT upon PT arrival.  -ND       Row Name 04/10/24 1053          Bed-Chair Transfer    Assistive Device (Bed-Chair Transfers) --  BUE support  -ND       Row Name 04/10/24 1053          Sit-Stand Transfer    Sit-Stand Orient (Transfers) minimum assist (75% patient effort);2 person assist;verbal cues;nonverbal cues (demo/gesture)  -ND     Assistive Device (Sit-Stand Transfers) walker, front-wheeled  -ND     Comment, (Sit-Stand Transfer) x1 from chair.  -ND       Row Name 04/10/24 1053          Gait/Stairs (Locomotion)    Orient Level (Gait) minimum assist  (75% patient effort);verbal cues;nonverbal cues (demo/gesture);2 person assist  -ND     Assistive Device (Gait) walker, front-wheeled  -ND     Distance in Feet (Gait) 10  -ND     Deviations/Abnormal Patterns (Gait) left sided deviations;oleg decreased;gait speed decreased;stride length decreased;weight shifting decreased  -ND     Left Sided Gait Deviations weight shift ability decreased  -ND     Comment, (Gait/Stairs) Pt ambulates with short cautious steps and forward flexed posture. Pt denies increased dizziness with ambulation. Overall ambulation distance limited by fatigue and dizziness.  -ND               User Key  (r) = Recorded By, (t) = Taken By, (c) = Cosigned By      Initials Name Provider Type    ND Ale Gomez PT Physical Therapist                   Obj/Interventions       Row Name 04/10/24 1056          Range of Motion Comprehensive    General Range of Motion bilateral lower extremity ROM WFL  -ND       Row Name 04/10/24 1056          Strength Comprehensive (MMT)    General Manual Muscle Testing (MMT) Assessment lower extremity strength deficits identified  -ND     Comment, General Manual Muscle Testing (MMT) Assessment RLE MMT 4+/5, LLE 4/5.  -ND       Row Name 04/10/24 1056          Motor Skills    Motor Skills coordination  -ND     Coordination heel to shin;right;WFL;left;minimal impairment  -ND       Row Name 04/10/24 1056          Balance    Balance Assessment sitting static balance;sitting dynamic balance;sit to stand dynamic balance;standing static balance;standing dynamic balance  -ND     Static Sitting Balance standby assist  -ND     Dynamic Sitting Balance contact guard  -ND     Position, Sitting Balance unsupported;sitting in chair  -ND     Sit to Stand Dynamic Balance minimal assist;2-person assist  -ND     Static Standing Balance minimal assist;1-person assist  -ND     Dynamic Standing Balance minimal assist;2-person assist  -ND     Position/Device Used, Standing Balance  supported;walker, front-wheeled  -ND     Balance Interventions sitting;standing;sit to stand;supported;static;dynamic  -ND       Row Name 04/10/24 1056          Sensory Assessment (Somatosensory)    Sensory Assessment (Somatosensory) left LE;right LE  -ND     Left LE Sensory Assessment impaired  -ND     Right LE Sensory Assessment intact  -ND               User Key  (r) = Recorded By, (t) = Taken By, (c) = Cosigned By      Initials Name Provider Type    ND Ale Gomez, PT Physical Therapist                   Goals/Plan       Row Name 04/10/24 1101          Bed Mobility Goal 1 (PT)    Activity/Assistive Device (Bed Mobility Goal 1, PT) sit to supine/supine to sit  -ND     [place] Level/Cues Needed (Bed Mobility Goal 1, PT) independent  -ND     Time Frame (Bed Mobility Goal 1, PT) long term goal (LTG)  -ND       Row Name 04/10/24 1101          Transfer Goal 1 (PT)    Activity/Assistive Device (Transfer Goal 1, PT) sit-to-stand/stand-to-sit;bed-to-chair/chair-to-bed  -ND     [place] Level/Cues Needed (Transfer Goal 1, PT) independent  -ND     Time Frame (Transfer Goal 1, PT) long term goal (LTG);10 days  -ND       Row Name 04/10/24 1101          Gait Training Goal 1 (PT)    Activity/Assistive Device (Gait Training Goal 1, PT) gait (walking locomotion);increase endurance/gait distance;decrease fall risk;assistive device use;walker, rolling  -ND     [place] Level (Gait Training Goal 1, PT) modified independence  -ND     Distance (Gait Training Goal 1, PT) 150  -ND     Time Frame (Gait Training Goal 1, PT) long term goal (LTG);10 days  -ND       Row Name 04/10/24 1101          Therapy Assessment/Plan (PT)    Planned Therapy Interventions (PT) balance training;bed mobility training;gait training;home exercise program;patient/family education;transfer training;postural re-education;strengthening  -ND               User Key  (r) = Recorded By, (t) = Taken By, (c) = Cosigned By      Initials Name  Provider Type    ND Ale Gomez, PT Physical Therapist                   Clinical Impression       Row Name 04/10/24 1058          Pain    Pretreatment Pain Rating 6/10  -ND     Posttreatment Pain Rating 6/10  -ND     Pain Location generalized  -ND     Pain Location - chest;head  -ND     Pain Intervention(s) Repositioned;Ambulation/increased activity  -ND       Row Name 04/10/24 1058          Plan of Care Review    Plan of Care Reviewed With patient  -ND     Outcome Evaluation PT evaluation completed. Pt presents with L sided weakness, impaired sensation on the L, limited activity tolerance, and impaired balance and coordination warranting IP PT services to address deficits and facilitate return to PLOF. Recommend IRF following d/c.  -ND       Row Name 04/10/24 1058          Therapy Assessment/Plan (PT)    Patient/Family Therapy Goals Statement (PT) Return to PLOF.  -ND     Rehab Potential (PT) good, to achieve stated therapy goals  -ND     Criteria for Skilled Interventions Met (PT) yes;meets criteria;skilled treatment is necessary  -ND     Therapy Frequency (PT) daily  -ND       Row Name 04/10/24 1058          Vital Signs    Pre Systolic BP Rehab 143  -ND     Pre Treatment Diastolic BP 71  -ND     Intra Systolic BP Rehab 154  -ND     Intra Treatment Diastolic BP 72  -ND     Post Systolic BP Rehab 149  -ND     Post Treatment Diastolic BP 76  -ND     Pretreatment Heart Rate (beats/min) 84  -ND     Posttreatment Heart Rate (beats/min) 69  -ND     Pre SpO2 (%) 99  -ND     O2 Delivery Pre Treatment room air  -ND     O2 Delivery Intra Treatment room air  -ND     Post SpO2 (%) 100  -ND     O2 Delivery Post Treatment room air  -ND     Pre Patient Position Sitting  -ND     Intra Patient Position Standing  -ND     Post Patient Position Sitting  -ND       Row Name 04/10/24 1058          Positioning and Restraints    Pre-Treatment Position sitting in chair/recliner  -ND     Post Treatment Position chair  -ND     In  Chair notified nsg;reclined;legs elevated;call light within reach;encouraged to call for assist;exit alarm on;waffle cushion  -ND               User Key  (r) = Recorded By, (t) = Taken By, (c) = Cosigned By      Initials Name Provider Type    Ale Bethea, PT Physical Therapist                   Outcome Measures       Row Name 04/10/24 1101 04/10/24 0800       How much help from another person do you currently need...    Turning from your back to your side while in flat bed without using bedrails? 3  -ND 3  -MP    Moving from lying on back to sitting on the side of a flat bed without bedrails? 3  -ND 2  -MP    Moving to and from a bed to a chair (including a wheelchair)? 3  -ND 2  -MP    Standing up from a chair using your arms (e.g., wheelchair, bedside chair)? 3  -ND 2  -MP    Climbing 3-5 steps with a railing? 2  -ND 2  -MP    To walk in hospital room? 3  -ND 2  -MP    AM-PAC 6 Clicks Score (PT) 17  -ND 13  -MP    Highest Level of Mobility Goal 5 --> Static standing  -ND 4 --> Transfer to chair/commode  -MP      Row Name 04/10/24 1101          Modified [place] Scale    Pre-Stroke Modified Mi Scale 6 - Unable to determine (UTD) from the medical record documentation  -ND     Modified [place] Scale 4 - Moderately severe disability.  Unable to walk without assistance, and unable to attend to own bodily needs without assistance.  -ND       Row Name 04/10/24 1101 04/10/24 1006       Functional Assessment    Outcome Measure Options AM-PAC 6 Clicks Basic Mobility (PT);Modified [place]  -ND AM-PAC 6 Clicks Daily Activity (OT)  -KF              User Key  (r) = Recorded By, (t) = Taken By, (c) = Cosigned By      Initials Name Provider Type    Terri Guerrier, RN Registered Nurse    Karolina Rajput OT Occupational Therapist    Ale Bethea, PT Physical Therapist                                 Physical Therapy Education       Title: PT OT SLP Therapies (In Progress)       Topic: Physical Therapy (In  Progress)       Point: Mobility training (In Progress)       Learning Progress Summary             Patient Acceptance, E, NR by ND at 4/10/2024 1103                         Point: Home exercise program (Not Started)       Learner Progress:  Not documented in this visit.              Point: Body mechanics (In Progress)       Learning Progress Summary             Patient Acceptance, E, NR by ND at 4/10/2024 1103                         Point: Precautions (In Progress)       Learning Progress Summary             Patient Acceptance, E, NR by ND at 4/10/2024 1103                                         User Key       Initials Effective Dates Name Provider Type Discipline    ND 11/16/23 -  Ale Gomez, PT Physical Therapist PT                  PT Recommendation and Plan  Planned Therapy Interventions (PT): balance training, bed mobility training, gait training, home exercise program, patient/family education, transfer training, postural re-education, strengthening  Plan of Care Reviewed With: patient  Outcome Evaluation: PT evaluation completed. Pt presents with L sided weakness, impaired sensation on the L, limited activity tolerance, and impaired balance and coordination warranting IP PT services to address deficits and facilitate return to PLOF. Recommend IRF following d/c.     Time Calculation:   PT Evaluation Complexity  History, PT Evaluation Complexity: 3 or more personal factors and/or comorbidities  Examination of Body Systems (PT Eval Complexity): total of 4 or more elements  Clinical Presentation (PT Evaluation Complexity): evolving  Clinical Decision Making (PT Evaluation Complexity): moderate complexity  Overall Complexity (PT Evaluation Complexity): moderate complexity     PT Charges       Row Name 04/10/24 1103             Time Calculation    Start Time 0856  -ND      PT Received On 04/10/24  -ND      PT Goal Re-Cert Due Date 04/20/24 -ND         Untimed Charges    PT Eval/Re-eval Minutes 35  -ND          Total Minutes    Untimed Charges Total Minutes 35  -ND       Total Minutes 35  -ND                User Key  (r) = Recorded By, (t) = Taken By, (c) = Cosigned By      Initials Name Provider Type    Ale Bethea, PT Physical Therapist                  Therapy Charges for Today       Code Description Service Date Service Provider Modifiers Qty    64021363819 HC PT EVAL MOD COMPLEXITY 3 4/10/2024 Ale Gomez, PT GP 1            PT G-Codes  Outcome Measure Options: AM-PAC 6 Clicks Basic Mobility (PT), Modified Mi  AM-PAC 6 Clicks Score (PT): 17  AM-PAC 6 Clicks Score (OT): 16  Modified [place] Scale: 4 - Moderately severe disability.  Unable to walk without assistance, and unable to attend to own bodily needs without assistance.  PT Discharge Summary  Anticipated Discharge Disposition (PT): inpatient rehabilitation facility    Ale Gomez, [place]  4/10/2024

## 2024-04-10 NOTE — PROGRESS NOTES
Stroke Progress Note       Chief Complaint:  left sided numbness     Subjective    Subjective     Subjective:   No acute events overnight     Review of Systems   Constitutional: No fatigue  HENT: Negative for nosebleeds and rhinorrhea.    Eyes: Negative for redness.   Respiratory: Negative for cough.    Gastrointestinal: Negative for anal bleeding.   Endocrine: Negative for polydipsia.   Genitourinary: Negative for enuresis and urgency.   Musculoskeletal: Negative for joint swelling.   Neurological: Negative for tremors.   Psychiatric/Behavioral: Negative for hallucinations.      Objective      Temp:  [98.5 °F (36.9 °C)-99.2 °F (37.3 °C)] 98.5 °F (36.9 °C)  Heart Rate:  [59-83] 59  Resp:  [16-18] 16  BP: ()/() 135/69        GEN: NAD, pleasant, cooperative  Eyes-show anicteric sclera, moist conjunctiva with no lid lag, no redness  Neck-trachea midline.  There is no thyromegaly.  ENMT-oropharynx clear with moist mucous membranes and good dentition.  Skin-no rash, lesions or ulcers.  Cardiovascular exam-no pedal edema, regular rate and rhythm.  CHEST: No signs of resp distress, on room air  Abdomen-no abdominal distention, nontender.  Psychiatric exam-alert oriented x3 with intact judgment and insight      NEURO    MENTAL STATUS: AAOx3, memory intact, fund of knowledge appropriate    LANG/SPEECH: Naming and repetition intact, fluent, follows 3-step commands    CRANIAL NERVES:      II: Pupils equal and reactive, no RAPD, no VF deficits, fundus(not done)      III, IV, VI: EOM intact, no gaze preference or deviation, no nystagmus.      V: normal sensation in V1, V2, and V3 segments bilaterally      VII: no asymmetry, no nasolabial fold flattening      VIII: normal hearing to speech      IX, X: normal palatal elevation, no uvular deviation      XI: 5/5 head turn and 5/5 shoulder shrug bilaterally      XII: midline tongue protrusion    MOTOR:  Normal tone throughout  5/5 muscle power in Rt shoulder  abductors/adductors, elbow flexors/extensors, wrist flexors/extensors, finger abductors/adductors.  5/5 in Rt hip flexors/extensors, knee flexors/extensors, ankle dorsiflexors and plantar flexors.    4/5 muscle power in Lt shoulder abductors/adductors, elbow flexors/extensors, wrist flexors/extensors, finger abductors/adductors.  4/5 in Lt hip flexors/extensors, knee flexors/extensors, ankle dorsiflexors and plantea flexors.( Effort related weakness)    REFLEXES:  no [facility], no clonus    SENSORY:  Left sided mild sensory loss to touch     COORDINATION: Normal finger to nose and heel to shin, no tremor, no dysmetria    STATION: Not assessed due to patient condition    GAIT: Not assessed due to patient condition   Results Review:    I reviewed the patient's new clinical results.    Lab Results (last 24 hours)       Procedure Component Value Units Date/Time    Urine Drug Screen - Urine, Clean Catch [[id number]]  (Abnormal) Collected: 04/10/24 0643    Specimen: Urine, Clean Catch Updated: 04/10/24 0724     THC, Screen, Urine Negative     Phencyclidine (PCP), Urine Negative     Cocaine Screen, Urine Negative     Methamphetamine, Ur Negative     Opiate Screen Positive     Amphetamine Screen, Urine Negative     Benzodiazepine Screen, Urine Negative     Tricyclic Antidepressants Screen Negative     Methadone Screen, Urine Negative     Barbiturates Screen, Urine Negative     Oxycodone Screen, Urine Negative     Buprenorphine, Screen, Urine Negative    Narrative:      Cutoff For Drugs Screened:    Amphetamines               500 ng/ml  Barbiturates               200 ng/ml  Benzodiazepines            150 ng/ml  Cocaine                    150 ng/ml  Methadone                  200 ng/ml  Opiates                    100 ng/ml  Phencyclidine               25 ng/ml  THC                         50 ng/ml  Methamphetamine            500 ng/ml  Tricyclic Antidepressants  300 ng/ml  Oxycodone                  100 ng/ml  Buprenorphine                10 ng/ml    The normal value for all drugs tested is negative. This report includes unconfirmed screening results, with the cutoff values listed, to be used for medical treatment purposes only.  Unconfirmed results must not be used for non-medical purposes such as employment or legal testing.  Clinical consideration should be applied to any drug of abuse test, particularly when unconfirmed results are used.      Fentanyl, Urine - Urine, Clean Catch [[id number]] Collected: 04/10/24 0643    Specimen: Urine, Clean Catch Updated: 04/10/24 0704    POC Glucose Once [[id number]]  (Normal) Collected: 04/09/24 2008    Specimen: Blood Updated: 04/10/24 0556     Glucose 105 mg/dL     Lipid Panel [[id number]]  (Abnormal) Collected: 04/10/24 0432    Specimen: Blood from Arm, Left Updated: 04/10/24 0515     Total Cholesterol 168 mg/dL      Triglycerides 62 mg/dL      HDL Cholesterol 86 mg/dL      LDL Cholesterol  70 mg/dL      VLDL Cholesterol 12 mg/dL      LDL/HDL Ratio 0.81    Narrative:      Cholesterol Reference Ranges  (U.S. Department of Health and Human Services ATP III Classifications)    Desirable          <200 mg/dL  Borderline High    200-239 mg/dL  High Risk          >240 mg/dL      Triglyceride Reference Ranges  (U.S. Department of Health and Human Services ATP III Classifications)    Normal           <150 mg/dL  Borderline High  150-199 mg/dL  High             200-499 mg/dL  Very High        >500 mg/dL    HDL Reference Ranges  (U.S. Department of Health and Human Services ATP III Classifications)    Low     <40 mg/dl (major risk factor for CHD)  High    >60 mg/dl ('negative' risk factor for CHD)        LDL Reference Ranges  (U.S. Department of Health and Human Services ATP III Classifications)    Optimal          <100 mg/dL  Near Optimal     100-129 mg/dL  Borderline High  130-159 mg/dL  High             160-189 mg/dL  Very High        >189 mg/dL    Valproic Acid Level, Total [[id number]]  (Abnormal)  Collected: 04/10/24 0432    Specimen: Blood from Arm, Left Updated: 04/10/24 0515     Valproic Acid 40.9 mcg/mL     Narrative:      Therapeutic Ranges for Valproic Acid    Epilepsy:       mcg/ml  Bipolar/Xochitl  up to 125 mcg/ml      Basic Metabolic Panel [[id number]]  (Abnormal) Collected: 04/10/24 0432    Specimen: Blood from Arm, Left Updated: 04/10/24 0515     Glucose 103 mg/dL      BUN 7 mg/dL      Creatinine 0.63 mg/dL      Sodium 142 mmol/L      Potassium 4.1 mmol/L      Comment: Slight hemolysis detected by analyzer. Result may be falsely elevated.        Chloride 108 mmol/L      CO2 23.0 mmol/L      Calcium 8.6 mg/dL      BUN/Creatinine Ratio 11.1     Anion Gap 11.0 mmol/L      eGFR 104.9 mL/min/1.73     Narrative:      GFR Normal >60  Chronic Kidney Disease <60  Kidney Failure <15      Hemoglobin A1c [[id number]]  (Normal) Collected: 04/10/24 0432    Specimen: Blood from Arm, Left Updated: 04/10/24 0514     Hemoglobin A1C 4.90 %     Narrative:      Hemoglobin A1C Ranges:    Increased Risk for Diabetes  5.7% to 6.4%  Diabetes                     >= 6.5%  Diabetic Goal                < 7.0%    CBC (No Diff) [[id number]]  (Abnormal) Collected: 04/10/24 0432    Specimen: Blood from Arm, Left Updated: 04/10/24 0449     WBC 4.97 10*3/mm3      RBC 4.15 10*6/mm3      Hemoglobin 11.7 g/dL      Hematocrit 36.6 %      MCV 88.2 fL      MCH 28.2 pg      MCHC 32.0 g/dL      RDW 13.5 %      RDW-SD 43.3 fl      MPV 9.8 fL      Platelets 257 10*3/mm3     POC Glucose Once [[id number]]  (Normal) Collected: 04/09/24 1711    Specimen: Blood Updated: 04/09/24 1713     Glucose 114 mg/dL     [place] Draw [[id number]] Collected: 04/09/24 0842    Specimen: Blood Updated: 04/09/24 1245    Narrative:      The following orders were created for panel order [place] [facility].  Procedure                               Abnormality         Status                     ---------                               -----------         ------                      Green Top (Gel)[[id number]]                                  Final result               Lavender Top[[id number]]                                     Final result               Gold Top - SST[[id number]]                                   Final result               Cheema Top[[id number]]                                         Final result               Light Blue Top[[id number]]                                   Final result                 Please view results for these tests on the individual orders.    Gray Top [[id number]] Collected: 04/09/24 0842    Specimen: Blood Updated: 04/09/24 1245     Extra Tube Hold for add-ons.     Comment: Auto resulted.       aPTT [[id number]]  (Abnormal) Collected: 04/09/24 0842    Specimen: Blood Updated: 04/09/24 1131     PTT 27.0 seconds     Narrative:      PTT = The equivalent PTT values for the therapeutic range of heparin levels at 0.3 to 0.5 U/ml are 60 to 70 seconds.    Protime-INR [[id number]]  (Normal) Collected: 04/09/24 0842    Specimen: Blood Updated: 04/09/24 1131     Protime 13.9 Seconds      INR 1.06    BNP [[id number]]  (Normal) Collected: 04/09/24 0842    Specimen: Blood Updated: 04/09/24 1046     proBNP 273.0 pg/mL     Narrative:      This assay is used as an aid in the diagnosis of individuals suspected of having heart failure. It can be used as an aid in the diagnosis of acute decompensated heart failure (ADHF) in patients presenting with signs and symptoms of ADHF to the emergency department (ED). In addition, NT-proBNP of <300 pg/mL indicates ADHF is not likely.    Age Range Result Interpretation  NT-proBNP Concentration (pg/mL:      <50             Positive            >450                   Gray                 300-450                    Negative             <300    50-75           Positive            >900                  Gray                300-900                  Negative            <300      >75             Positive            >1800                  Cheema                 300-1800                  Negative            <300    Urinalysis, Microscopic Only - Urine, Clean Catch [[id number]]  (Abnormal) Collected: 04/09/24 1002    Specimen: Urine, Clean Catch Updated: 04/09/24 1040     RBC, UA 0-2 /HPF      WBC, UA 6-10 /HPF      Bacteria, UA Trace /HPF      Squamous Epithelial Cells, UA 3-6 /HPF      Hyaline Casts, UA 0-6 /LPF      Methodology Automated Microscopy    Urinalysis With Microscopic If Indicated (No Culture) - Urine, Clean Catch [[id number]]  (Abnormal) Collected: 04/09/24 1002    Specimen: Urine, Clean Catch Updated: 04/09/24 1040     Color, UA Yellow     Appearance, UA Clear     pH, UA 5.5     Specific Gravity, UA 1.012     Glucose, UA Negative     Ketones, UA Negative     Bilirubin, UA Negative     Blood, UA Trace     Protein, UA Negative     Leuk Esterase, UA Trace     Nitrite, UA Negative     Urobilinogen, UA 0.2 E.U./dL          MRI Brain Without Contrast    Result Date: 4/9/2024  Impression: No evidence of acute infarct. Probable sequela of mild chronic small vessel ischemic disease. Electronically Signed: Francisco Myers MD  4/9/2024 10:11 PM EDT  Workstation ID: [id number]    CT CEREBRAL PERFUSION WITH & WITHOUT CONTRAST    Addendum Date: 4/9/2024    ADDENDUM #1 Technique: CTA of the head and neck was performed after the uneventful intravenous administration of 150 mL Isovue-370.  Reconstructed coronal and sagittal images were also obtained. In addition, a 3-D volume rendered image was created for interpretation. Automated exposure control and iterative reconstruction methods were used. Electronically Signed: Jose Alfredo Tejeda MD  4/9/2024 6:05 PM EDT  Workstation ID: [id number] ORIGINAL REPORT: CT CEREBRAL PERFUSION W WO CONTRAST, CT ANGIOGRAM HEAD W AI ANALYSIS OF LVO, CT ANGIOGRAM NECK Date of Exam: 4/9/2024 11:21 AM EDT Indication: Neuro deficit, acute, stroke suspected.  Comparison: None available. Technique: Axial CT images of the brain were obtained prior to and  after the administration of . Core blood volume, core blood flow, mean transit time, and Tmax images were obtained utilizing the Rapid software protocol. A limited CT angiogram of the head was also performed to measure the blood vessel density. The radiation dose reduction device was turned on for each scan per the ALARA (As Low as Reasonably Achievable) protocol. FINDINGS: Vascular Findings: The right common carotid, internal carotid, middle cerebral, anterior cerebral, vertebral, and posterior cerebral arteries are patent without abrupt cut off or aneurysmal dilation. The left common carotid, internal carotid, middle cerebral, anterior cerebral, vertebral, and posterior cerebral arteries are patent without abrupt cut off or aneurysmal dilation. Basilar artery appears patent and appears unremarkable. Non-vascular Findings: For description of nonvascular intracranial findings, please refer to the noncontrast head CT performed the same date. No acute abnormality is identified within the visualized soft tissue or bony structures of the neck. The visualized lung apices are clear. CT Perfusion: CBF (<30%) volume: 0 mL Tmax (>6.0s) volume: 0 mL Mismatch volume: 0 mL Mismatch ratio: None IMPRESSION: 1.No acute abnormality identified within the large arteries of the head or neck. 2.Significant stenosis of the bilateral internal carotid arteries. 3.CT perfusion study demonstrates no territorial ischemia or core infarct. Electronically Signed: Jose Alfredo Tejeda MD  4/9/2024 11:47 AM EDT  Workstation ID: [id number]    Result Date: 4/9/2024  1.No acute abnormality identified within the large arteries of the head or neck. 2.Significant stenosis of the bilateral internal carotid arteries. 3.CT perfusion study demonstrates no territorial ischemia or core infarct. Electronically Signed: Jose Alfredo Tejeda MD  4/9/2024 11:47 AM EDT  Workstation ID: [id number]    CT Angiogram Head w AI Analysis of LVO    Addendum Date: 4/9/2024    ADDENDUM  #1 Technique: CTA of the head and neck was performed after the uneventful intravenous administration of 150 mL Isovue-370.  Reconstructed coronal and sagittal images were also obtained. In addition, a 3-D volume rendered image was created for interpretation. Automated exposure control and iterative reconstruction methods were used. Electronically Signed: Jose Alfredo Tejeda MD  4/9/2024 6:05 PM EDT  Workstation ID: [id number] ORIGINAL REPORT: CT CEREBRAL PERFUSION W WO CONTRAST, CT ANGIOGRAM HEAD W AI ANALYSIS OF LVO, CT ANGIOGRAM NECK Date of Exam: 4/9/2024 11:21 AM EDT Indication: Neuro deficit, acute, stroke suspected.  Comparison: None available. Technique: Axial CT images of the brain were obtained prior to and after the administration of . Core blood volume, core blood flow, mean transit time, and Tmax images were obtained utilizing the Rapid software protocol. A limited CT angiogram of the head was also performed to measure the blood vessel density. The radiation dose reduction device was turned on for each scan per the ALARA (As Low as Reasonably Achievable) protocol. FINDINGS: Vascular Findings: The right common carotid, internal carotid, middle cerebral, anterior cerebral, vertebral, and posterior cerebral arteries are patent without abrupt cut off or aneurysmal dilation. The left common carotid, internal carotid, middle cerebral, anterior cerebral, vertebral, and posterior cerebral arteries are patent without abrupt cut off or aneurysmal dilation. Basilar artery appears patent and appears unremarkable. Non-vascular Findings: For description of nonvascular intracranial findings, please refer to the noncontrast head CT performed the same date. No acute abnormality is identified within the visualized soft tissue or bony structures of the neck. The visualized lung apices are clear. CT Perfusion: CBF (<30%) volume: 0 mL Tmax (>6.0s) volume: 0 mL Mismatch volume: 0 mL Mismatch ratio: None IMPRESSION: 1.No acute  abnormality identified within the large arteries of the head or neck. 2.Significant stenosis of the bilateral internal carotid arteries. 3.CT perfusion study demonstrates no territorial ischemia or core infarct. Electronically Signed: Jose Alfredo Tejeda MD  4/9/2024 11:47 AM EDT  Workstation ID: [id number]    Result Date: 4/9/2024  1.No acute abnormality identified within the large arteries of the head or neck. 2.Significant stenosis of the bilateral internal carotid arteries. 3.CT perfusion study demonstrates no territorial ischemia or core infarct. Electronically Signed: Jose Alfredo Tejeda MD  4/9/2024 11:47 AM EDT  Workstation ID: [id number]    CT Angiogram Neck    Addendum Date: 4/9/2024    ADDENDUM #1 Technique: CTA of the head and neck was performed after the uneventful intravenous administration of 150 mL Isovue-370.  Reconstructed coronal and sagittal images were also obtained. In addition, a 3-D volume rendered image was created for interpretation. Automated exposure control and iterative reconstruction methods were used. Electronically Signed: Jose Alfredo Tejeda MD  4/9/2024 6:05 PM EDT  Workstation ID: [id number] ORIGINAL REPORT: CT CEREBRAL PERFUSION W WO CONTRAST, CT ANGIOGRAM HEAD W AI ANALYSIS OF LVO, CT ANGIOGRAM NECK Date of Exam: 4/9/2024 11:21 AM EDT Indication: Neuro deficit, acute, stroke suspected.  Comparison: None available. Technique: Axial CT images of the brain were obtained prior to and after the administration of . Core blood volume, core blood flow, mean transit time, and Tmax images were obtained utilizing the Rapid software protocol. A limited CT angiogram of the head was also performed to measure the blood vessel density. The radiation dose reduction device was turned on for each scan per the ALARA (As Low as Reasonably Achievable) protocol. FINDINGS: Vascular Findings: The right common carotid, internal carotid, middle cerebral, anterior cerebral, vertebral, and posterior cerebral arteries are  patent without abrupt cut off or aneurysmal dilation. The left common carotid, internal carotid, middle cerebral, anterior cerebral, vertebral, and posterior cerebral arteries are patent without abrupt cut off or aneurysmal dilation. Basilar artery appears patent and appears unremarkable. Non-vascular Findings: For description of nonvascular intracranial findings, please refer to the noncontrast head CT performed the same date. No acute abnormality is identified within the visualized soft tissue or bony structures of the neck. The visualized lung apices are clear. CT Perfusion: CBF (<30%) volume: 0 mL Tmax (>6.0s) volume: 0 mL Mismatch volume: 0 mL Mismatch ratio: None IMPRESSION: 1.No acute abnormality identified within the large arteries of the head or neck. 2.Significant stenosis of the bilateral internal carotid arteries. 3.CT perfusion study demonstrates no territorial ischemia or core infarct. Electronically Signed: Jose Alfredo Tejeda MD  4/9/2024 11:47 AM EDT  Workstation ID: [id number]    Result Date: 4/9/2024  1.No acute abnormality identified within the large arteries of the head or neck. 2.Significant stenosis of the bilateral internal carotid arteries. 3.CT perfusion study demonstrates no territorial ischemia or core infarct. Electronically Signed: Jose Alfredo Tejeda MD  4/9/2024 11:47 AM EDT  Workstation ID: [id number]    CT Head Without Contrast    Result Date: 4/9/2024  No acute intracranial abnormality is identified. Electronically Signed: Jose Alfredo Tejeda MD  4/9/2024 2:43 PM EDT  Workstation ID: [id number]    XR Chest 1 View    Result Date: 4/9/2024  Impression: No acute process identified Electronically Signed: Denis Nelson MD  4/9/2024 10:07 AM EDT  Workstation ID: [id number]    CT Head Without Contrast    Result Date: 4/9/2024  1.No acute intracranial abnormality is identified. Electronically Signed: Jose Alfredo Tejeda MD  4/9/2024 9:27 AM EDT  Workstation ID: [id number]   Results for orders placed during the  hospital encounter of 01/10/22    Adult Transthoracic Echo Complete W/ Cont if Necessary Per Protocol    Interpretation Summary  · Left ventricular wall thickness is consistent with mild concentric hypertrophy.  · Estimated left ventricular EF = 70% Left ventricular ejection fraction appears to be 61 - 65%. Left ventricular systolic function is normal.            Assessment/Plan     Assessment/Plan:  53-year-old female presented to the emergency department after experiencing a syncopal episode, left side numbness and chest pain. TNK was given for left sided symptoms as symptoms appeared to be disabling. CTAs, CTP no sig pathology. Her exam appears to be effort related. MRI brain showed no acute infarct. Mild T2 FLAIR hyper-intensities bilaterally. It is less likely she had any acute ischemic event of the brain. Likely stroke mimic- migraine vs functional. Continue PT/OT/Speech. Okay for aspirin 81 after 24hr scan. If cleared by therapy. Okay from stroke stand point to be discharged.       Ariel Bautista MD  04/10/24  09:48 EDT

## 2024-04-10 NOTE — PLAN OF CARE
Goal Outcome Evaluation:  Plan of Care Reviewed With: patient           Outcome Evaluation: PT evaluation completed. Pt presents with L sided weakness, impaired sensation on the L, limited activity tolerance, and impaired balance and coordination warranting IP PT services to address deficits and facilitate return to PLOF. Recommend IRF following d/c.      Anticipated Discharge Disposition (PT): inpatient rehabilitation facility

## 2024-04-11 ENCOUNTER — APPOINTMENT (OUTPATIENT)
Dept: NEUROLOGY | Facility: HOSPITAL | Age: 54
DRG: 103 | End: 2024-04-11
Payer: COMMERCIAL

## 2024-04-11 LAB
ANION GAP SERPL CALCULATED.3IONS-SCNC: 9 MMOL/L (ref 5–15)
BASOPHILS # BLD AUTO: 0.04 10*3/MM3 (ref 0–0.2)
BASOPHILS NFR BLD AUTO: 0.9 % (ref 0–1.5)
BUN SERPL-MCNC: 9 MG/DL (ref 6–20)
BUN/CREAT SERPL: 12.7 (ref 7–25)
CALCIUM SPEC-SCNC: 8.5 MG/DL (ref 8.6–10.5)
CHLORIDE SERPL-SCNC: 107 MMOL/L (ref 98–107)
CO2 SERPL-SCNC: 24 MMOL/L (ref 22–29)
CREAT SERPL-MCNC: 0.71 MG/DL (ref 0.57–1)
DEPRECATED RDW RBC AUTO: 43.5 FL (ref 37–54)
EGFRCR SERPLBLD CKD-EPI 2021: 100.6 ML/MIN/1.73
EOSINOPHIL # BLD AUTO: 0.32 10*3/MM3 (ref 0–0.4)
EOSINOPHIL NFR BLD AUTO: 7.2 % (ref 0.3–6.2)
ERYTHROCYTE [DISTWIDTH] IN BLOOD BY AUTOMATED COUNT: 13.3 % (ref 12.3–15.4)
FOLATE SERPL-MCNC: 10.6 NG/ML (ref 4.78–24.2)
GLUCOSE SERPL-MCNC: 94 MG/DL (ref 65–99)
HCT VFR BLD AUTO: 38.4 % (ref 34–46.6)
HGB BLD-MCNC: 12.1 G/DL (ref 12–15.9)
IMM GRANULOCYTES # BLD AUTO: 0.01 10*3/MM3 (ref 0–0.05)
IMM GRANULOCYTES NFR BLD AUTO: 0.2 % (ref 0–0.5)
LYMPHOCYTES # BLD AUTO: 1.27 10*3/MM3 (ref 0.7–3.1)
LYMPHOCYTES NFR BLD AUTO: 28.5 % (ref 19.6–45.3)
MAGNESIUM SERPL-MCNC: 2.2 MG/DL (ref 1.6–2.6)
MCH RBC QN AUTO: 27.7 PG (ref 26.6–33)
MCHC RBC AUTO-ENTMCNC: 31.5 G/DL (ref 31.5–35.7)
MCV RBC AUTO: 87.9 FL (ref 79–97)
MONOCYTES # BLD AUTO: 0.51 10*3/MM3 (ref 0.1–0.9)
MONOCYTES NFR BLD AUTO: 11.5 % (ref 5–12)
NEUTROPHILS NFR BLD AUTO: 2.3 10*3/MM3 (ref 1.7–7)
NEUTROPHILS NFR BLD AUTO: 51.7 % (ref 42.7–76)
NRBC BLD AUTO-RTO: 0 /100 WBC (ref 0–0.2)
PLATELET # BLD AUTO: 265 10*3/MM3 (ref 140–450)
PMV BLD AUTO: 9.9 FL (ref 6–12)
POTASSIUM SERPL-SCNC: 3.9 MMOL/L (ref 3.5–5.2)
RBC # BLD AUTO: 4.37 10*6/MM3 (ref 3.77–5.28)
SODIUM SERPL-SCNC: 140 MMOL/L (ref 136–145)
T4 FREE SERPL-MCNC: 0.98 NG/DL (ref 0.93–1.7)
TSH SERPL DL<=0.05 MIU/L-ACNC: 7.51 UIU/ML (ref 0.27–4.2)
VIT B12 BLD-MCNC: 222 PG/ML (ref 211–946)
WBC NRBC COR # BLD AUTO: 4.45 10*3/MM3 (ref 3.4–10.8)

## 2024-04-11 PROCEDURE — 82746 ASSAY OF FOLIC ACID SERUM: CPT | Performed by: INTERNAL MEDICINE

## 2024-04-11 PROCEDURE — 25010000002 KETOROLAC TROMETHAMINE PER 15 MG: Performed by: INTERNAL MEDICINE

## 2024-04-11 PROCEDURE — 99222 1ST HOSP IP/OBS MODERATE 55: CPT

## 2024-04-11 PROCEDURE — 95816 EEG AWAKE AND DROWSY: CPT | Performed by: PSYCHIATRY & NEUROLOGY

## 2024-04-11 PROCEDURE — 80048 BASIC METABOLIC PNL TOTAL CA: CPT | Performed by: INTERNAL MEDICINE

## 2024-04-11 PROCEDURE — 84443 ASSAY THYROID STIM HORMONE: CPT | Performed by: INTERNAL MEDICINE

## 2024-04-11 PROCEDURE — 84439 ASSAY OF FREE THYROXINE: CPT | Performed by: INTERNAL MEDICINE

## 2024-04-11 PROCEDURE — 99232 SBSQ HOSP IP/OBS MODERATE 35: CPT | Performed by: PSYCHIATRY & NEUROLOGY

## 2024-04-11 PROCEDURE — 99233 SBSQ HOSP IP/OBS HIGH 50: CPT | Performed by: INTERNAL MEDICINE

## 2024-04-11 PROCEDURE — 25010000002 PROCHLORPERAZINE 10 MG/2ML SOLUTION: Performed by: INTERNAL MEDICINE

## 2024-04-11 PROCEDURE — 25010000002 DIPHENHYDRAMINE PER 50 MG: Performed by: INTERNAL MEDICINE

## 2024-04-11 PROCEDURE — 83735 ASSAY OF MAGNESIUM: CPT | Performed by: INTERNAL MEDICINE

## 2024-04-11 PROCEDURE — 97116 GAIT TRAINING THERAPY: CPT

## 2024-04-11 PROCEDURE — 95816 EEG AWAKE AND DROWSY: CPT

## 2024-04-11 PROCEDURE — 85025 COMPLETE CBC W/AUTO DIFF WBC: CPT | Performed by: INTERNAL MEDICINE

## 2024-04-11 PROCEDURE — 25010000002 THIAMINE HCL 200 MG/2ML SOLUTION: Performed by: INTERNAL MEDICINE

## 2024-04-11 PROCEDURE — 97110 THERAPEUTIC EXERCISES: CPT

## 2024-04-11 PROCEDURE — 82607 VITAMIN B-12: CPT | Performed by: INTERNAL MEDICINE

## 2024-04-11 RX ORDER — PROCHLORPERAZINE EDISYLATE 5 MG/ML
10 INJECTION INTRAMUSCULAR; INTRAVENOUS EVERY 6 HOURS PRN
Status: COMPLETED | OUTPATIENT
Start: 2024-04-11 | End: 2024-04-12

## 2024-04-11 RX ORDER — CHOLECALCIFEROL (VITAMIN D3) 125 MCG
5 CAPSULE ORAL NIGHTLY PRN
Status: DISCONTINUED | OUTPATIENT
Start: 2024-04-11 | End: 2024-04-16 | Stop reason: HOSPADM

## 2024-04-11 RX ORDER — MULTIPLE VITAMINS W/ MINERALS TAB 9MG-400MCG
1 TAB ORAL DAILY
Status: DISCONTINUED | OUTPATIENT
Start: 2024-04-11 | End: 2024-04-16 | Stop reason: HOSPADM

## 2024-04-11 RX ORDER — LANOLIN ALCOHOL/MO/W.PET/CERES
1000 CREAM (GRAM) TOPICAL DAILY
Status: DISCONTINUED | OUTPATIENT
Start: 2024-04-11 | End: 2024-04-16 | Stop reason: HOSPADM

## 2024-04-11 RX ORDER — THIAMINE HYDROCHLORIDE 100 MG/ML
200 INJECTION, SOLUTION INTRAMUSCULAR; INTRAVENOUS EVERY 8 HOURS SCHEDULED
Status: COMPLETED | OUTPATIENT
Start: 2024-04-11 | End: 2024-04-16

## 2024-04-11 RX ORDER — LORAZEPAM 1 MG/1
1 TABLET ORAL
Status: ACTIVE | OUTPATIENT
Start: 2024-04-11 | End: 2024-04-16

## 2024-04-11 RX ORDER — LORAZEPAM 1 MG/1
2 TABLET ORAL
Status: ACTIVE | OUTPATIENT
Start: 2024-04-11 | End: 2024-04-16

## 2024-04-11 RX ORDER — MIDAZOLAM HYDROCHLORIDE 1 MG/ML
4 INJECTION INTRAMUSCULAR; INTRAVENOUS
Status: ACTIVE | OUTPATIENT
Start: 2024-04-11 | End: 2024-04-16

## 2024-04-11 RX ORDER — LOSARTAN POTASSIUM 50 MG/1
50 TABLET ORAL
Status: DISCONTINUED | OUTPATIENT
Start: 2024-04-11 | End: 2024-04-16 | Stop reason: HOSPADM

## 2024-04-11 RX ORDER — FOLIC ACID 1 MG/1
1 TABLET ORAL DAILY
Status: DISCONTINUED | OUTPATIENT
Start: 2024-04-11 | End: 2024-04-16 | Stop reason: HOSPADM

## 2024-04-11 RX ORDER — BUPROPION HYDROCHLORIDE 150 MG/1
150 TABLET ORAL DAILY
Status: DISCONTINUED | OUTPATIENT
Start: 2024-04-11 | End: 2024-04-16 | Stop reason: HOSPADM

## 2024-04-11 RX ORDER — DIPHENHYDRAMINE HYDROCHLORIDE 50 MG/ML
25 INJECTION INTRAMUSCULAR; INTRAVENOUS EVERY 6 HOURS PRN
Status: COMPLETED | OUTPATIENT
Start: 2024-04-11 | End: 2024-04-12

## 2024-04-11 RX ORDER — MIDAZOLAM HYDROCHLORIDE 1 MG/ML
2 INJECTION INTRAMUSCULAR; INTRAVENOUS
Status: ACTIVE | OUTPATIENT
Start: 2024-04-11 | End: 2024-04-16

## 2024-04-11 RX ORDER — KETOROLAC TROMETHAMINE 15 MG/ML
15 INJECTION, SOLUTION INTRAMUSCULAR; INTRAVENOUS EVERY 6 HOURS PRN
Status: COMPLETED | OUTPATIENT
Start: 2024-04-11 | End: 2024-04-12

## 2024-04-11 RX ADMIN — Medication 5 MG: at 23:25

## 2024-04-11 RX ADMIN — THIAMINE HYDROCHLORIDE 200 MG: 100 INJECTION, SOLUTION INTRAMUSCULAR; INTRAVENOUS at 14:50

## 2024-04-11 RX ADMIN — Medication 10 ML: at 21:29

## 2024-04-11 RX ADMIN — THIAMINE HYDROCHLORIDE 200 MG: 100 INJECTION, SOLUTION INTRAMUSCULAR; INTRAVENOUS at 21:28

## 2024-04-11 RX ADMIN — OLANZAPINE 10 MG: 5 TABLET, FILM COATED ORAL at 21:28

## 2024-04-11 RX ADMIN — BUPROPION HYDROCHLORIDE 150 MG: 150 TABLET, EXTENDED RELEASE ORAL at 14:51

## 2024-04-11 RX ADMIN — Medication 10 ML: at 14:50

## 2024-04-11 RX ADMIN — ACETAMINOPHEN 1000 MG: 500 TABLET ORAL at 17:27

## 2024-04-11 RX ADMIN — LOSARTAN POTASSIUM 50 MG: 50 TABLET, FILM COATED ORAL at 14:52

## 2024-04-11 RX ADMIN — KETOROLAC TROMETHAMINE 15 MG: 15 INJECTION, SOLUTION INTRAMUSCULAR; INTRAVENOUS at 23:28

## 2024-04-11 RX ADMIN — Medication 10 ML: at 07:45

## 2024-04-11 RX ADMIN — PROCHLORPERAZINE EDISYLATE 10 MG: 5 INJECTION INTRAMUSCULAR; INTRAVENOUS at 23:25

## 2024-04-11 RX ADMIN — KETOROLAC TROMETHAMINE 15 MG: 15 INJECTION, SOLUTION INTRAMUSCULAR; INTRAVENOUS at 14:50

## 2024-04-11 RX ADMIN — ASPIRIN 81 MG: 81 TABLET, COATED ORAL at 09:55

## 2024-04-11 RX ADMIN — MAGNESIUM OXIDE TAB 400 MG (241.3 MG ELEMENTAL MG) 400 MG: 400 (241.3 MG) TAB at 09:55

## 2024-04-11 RX ADMIN — Medication 1000 MCG: at 17:20

## 2024-04-11 RX ADMIN — DIPHENHYDRAMINE HYDROCHLORIDE 25 MG: 50 INJECTION, SOLUTION INTRAMUSCULAR; INTRAVENOUS at 23:25

## 2024-04-11 RX ADMIN — DIPHENHYDRAMINE HYDROCHLORIDE 25 MG: 50 INJECTION, SOLUTION INTRAMUSCULAR; INTRAVENOUS at 14:49

## 2024-04-11 RX ADMIN — Medication 1 TABLET: at 14:51

## 2024-04-11 RX ADMIN — ARIPIPRAZOLE 15 MG: 15 TABLET ORAL at 09:55

## 2024-04-11 RX ADMIN — ACETAMINOPHEN 1000 MG: 500 TABLET ORAL at 10:47

## 2024-04-11 RX ADMIN — Medication 10 ML: at 14:51

## 2024-04-11 RX ADMIN — PROCHLORPERAZINE EDISYLATE 10 MG: 5 INJECTION INTRAMUSCULAR; INTRAVENOUS at 14:49

## 2024-04-11 RX ADMIN — FOLIC ACID 1 MG: 1 TABLET ORAL at 14:52

## 2024-04-11 NOTE — CONSULTS
Date of Hospital Visit: 24  Encounter Provider: Ligia Duggan PA-C  Place of Service: [facility]  Patient Name: Kamila Garcia  :1970  Referral Provider: No ref. provider found  Primary Care Provider: Latricia Mishra APRN    Chief complaint/Reason for Consultation: syncope    Problem List:  Syncope  Echo 4/10/2024: Ef 53%, mild LHV, saline test results negative, aortic sclerosis without stenosis  Carotid duplex 4/10/2024: no hemodynamically significant carotis artery disease  Chest pain  Negative MPS .  Echocardiogram, 2022: Mild LVH. LVEF 0.60.  CT Cardiac calcium score 6.4, 2022  LHC 3/11/2022, Dr. Anguiano: normal coronary arteries  Palpitations   Event monitor 2022: normal event monitor  Essential hypertension  Moderate obesity: BMI 33.18  History of LLE DVT/PE 2021, chronically anticoagulated with Eliquis, followed by LLE stent 2021-data deficit  Left lower extremity deep vein thrombosis noted in the peroneal vein. Chronicity of the thrombosis is uncertain, 2021  A limited left lower extremity DVT is seen involving the peroneal vein with partial compression noted, 2021  Normal left lower extremity venous duplex scan, 2022  Stopped taking Eliquis 3/2024  Suspected CVA, given TNKase 2024, imagining negative for infarct  Elevated TSH  Elevated lipase  Insomnia  [facility] 3-day admission, 2022, for chest pain. She had an unremarkable stress test and EGD. Her pain was felt to be pleuritic.  Alcohol use  Surgical history:  Gastric bypass   LLE stent      History of Present Illness:  Patient is a 53 year old female with the above past medical history who we are asked to see in consultation today for syncope. She states at work on Friday she had an initial episode. She did have some dizziness and lightheadedness prior. She did not go to the ED or get evaluated after this episode. Her second episode occurred on  Tuesday. She states she must have been out of it a little longer this time because she was in the ambulance when she came to. Did have some chest pain just prior to the episode. Has had some fluttering sensation. Workup as an inpatient has been unremarkable thus far.     Past Medical History:   Diagnosis Date    Anxiety and depression     Chest pain     Disease of thyroid gland     reports slightly elevated when in hospital    DVT (deep venous thrombosis)     ETOH use 04/09/2024    History of pulmonary embolus (PE)     Hypertension     Hypothyroidism 04/09/2024    Migraines     Obesity (BMI 30-39.9) 04/09/2024    Shortness of breath     with chest pain episodes       Past Surgical History:   Procedure Laterality Date    CARDIAC CATHETERIZATION Left 3/11/2022    Procedure: Left Heart Cath;  Surgeon: Peter Anguiano MD;  Location:  [facility] CATH INVASIVE LOCATION;  Service: Cardiology;  Laterality: Left;  Hold Eliquis 2 days prior to [facility]    ENDOSCOPY N/A 1/12/2022    Procedure: ESOPHAGOGASTRODUODENOSCOPY;  Surgeon: Brunner, Mark I, MD;  Location:  [facility] ENDOSCOPY;  Service: Gastroenterology;  Laterality: N/A;    GASTRIC BYPASS  2007    lap cm en y    IR STENT PLACEMENT Left 10/2021    xin surgical in cath lab placed stent in left leg       Medications Prior to Admission   Medication Sig Dispense Refill Last Dose    ARIPiprazole (ABILIFY) 15 MG tablet Take 1 tablet by mouth Daily.   4/8/2024    eszopiclone (LUNESTA) 3 MG tablet Take 1 tablet by mouth Every Night. Take immediately before bedtime   4/8/2024    OLANZapine (zyPREXA) 10 MG tablet Take 1 tablet by mouth Every Night.   4/8/2024    apixaban (ELIQUIS) 5 MG tablet tablet Start On 7/8: Take 1 tablet by mouth Every 12 (Twelve) Hours. 60 tablet 0     isosorbide mononitrate (IMDUR) 30 MG 24 hr tablet Take 1 tablet by mouth Daily. 90 tablet 3     lisinopril (PRINIVIL,ZESTRIL) 10 MG tablet Take 1 tablet by mouth Daily.       nitroglycerin (NITROSTAT) 0.4 MG SL  "tablet Place 1 tablet under the tongue Every 5 (Five) Minutes As Needed for Chest Pain. Take no more than 3 doses in 15 minutes. 30 tablet 0     ondansetron (ZOFRAN) 4 MG tablet Take 1 tablet by mouth Every 6 (Six) Hours As Needed for Nausea or Vomiting. 30 tablet 0     pantoprazole (PROTONIX) 40 MG EC tablet Take 1 tablet by mouth 2 (Two) Times a Day Before Meals. 60 tablet 0        Social History     Socioeconomic History    Marital status:    Tobacco Use    Smoking status: Never    Smokeless tobacco: Never   Vaping Use    Vaping status: Never Used   Substance and Sexual Activity    Alcohol use: Yes     Alcohol/week: 4.0 standard drinks of alcohol     Types: 4 Cans of beer per week     Comment: daily    Drug use: Never    Sexual activity: Defer       Family History   Problem Relation Age of Onset    Heart attack Mother 50    Cancer Mother     Stroke Father     Breast cancer Maternal Grandmother     No Known Problems Maternal Grandfather     No Known Problems Paternal Grandmother     No Known Problems Paternal Grandfather        REVIEW OF SYSTEMS:     12 point ROS was performed and is Negative except as outlined in HPI     Objective:     Vitals:    04/11/24 1011 04/11/24 1012 04/11/24 1013 04/11/24 1055   BP: 173/82 163/75 151/87 170/82   BP Location: Right arm Right arm Right arm Right arm   Patient Position: Lying Sitting Standing Lying   Pulse: 70 73 74 72   Resp:    16   Temp:    97.8 °F (36.6 °C)   TempSrc:    Oral   SpO2: 99% 100% 98% 97%   Weight:       Height:         Body mass index is 35.96 kg/m².  Flowsheet Rows      Flowsheet Row First Filed Value   Admission Height 167.6 cm (65.98\") Documented at 04/09/2024 0841   Admission Weight 89.4 kg (197 lb) Documented at 04/09/2024 0841            Physical Exam   General: No acute distress  Skin: Skin is warm and dry. No obvious cyanosis, erythema or pallor.   HEENT: Atraumatic, normocephalic, no conjunctival pallor, no scleral icterus.   Neck: Supple, no " JVD. Normal carotid upstrokes, no bruits.    Chest:No respiratory distress. No chest wall tenderness. Breath sounds are normal. No wheezes, rhonchi or rales.  Cardiovascular: Normal S1 and S2, no murmur, gallop or rub. PMI is not displaced.    Pulses:Radial and pedal pulses are 2+ and symmetric.    Abdomen: Soft, nontender, normal bowel sounds.   Musculoskeletal/Extremities:  No clubbing, cyanosis or edema. No gross deformity.   Neurological: Alert and oriented to person, place, and time, no gross focal deficits.   Psychiatric: Normal mood and affect.Speech and behavior is normal.    Lab Review:                Results from last 7 days   Lab Units 04/11/24  0607   SODIUM mmol/L 140   POTASSIUM mmol/L 3.9   CHLORIDE mmol/L 107   CO2 mmol/L 24.0   BUN mg/dL 9   CREATININE mg/dL 0.71   GLUCOSE mg/dL 94   CALCIUM mg/dL 8.5*     Results from last 7 days   Lab Units 04/10/24  1640 04/10/24  0432 04/09/24  0842   HSTROP T ng/L 8 7 9     Results from last 7 days   Lab Units 04/11/24  0607   WBC 10*3/mm3 4.45   HEMOGLOBIN g/dL 12.1   HEMATOCRIT % 38.4   PLATELETS 10*3/mm3 265     Results from last 7 days   Lab Units 04/09/24  0842   INR  1.06   APTT seconds 27.0*     Results from last 7 days   Lab Units 04/11/24  0607   MAGNESIUM mg/dL 2.2     Results from last 7 days   Lab Units 04/10/24  0432   CHOLESTEROL mg/dL 168   TRIGLYCERIDES mg/dL 62   HDL CHOL mg/dL 86*   LDL CHOL mg/dL 70       Imaging Results (Last 24 Hours)       ** No results found for the last 24 hours. **             EKG: Normal sinus rhythm         Assessment:   Syncope  Echo with normal EF, no hemodynamically significant VHD, and negative bubble study  Carotid duplex normal  Orthostatics negative  Left sided paraesthesias with headache  Neurology workup unremarkable  Hypertension  Alcohol abuse  Elevated TSH, T4 pending  Normal lipid status  History of DVT/PE 2021   previously anticoagulated with eliquis but stopped taking last month    Plan:   We will place  two week holter monitor to assess rate and rhythm for possible etiology of syncope.   LHC in 2022 with normal coronary arteries and troponin is negative with no acute ECG changes. There is no need for ischemic evaluation.   Recommend better blood pressure control. Initiate losartan 50mg today. Uptitrate as needed.   Recommend resuming Eliquis when ok with neurology.   Not much more to add from a cardiac standpoint. We will sign off. She can follow up with our office in 6 weeks as an OP.     Ligia Duggan PA-C

## 2024-04-11 NOTE — PROGRESS NOTES
Neurology       Patient Care Team:  Latricia Mishra APRN as PCP - General (Family Medicine)    Chief complaint: Headache and dizziness    History: 53-year-old woman seen for Dr. Mahoney for evaluation of syncopal event.    She had 1 last Friday and 1 day of admission.    She apparently did not injure herself with the first 1 had a sensation of lightheadedness.  She denies palpitations.    She bounced back fairly quickly after the first 1.    The second 1 was associated with a fall and hitting her head on the floor.  After which she has had sensation of vertigo as well is bifrontal 8 out of 10 nonthrobbing headache.  She has no complaints in her neck.    She has not had headaches in the past.    She denies headaches with throbbing nausea photophobia.    She has had tPA for presumed infarct with left sensory loss.    She continues to complain of numbness and tingling in the left arm and leg.    She has been drinking four 16 ounce beers a day and has been making an effort to taper over the last week.  She is down to 2.    Blood pressure on admission was 239/169.    Seen Dr. Alexis several years ago and demanding about a year ago.    He was followed for history of DVT with Eliquis therapy.    She has had some chest pain which apparently was noncardiac and has had heart cath and cardiac monitoring which was unremarkable.    Has a history of depression that was treated by psychiatrist with Zyprexa and bupropion 348 mg.    She is only been taking the Zyprexa and Lunesta for sleep.    She has not been taking bupropion or the prescribed clonidine for her blood pressure.    Also not taking the Eliquis prescribed.  Past Medical History:   Diagnosis Date    Anxiety and depression     Chest pain     Disease of thyroid gland     reports slightly elevated when in hospital    DVT (deep venous thrombosis)     ETOH use 04/09/2024    History of pulmonary embolus (PE)     Hypertension     Hypothyroidism 04/09/2024    Migraines      Obesity (BMI 30-39.9) 04/09/2024    Shortness of breath     with chest pain episodes       Vital Signs   Vitals:    04/11/24 1011 04/11/24 1012 04/11/24 1013 04/11/24 1055   BP: 173/82 163/75 151/87 170/82   BP Location: Right arm Right arm Right arm Right arm   Patient Position: Lying Sitting Standing Lying   Pulse: 70 73 74 72   Resp:    16   Temp:    97.8 °F (36.6 °C)   TempSrc:    Oral   SpO2: 99% 100% 98% 97%   Weight:       Height:           Physical Exam:   General: Pleasant white female with a flat affect.                Neuro: Mentally the patient is has a flat affect but is awake and alert she is oriented to person place and time he can give a good accounting of her history.    Speech is articulate with no word finding problems.    Coordination is normal on finger-nose testing bilaterally.    Cranial nerves show benign fundi full visual fields equal eye movements.    Facial movements are normal.  Facial sensation is symmetrical including double simultaneous stimulation.    Palate elevates normally tongue protrudes normally.    Reflexes are 1+ and equal bilaterally.    Motor testing shows normal power and tone in all muscle groups.    Sensory testing is normal to pin touch and vibration proximally and distally left and right.    Vibration does not split the midline either on the forehead of the sternum..    EKG is abnormal with minimal voltage criteria for LVH possible anterior infarct age undetermined.    L DL cholesterol is 70.    Perfusion scan and CT angiogram of the head and neck are unremarkable.    MRI brain shows scattered white matter abnormalities compatible with age.            Results Review:  Transthoracic echocardiogram shows concentric left ventricular hypertrophy normal left atrium and negative bubble study.      Results from last 7 days   Lab Units 04/11/24  0607   WBC 10*3/mm3 4.45   HEMOGLOBIN g/dL 12.1   HEMATOCRIT % 38.4   PLATELETS 10*3/mm3 265     Results from last 7 days   Lab Units  04/11/24  0607 04/10/24  0432 04/09/24  0842 04/05/24  0932   SODIUM mmol/L 140 142 141 137   POTASSIUM mmol/L 3.9 4.1 4.2 4.5   CHLORIDE mmol/L 107 108* 106 105   CO2 mmol/L 24.0 23.0 24.0 25.0   BUN mg/dL 9 7 12 10   CREATININE mg/dL 0.71 0.63 0.80 0.75   CALCIUM mg/dL 8.5* 8.6 8.4* 9.0   BILIRUBIN mg/dL  --   --  0.6 0.7   ALK PHOS U/L  --   --  91 88   ALT (SGPT) U/L  --   --  8 8   AST (SGOT) U/L  --   --  23 23   GLUCOSE mg/dL 94 103* 110* 105*       Imaging Results (Last 24 Hours)       ** No results found for the last 24 hours. **            Assessment:  Multiple syncopal spells, questionable cause.    Mild concussion with posttraumatic vertigo and posttraumatic headache.    Persisting left-sided subjective sensory changes.    History of heavy alcohol intake and recent attempts to reduce.    History of clots in the left leg on Eliquis, discontinued by the patient for financial reasons.    Depression, inadequately treated    Plan:  Resume Wellbutrin 150 mg daily for 2 weeks and increase to 300 mg daily thereafter.    Patient follow-up with her psychiatrist.    Continue baby aspirin indefinitely.    Pursue normal blood pressure control 230/80 or less.    cardiology to evaluate    EEG    Comment:  Although the imaging is negative, it certainly is conceivable that the patient had a small vessel infarct that is missed on the MRI scan.    That said, the primary event seem to be a syncopal event and 2 within the same week as new findings concerning.    Think that, primary event seems to be syncopal raising the possibility of cardiovascular causes.    Patient advised to seek out alcohol rehab as well.         I discussed the patients findings and my recommendations with patient, family, and primary care team    Jewel Rose MD  04/11/24  12:37 EDT

## 2024-04-11 NOTE — PLAN OF CARE
Goal Outcome Evaluation:  Plan of Care Reviewed With: patient        Progress: improving  Outcome Evaluation: Pt improved ambulation to 30', CGA, FWW with some moments of min-A for AD management with turns, very cautious gait with increased difficulty clearing LLE, short stride.  continued recommendation IPR

## 2024-04-11 NOTE — PROGRESS NOTES
[facility] Medicine Services  PROGRESS NOTE    Patient Name: Kamila Garcia  : 1970  MRN: [id number]    Date of Admission: 2024  Primary Care Physician: Latricia Mishra APRN    Subjective   Subjective     CC:  Left facial paresthesias    HPI:  5/10 global headache; no photophobia, no current nausea  Left facial, left arm paresthesias      Objective   Objective     Vital Signs:   Temp:  [97.7 °F (36.5 °C)-98.3 °F (36.8 °C)] 97.7 °F (36.5 °C)  Heart Rate:  [60-84] 74  Resp:  [16] 16  BP: (138-173)/(62-87) 151/87     Physical Exam:  Constitutional:Alert, oriented x 3, nontoxic appearing  Psych:Normal/appropriate affect  HEENT:NCAT, oropharynx clear  Neck: neck supple, full range of motion  Neuro: Face symmetric, speech clear, equal , moves all extremities  Cardiac: RRR; No pretibial pitting edema  Resp: CTAB, normal effort  GI: abd soft, nontender, obese  Skin: No extremity rash  Musculoskeletal/extremities: no cyanosis of extremities; no significant ankle edema    Results Reviewed:  LAB RESULTS:      Lab 24  0607 04/10/24  0432 04/09/24  0842 24  0932   WBC 4.45 4.97 4.44 4.61   HEMOGLOBIN 12.1 11.7* 13.0 12.3   HEMATOCRIT 38.4 36.6 40.8 39.5   PLATELETS 265 257 277 288   NEUTROS ABS 2.30  --  2.42 2.70   IMMATURE GRANS (ABS) 0.01  --  0.01 0.01   LYMPHS ABS 1.27  --  1.30 1.19   MONOS ABS 0.51  --  0.45 0.47   EOS ABS 0.32  --  0.21 0.18   MCV 87.9 88.2 86.1 89.2   PROTIME  --   --  13.9  --    APTT  --   --  27.0*  --          Lab 24  0607 04/10/24  0432 04/09/24  0842 24  0932   SODIUM 140 142 141 137   POTASSIUM 3.9 4.1 4.2 4.5   CHLORIDE 107 108* 106 105   CO2 24.0 23.0 24.0 25.0   ANION GAP 9.0 11.0 11.0 7.0   BUN 9 7 12 10   CREATININE 0.71 0.63 0.80 0.75   EGFR 100.6 104.9 87.1 94.2   GLUCOSE 94 103* 110* 105*   CALCIUM 8.5* 8.6 8.4* 9.0   MAGNESIUM 2.2  --  2.0 2.0   HEMOGLOBIN A1C  --  4.90  --   --    TSH 7.510*  --   --   --           Lab 04/09/24  0842 04/05/24  0932   TOTAL PROTEIN 7.3 6.5   ALBUMIN 4.2 3.9   GLOBULIN 3.1 2.6   ALT (SGPT) 8 8   AST (SGOT) 23 23   BILIRUBIN 0.6 0.7   ALK PHOS 91 88         Lab 04/10/24  1640 04/10/24  0432 04/09/24  0842 04/05/24  1205 04/05/24  0932   PROBNP  --   --  273.0  --   --    HSTROP T 8 7 9 11 8   PROTIME  --   --  13.9  --   --    INR  --   --  1.06  --   --          Lab 04/10/24  0432   CHOLESTEROL 168   LDL CHOL 70   HDL CHOL 86*   TRIGLYCERIDES 62             Brief Urine Lab Results  (Last result in the past 365 days)        Color   Clarity   Blood   Leuk Est   Nitrite   Protein   CREAT   Urine HCG        04/09/24 1002 Yellow   Clear   Trace   Trace   Negative   Negative                   Microbiology Results Abnormal       None            Duplex Carotid Ultrasound CAR    Result Date: 4/10/2024    Right internal carotid artery demonstrates normal flow without evidence of hemodynamically significant stenosis.   Left internal carotid artery demonstrates normal flow without evidence of hemodynamically significant stenosis.   Bilateral vertebral artery flow is antegrade.     CT Head Without Contrast    Result Date: 4/10/2024  CT HEAD WO CONTRAST Date of Exam: 4/10/2024 11:40 AM EDT Indication: Stroke, follow up 24 Hours Post Thrombolytic Administration. Comparison: MRI 1 day prior. Technique: Axial CT images were obtained of the head without contrast administration.  Automated exposure control and iterative construction methods were used. FINDINGS: Gray-white differentiation is maintained and there is no evidence of intracranial hemorrhage, mass or mass effect. Age-related changes of the brain are present including volume loss and typical periventricular sequela of chronic small vessel ischemia. There is otherwise no evidence of intracranial hemorrhage, mass or mass effect. The ventricles are normal in size and configuration accounting for surrounding volume loss. The orbits are normal and the  paranasal sinuses are grossly clear.     Impression: Stable CT head without evidence of hemorrhage. Electronically Signed: Mike Kidd MD  4/10/2024 11:58 AM EDT  Workstation ID: [id number]    Adult Transthoracic Echo Complete W/ Cont if Necessary Per Protocol (With Agitated Saline)    Result Date: 4/10/2024    Left ventricular systolic function is normal. Calculated left ventricular EF = 53.6%   Left ventricular wall thickness is consistent with mild concentric hypertrophy.   Saline test results are negative.   Estimated right ventricular systolic pressure from tricuspid regurgitation is normal (<35 mmHg).   The aortic valve exhibits sclerosis.     MRI Brain Without Contrast    Result Date: 4/9/2024  MRI BRAIN WO CONTRAST Date of Exam: 4/9/2024 9:36 PM EDT Indication: Stroke, follow up left sided weakness and numbness.  Comparison: Same day CT Technique:  Routine multiplanar/multisequence sequence images of the brain were obtained without contrast administration. Findings: Diffusion imaging demonstrates no evidence of acute or subacute infarct. There are a few scattered subcortical and periventricular white matter hyperintensities, which may represent sequela of mild small vessel ischemic disease. No extra-axial fluid collection. Normal ventricular volume and configuration. Patent basal cisterns. Normal flow voids within the visualized intracranial vessels. No fracture or suspicious osseous lesion. Normal appearance of the orbits. Paranasal sinuses are predominantly well aerated. No significant mastoid air cell effusion. Soft tissues are unremarkable.     Impression: Impression: No evidence of acute infarct. Probable sequela of mild chronic small vessel ischemic disease. Electronically Signed: Francisco Myers MD  4/9/2024 10:11 PM EDT  Workstation ID: [id number]    CT CEREBRAL PERFUSION WITH & WITHOUT CONTRAST    Addendum Date: 4/9/2024    ADDENDUM #1 Technique: CTA of the head and neck was performed after the  uneventful intravenous administration of 150 mL Isovue-370.  Reconstructed coronal and sagittal images were also obtained. In addition, a 3-D volume rendered image was created for interpretation. Automated exposure control and iterative reconstruction methods were used. Electronically Signed: Jose Alfredo Tejeda MD  4/9/2024 6:05 PM EDT  Workstation ID: [id number] ORIGINAL REPORT: CT CEREBRAL PERFUSION W WO CONTRAST, CT ANGIOGRAM HEAD W AI ANALYSIS OF LVO, CT ANGIOGRAM NECK Date of Exam: 4/9/2024 11:21 AM EDT Indication: Neuro deficit, acute, stroke suspected.  Comparison: None available. Technique: Axial CT images of the brain were obtained prior to and after the administration of . Core blood volume, core blood flow, mean transit time, and Tmax images were obtained utilizing the Rapid software protocol. A limited CT angiogram of the head was also performed to measure the blood vessel density. The radiation dose reduction device was turned on for each scan per the ALARA (As Low as Reasonably Achievable) protocol. FINDINGS: Vascular Findings: The right common carotid, internal carotid, middle cerebral, anterior cerebral, vertebral, and posterior cerebral arteries are patent without abrupt cut off or aneurysmal dilation. The left common carotid, internal carotid, middle cerebral, anterior cerebral, vertebral, and posterior cerebral arteries are patent without abrupt cut off or aneurysmal dilation. Basilar artery appears patent and appears unremarkable. Non-vascular Findings: For description of nonvascular intracranial findings, please refer to the noncontrast head CT performed the same date. No acute abnormality is identified within the visualized soft tissue or bony structures of the neck. The visualized lung apices are clear. CT Perfusion: CBF (<30%) volume: 0 mL Tmax (>6.0s) volume: 0 mL Mismatch volume: 0 mL Mismatch ratio: None IMPRESSION: 1.No acute abnormality identified within the large arteries of the head or  neck. 2.Significant stenosis of the bilateral internal carotid arteries. 3.CT perfusion study demonstrates no territorial ischemia or core infarct. Electronically Signed: Jose Alfredo Tejeda MD  4/9/2024 11:47 AM EDT  Workstation ID: [id number]    Result Date: 4/9/2024  CT CEREBRAL PERFUSION W WO CONTRAST, CT ANGIOGRAM HEAD W AI ANALYSIS OF LVO, CT ANGIOGRAM NECK Date of Exam: 4/9/2024 11:21 AM EDT Indication: Neuro deficit, acute, stroke suspected.  Comparison: None available. Technique: Axial CT images of the brain were obtained prior to and after the administration of . Core blood volume, core blood flow, mean transit time, and Tmax images were obtained utilizing the Rapid software protocol. A limited CT angiogram of the head was also performed to measure the blood vessel density. The radiation dose reduction device was turned on for each scan per the ALARA (As Low as Reasonably Achievable) protocol. FINDINGS: Vascular Findings: The right common carotid, internal carotid, middle cerebral, anterior cerebral, vertebral, and posterior cerebral arteries are patent without abrupt cut off or aneurysmal dilation. The left common carotid, internal carotid, middle cerebral, anterior cerebral, vertebral, and posterior cerebral arteries are patent without abrupt cut off or aneurysmal dilation. Basilar artery appears patent and appears unremarkable. Non-vascular Findings: For description of nonvascular intracranial findings, please refer to the noncontrast head CT performed the same date. No acute abnormality is identified within the visualized soft tissue or bony structures of the neck. The visualized lung apices are clear. CT Perfusion: CBF (<30%) volume: 0 mL Tmax (>6.0s) volume: 0 mL Mismatch volume: 0 mL Mismatch ratio: None     Impression: 1.No acute abnormality identified within the large arteries of the head or neck. 2.Significant stenosis of the bilateral internal carotid arteries. 3.CT perfusion study demonstrates no  territorial ischemia or core infarct. Electronically Signed: Jose Alfredo Tejeda MD  4/9/2024 11:47 AM EDT  Workstation ID: [id number]    CT Angiogram Head w AI Analysis of LVO    Addendum Date: 4/9/2024    ADDENDUM #1 Technique: CTA of the head and neck was performed after the uneventful intravenous administration of 150 mL Isovue-370.  Reconstructed coronal and sagittal images were also obtained. In addition, a 3-D volume rendered image was created for interpretation. Automated exposure control and iterative reconstruction methods were used. Electronically Signed: Jose Alfredo Tejeda MD  4/9/2024 6:05 PM EDT  Workstation ID: [id number] ORIGINAL REPORT: CT CEREBRAL PERFUSION W WO CONTRAST, CT ANGIOGRAM HEAD W AI ANALYSIS OF LVO, CT ANGIOGRAM NECK Date of Exam: 4/9/2024 11:21 AM EDT Indication: Neuro deficit, acute, stroke suspected.  Comparison: None available. Technique: Axial CT images of the brain were obtained prior to and after the administration of . Core blood volume, core blood flow, mean transit time, and Tmax images were obtained utilizing the Rapid software protocol. A limited CT angiogram of the head was also performed to measure the blood vessel density. The radiation dose reduction device was turned on for each scan per the ALARA (As Low as Reasonably Achievable) protocol. FINDINGS: Vascular Findings: The right common carotid, internal carotid, middle cerebral, anterior cerebral, vertebral, and posterior cerebral arteries are patent without abrupt cut off or aneurysmal dilation. The left common carotid, internal carotid, middle cerebral, anterior cerebral, vertebral, and posterior cerebral arteries are patent without abrupt cut off or aneurysmal dilation. Basilar artery appears patent and appears unremarkable. Non-vascular Findings: For description of nonvascular intracranial findings, please refer to the noncontrast head CT performed the same date. No acute abnormality is identified within the visualized soft  tissue or bony structures of the neck. The visualized lung apices are clear. CT Perfusion: CBF (<30%) volume: 0 mL Tmax (>6.0s) volume: 0 mL Mismatch volume: 0 mL Mismatch ratio: None IMPRESSION: 1.No acute abnormality identified within the large arteries of the head or neck. 2.Significant stenosis of the bilateral internal carotid arteries. 3.CT perfusion study demonstrates no territorial ischemia or core infarct. Electronically Signed: Jose Alfredo Tejeda MD  4/9/2024 11:47 AM EDT  Workstation ID: [id number]    Result Date: 4/9/2024  CT CEREBRAL PERFUSION W WO CONTRAST, CT ANGIOGRAM HEAD W AI ANALYSIS OF LVO, CT ANGIOGRAM NECK Date of Exam: 4/9/2024 11:21 AM EDT Indication: Neuro deficit, acute, stroke suspected.  Comparison: None available. Technique: Axial CT images of the brain were obtained prior to and after the administration of . Core blood volume, core blood flow, mean transit time, and Tmax images were obtained utilizing the Rapid software protocol. A limited CT angiogram of the head was also performed to measure the blood vessel density. The radiation dose reduction device was turned on for each scan per the ALARA (As Low as Reasonably Achievable) protocol. FINDINGS: Vascular Findings: The right common carotid, internal carotid, middle cerebral, anterior cerebral, vertebral, and posterior cerebral arteries are patent without abrupt cut off or aneurysmal dilation. The left common carotid, internal carotid, middle cerebral, anterior cerebral, vertebral, and posterior cerebral arteries are patent without abrupt cut off or aneurysmal dilation. Basilar artery appears patent and appears unremarkable. Non-vascular Findings: For description of nonvascular intracranial findings, please refer to the noncontrast head CT performed the same date. No acute abnormality is identified within the visualized soft tissue or bony structures of the neck. The visualized lung apices are clear. CT Perfusion: CBF (<30%) volume: 0 mL  Tmax (>6.0s) volume: 0 mL Mismatch volume: 0 mL Mismatch ratio: None     Impression: 1.No acute abnormality identified within the large arteries of the head or neck. 2.Significant stenosis of the bilateral internal carotid arteries. 3.CT perfusion study demonstrates no territorial ischemia or core infarct. Electronically Signed: Jose Alfredo Tejeda MD  4/9/2024 11:47 AM EDT  Workstation ID: [id number]    CT Angiogram Neck    Addendum Date: 4/9/2024    ADDENDUM #1 Technique: CTA of the head and neck was performed after the uneventful intravenous administration of 150 mL Isovue-370.  Reconstructed coronal and sagittal images were also obtained. In addition, a 3-D volume rendered image was created for interpretation. Automated exposure control and iterative reconstruction methods were used. Electronically Signed: Jose Alfredo Tejeda MD  4/9/2024 6:05 PM EDT  Workstation ID: [id number] ORIGINAL REPORT: CT CEREBRAL PERFUSION W WO CONTRAST, CT ANGIOGRAM HEAD W AI ANALYSIS OF LVO, CT ANGIOGRAM NECK Date of Exam: 4/9/2024 11:21 AM EDT Indication: Neuro deficit, acute, stroke suspected.  Comparison: None available. Technique: Axial CT images of the brain were obtained prior to and after the administration of . Core blood volume, core blood flow, mean transit time, and Tmax images were obtained utilizing the Rapid software protocol. A limited CT angiogram of the head was also performed to measure the blood vessel density. The radiation dose reduction device was turned on for each scan per the ALARA (As Low as Reasonably Achievable) protocol. FINDINGS: Vascular Findings: The right common carotid, internal carotid, middle cerebral, anterior cerebral, vertebral, and posterior cerebral arteries are patent without abrupt cut off or aneurysmal dilation. The left common carotid, internal carotid, middle cerebral, anterior cerebral, vertebral, and posterior cerebral arteries are patent without abrupt cut off or aneurysmal dilation. Basilar  artery appears patent and appears unremarkable. Non-vascular Findings: For description of nonvascular intracranial findings, please refer to the noncontrast head CT performed the same date. No acute abnormality is identified within the visualized soft tissue or bony structures of the neck. The visualized lung apices are clear. CT Perfusion: CBF (<30%) volume: 0 mL Tmax (>6.0s) volume: 0 mL Mismatch volume: 0 mL Mismatch ratio: None IMPRESSION: 1.No acute abnormality identified within the large arteries of the head or neck. 2.Significant stenosis of the bilateral internal carotid arteries. 3.CT perfusion study demonstrates no territorial ischemia or core infarct. Electronically Signed: Jose Alfredo Tejeda MD  4/9/2024 11:47 AM EDT  Workstation ID: [id number]    Result Date: 4/9/2024  CT CEREBRAL PERFUSION W WO CONTRAST, CT ANGIOGRAM HEAD W AI ANALYSIS OF LVO, CT ANGIOGRAM NECK Date of Exam: 4/9/2024 11:21 AM EDT Indication: Neuro deficit, acute, stroke suspected.  Comparison: None available. Technique: Axial CT images of the brain were obtained prior to and after the administration of . Core blood volume, core blood flow, mean transit time, and Tmax images were obtained utilizing the Rapid software protocol. A limited CT angiogram of the head was also performed to measure the blood vessel density. The radiation dose reduction device was turned on for each scan per the ALARA (As Low as Reasonably Achievable) protocol. FINDINGS: Vascular Findings: The right common carotid, internal carotid, middle cerebral, anterior cerebral, vertebral, and posterior cerebral arteries are patent without abrupt cut off or aneurysmal dilation. The left common carotid, internal carotid, middle cerebral, anterior cerebral, vertebral, and posterior cerebral arteries are patent without abrupt cut off or aneurysmal dilation. Basilar artery appears patent and appears unremarkable. Non-vascular Findings: For description of nonvascular intracranial  findings, please refer to the noncontrast head CT performed the same date. No acute abnormality is identified within the visualized soft tissue or bony structures of the neck. The visualized lung apices are clear. CT Perfusion: CBF (<30%) volume: 0 mL Tmax (>6.0s) volume: 0 mL Mismatch volume: 0 mL Mismatch ratio: None     Impression: 1.No acute abnormality identified within the large arteries of the head or neck. 2.Significant stenosis of the bilateral internal carotid arteries. 3.CT perfusion study demonstrates no territorial ischemia or core infarct. Electronically Signed: Jose Alfredo Tejeda MD  4/9/2024 11:47 AM EDT  Workstation ID: [id number]    CT Head Without Contrast    Result Date: 4/9/2024  CT HEAD WO CONTRAST Date of Exam: 4/9/2024 2:31 PM EDT Indication: Stroke, follow up headache. Comparison: Noncontrast head CT from earlier the same day Technique: Axial CT images were obtained of the head without contrast administration.  Automated exposure control and iterative construction methods were used. FINDINGS:  No significant mass effect, midline shift, intracranial hemorrhage, or hydrocephalus is identified. No extra-axial fluid collection is identified.   The calvarium and overlying soft tissues are unremarkable. The paranasal sinuses and bilateral mastoid air cells are adequately aerated. The visualized bony orbits, globes, and retrobulbar soft tissues are unremarkable.     Impression: No acute intracranial abnormality is identified. Electronically Signed: Jose Alfredo Tejeda MD  4/9/2024 2:43 PM EDT  Workstation ID: [id number]     Results for orders placed during the hospital encounter of 04/09/24    Adult Transthoracic Echo Complete W/ Cont if Necessary Per Protocol (With Agitated Saline)    Interpretation Summary    Left ventricular systolic function is normal. Calculated left ventricular EF = 53.6%    Left ventricular wall thickness is consistent with mild concentric hypertrophy.    Saline test results are  negative.    Estimated right ventricular systolic pressure from tricuspid regurgitation is normal (<35 mmHg).    The aortic valve exhibits sclerosis.      Current medications:  Scheduled Meds:ARIPiprazole, 15 mg, Oral, Daily  aspirin, 81 mg, Oral, Daily  magnesium oxide, 400 mg, Oral, Daily  OLANZapine, 10 mg, Oral, Nightly  sodium chloride, 10 mL, Intravenous, Q12H      Continuous Infusions:   PRN Meds:.  acetaminophen    diphenhydrAMINE    ketorolac    nitroglycerin    prochlorperazine    sodium chloride    sodium chloride    Assessment & Plan   Assessment & Plan     Active Hospital Problems    Diagnosis  POA    **Suspected cerebrovascular accident (CVA) [R09.89]  Yes    Syncope and collapse [R55]  Unknown    Hypertensive urgency [I16.0]  Yes    Obesity (BMI 30-39.9) [E66.9]  Yes    ETOH use [Z78.9]  Yes    Anxiety and depression [F41.9, F32.A]  Yes    H/O LLE DVT & PE (July 2021) [Z86.718]  Not Applicable      Resolved Hospital Problems   No resolved problems to display.        Brief Hospital Course to date:  Kamila Garcia is a 53 y.o. female w/ hx previous dvt & pe (July 2021) who stopped taking eliquis ~1 month ago, anxiety/depression, obesity, migraines (per chart, patient denies) who presented after a syncopal event which promped co-workers to call ems, regained consciousness en route to [facility] ED where upon arrival developed numbness and tingling to left face, arm and code stroke called. Evaluated by neuro-stroke service, ct head negative, cta h&n were negative for flow limiting stenosis and ct perfusion was negative for lvo. Ultimately was deemed candidate for thrombolytic therapy w/ tnkase administered 4/9/24 at 11:48 , also received dose of depakote, and was admitted to icu for further evaluation. Repeat ct head was negative.continued to have headache, left facial arm paresthesias. Subsequent mri brain evening 4/9/24 was negative as well. Repeat ct head 4/10/24 was again negative. Carotid duplex  "negative. Echo showed normal ef, saline test negative. Neuro-stroke service felt the symptoms were not due to cerebral ischemia, likely a stroke \"mimic\" such as functional or atypical migraine. General neurology was consulted and transferred out of icu 4/11/24.    Syncopal episode (unclear etiology)  -echo ok  -orthostatics normal 4/11/24  -no dysrhythmias on tele  -cards evaluated: set up w/ holter monitor upon discharge; follow up 6 weeks in cards clinic    Mild concussion (w/ post-traumatic vertigo and post-traumatic headache)  Persistent Left sided facial and arm paresthesias and ataxia  Borderline b12 deficiency  -s/p tnkase in ED 4/9  -ctperfusion negative, cta h&n negative  -subsequent mri negative 4/9  -echo normal  -subsequent ct head 4/10 again negative  -neuro-stroke followed: per 4/10/24: feels ongoing paresthesias less likely brain ischemic, more likely stroke \"mimic\" including \"functional\" vs atypical migraine  -oral b12 replacement (for borderline low b12 level)  **General neurology following: EEG pending; asa indefinitely (also resumed wellbutrin)  -I also ordered a prn migraine cocktail trial to see if helps (although headache isn't classical migraine)  -pt/ot recommending inpt rehab (per 4/10 notes)    Hx etoh abuse  -5 beer daily  -startciwa protocol/prn benzos protocol (? Component of withdrawal)    Htn  -start losartan 50mg (titrate prn)    Mildly elevated tsh  -tsh 7.5; normal free t4  -follow up pcp, repeat tsh 4-6 weeks    Hx previous pe & dvt  -formerly on eliquis but stopped taking last month  -continue asa currently  -restart eliquis upon discharge when ok w/ neurology      Mood d/o  -stop abilify  -continue zyprexa        Am labs: bmp, mag    Expected Discharge Location and Transportation: ? Inpt rehab (pt/ot recommend inpt rehab, patient deciding if wishes for referral)  Expected Discharge   Expected Discharge Date: 4/12/2024; Expected Discharge Time:      DVT prophylaxis:  Mechanical " DVT prophylaxis orders are present.         AM-PAC 6 Clicks Score (PT): 17 (04/10/24 4543)    CODE STATUS:   There are no questions and answers to display.       Scott Mahoney MD  04/11/24

## 2024-04-11 NOTE — THERAPY TREATMENT NOTE
Patient Name: Kamila Garcia  : 1970    MRN: [id number]                              Today's Date: 2024       Admit Date: 2024    Visit Dx:     ICD-10-CM ICD-9-CM   1. Numbness and tingling of left side of face  R20.0 782.0    R20.2    2. Cognitive communication deficit  R41.841 799.52   3. Oral phase dysphagia  R13.11 787.21     Patient Active Problem List   Diagnosis    Acute pulmonary embolism    Chest pain    H/O LLE DVT & PE (2021)    History of pulmonary embolism    Insomnia    Hypertension    Palpitations    Suspected cerebrovascular accident (CVA)    Hypertensive urgency    Obesity (BMI 30-39.9)    ETOH use    Anxiety and depression    Acute CVA (cerebrovascular accident)    Syncope and collapse     Past Medical History:   Diagnosis Date    Anxiety and depression     Chest pain     Disease of thyroid gland     reports slightly elevated when in hospital    DVT (deep venous thrombosis)     ETOH use 2024    History of pulmonary embolus (PE)     Hypertension     Hypothyroidism 2024    Migraines     Obesity (BMI 30-39.9) 2024    Shortness of breath     with chest pain episodes     Past Surgical History:   Procedure Laterality Date    CARDIAC CATHETERIZATION Left 3/11/2022    Procedure: Left Heart Cath;  Surgeon: Peter Anguiano MD;  Location:  [facility];  Service: Cardiology;  Laterality: Left;  Hold Eliquis 2 days prior to [facility]    ENDOSCOPY N/A 2022    Procedure: ESOPHAGOGASTRODUODENOSCOPY;  Surgeon: Brunner, Mark I, MD;  Location:  [facility];  Service: Gastroenterology;  Laterality: N/A;    GASTRIC BYPASS      lap cm en y    IR STENT PLACEMENT Left 10/2021    xin surgical in cath lab placed stent in left leg      General Information       Row Name 24 2188          Physical Therapy Time and Intention    Document Type therapy note (daily note)  -KG     Mode of Treatment physical therapy  -KG       Row Name 24 1900           General Information    Patient Profile Reviewed yes  -KG     Existing Precautions/Restrictions fall;other (see comments)  L sided weakness, numbness  -KG       Row Name 04/11/24 1303          Cognition    Orientation Status (Cognition) oriented x 3  -KG       Row Name 04/11/24 1303          Safety Issues, Functional Mobility    Safety Issues Affecting Function (Mobility) insight into deficits/self-awareness;awareness of need for assistance  -KG     Impairments Affecting Function (Mobility) balance;coordination;endurance/activity tolerance;sensation/sensory awareness;strength  -KG               User Key  (r) = Recorded By, (t) = Taken By, (c) = Cosigned By      Initials Name Provider Type    KG Maddie Simpson Physical Therapist                   Mobility       Row Name 04/11/24 1303          Bed Mobility    Bed Mobility supine-sit  -KG     Supine-Sit [place] (Bed Mobility) contact guard;verbal cues  -KG     Assistive Device (Bed Mobility) bed rails;head of bed elevated  -KG       Row Name 04/11/24 1303          Transfers    Comment, (Transfers) improved to CGA, FWW  -KG       Row Name 04/11/24 1303          Sit-Stand Transfer    Sit-Stand [place] (Transfers) contact guard  -KG     Assistive Device (Sit-Stand Transfers) walker, front-wheeled  -KG       Row Name 04/11/24 1303          Gait/Stairs (Locomotion)    [place] Level (Gait) minimum assist (75% patient effort);verbal cues;nonverbal cues (demo/gesture);2 person assist;contact guard  -KG     Assistive Device (Gait) walker, front-wheeled  -KG     Distance in Feet (Gait) 30  -KG     Deviations/Abnormal Patterns (Gait) left sided deviations;oleg decreased;gait speed decreased;stride length decreased;weight shifting decreased  -KG     Left Sided Gait Deviations weight shift ability decreased  -KG     Comment, (Gait/Stairs) Pt improved ambulation to 30', CGA, FWW with some moments of min-A for AD management with turns, very cautious gait  with increased difficulty clearing LLE, short stride.  -KG               User Key  (r) = Recorded By, (t) = Taken By, (c) = Cosigned By      Initials Name Provider Type    Maddie Muir Physical Therapist                   Obj/Interventions       Row Name 04/11/24 1305          Motor Skills    Therapeutic Exercise other (see comments)  marching in place, SLR  -KG       Row Name 04/11/24 1305          Balance    Dynamic Standing Balance minimal assist;contact guard  -KG     Position/Device Used, Standing Balance supported;walker, front-wheeled  -KG     Balance Interventions standing;sit to stand  -KG               User Key  (r) = Recorded By, (t) = Taken By, (c) = Cosigned By      Initials Name Provider Type    Maddie Muir Physical Therapist                   Goals/Plan    No documentation.                  Clinical Impression       Row Name 04/11/24 1306          Pain    Pretreatment Pain Rating 4/10  -KG     Posttreatment Pain Rating 4/10  -KG     Pain Location generalized  -KG     Pain Location - head  -KG     Pain Intervention(s) Ambulation/increased activity;Repositioned  -KG       Row Name 04/11/24 1306          Plan of Care Review    Plan of Care Reviewed With patient  -KG     Progress improving  -KG     Outcome Evaluation Pt improved ambulation to 30', CGA, FWW with some moments of min-A for AD management with turns, very cautious gait with increased difficulty clearing LLE, short stride.  continued recommendation IPR  -KG       Row Name 04/11/24 1306          Positioning and Restraints    Pre-Treatment Position in bed  -KG     Post Treatment Position bed  -KG     In Bed notified nsg;fowlers;call light within reach;encouraged to call for assist;exit alarm on;with other staff  -KG               User Key  (r) = Recorded By, (t) = Taken By, (c) = Cosigned By      Initials Name Provider Type    Maddie Muir Physical Therapist                   Outcome Measures       [facility] Name 04/11/24 1308           How much help from another person do you currently need...    Turning from your back to your side while in flat bed without using bedrails? 3  -KG     Moving from lying on back to sitting on the side of a flat bed without bedrails? 3  -KG     Moving to and from a bed to a chair (including a wheelchair)? 3  -KG     Standing up from a chair using your arms (e.g., wheelchair, bedside chair)? 3  -KG     Climbing 3-5 steps with a railing? 2  -KG     To walk in hospital room? 3  -KG     AM-PAC 6 Clicks Score (PT) 17  -KG     Highest Level of Mobility Goal 5 --> Static standing  -KG       Row Name 04/11/24 1307          Functional Assessment    Outcome Measure Options AM-PAC 6 Clicks Basic Mobility (PT);Modified [place]  -KG               User Key  (r) = Recorded By, (t) = Taken By, (c) = Cosigned By      Initials Name Provider Type    KG Maddie Simpson Physical Therapist                                 Physical Therapy Education       Title: PT OT SLP Therapies (In Progress)       Topic: Physical Therapy (In Progress)       Point: Mobility training (In Progress)       Learning Progress Summary             Patient Acceptance, E, NR by ND at 4/10/2024 1103                         Point: Home exercise program (Not Started)       Learner Progress:  Not documented in this visit.              Point: Body mechanics (In Progress)       Learning Progress Summary             Patient Acceptance, E, NR by ND at 4/10/2024 1103                         Point: Precautions (In Progress)       Learning Progress Summary             Patient Acceptance, E, NR by ND at 4/10/2024 1103                                         User Key       Initials Effective Dates Name Provider Type Discipline    ND 11/16/23 -  Ale Gomez, [place] Physical Therapist PT                  PT Recommendation and Plan     Plan of Care Reviewed With: patient  Progress: improving  Outcome Evaluation: Pt improved ambulation to 30', CGA, FWW with some  moments of min-A for AD management with turns, very cautious gait with increased difficulty clearing LLE, short stride.  continued recommendation IPR     Time Calculation:         PT Charges       Row Name 04/11/24 1307             Time Calculation    Start Time 1115  -KG      PT Received On 04/11/24  -KG         Timed Charges    01680 - PT Therapeutic Exercise Minutes 10  -KG      92730 - Gait Training Minutes  20  -KG         Total Minutes    Timed Charges Total Minutes 30  -KG       Total Minutes 30  -KG                User Key  (r) = Recorded By, (t) = Taken By, (c) = Cosigned By      Initials Name Provider Type    Maddie Muir Physical Therapist                  Therapy Charges for Today       Code Description Service Date Service Provider Modifiers Qty    13098719359 HC PT THER PROC EA 15 MIN 4/11/2024 Maddie Simpson GP 1    62449286028 HC GAIT TRAINING EA 15 MIN 4/11/2024 Maddie Simpson GP 1            PT G-Codes  Outcome Measure Options: AM-PAC 6 Clicks Basic Mobility (PT), Modified [place]  AM-PAC 6 Clicks Score (PT): 17  AM-PAC 6 Clicks Score (OT): 16  Modified Mi Scale: 4 - Moderately severe disability.  Unable to walk without assistance, and unable to attend to own bodily needs without assistance.       Maddie Simpson  4/11/2024

## 2024-04-12 DIAGNOSIS — R55 SYNCOPE AND COLLAPSE: Primary | ICD-10-CM

## 2024-04-12 LAB
ANION GAP SERPL CALCULATED.3IONS-SCNC: 9 MMOL/L (ref 5–15)
BUN SERPL-MCNC: 12 MG/DL (ref 6–20)
BUN/CREAT SERPL: 15 (ref 7–25)
CALCIUM SPEC-SCNC: 8.6 MG/DL (ref 8.6–10.5)
CHLORIDE SERPL-SCNC: 108 MMOL/L (ref 98–107)
CO2 SERPL-SCNC: 22 MMOL/L (ref 22–29)
CREAT SERPL-MCNC: 0.8 MG/DL (ref 0.57–1)
EGFRCR SERPLBLD CKD-EPI 2021: 87.1 ML/MIN/1.73
GLUCOSE SERPL-MCNC: 92 MG/DL (ref 65–99)
MAGNESIUM SERPL-MCNC: 2.1 MG/DL (ref 1.6–2.6)
POTASSIUM SERPL-SCNC: 4.3 MMOL/L (ref 3.5–5.2)
SODIUM SERPL-SCNC: 139 MMOL/L (ref 136–145)

## 2024-04-12 PROCEDURE — 97535 SELF CARE MNGMENT TRAINING: CPT

## 2024-04-12 PROCEDURE — 25010000002 PROCHLORPERAZINE 10 MG/2ML SOLUTION: Performed by: INTERNAL MEDICINE

## 2024-04-12 PROCEDURE — 99232 SBSQ HOSP IP/OBS MODERATE 35: CPT | Performed by: PSYCHIATRY & NEUROLOGY

## 2024-04-12 PROCEDURE — 97110 THERAPEUTIC EXERCISES: CPT

## 2024-04-12 PROCEDURE — 97112 NEUROMUSCULAR REEDUCATION: CPT

## 2024-04-12 PROCEDURE — 80048 BASIC METABOLIC PNL TOTAL CA: CPT | Performed by: INTERNAL MEDICINE

## 2024-04-12 PROCEDURE — 97530 THERAPEUTIC ACTIVITIES: CPT

## 2024-04-12 PROCEDURE — 83735 ASSAY OF MAGNESIUM: CPT | Performed by: INTERNAL MEDICINE

## 2024-04-12 PROCEDURE — 25010000002 KETOROLAC TROMETHAMINE PER 15 MG: Performed by: INTERNAL MEDICINE

## 2024-04-12 PROCEDURE — 25010000002 THIAMINE HCL 200 MG/2ML SOLUTION: Performed by: INTERNAL MEDICINE

## 2024-04-12 PROCEDURE — 25010000002 DIPHENHYDRAMINE PER 50 MG: Performed by: INTERNAL MEDICINE

## 2024-04-12 PROCEDURE — 99232 SBSQ HOSP IP/OBS MODERATE 35: CPT | Performed by: NURSE PRACTITIONER

## 2024-04-12 RX ORDER — AMOXICILLIN 250 MG
2 CAPSULE ORAL 2 TIMES DAILY
Status: DISCONTINUED | OUTPATIENT
Start: 2024-04-12 | End: 2024-04-16 | Stop reason: HOSPADM

## 2024-04-12 RX ORDER — BISACODYL 10 MG
10 SUPPOSITORY, RECTAL RECTAL DAILY PRN
Status: DISCONTINUED | OUTPATIENT
Start: 2024-04-12 | End: 2024-04-16 | Stop reason: HOSPADM

## 2024-04-12 RX ORDER — BISACODYL 5 MG/1
5 TABLET, DELAYED RELEASE ORAL DAILY PRN
Status: DISCONTINUED | OUTPATIENT
Start: 2024-04-12 | End: 2024-04-16 | Stop reason: HOSPADM

## 2024-04-12 RX ORDER — POLYETHYLENE GLYCOL 3350 17 G/17G
17 POWDER, FOR SOLUTION ORAL DAILY PRN
Status: DISCONTINUED | OUTPATIENT
Start: 2024-04-12 | End: 2024-04-16 | Stop reason: HOSPADM

## 2024-04-12 RX ADMIN — APIXABAN 10 MG: 5 TABLET, FILM COATED ORAL at 21:58

## 2024-04-12 RX ADMIN — OLANZAPINE 10 MG: 5 TABLET, FILM COATED ORAL at 21:58

## 2024-04-12 RX ADMIN — THIAMINE HYDROCHLORIDE 200 MG: 100 INJECTION, SOLUTION INTRAMUSCULAR; INTRAVENOUS at 14:24

## 2024-04-12 RX ADMIN — PROCHLORPERAZINE EDISYLATE 10 MG: 5 INJECTION INTRAMUSCULAR; INTRAVENOUS at 12:27

## 2024-04-12 RX ADMIN — Medication 1 TABLET: at 08:39

## 2024-04-12 RX ADMIN — MAGNESIUM OXIDE TAB 400 MG (241.3 MG ELEMENTAL MG) 400 MG: 400 (241.3 MG) TAB at 08:39

## 2024-04-12 RX ADMIN — APIXABAN 10 MG: 5 TABLET, FILM COATED ORAL at 14:23

## 2024-04-12 RX ADMIN — ASPIRIN 81 MG: 81 TABLET, COATED ORAL at 08:39

## 2024-04-12 RX ADMIN — Medication 1000 MCG: at 08:39

## 2024-04-12 RX ADMIN — SENNOSIDES AND DOCUSATE SODIUM 2 TABLET: 8.6; 5 TABLET ORAL at 21:58

## 2024-04-12 RX ADMIN — Medication 5 MG: at 21:58

## 2024-04-12 RX ADMIN — LOSARTAN POTASSIUM 50 MG: 50 TABLET, FILM COATED ORAL at 08:39

## 2024-04-12 RX ADMIN — DIPHENHYDRAMINE HYDROCHLORIDE 25 MG: 50 INJECTION, SOLUTION INTRAMUSCULAR; INTRAVENOUS at 12:27

## 2024-04-12 RX ADMIN — Medication 10 ML: at 21:58

## 2024-04-12 RX ADMIN — BUPROPION HYDROCHLORIDE 150 MG: 150 TABLET, EXTENDED RELEASE ORAL at 08:39

## 2024-04-12 RX ADMIN — ACETAMINOPHEN 1000 MG: 500 TABLET ORAL at 18:32

## 2024-04-12 RX ADMIN — ACETAMINOPHEN 1000 MG: 500 TABLET ORAL at 08:47

## 2024-04-12 RX ADMIN — KETOROLAC TROMETHAMINE 15 MG: 15 INJECTION, SOLUTION INTRAMUSCULAR; INTRAVENOUS at 05:41

## 2024-04-12 RX ADMIN — Medication 10 ML: at 08:41

## 2024-04-12 RX ADMIN — THIAMINE HYDROCHLORIDE 200 MG: 100 INJECTION, SOLUTION INTRAMUSCULAR; INTRAVENOUS at 21:58

## 2024-04-12 RX ADMIN — PROCHLORPERAZINE EDISYLATE 10 MG: 5 INJECTION INTRAMUSCULAR; INTRAVENOUS at 05:35

## 2024-04-12 RX ADMIN — FOLIC ACID 1 MG: 1 TABLET ORAL at 08:39

## 2024-04-12 RX ADMIN — DIPHENHYDRAMINE HYDROCHLORIDE 25 MG: 50 INJECTION, SOLUTION INTRAMUSCULAR; INTRAVENOUS at 05:38

## 2024-04-12 RX ADMIN — KETOROLAC TROMETHAMINE 15 MG: 15 INJECTION, SOLUTION INTRAMUSCULAR; INTRAVENOUS at 12:27

## 2024-04-12 RX ADMIN — THIAMINE HYDROCHLORIDE 200 MG: 100 INJECTION, SOLUTION INTRAMUSCULAR; INTRAVENOUS at 05:34

## 2024-04-12 NOTE — PLAN OF CARE
Goal Outcome Evaluation:  Plan of Care Reviewed With: patient, spouse        Progress: improving  Outcome Evaluation: Pt demo improvement w/ mobility and self-care. Pt improved to CGA for HH distance ambulation and self-care while at sink. Will continue IPOT services . d/c rec is IPR.      Anticipated Discharge Disposition (OT): inpatient rehabilitation facility

## 2024-04-12 NOTE — DISCHARGE PLACEMENT REQUEST
"FROM: Cassidy GUERRA, RN,  [phone]  Kamila Garcia (53 y.o. Female)       Date of Birth   1970    Social Security Number       Address   [address] [place] [address]    Home Phone   [phone]    MRN   [id number]       Mu-ism   Gnosticist    Marital Status                               Admission Date   4/9/24    Admission Type   Emergency    Admitting Provider   Jo Ordoñez MD    Attending Provider   Jo Ordoñez MD    Department, Room/Bed   [facility] 3E, S336/1       Discharge Date       Discharge Disposition       Discharge Destination                                 Attending Provider: Jo Ordoñez MD    Allergies: Hydrocodone, Lactose Intolerance (Gi)    Isolation: None   Infection: None   Code Status: Prior    Ht: 167.6 cm (65.98\")   Wt: 101 kg (222 lb 10.6 oz)    Admission Cmt: None   Principal Problem: Suspected cerebrovascular accident (CVA) [R09.89]                   Active Insurance as of 4/9/2024       Primary Coverage       Payor Plan Insurance Group Employer/Plan Group    [employer] BLUE CROSS BLUE [employer] PPO [id number]       Payor Plan Address Payor Plan Phone Number Payor Plan Fax Number Effective Dates    [address] [phone]  10/18/2020 - None Entered    [facility] [id number]         Subscriber Name Subscriber Birth Date Member ID       NATIVIDAD GARCIAA 6/4/1973 [id number]                     Emergency Contacts        (Rel.) Home Phone Work Phone Mobile Phone    Janice Garcia (Spouse) [phone] -- --              Insurance Information                  [employer]HEM BLUE CROSS/[employer]HE[employer] BLUE CROSS BLUE SHIELD PPO Phone: [phone]    Subscriber: Janice Garcia Subscriber#: [id number]    Group#: [id number] Precert#: [id number]             History & Physical        Natacha Wilder MD at 04/09/24 1300              ICU ADMISSION NOTE    Chief complaint     Left-sided weakness   " syncope      Subjective    Kamila Garcia is a 53 y.o. female who presents to [facility] ED 04/09/24 for evaluation of possible CVA.     Patient has a PMH of ETOH use (4 beers/day), migraines, HTN, obesity, LLE DVT & PE (July 2021, supposed to be on Eliquis however patient reports she stopped taking ~1 month ago), and anxiety & depression.     Patient works as a phlebotomist at [employer] and was at work this morning when she experienced a syncopal event, prompting co-workers to call EMS. She regained consciousness en route to [facility] ED where upon arrival developed numbness & tingling to the left side of her face and left upper extremity and a code stroke was called. Additionally noted to be significantly hypertensive ('s) received IV Labetalol and was placed on Cardene infusion.     She was evaluated by Neurology in the ED with initial NIHSS 4 and CTH negative for an acute intracranial abnormality. Subsequent CTA H/N was negative for flow-limiting stenosis and CTP was negative for LVO. She was ultimately deemed a candidate for thrombolytic therapy with TNKase administered at 1148. She will be admitted to ICU for further evaluation and management.    Time spent: 8 minutes   Electronically signed by Lissett Auguste DNP, RAFFY, 04/09/24, 1:41 PM EDT.     She is currently post TNKase.  She continues to have some numbness on her left face.    Review of Systems  Review of Systems   Constitutional:  Positive for fatigue.   HENT:  Negative for congestion and trouble swallowing.    Eyes:  Negative for visual disturbance.   Respiratory:  Negative for cough and shortness of breath.    Cardiovascular:  Negative for chest pain.   Gastrointestinal:  Negative for abdominal pain.   Endocrine: Negative.    Genitourinary:  Negative for dysuria.   Musculoskeletal:  Negative for arthralgias.   Skin:  Negative for rash.   Allergic/Immunologic: Negative for environmental allergies.   Neurological:  Positive for dizziness, facial  asymmetry, weakness and numbness.   Hematological:  Negative for adenopathy.   Psychiatric/Behavioral:  Positive for dysphoric mood. The patient is nervous/anxious.         Home Medications  (Not in a hospital admission)    Prior to Admission medications    Medication Sig Start Date End Date Taking? Authorizing Provider   apixaban (ELIQUIS) 5 MG tablet tablet Start On 7/8: Take 1 tablet by mouth Every 12 (Twelve) Hours. 7/8/21   Yazmin Whitley,    eszopiclone (LUNESTA) 3 MG tablet Take 3 mg by mouth Every Night. Take immediately before bedtime    Emergency, Nurse ELYSE Israel   isosorbide mononitrate (IMDUR) 30 MG 24 hr tablet Take 1 tablet by mouth Daily. 3/2/22   Brooks Alexis MD   lisinopril (PRINIVIL,ZESTRIL) 10 MG tablet Take 10 mg by mouth Daily.    Provider, MD Irma   nitroglycerin (NITROSTAT) 0.4 MG SL tablet Place 1 tablet under the tongue Every 5 (Five) Minutes As Needed for Chest Pain. Take no more than 3 doses in 15 minutes. 1/12/22   Vanessa Bang MD   OLANZapine (zyPREXA) 10 MG tablet Take 10 mg by mouth Every Night.    Emergency, Nurse ELYSE Israel   ondansetron (ZOFRAN) 4 MG tablet Take 1 tablet by mouth Every 6 (Six) Hours As Needed for Nausea or Vomiting. 1/12/22   Vanessa Bang MD   pantoprazole (PROTONIX) 40 MG EC tablet Take 1 tablet by mouth 2 (Two) Times a Day Before Meals. 1/12/22   Vanessa Bang MD   Patient verbally told me that the only medication she takes are Lunesta 3 mg at night and Zyprexa 10 mg at night for sleep    History  Past Medical History:   Diagnosis Date    Anxiety and depression     Chest pain     Disease of thyroid gland     reports slightly elevated when in hospital    DVT (deep venous thrombosis)     ETOH use 04/09/2024    History of pulmonary embolus (PE)     Hypertension     Hypothyroidism 04/09/2024    Migraines     Obesity (BMI 30-39.9) 04/09/2024    Shortness of breath     with chest pain episodes     Past Surgical History:  "  Procedure Laterality Date    CARDIAC CATHETERIZATION Left 3/11/2022    Procedure: Left Heart Cath;  Surgeon: Peter Anguiano MD;  Location:  [facility];  Service: Cardiology;  Laterality: Left;  Hold Eliquis 2 days prior to [facility]    ENDOSCOPY N/A 1/12/2022    Procedure: ESOPHAGOGASTRODUODENOSCOPY;  Surgeon: Brunner, Mark I, MD;  Location:  [facility];  Service: Gastroenterology;  Laterality: N/A;    GASTRIC BYPASS  2007    lap cm en y    IR STENT PLACEMENT Left 10/2021    xin surgical in cath lab placed stent in left leg     Family History   Problem Relation Age of Onset    Heart attack Mother 50    Cancer Mother     Stroke Father     Breast cancer Maternal Grandmother     No Known Problems Maternal Grandfather     No Known Problems Paternal Grandmother     No Known Problems Paternal Grandfather      Social History     Tobacco Use    Smoking status: Never    Smokeless tobacco: Never   Vaping Use    Vaping status: Never Used   Substance Use Topics    Alcohol use: Yes     Alcohol/week: 4.0 standard drinks of alcohol     Types: 4 Cans of beer per week     Comment: daily    Drug use: Never     (Not in a hospital admission)    Allergies:  Hydrocodone      Objective    Vital Signs  Blood pressure 137/67, pulse 73, temperature 98.3 °F (36.8 °C), temperature source Oral, resp. rate 16, height 167.6 cm (65.98\"), weight 89.4 kg (197 lb), SpO2 95%.    Physical Exam:  General Appearance:  Overweight middle-aged woman in no distress   Head:  No visible trauma   Eyes:          Conjunctiva pink   Ears:     Throat: Oral mucosa moist   Neck: Trachea midline, supple   Back:      Lungs:   Symmetric chest expansion without wheeze or rhonchi    Heart:  Regular rhythm, S1, S2 auscultated, systolic murmur   Abdomen:   Bowel sounds present, soft   Rectal:     Deferred   Extremities:    No pitting edema or cyanosis   Pulses: Palpable radial pulses   Skin: Warm and dry without rash   Lymph nodes: No cervical " adenopathy   Neurologic: Oriented x 3, speech fluent, face symmetric, tongue midline, I do not appreciate motor drift upper or lower extremities       Results Review:   Lab Results (last 24 hours)       Procedure Component Value Units Date/Time    Wright Draw [[id number]] Collected: 04/09/24 0842    Specimen: Blood Updated: 04/09/24 1245    Narrative:      The following orders were created for panel order Wright Draw.  Procedure                               Abnormality         Status                     ---------                               -----------         ------                     Green Top (Gel)[[id number]]                                  Final result               Lavender Top[[id number]]                                     Final result               Gold Top - SST[[id number]]                                   Final result               Cheema Top[[id number]]                                         Final result               Light Blue Top[[id number]]                                   Final result                 Please view results for these tests on the individual orders.    Gray Top [[id number]] Collected: 04/09/24 0842    Specimen: Blood Updated: 04/09/24 1245     Extra Tube Hold for add-ons.     Comment: Auto resulted.       aPTT [[id number]]  (Abnormal) Collected: 04/09/24 0842    Specimen: Blood Updated: 04/09/24 1131     PTT 27.0 seconds     Narrative:      PTT = The equivalent PTT values for the therapeutic range of heparin levels at 0.3 to 0.5 U/ml are 60 to 70 seconds.    Protime-INR [[id number]]  (Normal) Collected: 04/09/24 0842    Specimen: Blood Updated: 04/09/24 1131     Protime 13.9 Seconds      INR 1.06    BNP [[id number]]  (Normal) Collected: 04/09/24 0842    Specimen: Blood Updated: 04/09/24 1046     proBNP 273.0 pg/mL     Narrative:      This assay is used as an aid in the diagnosis of individuals suspected of having heart failure. It can be used as an aid in the diagnosis of acute  decompensated heart failure (ADHF) in patients presenting with signs and symptoms of ADHF to the emergency department (ED). In addition, NT-proBNP of <300 pg/mL indicates ADHF is not likely.    Age Range Result Interpretation  NT-proBNP Concentration (pg/mL:      <50             Positive            >450                   Gray                 300-450                    Negative             <300    50-75           Positive            >900                  Gray                300-900                  Negative            <300      >75             Positive            >1800                  Gray                300-1800                  Negative            <300    Urinalysis, Microscopic Only - Urine, Clean Catch [[id number]]  (Abnormal) Collected: 04/09/24 1002    Specimen: Urine, Clean Catch Updated: 04/09/24 1040     RBC, UA 0-2 /HPF      WBC, UA 6-10 /HPF      Bacteria, UA Trace /HPF      Squamous Epithelial Cells, UA 3-6 /HPF      Hyaline Casts, UA 0-6 /LPF      Methodology Automated Microscopy    Urinalysis With Microscopic If Indicated (No Culture) - Urine, Clean Catch [[id number]]  (Abnormal) Collected: 04/09/24 1002    Specimen: Urine, Clean Catch Updated: 04/09/24 1040     Color, UA Yellow     Appearance, UA Clear     pH, UA 5.5     Specific Gravity, UA 1.012     Glucose, UA Negative     Ketones, UA Negative     Bilirubin, UA Negative     Blood, UA Trace     Protein, UA Negative     Leuk Esterase, UA Trace     Nitrite, UA Negative     Urobilinogen, UA 0.2 E.U./dL    Green Top (Gel) [[id number]] Collected: 04/09/24 0842    Specimen: Blood Updated: 04/09/24 0945     Extra Tube Hold for add-ons.     Comment: Auto resulted.       Lavender Top [[id number]] Collected: 04/09/24 0842    Specimen: Blood Updated: 04/09/24 0945     Extra Tube hold for add-on     Comment: Auto resulted       Gold Top - SST [[id number]] Collected: 04/09/24 0842    Specimen: Blood Updated: 04/09/24 0945     Extra Tube Hold for add-ons.     Comment:  Auto resulted.       Light Blue Top [[id number]] Collected: 04/09/24 0842    Specimen: Blood Updated: 04/09/24 0945     Extra Tube Hold for add-ons.     Comment: Auto resulted       Comprehensive Metabolic Panel [[id number]]  (Abnormal) Collected: 04/09/24 0842    Specimen: Blood Updated: 04/09/24 0916     Glucose 110 mg/dL      BUN 12 mg/dL      Creatinine 0.80 mg/dL      Sodium 141 mmol/L      Potassium 4.2 mmol/L      Comment: Slight hemolysis detected by analyzer. Result may be falsely elevated.        Chloride 106 mmol/L      CO2 24.0 mmol/L      Calcium 8.4 mg/dL      Total Protein 7.3 g/dL      Albumin 4.2 g/dL      ALT (SGPT) 8 U/L      AST (SGOT) 23 U/L      Alkaline Phosphatase 91 U/L      Total Bilirubin 0.6 mg/dL      Globulin 3.1 gm/dL      Comment: Calculated Result        A/G Ratio 1.4 g/dL      BUN/Creatinine Ratio 15.0     Anion Gap 11.0 mmol/L      eGFR 87.1 mL/min/1.73     Narrative:      GFR Normal >60  Chronic Kidney Disease <60  Kidney Failure <15      Magnesium [[id number]]  (Normal) Collected: 04/09/24 0842    Specimen: Blood Updated: 04/09/24 0916     Magnesium 2.0 mg/dL     Single High Sensitivity Troponin T [[id number]]  (Normal) Collected: 04/09/24 0842    Specimen: Blood Updated: 04/09/24 0914     HS Troponin T 9 ng/L     Narrative:      High Sensitive Troponin T Reference Range:  <14.0 ng/L- Negative Female for AMI  <22.0 ng/L- Negative Male for AMI  >=14 - Abnormal Female indicating possible myocardial injury.  >=22 - Abnormal Male indicating possible myocardial injury.   Clinicians would have to utilize clinical acumen, EKG, Troponin, and serial changes to determine if it is an Acute Myocardial Infarction or myocardial injury due to an underlying chronic condition.         CBC & Differential [[id number]]  (Normal) Collected: 04/09/24 0842    Specimen: Blood Updated: 04/09/24 0849    Narrative:      The following orders were created for panel order CBC & Differential.  Procedure                                Abnormality         Status                     ---------                               -----------         ------                     CBC Auto Differential[[id number]]        Normal              Final result                 Please view results for these tests on the individual orders.    CBC Auto Differential [[id number]]  (Normal) Collected: 04/09/24 0842    Specimen: Blood Updated: 04/09/24 0849     WBC 4.44 10*3/mm3      RBC 4.74 10*6/mm3      Hemoglobin 13.0 g/dL      Hematocrit 40.8 %      MCV 86.1 fL      MCH 27.4 pg      MCHC 31.9 g/dL      RDW 13.2 %      RDW-SD 41.5 fl      MPV 9.4 fL      Platelets 277 10*3/mm3      Neutrophil % 54.6 %      Lymphocyte % 29.3 %      Monocyte % 10.1 %      Eosinophil % 4.7 %      Basophil % 1.1 %      Immature Grans % 0.2 %      Neutrophils, Absolute 2.42 10*3/mm3      Lymphocytes, Absolute 1.30 10*3/mm3      Monocytes, Absolute 0.45 10*3/mm3      Eosinophils, Absolute 0.21 10*3/mm3      Basophils, Absolute 0.05 10*3/mm3      Immature Grans, Absolute 0.01 10*3/mm3      nRBC 0.0 /100 WBC           Imaging Results (Last 24 Hours)       Procedure Component Value Units Date/Time    CT CEREBRAL PERFUSION WITH & WITHOUT CONTRAST [[id number]] Collected: 04/09/24 1142     Updated: 04/09/24 1150    Narrative:      CT CEREBRAL PERFUSION W WO CONTRAST, CT ANGIOGRAM HEAD W AI ANALYSIS OF LVO, CT ANGIOGRAM NECK    Date of Exam: 4/9/2024 11:21 AM EDT    Indication: Neuro deficit, acute, stroke suspected.     Comparison: None available.    Technique: Axial CT images of the brain were obtained prior to and after the administration of . Core blood volume, core blood flow, mean transit time, and Tmax images were obtained utilizing the Rapid software protocol. A limited CT angiogram of the   head was also performed to measure the blood vessel density.    The radiation dose reduction device was turned on for each scan per the ALARA (As Low as Reasonably Achievable)  protocol.    FINDINGS:    Vascular Findings:    The right common carotid, internal carotid, middle cerebral, anterior cerebral, vertebral, and posterior cerebral arteries are patent without abrupt cut off or aneurysmal dilation.    The left common carotid, internal carotid, middle cerebral, anterior cerebral, vertebral, and posterior cerebral arteries are patent without abrupt cut off or aneurysmal dilation.    Basilar artery appears patent and appears unremarkable.    Non-vascular Findings:    For description of nonvascular intracranial findings, please refer to the noncontrast head CT performed the same date.    No acute abnormality is identified within the visualized soft tissue or bony structures of the neck.    The visualized lung apices are clear.    CT Perfusion:  CBF (<30%) volume: 0 mL  Tmax (>6.0s) volume: 0 mL  Mismatch volume: 0 mL  Mismatch ratio: None          Impression:      1.No acute abnormality identified within the large arteries of the head or neck.  2.Significant stenosis of the bilateral internal carotid arteries.  3.CT perfusion study demonstrates no territorial ischemia or core infarct.        Electronically Signed: Jose Alfredo Tejeda MD    4/9/2024 11:47 AM EDT    Workstation ID: [id number]    CT Angiogram Head w AI Analysis of LVO [[id number]] Collected: 04/09/24 1142     Updated: 04/09/24 1150    Narrative:      CT CEREBRAL PERFUSION W WO CONTRAST, CT ANGIOGRAM HEAD W AI ANALYSIS OF LVO, CT ANGIOGRAM NECK    Date of Exam: 4/9/2024 11:21 AM EDT    Indication: Neuro deficit, acute, stroke suspected.     Comparison: None available.    Technique: Axial CT images of the brain were obtained prior to and after the administration of . Core blood volume, core blood flow, mean transit time, and Tmax images were obtained utilizing the Rapid software protocol. A limited CT angiogram of the   head was also performed to measure the blood vessel density.    The radiation dose reduction device was turned on  for each scan per the ALARA (As Low as Reasonably Achievable) protocol.    FINDINGS:    Vascular Findings:    The right common carotid, internal carotid, middle cerebral, anterior cerebral, vertebral, and posterior cerebral arteries are patent without abrupt cut off or aneurysmal dilation.    The left common carotid, internal carotid, middle cerebral, anterior cerebral, vertebral, and posterior cerebral arteries are patent without abrupt cut off or aneurysmal dilation.    Basilar artery appears patent and appears unremarkable.    Non-vascular Findings:    For description of nonvascular intracranial findings, please refer to the noncontrast head CT performed the same date.    No acute abnormality is identified within the visualized soft tissue or bony structures of the neck.    The visualized lung apices are clear.    CT Perfusion:  CBF (<30%) volume: 0 mL  Tmax (>6.0s) volume: 0 mL  Mismatch volume: 0 mL  Mismatch ratio: None          Impression:      1.No acute abnormality identified within the large arteries of the head or neck.  2.Significant stenosis of the bilateral internal carotid arteries.  3.CT perfusion study demonstrates no territorial ischemia or core infarct.        Electronically Signed: Jose Alfredo Tejeda MD    4/9/2024 11:47 AM EDT    Workstation ID: [id number]    CT Angiogram Neck [[id number]] Collected: 04/09/24 1142     Updated: 04/09/24 1150    Narrative:      CT CEREBRAL PERFUSION W WO CONTRAST, CT ANGIOGRAM HEAD W AI ANALYSIS OF LVO, CT ANGIOGRAM NECK    Date of Exam: 4/9/2024 11:21 AM EDT    Indication: Neuro deficit, acute, stroke suspected.     Comparison: None available.    Technique: Axial CT images of the brain were obtained prior to and after the administration of . Core blood volume, core blood flow, mean transit time, and Tmax images were obtained utilizing the Rapid software protocol. A limited CT angiogram of the   head was also performed to measure the blood vessel density.    The  radiation dose reduction device was turned on for each scan per the ALARA (As Low as Reasonably Achievable) protocol.    FINDINGS:    Vascular Findings:    The right common carotid, internal carotid, middle cerebral, anterior cerebral, vertebral, and posterior cerebral arteries are patent without abrupt cut off or aneurysmal dilation.    The left common carotid, internal carotid, middle cerebral, anterior cerebral, vertebral, and posterior cerebral arteries are patent without abrupt cut off or aneurysmal dilation.    Basilar artery appears patent and appears unremarkable.    Non-vascular Findings:    For description of nonvascular intracranial findings, please refer to the noncontrast head CT performed the same date.    No acute abnormality is identified within the visualized soft tissue or bony structures of the neck.    The visualized lung apices are clear.    CT Perfusion:  CBF (<30%) volume: 0 mL  Tmax (>6.0s) volume: 0 mL  Mismatch volume: 0 mL  Mismatch ratio: None          Impression:      1.No acute abnormality identified within the large arteries of the head or neck.  2.Significant stenosis of the bilateral internal carotid arteries.  3.CT perfusion study demonstrates no territorial ischemia or core infarct.        Electronically Signed: Jose Alfredo Tejeda MD    4/9/2024 11:47 AM EDT    Workstation ID: [id number]    XR Chest 1 View [[id number]] Collected: 04/09/24 1006     Updated: 04/09/24 1010    Narrative:      XR CHEST 1 VW    Date of Exam: 4/9/2024 9:50 AM EDT    Indication: Chest pain, syncope    Comparison: 4/5/2024    Findings:  Cardiomediastinal silhouette is unremarkable.  No airspace disease, pneumothorax, nor pleural effusion. No acute osseous abnormality identified.      Impression:      Impression:  No acute process identified      Electronically Signed: Denis Nelson MD    4/9/2024 10:07 AM EDT    Workstation ID: [id number]    CT Head Without Contrast [[id number]] Collected: 04/09/24 0925     Updated:  04/09/24 0930    Narrative:      CT HEAD WO CONTRAST    Date of Exam: 4/9/2024 9:15 AM EDT    Indication: Head trauma, minor, normal mental status (Age 18-64y)  Headache, new or worsening (Age >= 50y)  Syncope/presyncope, cerebrovascular cause suspected  Syncope x 2, left parietal head injury, HA.    Comparison:4/5/24    Technique: Axial CT images were obtained of the head without contrast administration.  Automated exposure control and iterative construction methods were used.      FINDINGS:    The brain parenchyma appears unremarkable in volume and morphology.  No significant mass effect, midline shift, intracranial hemorrhage, or hydrocephalus is identified. No extra-axial fluid collection is identified.   The calvarium and overlying soft   tissues are unremarkable. The paranasal sinuses and bilateral mastoid air cells are adequately aerated. The visualized bony orbits, globes, and retrobulbar soft tissues are unremarkable.      Impression:      1.No acute intracranial abnormality is identified.      Electronically Signed: Jose Alfredo Tejeda MD    4/9/2024 9:27 AM EDT    Workstation ID: [id number]             PROBLEM LIST    Left-sided weakness  History of DVT, 2021  Left heart catheterization, 2022 normal coronary arteries    Assessment & Plan    53-year-old woman, non-smoker with previous Joelle-en-Y gastric bypass, presenting with syncope, left-sided weakness and numbness who received TNKase.  Imaging was negative for perfusion defect or bleed.  I saw her post TNKase and did not appreciate any weakness but she was still complaining of numbness of her left face.  This may have been a transient ischemic attack or a small stroke.  She has had 2 episodes of syncope 1 on Friday and 1 today.  Left heart catheterization was normal in 2022 and echo at that time revealed no valvular disease.  I do hear a systolic murmur.    Ischemic stroke protocol  EEG  Monitor for signs of bleeding  Rocephin daily x 3 for possible UTI  PT,  OT, speech therapy    I discussed the patients findings and my recommendations with patient    Natacha FIGUEROA. MD [facility]    Time: 40min    Please note that portions of this note were completed with a voice recognition program.       Electronically signed by Natacha Wilder MD at 04/09/24 7037       Current Facility-Administered Medications   Medication Dose Route Frequency Provider Last Rate Last Admin    acetaminophen (TYLENOL) tablet 1,000 mg  1,000 mg Oral Q6H PRN Eze Chandler MD   1,000 mg at 04/12/24 0847    aspirin EC tablet 81 mg  81 mg Oral Daily Ale Brown APRN   81 mg at 04/12/24 0839    sennosides-docusate (PERICOLACE) 8.6-50 MG per tablet 2 tablet  2 tablet Oral BID Dana Eng APRN        And    polyethylene glycol (MIRALAX) packet 17 g  17 g Oral Daily PRN Dana Eng APRN        And    bisacodyl (DULCOLAX) EC tablet 5 mg  5 mg Oral Daily PRN Dana Eng APRN        And    bisacodyl (DULCOLAX) suppository 10 mg  10 mg Rectal Daily PRN Dana Eng APRN        buPROPion XL (WELLBUTRIN XL) 24 hr tablet 150 mg  150 mg Oral Daily Jewel Rose MD   150 mg at 04/12/24 0839    diphenhydrAMINE (BENADRYL) injection 25 mg  25 mg Intravenous Q6H PRN Scott Mahoney MD   25 mg at 04/12/24 0538    folic acid (FOLVITE) tablet 1 mg  1 mg Oral Daily Scott Mahoney MD   1 mg at 04/12/24 0839    ketorolac (TORADOL) injection 15 mg  15 mg Intravenous Q6H PRN Scott Mahoney MD   15 mg at 04/12/24 0541    LORazepam (ATIVAN) tablet 1 mg  1 mg Oral Q1H PRN Scott Mahoney MD        Or    midazolam (VERSED) injection 2 mg  2 mg Intravenous Q1H PRN Scott Mahoney MD        Or    LORazepam (ATIVAN) tablet 2 mg  2 mg Oral Q1H PRN Scott Mahoney MD        Or    midazolam (VERSED) injection 4 mg  4 mg Intravenous Q1H PRN Scott Mahoney MD        Or    midazolam (VERSED) injection 4 mg  4 mg Intravenous  Q15 Min PRN Scott Mahoney MD        Or    midazolam (VERSED) injection 4 mg  4 mg Intramuscular Q15 Min PRN Scott Mahoney MD        losartan (COZAAR) tablet 50 mg  50 mg Oral Q24H Scott Mahoney MD   50 mg at 24 0839    magnesium oxide (MAG-OX) tablet 400 mg  400 mg Oral Daily Eze Chandler MD   400 mg at 24 0839    Magnesium Standard Dose Replacement - Follow Nurse / BPA Driven Protocol   Does not apply PRN Scott Mahoney MD        melatonin tablet 5 mg  5 mg Oral Nightly PRN Scott Mahoney MD   5 mg at 24 2325    multivitamin with minerals 1 tablet  1 tablet Oral Daily Scott Mahoney MD   1 tablet at 24 0839    nitroglycerin (NITROSTAT) SL tablet 0.4 mg  0.4 mg Sublingual Q5 Min PRN Eze Chandler MD   0.4 mg at 24 1054    OLANZapine (zyPREXA) tablet 10 mg  10 mg Oral Nightly Eze Chandler MD   10 mg at 24 2128    prochlorperazine (COMPAZINE) injection 10 mg  10 mg Intravenous Q6H PRN Scott Mahoney MD   10 mg at 24 0535    sodium chloride 0.9 % flush 10 mL  10 mL Intravenous PRN Eze Chandler MD   10 mL at 24 1451    sodium chloride 0.9 % flush 10 mL  10 mL Intravenous Q12H Eze Chandler MD   10 mL at 24 0841    sodium chloride 0.9 % infusion 40 mL  40 mL Intravenous PRN Eze Chandler MD        thiamine (B-1) injection 200 mg  200 mg Intravenous Q8H Scott Mahoney MD   200 mg at 24 0534    Followed by    [START ON 2024] thiamine (VITAMIN B-1) tablet 100 mg  100 mg Oral Daily Scott Mahoney MD        vitamin B-12 (CYANOCOBALAMIN) tablet 1,000 mcg  1,000 mcg Oral Daily Scott Mahoney MD   1,000 mcg at 24 0839        Physical Therapy Notes (most recent note)        Maddie Simpson at 24 1115  Version 1 of 1         Patient Name: Kamila Garcia  : 1970    MRN: [id number]                              Today's Date:  4/11/2024       Admit Date: 4/9/2024    Visit Dx:     ICD-10-CM ICD-9-CM   1. Numbness and tingling of left side of face  R20.0 782.0    R20.2    2. Cognitive communication deficit  R41.841 799.52   3. Oral phase dysphagia  R13.11 787.21     Patient Active Problem List   Diagnosis    Acute pulmonary embolism    Chest pain    H/O LLE DVT & PE (July 2021)    History of pulmonary embolism    Insomnia    Hypertension    Palpitations    Suspected cerebrovascular accident (CVA)    Hypertensive urgency    Obesity (BMI 30-39.9)    ETOH use    Anxiety and depression    Acute CVA (cerebrovascular accident)    Syncope and collapse     Past Medical History:   Diagnosis Date    Anxiety and depression     Chest pain     Disease of thyroid gland     reports slightly elevated when in hospital    DVT (deep venous thrombosis)     ETOH use 04/09/2024    History of pulmonary embolus (PE)     Hypertension     Hypothyroidism 04/09/2024    Migraines     Obesity (BMI 30-39.9) 04/09/2024    Shortness of breath     with chest pain episodes     Past Surgical History:   Procedure Laterality Date    CARDIAC CATHETERIZATION Left 3/11/2022    Procedure: Left Heart Cath;  Surgeon: Peter Anguiano MD;  Location:  [facility] CATH INVASIVE LOCATION;  Service: Cardiology;  Laterality: Left;  Hold Eliquis 2 days prior to [facility]    ENDOSCOPY N/A 1/12/2022    Procedure: ESOPHAGOGASTRODUODENOSCOPY;  Surgeon: Brunner, Mark I, MD;  Location:  [facility] ENDOSCOPY;  Service: Gastroenterology;  Laterality: N/A;    GASTRIC BYPASS  2007    lap cm en y    IR STENT PLACEMENT Left 10/2021    xin surgical in cath lab placed stent in left leg      General Information       Row Name 04/11/24 1303          Physical Therapy Time and Intention    Document Type therapy note (daily note)  -KG     Mode of Treatment physical therapy  -KG       Row Name 04/11/24 1303          General Information    Patient Profile Reviewed yes  -KG     Existing Precautions/Restrictions fall;other  (see comments)  L sided weakness, numbness  -KG       Row Name 04/11/24 1303          Cognition    Orientation Status (Cognition) oriented x 3  -KG       Row Name 04/11/24 1303          Safety Issues, Functional Mobility    Safety Issues Affecting Function (Mobility) insight into deficits/self-awareness;awareness of need for assistance  -KG     Impairments Affecting Function (Mobility) balance;coordination;endurance/activity tolerance;sensation/sensory awareness;strength  -KG               User Key  (r) = Recorded By, (t) = Taken By, (c) = Cosigned By      Initials Name Provider Type    KG Maddie Simpson Physical Therapist                   Mobility       Row Name 04/11/24 1303          Bed Mobility    Bed Mobility supine-sit  -KG     Supine-Sit [place] (Bed Mobility) contact guard;verbal cues  -KG     Assistive Device (Bed Mobility) bed rails;head of bed elevated  -KG       Row Name 04/11/24 1303          Transfers    Comment, (Transfers) improved to CGA, FWW  -KG       Row Name 04/11/24 1303          Sit-Stand Transfer    Sit-Stand [place] (Transfers) contact guard  -KG     Assistive Device (Sit-Stand Transfers) walker, front-wheeled  -KG       Row Name 04/11/24 1303          Gait/Stairs (Locomotion)    [place] Level (Gait) minimum assist (75% patient effort);verbal cues;nonverbal cues (demo/gesture);2 person assist;contact guard  -KG     Assistive Device (Gait) walker, front-wheeled  -KG     Distance in Feet (Gait) 30  -KG     Deviations/Abnormal Patterns (Gait) left sided deviations;oleg decreased;gait speed decreased;stride length decreased;weight shifting decreased  -KG     Left Sided Gait Deviations weight shift ability decreased  -KG     Comment, (Gait/Stairs) Pt improved ambulation to 30', CGA, FWW with some moments of min-A for AD management with turns, very cautious gait with increased difficulty clearing LLE, short stride.  -KG               User Key  (r) = Recorded By, (t) = Taken  By, (c) = Cosigned By      Initials Name Provider Type    Maddie Muir Physical Therapist                   Obj/Interventions       Row Name 04/11/24 1305          Motor Skills    Therapeutic Exercise other (see comments)  marching in place, SLR  -KG       Row Name 04/11/24 1305          Balance    Dynamic Standing Balance minimal assist;contact guard  -KG     Position/Device Used, Standing Balance supported;walker, front-wheeled  -KG     Balance Interventions standing;sit to stand  -KG               User Key  (r) = Recorded By, (t) = Taken By, (c) = Cosigned By      Initials Name Provider Type    Maddie Muir Physical Therapist                   Goals/Plan    No documentation.                  Clinical Impression       Row Name 04/11/24 1306          Pain    Pretreatment Pain Rating 4/10  -KG     Posttreatment Pain Rating 4/10  -KG     Pain Location generalized  -KG     Pain Location - head  -KG     Pain Intervention(s) Ambulation/increased activity;Repositioned  -KG       Row Name 04/11/24 1306          Plan of Care Review    Plan of Care Reviewed With patient  -KG     Progress improving  -KG     Outcome Evaluation Pt improved ambulation to 30', CGA, FWW with some moments of min-A for AD management with turns, very cautious gait with increased difficulty clearing LLE, short stride.  continued recommendation IPR  -KG       Row Name 04/11/24 1306          Positioning and Restraints    Pre-Treatment Position in bed  -KG     Post Treatment Position bed  -KG     In Bed notified nsg;fowlers;call light within reach;encouraged to call for assist;exit alarm on;with other staff  -KG               User Key  (r) = Recorded By, (t) = Taken By, (c) = Cosigned By      Initials Name Provider Type    Maddie Muir Physical Therapist                   Outcome Measures       Row Name 04/11/24 1307          How much help from another person do you currently need...    Turning from your back to your side while  in flat bed without using bedrails? 3  -KG     Moving from lying on back to sitting on the side of a flat bed without bedrails? 3  -KG     Moving to and from a bed to a chair (including a wheelchair)? 3  -KG     Standing up from a chair using your arms (e.g., wheelchair, bedside chair)? 3  -KG     Climbing 3-5 steps with a railing? 2  -KG     To walk in hospital room? 3  -KG     AM-PAC 6 Clicks Score (PT) 17  -KG     Highest Level of Mobility Goal 5 --> Static standing  -KG       Row Name 04/11/24 1307          Functional Assessment    Outcome Measure Options AM-PAC 6 Clicks Basic Mobility (PT);Modified Early  -KG               User Key  (r) = Recorded By, (t) = Taken By, (c) = Cosigned By      Initials Name Provider Type    Maddie Muir Physical Therapist                                 Physical Therapy Education       Title: PT OT SLP Therapies (In Progress)       Topic: Physical Therapy (In Progress)       Point: Mobility training (In Progress)       Learning Progress Summary             Patient Acceptance, E, NR by ND at 4/10/2024 1103                         Point: Home exercise program (Not Started)       Learner Progress:  Not documented in this visit.              Point: Body mechanics (In Progress)       Learning Progress Summary             Patient Acceptance, E, NR by ND at 4/10/2024 1103                         Point: Precautions (In Progress)       Learning Progress Summary             Patient Acceptance, E, NR by ND at 4/10/2024 1103                                         User Key       Initials Effective Dates Name Provider Type Discipline    ND 11/16/23 -  Ale Gomez PT Physical Therapist PT                  PT Recommendation and Plan     Plan of Care Reviewed With: patient  Progress: improving  Outcome Evaluation: Pt improved ambulation to 30', CGA, FWW with some moments of min-A for AD management with turns, very cautious gait with increased difficulty clearing LLE, short stride.   continued recommendation IPR     Time Calculation:         PT Charges       Row Name 24 1307             Time Calculation    Start Time 1115  -KG      PT Received On 24  -KG         Timed Charges    62955 - PT Therapeutic Exercise Minutes 10  -KG      02449 - Gait Training Minutes  20  -KG         Total Minutes    Timed Charges Total Minutes 30  -KG       Total Minutes 30  -KG                User Key  (r) = Recorded By, (t) = Taken By, (c) = Cosigned By      Initials Name Provider Type    ERNIE Maddie Simpson Physical Therapist                  Therapy Charges for Today       Code Description Service Date Service Provider Modifiers Qty    99156799422 HC PT THER PROC EA 15 MIN 2024 Maddie Simpson GP 1    95779080079 HC GAIT TRAINING EA 15 MIN 2024 Maddie Simpson GP 1            PT G-Codes  Outcome Measure Options: AM-PAC 6 Clicks Basic Mobility (PT), Modified Bent  AM-PAC 6 Clicks Score (PT): 17  AM-PAC 6 Clicks Score (OT): 16  Modified Bent Scale: 4 - Moderately severe disability.  Unable to walk without assistance, and unable to attend to own bodily needs without assistance.       Maddie Simpson  2024      Electronically signed by Maddie Simpson at 24 1308          Occupational Therapy Notes (most recent note)        Karolina Prabhakar, OT at 04/10/24 0853          Patient Name: Kamila Garcia  : 1970    MRN: [id number]                              Today's Date: 4/10/2024       Admit Date: 2024    Visit Dx:     ICD-10-CM ICD-9-CM   1. Numbness and tingling of left side of face  R20.0 782.0    R20.2    2. Cognitive communication deficit  R41.841 799.52     Patient Active Problem List   Diagnosis    Acute pulmonary embolism    Chest pain    H/O LLE DVT & PE (2021)    History of pulmonary embolism    Insomnia    Hypertension    Palpitations    Suspected cerebrovascular accident (CVA)    Hypertensive urgency    Obesity (BMI 30-39.9)    ETOH use     Anxiety and depression     Past Medical History:   Diagnosis Date    Anxiety and depression     Chest pain     Disease of thyroid gland     reports slightly elevated when in hospital    DVT (deep venous thrombosis)     ETOH use 04/09/2024    History of pulmonary embolus (PE)     Hypertension     Hypothyroidism 04/09/2024    Migraines     Obesity (BMI 30-39.9) 04/09/2024    Shortness of breath     with chest pain episodes     Past Surgical History:   Procedure Laterality Date    CARDIAC CATHETERIZATION Left 3/11/2022    Procedure: Left Heart Cath;  Surgeon: Peter Anguiano MD;  Location:  [facility] CATH INVASIVE LOCATION;  Service: Cardiology;  Laterality: Left;  Hold Eliquis 2 days prior to [facility]    ENDOSCOPY N/A 1/12/2022    Procedure: ESOPHAGOGASTRODUODENOSCOPY;  Surgeon: Brunner, Mark I, MD;  Location:  [facility] ENDOSCOPY;  Service: Gastroenterology;  Laterality: N/A;    GASTRIC BYPASS  2007    lap cm en y    IR STENT PLACEMENT Left 10/2021    xin surgical in cath lab placed stent in left leg      General Information       Row Name 04/10/24 0958          OT Time and Intention    Document Type evaluation  -KF     Mode of Treatment occupational therapy  -KF       Row Name 04/10/24 0958          General Information    Patient Profile Reviewed yes  -KF     Prior Level of Function independent:;all household mobility;community mobility;bed mobility;ADL's;work;driving  Independent prior, no AD  -KF     Existing Precautions/Restrictions fall;other (see comments)  L sided weakness/numbness  -KF     Barriers to Rehab medically complex;previous functional deficit  -KF       Row Name 04/10/24 0958          Living Environment    People in Home spouse  -KF       Row Name 04/10/24 0958          Home Main Entrance    Number of Stairs, Main Entrance three  -KF     Stair Railings, Main Entrance none  -KF       Row Name 04/10/24 0958          Stairs Within Home, Primary    Stairs, Within Home, Primary Pt states she has a tub shower.   -     Number of Stairs, Within Home, Primary none  -       Row Name 04/10/24 0958          Cognition    Orientation Status (Cognition) oriented x 3  -       Row Name 04/10/24 0958          Safety Issues, Functional Mobility    Safety Issues Affecting Function (Mobility) awareness of need for assistance;insight into deficits/self-awareness;safety precaution awareness;safety precautions follow-through/compliance;sequencing abilities;positioning of assistive device  -KF     Impairments Affecting Function (Mobility) balance;sensation/sensory awareness;endurance/activity tolerance;strength;grasp;pain  -KF               User Key  (r) = Recorded By, (t) = Taken By, (c) = Cosigned By      Initials Name Provider Type    KF Karolina Prabhakar OT Occupational Therapist                     Mobility/ADL's       Row Name 04/10/24 0959          Bed Mobility    Bed Mobility supine-sit  -KF     Supine-Sit [place] (Bed Mobility) contact guard;verbal cues  -     Assistive Device (Bed Mobility) bed rails;head of bed elevated  -     Comment, (Bed Mobility) Verbal cues for technique and sequence. Pt noted dizziness upon sitting EOB, BP monitored.  -       Row Name 04/10/24 0959          Transfers    Transfers bed-chair transfer;sit-stand transfer;stand-sit transfer  -     Comment, (Transfers) Pt performed STS from the EOB and took steps to the chair with Davy x2, HHA x2. L sided weakness noted, spefically at quad during transfer, though no knee buckling noted.  -       Row Name 04/10/24 0959          Bed-Chair Transfer    Bed-Chair [place] (Transfers) minimum assist (75% patient effort);2 person assist;verbal cues;nonverbal cues (demo/gesture)  -     Assistive Device (Bed-Chair Transfers) other (see comments)  HHA x2  -       Row Name 04/10/24 0959          Sit-Stand Transfer    Sit-Stand [place] (Transfers) minimum assist (75% patient effort);2 person assist;verbal cues;nonverbal cues (demo/gesture)   -KF     Assistive Device (Sit-Stand Transfers) other (see comments)  A x2  -KF       Row Name 04/10/24 0959          Stand-Sit Transfer    Stand-Sit [place] (Transfers) minimum assist (75% patient effort);2 person assist;verbal cues;nonverbal cues (demo/gesture)  -KF     Assistive Device (Stand-Sit Transfers) other (see comments)  HHA x2  -KF       Row Name 04/10/24 0959          Functional Mobility    Functional Mobility- Comment Defer to PT  -KF       Row Name 04/10/24 0959          Activities of Daily Living    BADL Assessment/Intervention upper body dressing;lower body dressing  -KF       Row Name 04/10/24 0959          Upper Body Dressing Assessment/Training    [place] Level (Upper Body Dressing) don;front opening garment;minimum assist (75% patient effort)  -KF     Position (Upper Body Dressing) edge of bed sitting  -       Row Name 04/10/24 0959          Lower Body Dressing Assessment/Training    [place] Level (Lower Body Dressing) don;socks;dependent (less than 25% patient effort)  -KF     Position (Lower Body Dressing) edge of bed sitting  -KF     Comment, (Lower Body Dressing) Pt deferred attempt to don socks at the EOB secondary to persistent dizziness.  -KF               User Key  (r) = Recorded By, (t) = Taken By, (c) = Cosigned By      Initials Name Provider Type    Karolina Rajput OT Occupational Therapist                   Obj/Interventions       Row Name 04/10/24 1001          Sensory Assessment (Somatosensory)    Sensory Assessment (Somatosensory) right UE  -KF     Right UE Sensory Assessment light touch awareness;impaired  -       Row Name 04/10/24 1001          Vision Assessment/Intervention    Visual Impairment/Limitations WFL  -KF       Row Name 04/10/24 1001          Range of Motion Comprehensive    General Range of Motion bilateral upper extremity ROM WFL  -KF       Row Name 04/10/24 1001          Strength Comprehensive (MMT)    General Manual Muscle Testing (MMT)  Assessment upper extremity strength deficits identified  -KF     Comment, General Manual Muscle Testing (MMT) Assessment RUE grossly 4/5, LUE grossly 3/5  -KF       Row Name 04/10/24 1001          Balance    Balance Assessment sitting static balance;sitting dynamic balance;sit to stand dynamic balance;standing static balance;standing dynamic balance  -KF     Static Sitting Balance standby assist  -KF     Dynamic Sitting Balance contact guard  -KF     Position, Sitting Balance unsupported;sitting edge of bed  -KF     Sit to Stand Dynamic Balance minimal assist;2-person assist  -KF     Static Standing Balance minimal assist;1-person assist  -KF     Dynamic Standing Balance minimal assist;2-person assist  -KF     Position/Device Used, Standing Balance supported  -KF     Balance Interventions sitting;standing;sit to stand;supported;static;dynamic;occupation based/functional task  -KF               User Key  (r) = Recorded By, (t) = Taken By, (c) = Cosigned By      Initials Name Provider Type    KF Karolina Prabhakar, OT Occupational Therapist                   Goals/Plan       Row Name 04/10/24 1005          Transfer Goal 1 (OT)    Activity/Assistive Device (Transfer Goal 1, OT) commode;bed-to-chair/chair-to-bed  -KF     [place] Level/Cues Needed (Transfer Goal 1, OT) supervision required  -KF     Time Frame (Transfer Goal 1, OT) long term goal (LTG);10 days  -KF     Progress/Outcome (Transfer Goal 1, OT) goal ongoing  -KF       Row Name 04/10/24 1005          Dressing Goal 1 (OT)    Activity/Device (Dressing Goal 1, OT) upper body dressing;lower body dressing  -KF     [place]/Cues Needed (Dressing Goal 1, OT) supervision required  -KF     Time Frame (Dressing Goal 1, OT) long term goal (LTG);10 days  -KF     Progress/Outcome (Dressing Goal 1, OT) goal ongoing  -KF       Row Name 04/10/24 1005          Grooming Goal 1 (OT)    Activity/Device (Grooming Goal 1, OT) hair care;oral care;wash face, hands  -KF      [place] (Grooming Goal 1, OT) supervision required  -KF     Time Frame (Grooming Goal 1, OT) long term goal (LTG);10 days  -KF     Strategies/Barriers (Grooming Goal 1, OT) sink side  -KF     Progress/Outcome (Grooming Goal 1, OT) goal ongoing  -KF       Row Name 04/10/24 1005          Strength Goal 1 (OT)    Strength Goal 1 (OT) Pt will increase LUE strength to 4/5 to faciliate greater independence during ADLs and functional mobility.  -KF     Time Frame (Strength Goal 1, OT) long term goal (LTG);10 days  -KF     Progress/Outcome (Strength Goal 1, OT) goal ongoing  -KF       Row Name 04/10/24 1005          Therapy Assessment/Plan (OT)    Planned Therapy Interventions (OT) activity tolerance training;adaptive equipment training;BADL retraining;functional balance retraining;occupation/activity based interventions;patient/caregiver education/training;ROM/therapeutic exercise;strengthening exercise;transfer/mobility retraining  -KF               User Key  (r) = Recorded By, (t) = Taken By, (c) = Cosigned By      Initials Name Provider Type    KF Karolina Prabhakar, OT Occupational Therapist                   Clinical Impression       Row Name 04/10/24 1002          Pain Assessment    Pretreatment Pain Rating 6/10  -KF     Posttreatment Pain Rating 6/10  -KF     Pain Location generalized  -KF     Pain Location - chest;head  -KF     Pain Intervention(s) Repositioned;Ambulation/increased activity  -KF       Row Name 04/10/24 1002          Plan of Care Review    Plan of Care Reviewed With patient  -KF     Progress no change  -KF     Outcome Evaluation OT evaluation completed. The pt presents below her functional baseline with generalized weakness (L>R), decreased activity tolerance, and balance deficits. The pt performed bed mobility with CGA and took ~3-4 steps to the chair with Davy x2, HHA x2 secondary to L sided weakness. The pt will benefit from continued IP OT services to increase the pt's safety and independence  during ADLs and functional mobility. Recommend a d/c to IRF for best outcome.  -KF       Row Name 04/10/24 1002          Therapy Assessment/Plan (OT)    Rehab Potential (OT) good, to achieve stated therapy goals  -KF     Criteria for Skilled Therapeutic Interventions Met (OT) yes;skilled treatment is necessary  -KF     Therapy Frequency (OT) daily  -KF       Row Name 04/10/24 1002          Therapy Plan Review/Discharge Plan (OT)    Anticipated Discharge Disposition (OT) inpatient rehabilitation facility  -KF       Row Name 04/10/24 1002          Vital Signs    Pre Systolic BP Rehab 135  supine  -KF     Pre Treatment Diastolic BP 65  -KF     Intra Systolic BP Rehab 143  EOB  -KF     Intra Treatment Diastolic BP 71  -KF     Post Systolic BP Rehab 154  post transfer to chair  -KF     Post Treatment Diastolic BP 72  -KF     Pretreatment Heart Rate (beats/min) 82  -KF     Intratreatment Heart Rate (beats/min) 74  -KF     Posttreatment Heart Rate (beats/min) 67  -KF     Pre SpO2 (%) 99  -KF     O2 Delivery Pre Treatment room air  -KF     Post SpO2 (%) 98  -KF     O2 Delivery Post Treatment room air  -KF     Pre Patient Position Supine  -KF     Intra Patient Position Standing  -KF     Post Patient Position Sitting  -KF       Row Name 04/10/24 1002          Positioning and Restraints    Pre-Treatment Position in bed  -KF     Post Treatment Position chair  -KF     In Chair sitting;with PT  -KF               User Key  (r) = Recorded By, (t) = Taken By, (c) = Cosigned By      Initials Name Provider Type    KF Karolina Prabhakar, QUIRINO Occupational Therapist                   Outcome Measures       Row Name 04/10/24 1006          How much help from another is currently needed...    Putting on and taking off regular lower body clothing? 2  -KF     Bathing (including washing, rinsing, and drying) 2  -KF     Toileting (which includes using toilet bed pan or urinal) 3  -KF     Putting on and taking off regular upper body clothing 3  -KF      Taking care of personal grooming (such as brushing teeth) 3  -KF     Eating meals 3  -KF     AM-PAC 6 Clicks Score (OT) 16  -KF       Row Name 04/10/24 0800          How much help from another person do you currently need...    Turning from your back to your side while in flat bed without using bedrails? 3  -MP     Moving from lying on back to sitting on the side of a flat bed without bedrails? 2  -MP     Moving to and from a bed to a chair (including a wheelchair)? 2  -MP     Standing up from a chair using your arms (e.g., wheelchair, bedside chair)? 2  -MP     Climbing 3-5 steps with a railing? 2  -MP     To walk in hospital room? 2  -MP     AM-PAC 6 Clicks Score (PT) 13  -MP     Highest Level of Mobility Goal 4 --> Transfer to chair/commode  -MP       Row Name 04/10/24 1006          Functional Assessment    Outcome Measure Options AM-PAC 6 Clicks Daily Activity (OT)  -KF               User Key  (r) = Recorded By, (t) = Taken By, (c) = Cosigned By      Initials Name Provider Type    MP Terri El, RN Registered Nurse    Karolina Rajput OT Occupational Therapist                    Occupational Therapy Education       Title: PT OT SLP Therapies (In Progress)       Topic: Occupational Therapy (In Progress)       Point: ADL training (Done)       Description:   Instruct learner(s) on proper safety adaptation and remediation techniques during self care or transfers.   Instruct in proper use of assistive devices.                  Learning Progress Summary             Patient Acceptance, E,TB, VU,DU by KF at 4/10/2024 0853                         Point: Home exercise program (Not Started)       Description:   Instruct learner(s) on appropriate technique for monitoring, assisting and/or progressing therapeutic exercises/activities.                  Learner Progress:  Not documented in this visit.              Point: Precautions (Done)       Description:   Instruct learner(s) on prescribed precautions during  self-care and functional transfers.                  Learning Progress Summary             Patient Acceptance, E,TB, VU,DU by  at 4/10/2024 0853                         Point: Body mechanics (Done)       Description:   Instruct learner(s) on proper positioning and spine alignment during self-care, functional mobility activities and/or exercises.                  Learning Progress Summary             Patient Acceptance, E,TB, VU,DU by  at 4/10/2024 0853                                         User Key       Initials Effective Dates Name Provider Type Discipline     08/09/23 -  Karolina Prabhakar OT Occupational Therapist OT                  OT Recommendation and Plan  Planned Therapy Interventions (OT): activity tolerance training, adaptive equipment training, BADL retraining, functional balance retraining, occupation/activity based interventions, patient/caregiver education/training, ROM/therapeutic exercise, strengthening exercise, transfer/mobility retraining  Therapy Frequency (OT): daily  Plan of Care Review  Plan of Care Reviewed With: patient  Progress: no change  Outcome Evaluation: OT evaluation completed. The pt presents below her functional baseline with generalized weakness (L>R), decreased activity tolerance, and balance deficits. The pt performed bed mobility with CGA and took ~3-4 steps to the chair with Davy x2, HHA x2 secondary to L sided weakness. The pt will benefit from continued IP OT services to increase the pt's safety and independence during ADLs and functional mobility. Recommend a d/c to IRF for best outcome.     Time Calculation:   Evaluation Complexity (OT)  Review Occupational Profile/Medical/Therapy History Complexity: expanded/moderate complexity  Assessment, Occupational Performance/Identification of Deficit Complexity: 3-5 performance deficits  Clinical Decision Making Complexity (OT): detailed assessment/moderate complexity  Overall Complexity of Evaluation (OT): moderate  complexity     Time Calculation- OT       Row Name 04/10/24 1007             Time Calculation- OT    OT Start Time 0853  -KF      OT Received On 04/10/24  -KF      OT Goal Re-Cert Due Date 24  -KF         Untimed Charges    OT Eval/Re-eval Minutes 47  -KF         Total Minutes    Untimed Charges Total Minutes 47  -KF       Total Minutes 47  -KF                User Key  (r) = Recorded By, (t) = Taken By, (c) = Cosigned By      Initials Name Provider Type    KF Karolina Prabhakar OT Occupational Therapist                  Therapy Charges for Today       Code Description Service Date Service Provider Modifiers Qty    31094182866 HC OT EVAL MOD COMPLEXITY 4 4/10/2024 Karolina Prabhakar OT GO 1                 Karolina Prabhakar OT  4/10/2024    Electronically signed by Karoilna Prabhakar OT at 04/10/24 1008          Speech Language Pathology Notes (most recent note)        Emiliana Villalta MS CCC-SLP at 04/10/24 1643          Acute Care - Speech Language Pathology   Swallow Re-Evaluation [facility]     Patient Name: Kamila Garcia  : 1970  MRN: [id number]  Today's Date: 4/10/2024               Admit Date: 2024    Visit Dx:     ICD-10-CM ICD-9-CM   1. Numbness and tingling of left side of face  R20.0 782.0    R20.2    2. Cognitive communication deficit  R41.841 799.52   3. Oral phase dysphagia  R13.11 787.21     Patient Active Problem List   Diagnosis    Acute pulmonary embolism    Chest pain    H/O LLE DVT & PE (2021)    History of pulmonary embolism    Insomnia    Hypertension    Palpitations    Suspected cerebrovascular accident (CVA)    Hypertensive urgency    Obesity (BMI 30-39.9)    ETOH use    Anxiety and depression    Acute CVA (cerebrovascular accident)    Syncope and collapse     Past Medical History:   Diagnosis Date    Anxiety and depression     Chest pain     Disease of thyroid gland     reports slightly elevated when in hospital    DVT (deep venous thrombosis)     ETOH use  04/09/2024    History of pulmonary embolus (PE)     Hypertension     Hypothyroidism 04/09/2024    Migraines     Obesity (BMI 30-39.9) 04/09/2024    Shortness of breath     with chest pain episodes     Past Surgical History:   Procedure Laterality Date    CARDIAC CATHETERIZATION Left 3/11/2022    Procedure: Left Heart Cath;  Surgeon: Peter Anguiano MD;  Location:  [facility] CATH INVASIVE LOCATION;  Service: Cardiology;  Laterality: Left;  Hold Eliquis 2 days prior to [facility]    ENDOSCOPY N/A 1/12/2022    Procedure: ESOPHAGOGASTRODUODENOSCOPY;  Surgeon: Brunner, Mark I, MD;  Location:  [facility];  Service: Gastroenterology;  Laterality: N/A;    GASTRIC BYPASS  2007    lap cm en y    IR STENT PLACEMENT Left 10/2021    xin surgical in cath lab placed stent in left leg       SLP Recommendation and Plan  SLP Swallowing Diagnosis: mild, oral dysphagia (04/10/24 1600)  SLP Diet Recommendation: soft to chew textures, whole, thin liquids, other (see comments) (can advance to regular solids if pt prefers) (04/10/24 1600)  Recommended Precautions and Strategies: general aspiration precautions, other (see comments) (place food and liquid on R side of mouth) (04/10/24 1600)  SLP Rec. for Method of Medication Administration: as tolerated (trial whole in applesauce to determine if pt senses improved oral control over pill in mouth.) (04/10/24 1600)     Monitor for Signs of Aspiration: notify SLP if any concerns (04/10/24 1600)     Swallow Criteria for Skilled Therapeutic Interventions Met: demonstrates skilled criteria (04/10/24 1600)  Anticipated Discharge Disposition (SLP): home with assist (04/10/24 1600)  Rehab Potential/Prognosis, Swallowing: good, to achieve stated therapy goals (04/10/24 1600)  Therapy Frequency (Swallow): 5 days per week (04/10/24 1600)  Predicted Duration Therapy Intervention (Days): until discharge (04/10/24 1600)  Oral Care Recommendations: Oral Care BID/PRN, Toothbrush (04/10/24 1600)                                                SWALLOW EVALUATION (Last 72 Hours)       SLP Adult Swallow Evaluation       Row Name 04/10/24 1600 04/09/24 6403                Rehab Evaluation    Document Type re-evaluation  - evaluation  -       Subjective Information no complaints  - no complaints  -       Patient Observations alert;cooperative  - alert;cooperative  -       Patient/Family/Caregiver Comments/Observations -- Family present  -       Patient Effort good  -SM good  -       Comment Attempted AM, pt getting bath  - --       Symptoms Noted During/After Treatment -- none  -          General Information    Patient Profile Reviewed -- yes  -       Pertinent History Of Current Problem -- Adm with c/o numbness/tingling on L side s/p TNK administration. Hx: ETOH use, migraines, HTN, obesity, LLE DVT, PE. Head CT negative for acute intracranial abnormalities, MRI pending  -       Current Method of Nutrition -- NPO  -       Precautions/Limitations, Vision -- WFL;for purposes of eval  -       Precautions/Limitations, Hearing -- WFL;for purposes of eval  -       Prior Level of Function-Communication -- WFL  [facility]       Prior Level of Function-Swallowing -- no diet consistency restrictions  [facility]       Plans/Goals Discussed with -- patient;family;agreed upon  [facility]       Barriers to Rehab -- none identified  -       Patient's Goals for Discharge -- patient did not state  -       Family Goals for Discharge -- family did not state  -          Pain    Additional Documentation -- Pain Scale: FACES Pre/Post-Treatment (Group)  [facility]          Pain Scale: FACES Pre/Post-Treatment    Pain: FACES Scale, Pretreatment 2-->hurts little bit  -SM 2-->hurts little bit  -       Posttreatment Pain Rating 2-->hurts little bit  -SM 2-->hurts little bit  -       Pain Location -- generalized  -       Pain Location - -- head  -       Pre/Posttreatment Pain Comment -- RN aware  -          Oral Motor Structure  and Function    Dentition Assessment -- natural, present and adequate  -       Secretion Management -- WNL/WF  -       Mucosal Quality -- moist, healthy  -          Oral Musculature and Cranial Nerve Assessment    Oral Motor General Assessment -- [facility]          General Eating/Swallowing Observations    Respiratory Support Currently in Use -- room air  -       Eating/Swallowing Skills -- self-fed;fed by SLP  -       Positioning During Eating -- upright in bed  -       Utensils Used -- spoon;cup;straw  -       Consistencies Trialed -- ice chips;thin liquids;pureed;regular textures  -          Clinical Swallow Eval    Oral Prep Phase impaired  -SM --  -       Oral Transit impaired  -SM --  -       Oral Residue WFL  - --  -       Pharyngeal Phase no overt signs/symptoms of pharyngeal impairment  - no overt signs/symptoms of pharyngeal impairment  -       Clinical Swallow Evaluation Summary Persistent L OM numbness. ROM/strength functional. Pt reports has to take liquid and food to R side of mouth otherwise cannot feel it and coughs. No s/s aspiration when observed taking solids and liquids on R. Did not push for tirals on L. Pt feels cognitive improved/baseline. In agreement for SLP to return tomorrow to assess higher level areas.  - No overt s/s of aspiration accross trials of thin liquid, pureed, or regular solid consistencies; even when pushed for consecutive sips of thin. Prolonged manipulation/mastication of regular solid trial, mild residue; pt able to clear w/ liquid wash. Pt w/ difficulty pulling liquid from straw on L, able to pull liquid on R side. Ok to initiate regular diet w/ thin liquids, general aspiration precautions, check for pocketing/residue. Neuro w/u ongoing, SLP will f/u for re-eval to ensure no additonal concerns  -          SLP Evaluation Clinical Impression    SLP Swallowing Diagnosis mild;oral dysphagia  - R/O pharyngeal dysphagia  -       Functional  Impact risk of aspiration/pneumonia  - risk of aspiration/pneumonia  -       Rehab Potential/Prognosis, Swallowing good, to achieve stated therapy goals  - good, to achieve stated therapy goals  -       Swallow Criteria for Skilled Therapeutic Interventions Met demonstrates skilled criteria  - demonstrates skilled criteria  -          Recommendations    Therapy Frequency (Swallow) 5 days per week  - --       Predicted Duration Therapy Intervention (Days) until discharge  - until discharge  -       SLP Diet Recommendation soft to chew textures;whole;thin liquids;other (see comments)  can advance to regular solids if pt prefers  - soft to chew textures;whole;thin liquids  -       Recommended Diagnostics -- reassess via clinical swallow evaluation  -       Recommended Precautions and Strategies general aspiration precautions;other (see comments)  place food and liquid on R side of mouth  - general aspiration precautions;small bites of food and sips of liquid;check mouth frequently for oral residue/pocketing  -       Oral Care Recommendations Oral Care BID/PRN;Toothbrush  - Oral Care BID/PRN;Toothbrush  -       SLP Rec. for Method of Medication Administration as tolerated  trial whole in applesauce to determine if pt senses improved oral control over pill in mouth.  - meds whole;with thin liquids;meds crushed;with puree;as tolerated  -       Monitor for Signs of Aspiration notify SLP if any concerns  - yes;notify SLP if any concerns  -       Anticipated Discharge Disposition (SLP) home with assist  - unknown;anticipate therapy at next level of care  -                 User Key  (r) = Recorded By, (t) = Taken By, (c) = Cosigned By      Initials Name Effective Dates     Emiliana Villalta, MS CCC-SLP 02/03/23 -      Jo Bang, MS CCC-SLP 05/12/23 -                     EDUCATION  The patient has been educated in the following areas:   Cognitive Impairment  Communication Impairment Dysphagia (Swallowing Impairment) Modified Diet Instruction.        SLP GOALS       Row Name 04/09/24 1605             Patient will demonstrate functional cognitive-linguistic skills for return to discharge environment    [place] Independently  -      Time frame by discharge  -      Progress/Outcomes new goal  -         SLP Diagnostic Treatment     Patient will participate in further assessment in the following areas cognitive-linguistic;higher-level cognitive-linguistic;reading comprehension;graphic expression  -      Time Frame (Diagnostic) short term goal (STG)  -      Progress/Outcomes (Additional Goal 1, SLP) new goal  -         Memory Skills Goal 1 (SLP)    Improve Memory Skills Through Goal 1 (SLP) recalling related word lists immediately;recalling unrelated word lists immediately;80%;with minimal cues (75-90%)  -      Time Frame (Memory Skills Goal 1, SLP) short term goal (STG)  -      Progress/Outcomes (Memory Skills Goal 1, SLP) new goal  -                User Key  (r) = Recorded By, (t) = Taken By, (c) = Cosigned By      Initials Name Provider Type     Jo Bang, MS CCC-SLP Speech and Language Pathologist                       Time Calculation:    Time Calculation- SLP       Row Name 04/10/24 1642             Time Calculation- [facility]    SLP Received On 04/10/24  -SM         Untimed Charges    [id number] Eval Oral Pharyng Swallow Minutes 38  -SM         Total Minutes    Untimed Charges Total Minutes 38  -SM       Total Minutes 38  -SM                User Key  (r) = Recorded By, (t) = Taken By, (c) = Cosigned By      Initials Name Provider Type    Emiliana Valentine, MS CCC-SLP Speech and Language Pathologist                    Therapy Charges for Today       Code Description Service Date Service Provider Modifiers Qty    [id number] HC ST EVAL ORAL PHARYNG SWALLOW 3 4/10/2024 Emiliana Villlata MS CCC-SLP GN 1                 Emiliana CRISTOBAL  MS Amb[place] CCC-SLP  4/10/2024    Electronically signed by Emiliana Villalta MS CCC-SLP at 04/10/24 7945

## 2024-04-12 NOTE — PROGRESS NOTES
Neurology       Patient Care Team:  Latricia Mishra APRN as PCP - General (Family Medicine)    Chief complaint: Headache and gait disturbance    History: Walking poorly but the headache is better.    Cardiology has recommended 2-week monitor and resuming Eliquis.    Rehab bed pending  Past Medical History:   Diagnosis Date    Anxiety and depression     Chest pain     Disease of thyroid gland     reports slightly elevated when in hospital    DVT (deep venous thrombosis)     ETOH use 04/09/2024    History of pulmonary embolus (PE)     Hypertension     Hypothyroidism 04/09/2024    Migraines     Obesity (BMI 30-39.9) 04/09/2024    Shortness of breath     with chest pain episodes       Vital Signs   Vitals:    04/12/24 0533 04/12/24 0720 04/12/24 1105 04/12/24 1220   BP: 156/93 146/81 143/79    BP Location: Right arm Right arm Right arm    Patient Position: Lying Lying Lying    Pulse: 58 56 72 78   Resp: 19 18 16    Temp:  97.7 °F (36.5 °C) 98.2 °F (36.8 °C)    TempSrc:  Oral Oral    SpO2: 98% 97% 99%    Weight:       Height:           Physical Exam:   General: Flat affect              Neuro: Oriented to person place and time.        Results Review:  Labs reviewed  Results from last 7 days   Lab Units 04/11/24  0607   WBC 10*3/mm3 4.45   HEMOGLOBIN g/dL 12.1   HEMATOCRIT % 38.4   PLATELETS 10*3/mm3 265     Results from last 7 days   Lab Units 04/12/24  0527 04/11/24  0607 04/10/24  0432 04/09/24  0842   SODIUM mmol/L 139 140 142 141   POTASSIUM mmol/L 4.3 3.9 4.1 4.2   CHLORIDE mmol/L 108* 107 108* 106   CO2 mmol/L 22.0 24.0 23.0 24.0   BUN mg/dL 12 9 7 12   CREATININE mg/dL 0.80 0.71 0.63 0.80   CALCIUM mg/dL 8.6 8.5* 8.6 8.4*   BILIRUBIN mg/dL  --   --   --  0.6   ALK PHOS U/L  --   --   --  91   ALT (SGPT) U/L  --   --   --  8   AST (SGOT) U/L  --   --   --  23   GLUCOSE mg/dL 92 94 103* 110*       Imaging Results (Last 24 Hours)       ** No results found for the last 24 hours. **            Assessment:  Syncopal  spell with closed head injury    Posttraumatic headache.    Posttraumatic vertigo.    Disturbance with functional elements.    History of DVT    Plan:  Inpatient rehab.    Resume Eliquis 5 mg twice daily.     to try to determine why this was not getting paid for by her insurance.    Comment:  Slow progress         I discussed the patients findings and my recommendations with patient, family, and primary care team    Jewel Rose MD  04/12/24  12:27 EDT

## 2024-04-12 NOTE — CASE MANAGEMENT/SOCIAL WORK
Continued Stay Note  [facility]     Patient Name: Kamila Garcia  MRN: [id number]  Today's Date: 4/12/2024    Admit Date: 4/9/2024    Plan: IRF   Discharge Plan       Row Name 04/12/24 1142       Plan    Plan IRF    Patient/Family in Agreement with Plan yes    Plan Comments Discussed in MDR. Ms. Garcia needs inpatient rehab. Referrals have been sent to [facility], [facility] and [facility] in this order per her request. She will need a pre-cert. CM will continue to follow.    Final Discharge Disposition Code 30 - still a patient                   Discharge Codes    No documentation.                 Expected Discharge Date and Time       Expected Discharge Date Expected Discharge Time    Apr 13, 2024               Cassidy Montoya RN

## 2024-04-12 NOTE — PLAN OF CARE
Goal Outcome Evaluation:  Plan of Care Reviewed With: patient, spouse        Progress: improving  Outcome Evaluation: Pt with minor improvement in ambulation distance, however with improved stability with gait this date. Pt continues to demo balance, strength, and endurance below baseline and will continue to benefit from PT to address these deficits.

## 2024-04-12 NOTE — PROGRESS NOTES
"    [facility] Medicine Services  PROGRESS NOTE    Patient Name: Kamila Garcia  : 1970  MRN: [id number]    Date of Admission: 2024  Primary Care Physician: Latricia Mishra APRN    Subjective   Subjective     CC:  Left facial paresthesias    HPI:  Headache remains, but improved. Cont to have left sided numbness but it is improving as well. Feels her left leg is very weak and going to \"give out\" when she is walking. Encouraged to reconsider inpat rehab. No BM yet during this hospital stay.       Objective   Objective     Vital Signs:   Temp:  [97.6 °F (36.4 °C)-98.3 °F (36.8 °C)] 98.2 °F (36.8 °C)  Heart Rate:  [56-85] 78  Resp:  [16-20] 16  BP: (137-165)/(75-93) 143/79     Physical Exam:  Constitutional: No acute distress, awake, alert  HENT: NCAT, mucous membranes moist  Respiratory: Clear to auscultation bilaterally, respiratory effort normal   Cardiovascular: RRR, no murmurs, rubs, or gallops  Gastrointestinal: Positive bowel sounds, soft, nontender, nondistended  Musculoskeletal: No bilateral ankle edema  Psychiatric: Flat affect, cooperative  Neurologic: Oriented x 3, left leg and arm slightly weaker, no facial asymmetry, speech clear  Skin: No rashes     Results Reviewed:  LAB RESULTS:      Lab 24  0607 04/10/24  0432 24  0842   WBC 4.45 4.97 4.44   HEMOGLOBIN 12.1 11.7* 13.0   HEMATOCRIT 38.4 36.6 40.8   PLATELETS 265 257 277   NEUTROS ABS 2.30  --  2.42   IMMATURE GRANS (ABS) 0.01  --  0.01   LYMPHS ABS 1.27  --  1.30   MONOS ABS 0.51  --  0.45   EOS ABS 0.32  --  0.21   MCV 87.9 88.2 86.1   PROTIME  --   --  13.9   APTT  --   --  27.0*         Lab 24  0527 24  0607 04/10/24  0432 24  0842   SODIUM 139 140 142 141   POTASSIUM 4.3 3.9 4.1 4.2   CHLORIDE 108* 107 108* 106   CO2 22.0 24.0 23.0 24.0   ANION GAP 9.0 9.0 11.0 11.0   BUN 12 9 7 12   CREATININE 0.80 0.71 0.63 0.80   EGFR 87.1 100.6 104.9 87.1   GLUCOSE 92 94 103* 110* "   CALCIUM 8.6 8.5* 8.6 8.4*   MAGNESIUM 2.1 2.2  --  2.0   HEMOGLOBIN A1C  --   --  4.90  --    TSH  --  7.510*  --   --          Lab 04/09/24  0842   TOTAL PROTEIN 7.3   ALBUMIN 4.2   GLOBULIN 3.1   ALT (SGPT) 8   AST (SGOT) 23   BILIRUBIN 0.6   ALK PHOS 91         Lab 04/10/24  1640 04/10/24  0432 04/09/24  0842   PROBNP  --   --  273.0   HSTROP T 8 7 9   PROTIME  --   --  13.9   INR  --   --  1.06         Lab 04/10/24  0432   CHOLESTEROL 168   LDL CHOL 70   HDL CHOL 86*   TRIGLYCERIDES 62         Lab 04/11/24  1027   FOLATE 10.60   VITAMIN B 12 222         Brief Urine Lab Results  (Last result in the past 365 days)        Color   Clarity   Blood   Leuk Est   Nitrite   Protein   CREAT   Urine HCG        04/09/24 1002 Yellow   Clear   Trace   Trace   Negative   Negative                   Microbiology Results Abnormal       None            EEG    Result Date: 4/11/2024  Reason for referral: 53 y.o.female with syncope Technical Summary:  A 19 channel digital EEG was performed using the international 10-20 placement system, including eye leads and EKG leads. Duration: 20 minutes Findings: The patient is awake.  A medium amplitude well-regulated tenderness posterior rhythm is evident symmetrically over the occipital leads.  Low amplitude intermixed alpha and theta activity are seen anteriorly.  Drowsiness is seen with mild slowing of the background but stage II sleep is not seen.  Photic stimulation yields a symmetric driving response at several flash frequencies.  Hyperventilation is not performed.  No focal features or epileptiform activity are seen. Video: Available Technical quality: Superior EKG: Regular, 60 bpm SUMMARY: Normal EEG in the awake and lightly drowsy states No focal features or epileptiform activity are seen     Impression: Normal study This report is transcribed using the Dragon dictation system.       Results for orders placed during the hospital encounter of 04/09/24    Adult Transthoracic Echo  Complete W/ Cont if Necessary Per Protocol (With Agitated Saline)    Interpretation Summary    Left ventricular systolic function is normal. Calculated left ventricular EF = 53.6%    Left ventricular wall thickness is consistent with mild concentric hypertrophy.    Saline test results are negative.    Estimated right ventricular systolic pressure from tricuspid regurgitation is normal (<35 mmHg).    The aortic valve exhibits sclerosis.      Current medications:  Scheduled Meds:apixaban, 10 mg, Oral, Q12H   Followed by  [START ON 4/19/2024] apixaban, 5 mg, Oral, Q12H  aspirin, 81 mg, Oral, Daily  buPROPion XL, 150 mg, Oral, Daily  folic acid, 1 mg, Oral, Daily  losartan, 50 mg, Oral, Q24H  magnesium oxide, 400 mg, Oral, Daily  multivitamin with minerals, 1 tablet, Oral, Daily  OLANZapine, 10 mg, Oral, Nightly  senna-docusate sodium, 2 tablet, Oral, BID  sodium chloride, 10 mL, Intravenous, Q12H  thiamine (B-1) IV, 200 mg, Intravenous, Q8H   Followed by  [START ON 4/16/2024] thiamine, 100 mg, Oral, Daily  vitamin B-12, 1,000 mcg, Oral, Daily      Continuous Infusions:   PRN Meds:.  acetaminophen    senna-docusate sodium **AND** polyethylene glycol **AND** bisacodyl **AND** bisacodyl    LORazepam **OR** midazolam **OR** LORazepam **OR** midazolam **OR** midazolam **OR** midazolam    Magnesium Standard Dose Replacement - Follow Nurse / BPA Driven Protocol    melatonin    nitroglycerin    sodium chloride    sodium chloride    Assessment & Plan   Assessment & Plan     Active Hospital Problems    Diagnosis  POA    **Suspected cerebrovascular accident (CVA) [R09.89]  Yes    Syncope and collapse [R55]  Unknown    Hypertensive urgency [I16.0]  Yes    Obesity (BMI 30-39.9) [E66.9]  Yes    ETOH use [Z78.9]  Yes    Anxiety and depression [F41.9, F32.A]  Yes    H/O LLE DVT & PE (July 2021) [Z86.718]  Not Applicable      Resolved Hospital Problems   No resolved problems to display.        Brief Hospital Course to date:  Kamila  "Stacey Garcia is a 53 y.o. female w/ hx previous dvt & pe (July 2021) who stopped taking eliquis ~1 month ago, anxiety/depression, obesity, migraines (per chart, patient denies) who presented after a syncopal event which promped co-workers to call ems, regained consciousness en route to [facility] ED where upon arrival developed numbness and tingling to left face, arm and code stroke called. Evaluated by neuro-stroke service, ct head negative, cta h&n were negative for flow limiting stenosis and ct perfusion was negative for lvo. Ultimately was deemed candidate for thrombolytic therapy w/ tnkase administered 4/9/24 at 11:48 , also received dose of depakote, and was admitted to icu for further evaluation. Repeat ct head was negative.continued to have headache, left facial arm paresthesias. Subsequent mri brain evening 4/9/24 was negative as well. Repeat ct head 4/10/24 was again negative. Carotid duplex negative. Echo showed normal ef, saline test negative. Neuro-stroke service felt the symptoms were not due to cerebral ischemia, likely a stroke \"mimic\" such as functional or atypical migraine. General neurology was consulted and transferred out of icu 4/11/24.    Syncopal episode (unclear etiology)  -echo ok  -orthostatics normal 4/11/24  -no dysrhythmias on tele  -cards evaluated: set up w/ holter monitor upon discharge; follow up 6 weeks in cards clinic    Mild concussion (w/ post-traumatic vertigo and post-traumatic headache)  Persistent Left sided facial and arm paresthesias and ataxia  Borderline b12 deficiency  -s/p tnkase in ED 4/9  -ctperfusion negative, cta h&n negative  -subsequent mri negative 4/9  -echo normal  -subsequent ct head 4/10 again negative  -neuro-stroke followed: per 4/10/24: feels ongoing paresthesias less likely brain ischemic, more likely stroke \"mimic\" including \"functional\" vs atypical migraine  -oral b12 replacement (for borderline low b12 level)  **General neurology following: EEG " normal  -pt/ot recommending inpt rehab  --ASA indefinitely      Hx etoh abuse  -5 beer daily  -start ciwa protocol/prn benzos protocol (? Component of withdrawal)    Htn  -start losartan 50mg (titrate prn)    Mildly elevated tsh  -tsh 7.5; normal free t4  -follow up pcp, repeat tsh 4-6 weeks    Hx previous pe & dvt  -formerly on eliquis but stopped taking last month  -continue asa currently  -restart eliquis now per Neurology       Mood d/o  -stop abilify  -continue zyprexa  --restart Wellbutrin 150 mg daily for 2 weeks then increase to 300 mg daily thereafter.   --follow up with Psychiatrist         Am labs: bmp, mag    Expected Discharge Location and Transportation: [facility] rehab soon  Expected Discharge   Expected Discharge Date: 4/15/2024; Expected Discharge Time:      DVT prophylaxis:  Medical and mechanical DVT prophylaxis orders are present.         AM-PAC 6 Clicks Score (PT): 17 (04/12/24 0800)    CODE STATUS:   There are no questions and answers to display.       Dana Eng, APRN  04/12/24

## 2024-04-12 NOTE — THERAPY TREATMENT NOTE
Patient Name: Kamila Garcia  : 1970    MRN: [id number]                              Today's Date: 2024       Admit Date: 2024    Visit Dx:     ICD-10-CM ICD-9-CM   1. Numbness and tingling of left side of face  R20.0 782.0    R20.2    2. Cognitive communication deficit  R41.841 799.52   3. Oral phase dysphagia  R13.11 787.21     Patient Active Problem List   Diagnosis    Acute pulmonary embolism    Chest pain    H/O LLE DVT & PE (2021)    History of pulmonary embolism    Insomnia    Hypertension    Palpitations    Suspected cerebrovascular accident (CVA)    Hypertensive urgency    Obesity (BMI 30-39.9)    ETOH use    Anxiety and depression    Acute CVA (cerebrovascular accident)    Syncope and collapse     Past Medical History:   Diagnosis Date    Anxiety and depression     Chest pain     Disease of thyroid gland     reports slightly elevated when in hospital    DVT (deep venous thrombosis)     ETOH use 2024    History of pulmonary embolus (PE)     Hypertension     Hypothyroidism 2024    Migraines     Obesity (BMI 30-39.9) 2024    Shortness of breath     with chest pain episodes     Past Surgical History:   Procedure Laterality Date    CARDIAC CATHETERIZATION Left 3/11/2022    Procedure: Left Heart Cath;  Surgeon: Peter Anguaino MD;  Location:  [facility];  Service: Cardiology;  Laterality: Left;  Hold Eliquis 2 days prior to [facility]    ENDOSCOPY N/A 2022    Procedure: ESOPHAGOGASTRODUODENOSCOPY;  Surgeon: Brunner, Mark I, MD;  Location:  [facility];  Service: Gastroenterology;  Laterality: N/A;    GASTRIC BYPASS      lap mc en y    IR STENT PLACEMENT Left 10/2021    xin surgical in cath lab placed stent in left leg      General Information       Row Name 24 1512          Physical Therapy Time and Intention    Document Type therapy note (daily note)  -KR     Mode of Treatment physical therapy;individual therapy  -KR       Row  "Name 04/12/24 1512          General Information    Patient Profile Reviewed yes  -KR     Existing Precautions/Restrictions other (see comments)  L numbness  -KR     Barriers to Rehab none identified  -KR       Row Name 04/12/24 1512          Cognition    Orientation Status (Cognition) oriented x 3  -KR       Row Name 04/12/24 1512          Safety Issues, Functional Mobility    Impairments Affecting Function (Mobility) balance;endurance/activity tolerance;sensation/sensory awareness;strength  -KR               User Key  (r) = Recorded By, (t) = Taken By, (c) = Cosigned By      Initials Name Provider Type    Maria Del Rosario Everett PT Physical Therapist                   Mobility       Row Name 04/12/24 1512          Sit-Stand Transfer    Sit-Stand [place] (Transfers) contact guard;1 person assist  -KR     Assistive Device (Sit-Stand Transfers) walker, front-wheeled  -KR     Comment, (Sit-Stand Transfer) 1x from bed, 2x from chair with FWW.  -KR       Row Name 04/12/24 1512          Gait/Stairs (Locomotion)    [place] Level (Gait) contact guard  -KR     Assistive Device (Gait) walker, front-wheeled  -KR     Distance in Feet (Gait) 40  40 + 40  -KR     Deviations/Abnormal Patterns (Gait) left sided deviations;oleg decreased;gait speed decreased;stride length decreased;weight shifting decreased  -KR     Left Sided Gait Deviations weight shift ability decreased  -KR     Comment, (Gait/Stairs) pt ambulates at markedly decreased pace with increased WB through UEs d/t decreased LLE sensation. Pt reporting LLE feels \"wobbly\" with ambulation.  -KR               User Key  (r) = Recorded By, (t) = Taken By, (c) = Cosigned By      Initials Name Provider Type    Maria Del Rosario Everett PT Physical Therapist                   Obj/Interventions       Row Name 04/12/24 1517          Motor Skills    Therapeutic Exercise hip;ankle  -KR     Additional Documentation Advanced Stepping/Walking Interventions (Group)  -KR       Row " "Name 04/12/24 1517          Hip (Therapeutic Exercise)    Hip (Therapeutic Exercise) strengthening exercise  -KR     Hip Strengthening (Therapeutic Exercise) other (see comments)  5x STS from chair w/ arms across chest  -KR       Row Name 04/12/24 1517          Ankle (Therapeutic Exercise)    Ankle (Therapeutic Exercise) strengthening exercise  -KR     Ankle Strengthening (Therapeutic Exercise) 10 repetitions;bilateral;standing;plantarflexion  -KR       Row Name 04/12/24 1517          Balance    Balance Assessment sitting static balance;sitting dynamic balance;standing static balance;standing dynamic balance  -KR     Static Sitting Balance independent  -KR     Dynamic Sitting Balance independent  -KR     Position, Sitting Balance unsupported;sitting edge of bed;sitting in chair  -KR     Static Standing Balance contact guard  -KR     Dynamic Standing Balance contact guard  -KR     Position/Device Used, Standing Balance supported;walker, front-wheeled  -KR     Balance Interventions sitting;standing;sit to stand;supported;static;dynamic;other (see comments)  30\" WBOS E.C., 30\" NBOS E.O., 30\" NBOS E.C., 30\" semi-tandem RLE forward E.O. and 30\" E.C., 30\" semi-tandem LLE forward E.O. and 30\" E.C. Increased anteroposterior sway with semi-tandem stance, CGA.  -KR       Row Name 04/12/24 1517          Advanced Stepping/Walking Interventions    Stepping/Walking Interventions side stepping  -KR     Side Stepping (Stepping/Walking Interventions) 10' B with BUE support and CGA  -KR               User Key  (r) = Recorded By, (t) = Taken By, (c) = Cosigned By      Initials Name Provider Type    KR Maria Del Rosario Weston PT Physical Therapist                   Goals/Plan    No documentation.                  Clinical Impression       Row Name 04/12/24 1524          Pain Scale: FACES Pre/Post-Treatment    Pain: FACES Scale, Pretreatment 0-->no hurt  -KR     Posttreatment Pain Rating 0-->no hurt  -KR       Row Name 04/12/24 1524          " Plan of Care Review    Plan of Care Reviewed With patient;spouse  -KR     Progress improving  -KR     Outcome Evaluation Pt with minor improvement in ambulation distance, however with improved stability with gait this date. Pt continues to demo balance, strength, and endurance below baseline and will continue to benefit from PT to address these deficits.  -KR       Row Name 04/12/24 1524          Vital Signs    Pre Patient Position Sitting  -KR     Intra Patient Position Standing  -KR     Post Patient Position Sitting  -KR       Row Name 04/12/24 1524          Positioning and Restraints    Pre-Treatment Position in bed  -KR     Post Treatment Position bed  -KR     In Bed notified nsg;sitting EOB;encouraged to call for assist;with family/caregiver;call light within reach  -KR               User Key  (r) = Recorded By, (t) = Taken By, (c) = Cosigned By      Initials Name Provider Type    Maria Del Rosario Everett PT Physical Therapist                   Outcome Measures       Row Name 04/12/24 1525 04/12/24 0800       How much help from another person do you currently need...    Turning from your back to your side while in flat bed without using bedrails? 3  -KR 3  -EC    Moving from lying on back to sitting on the side of a flat bed without bedrails? 3  -KR 3  -EC    Moving to and from a bed to a chair (including a wheelchair)? 3  -KR 3  -EC    Standing up from a chair using your arms (e.g., wheelchair, bedside chair)? 3  -KR 3  -EC    Climbing 3-5 steps with a railing? 3  -KR 2  -EC    To walk in hospital room? 3  -KR 3  -EC    AM-PAC 6 Clicks Score (PT) 18  -KR 17  -EC    Highest Level of Mobility Goal 6 --> Walk 10 steps or more  -KR 5 --> Static standing  -EC      Row Name 04/12/24 1145          Functional Assessment    Outcome Measure Options AM-PAC 6 Clicks Daily Activity (OT)  -KL               User Key  (r) = Recorded By, (t) = Taken By, (c) = Cosigned By      Initials Name Provider Type    Maria Del Rosario Everett PT  Physical Therapist    Adelaida Johnson OT Occupational Therapist    Michelle Bonilla, RN Registered Nurse                                 Physical Therapy Education       Title: PT OT SLP Therapies (In Progress)       Topic: Physical Therapy (In Progress)       Point: Mobility training (Done)       Learning Progress Summary             Patient Acceptance, E, VU by KR at 4/12/2024 1526    Acceptance, E, NR by ND at 4/10/2024 1103   Family Acceptance, E, VU by KR at 4/12/2024 1526                         Point: Home exercise program (Not Started)       Learner Progress:  Not documented in this visit.              Point: Body mechanics (Done)       Learning Progress Summary             Patient Acceptance, E, VU by KR at 4/12/2024 1526    Acceptance, E, NR by ND at 4/10/2024 1103   Family Acceptance, E, VU by KR at 4/12/2024 1526                         Point: Precautions (Done)       Learning Progress Summary             Patient Acceptance, E, VU by KR at 4/12/2024 1526    Acceptance, E, NR by ND at 4/10/2024 1103   Family Acceptance, E, VU by KR at 4/12/2024 1526                                         User Key       Initials Effective Dates Name Provider Type Discipline    KR 12/30/22 -  Maria Del Rosario Weston, PT Physical Therapist PT    ND 11/16/23 -  Ale Gomez PT Physical Therapist PT                  PT Recommendation and Plan     Plan of Care Reviewed With: patient, spouse  Progress: improving  Outcome Evaluation: Pt with minor improvement in ambulation distance, however with improved stability with gait this date. Pt continues to demo balance, strength, and endurance below baseline and will continue to benefit from PT to address these deficits.     Time Calculation:         PT Charges       Row Name 04/12/24 1526             Time Calculation    Start Time 1441  -KR      PT Received On 04/12/24  -KR         Timed Charges    02588 - PT Therapeutic Exercise Minutes 5  -KR      72584 -  PT Neuromuscular  Reeducation Minutes 10  -KR      07031 - Gait Training Minutes  5  -KR      67376 - PT Therapeutic Activity Minutes 3  -KR         Total Minutes    Timed Charges Total Minutes 23  -KR       Total Minutes 23  -KR                User Key  (r) = Recorded By, (t) = Taken By, (c) = Cosigned By      Initials Name Provider Type    Maria Del Rosario Everett PT Physical Therapist                  Therapy Charges for Today       Code Description Service Date Service Provider Modifiers Qty    04832568018 HC PT NEUROMUSC RE EDUCATION EA 15 MIN 4/12/2024 Maria Del Rosario Weston, PT GP 1    36053174123 HC PT THER PROC EA 15 MIN 4/12/2024 Maria Del Rosario Weston, PT GP 1            PT G-Codes  Outcome Measure Options: AM-PAC 6 Clicks Daily Activity (OT)  AM-PAC 6 Clicks Score (PT): 18  AM-PAC 6 Clicks Score (OT): 20  Modified Mi Scale: 4 - Moderately severe disability.  Unable to walk without assistance, and unable to attend to own bodily needs without assistance.       Maria Del Rosario Weston PT  4/12/2024

## 2024-04-12 NOTE — PLAN OF CARE
Goal Outcome Evaluation:  Plan of Care Reviewed With: patient, spouse        Progress: improving  Outcome Evaluation: Hypertensive, not orthostatic (asymptomatic).. BP improved w/ losartan. CIWA=3-6. Headache improved after migraine cocktail and tylenol, although not completely relieved. Ambulated to Bathroom, steady gait, no ataxia noted, although pt still c/o tingling/numbness LUE&LLE. Plan discharge home w/ spouse when medically ready.

## 2024-04-12 NOTE — PAYOR COMM NOTE
"Kamila Garcia (53 y.o. Female)       Date of Birth   1970    Social Security Number       Address   [address] [place] [address]    Home Phone   [phone]    MRN   [id number]       Ad[employer]ist   Taoist    Marital Status                               Admission Date   4/9/24    Admission Type   Emergency    Admitting Provider   oJ Ordoñez MD    Attending Provider   Jo Ordoñez MD    Department, Room/Bed   [facility] 3E, S336/1       Discharge Date       Discharge Disposition       Discharge Destination                                 Attending Provider: Jo Ordoñez MD    Allergies: Hydrocodone, Lactose Intolerance (Gi)    Isolation: None   Infection: None   Code Status: Prior    Ht: 167.6 cm (65.98\")   Wt: 101 kg (222 lb 10.6 oz)    Admission Cmt: None   Principal Problem: Suspected cerebrovascular accident (CVA) [R09.89]                   Active Insurance as of 4/9/2024       Primary Coverage       Payor Plan Insurance Group Employer/Plan Group    [employer] BLUE CROSS [employer] BLUE CROSS BLUE SHIELD PPO [id number]       Payor Plan Address Payor Plan Phone Number Payor Plan Fax Number Effective Dates    [address] [phone]  10/18/2020 - None Entered    [facility] [id number]         Subscriber Name Subscriber Birth Date Member ID       JANICE GARCIA 6/4/1973 [id number]                     Emergency Contacts        (Rel.) Home Phone Work Phone Mobile Phone    Janice Garcia (Spouse) [phone] -- --                 Physician Progress Notes (last 24 hours)        Jewel Rose MD at 04/12/24 1227          Neurology       Patient Care Team:  Latricia Mishra APRN as PCP - General (Family Medicine)    Chief complaint: Headache and gait disturbance    History: Walking poorly but the headache is better.    Cardiology has recommended 2-week monitor and resuming Eliquis.    Rehab bed pending  Past Medical History:   Diagnosis Date    " Anxiety and depression     Chest pain     Disease of thyroid gland     reports slightly elevated when in hospital    DVT (deep venous thrombosis)     ETOH use 04/09/2024    History of pulmonary embolus (PE)     Hypertension     Hypothyroidism 04/09/2024    Migraines     Obesity (BMI 30-39.9) 04/09/2024    Shortness of breath     with chest pain episodes       Vital Signs   Vitals:    04/12/24 0533 04/12/24 0720 04/12/24 1105 04/12/24 1220   BP: 156/93 146/81 143/79    BP Location: Right arm Right arm Right arm    Patient Position: Lying Lying Lying    Pulse: 58 56 72 78   Resp: 19 18 16    Temp:  97.7 °F (36.5 °C) 98.2 °F (36.8 °C)    TempSrc:  Oral Oral    SpO2: 98% 97% 99%    Weight:       Height:           Physical Exam:   General: Flat affect              Neuro: Oriented to person place and time.        Results Review:  Labs reviewed  Results from last 7 days   Lab Units 04/11/24  0607   WBC 10*3/mm3 4.45   HEMOGLOBIN g/dL 12.1   HEMATOCRIT % 38.4   PLATELETS 10*3/mm3 265     Results from last 7 days   Lab Units 04/12/24  0527 04/11/24  0607 04/10/24  0432 04/09/24  0842   SODIUM mmol/L 139 140 142 141   POTASSIUM mmol/L 4.3 3.9 4.1 4.2   CHLORIDE mmol/L 108* 107 108* 106   CO2 mmol/L 22.0 24.0 23.0 24.0   BUN mg/dL 12 9 7 12   CREATININE mg/dL 0.80 0.71 0.63 0.80   CALCIUM mg/dL 8.6 8.5* 8.6 8.4*   BILIRUBIN mg/dL  --   --   --  0.6   ALK PHOS U/L  --   --   --  91   ALT (SGPT) U/L  --   --   --  8   AST (SGOT) U/L  --   --   --  23   GLUCOSE mg/dL 92 94 103* 110*       Imaging Results (Last 24 Hours)       ** No results found for the last 24 hours. **            Assessment:  Syncopal spell with closed head injury    Posttraumatic headache.    Posttraumatic vertigo.    Disturbance with functional elements.    History of DVT    Plan:  Inpatient rehab.    Resume Eliquis 5 mg twice daily.     to try to determine why this was not getting paid for by her insurance.    Comment:  Slow progress         I  discussed the patients findings and my recommendations with patient, family, and primary care team    Jewel Rose MD  04/12/24  12:27 EDT          Electronically signed by Jewel Rose MD at 04/12/24 3267

## 2024-04-12 NOTE — THERAPY TREATMENT NOTE
Patient Name: Kamila Garcia  : 1970    MRN: [id number]                              Today's Date: 2024       Admit Date: 2024    Visit Dx:     ICD-10-CM ICD-9-CM   1. Numbness and tingling of left side of face  R20.0 782.0    R20.2    2. Cognitive communication deficit  R41.841 799.52   3. Oral phase dysphagia  R13.11 787.21     Patient Active Problem List   Diagnosis    Acute pulmonary embolism    Chest pain    H/O LLE DVT & PE (2021)    History of pulmonary embolism    Insomnia    Hypertension    Palpitations    Suspected cerebrovascular accident (CVA)    Hypertensive urgency    Obesity (BMI 30-39.9)    ETOH use    Anxiety and depression    Acute CVA (cerebrovascular accident)    Syncope and collapse     Past Medical History:   Diagnosis Date    Anxiety and depression     Chest pain     Disease of thyroid gland     reports slightly elevated when in hospital    DVT (deep venous thrombosis)     ETOH use 2024    History of pulmonary embolus (PE)     Hypertension     Hypothyroidism 2024    Migraines     Obesity (BMI 30-39.9) 2024    Shortness of breath     with chest pain episodes     Past Surgical History:   Procedure Laterality Date    CARDIAC CATHETERIZATION Left 3/11/2022    Procedure: Left Heart Cath;  Surgeon: Peter Anguiano MD;  Location:  [facility];  Service: Cardiology;  Laterality: Left;  Hold Eliquis 2 days prior to [facility]    ENDOSCOPY N/A 2022    Procedure: ESOPHAGOGASTRODUODENOSCOPY;  Surgeon: Brunner, Mark I, MD;  Location:  [facility];  Service: Gastroenterology;  Laterality: N/A;    GASTRIC BYPASS      lap cm en y    IR STENT PLACEMENT Left 10/2021    xin surgical in cath lab placed stent in left leg      General Information       Row Name 24 1134          OT Time and Intention    Document Type therapy note (daily note)  -KL     Mode of Treatment occupational therapy  -       Row Name 24 1134           General Information    Patient Profile Reviewed yes  -     Existing Precautions/Restrictions other (see comments)  L sided numbness  -     Barriers to Rehab none identified  -       Row Name 04/12/24 1134          Cognition    Orientation Status (Cognition) oriented x 3  -       Row Name 04/12/24 1134          Safety Issues, Functional Mobility    Safety Issues Affecting Function (Mobility) awareness of need for assistance  -     Impairments Affecting Function (Mobility) balance;endurance/activity tolerance;sensation/sensory awareness;strength  -               User Key  (r) = Recorded By, (t) = Taken By, (c) = Cosigned By      Initials Name Provider Type    Adelaida Johnson OT Occupational Therapist                     Mobility/ADL's       Row Name 04/12/24 1137          Bed Mobility    Supine-Sit [place] (Bed Mobility) supervision  -     Assistive Device (Bed Mobility) head of bed elevated  -       Row Name 04/12/24 1137          Transfers    Transfers sit-stand transfer;stand-sit transfer;toilet transfer  -       Row Name 04/12/24 1137          Sit-Stand Transfer    Sit-Stand [place] (Transfers) contact guard;1 person assist  -     Assistive Device (Sit-Stand Transfers) walker, front-wheeled  -       Row Name 04/12/24 1137          Stand-Sit Transfer    Stand-Sit [place] (Transfers) 1 person assist;contact guard  -     Assistive Device (Stand-Sit Transfers) walker, front-wheeled  -[facility] Name 04/12/24 1137          Toilet Transfer    Type (Toilet Transfer) sit-stand;stand-sit  -     [place] Level (Toilet Transfer) supervision;1 person assist  -     Assistive Device (Toilet Transfer) grab bars/safety frame;walker, front-wheeled  -       Row Name 04/12/24 1137          Functional Mobility    Functional Mobility- Ind. Level verbal cues required;contact guard assist;1 person  -     Functional Mobility- Device walker, front-wheeled  -     Functional  Mobility-Distance (Feet) --  HH distances  -       Row Name 04/12/24 1137          Activities of Daily Living    BADL Assessment/Intervention grooming;toileting  -       Row Name 04/12/24 1137          Lower Body Dressing Assessment/Training    Comment, (Lower Body Dressing) Pt able to demo figure 4 while sitting up in bed in order to adjust socks  -       Row Name 04/12/24 1137          Grooming Assessment/Training    [place] Level (Grooming) oral care regimen;wash face, hands;supervision  -     Position (Grooming) sink side  -     Comment, (Grooming) sink side w/ FWW  -       Row Name 04/12/24 1137          Toileting Assessment/Training    [place] Level (Toileting) adjust/manage clothing;perform perineal hygiene;contact guard assist  -     Position (Toileting) supported standing;supported sitting  -               User Key  (r) = Recorded By, (t) = Taken By, (c) = Cosigned By      Initials Name Provider Type     Adelaida Morse OT Occupational Therapist                   Obj/Interventions       Row Name 04/12/24 1141          Sensory Interventions    Desensitization Techniques/Interventions (Sensation) rubbing with textures  -     Comment, Sensory Intervention Educated pt and SO on sensory intervention via brushing w/ different textures. Pt provided w/ textured brush and demo understanding.  -       Row Name 04/12/24 1141          Balance    Static Sitting Balance independent  -     Dynamic Sitting Balance independent  -KL     Position, Sitting Balance unsupported;sitting edge of bed  -     Static Standing Balance contact guard  -     Dynamic Standing Balance contact guard  -     Position/Device Used, Standing Balance walker, front-wheeled  -     Balance Interventions sitting;standing;sit to stand;supported;static;dynamic;occupation based/functional task  -               User Key  (r) = Recorded By, (t) = Taken By, (c) = Cosigned By      Initials Name Provider Type      Adelaida Morse OT Occupational Therapist                   Goals/Plan    No documentation.                  Clinical Impression       Row Name 04/12/24 1143          Pain Scale: FACES Pre/Post-Treatment    Pain: FACES Scale, Pretreatment 2-->hurts little bit  -     Posttreatment Pain Rating 2-->hurts little bit  -     Pain Location anterior  -     Pain Location - head  -     Pre/Posttreatment Pain Comment c/o headache, RN aware  -       Row Name 04/12/24 1143          Plan of Care Review    Plan of Care Reviewed With patient;spouse  -     Progress improving  -     Outcome Evaluation Pt demo improvement w/ mobility and self-care. Pt improved to CGA for HH distance ambulation and self-care while at sink. Will continue IPOT services . d/c rec is IPR.  -       Row Name 04/12/24 1143          Therapy Plan Review/Discharge Plan (OT)    Anticipated Discharge Disposition (OT) inpatient rehabilitation facility  -       Row Name 04/12/24 1143          Vital Signs    Pre Systolic BP Rehab 143  -KL     Pre Treatment Diastolic BP 79  -KL     Post Systolic BP Rehab 161  -KL     Post Treatment Diastolic BP 78  -KL     Pretreatment Heart Rate (beats/min) 69  -KL     Posttreatment Heart Rate (beats/min) 68  -KL     Pre SpO2 (%) 97  -KL     O2 Delivery Pre Treatment room air  -     Post SpO2 (%) 97  -     O2 Delivery Post Treatment room air  -     Pre Patient Position Supine  -     Intra Patient Position Standing  -     Post Patient Position Supine  -[facility] Name 04/12/24 1143          Positioning and Restraints    Pre-Treatment Position in bed  -     Post Treatment Position bed  -     In Bed notified nsg;fowlers;call light within reach;encouraged to call for assist;exit alarm on;with family/caregiver  -               User Key  (r) = Recorded By, (t) = Taken By, (c) = Cosigned By      Initials Name Provider Type    Adelaida Johnson OT Occupational Therapist                   Outcome Measures        Row Name 04/12/24 1145          How much help from another is currently needed...    Putting on and taking off regular lower body clothing? 3  -KL     Bathing (including washing, rinsing, and drying) 3  -KL     Toileting (which includes using toilet bed pan or urinal) 3  -KL     Putting on and taking off regular upper body clothing 3  -KL     Taking care of personal grooming (such as brushing teeth) 4  -KL     Eating meals 4  -KL     AM-PAC 6 Clicks Score (OT) 20  -KL       Row Name 04/12/24 0800          How much help from another person do you currently need...    Turning from your back to your side while in flat bed without using bedrails? 3  -EC     Moving from lying on back to sitting on the side of a flat bed without bedrails? 3  -EC     Moving to and from a bed to a chair (including a wheelchair)? 3  -EC     Standing up from a chair using your arms (e.g., wheelchair, bedside chair)? 3  -EC     Climbing 3-5 steps with a railing? 2  -EC     To walk in hospital room? 3  -EC     AM-PAC 6 Clicks Score (PT) 17  -EC     Highest Level of Mobility Goal 5 --> Static standing  -EC       Row Name 04/12/24 1145          Functional Assessment    Outcome Measure Options AM-PAC 6 Clicks Daily Activity (OT)  -               User Key  (r) = Recorded By, (t) = Taken By, (c) = Cosigned By      Initials Name Provider Type    Adelaida Johnson OT Occupational Therapist    EC Michelle Norwood, RN Registered Nurse                    Occupational Therapy Education       Title: PT OT SLP Therapies (In Progress)       Topic: Occupational Therapy (Done)       Point: ADL training (Done)       Description:   Instruct learner(s) on proper safety adaptation and remediation techniques during self care or transfers.   Instruct in proper use of assistive devices.                  Learning Progress Summary             Patient Acceptance, E,D, VU,NR by CARMEN at 4/12/2024 1146    Acceptance, E,TB, VU,DU by KF at 4/10/2024 0853   Significant Other  Acceptance, E,D, VU,NR by  at 4/12/2024 1146                         Point: Home exercise program (Done)       Description:   Instruct learner(s) on appropriate technique for monitoring, assisting and/or progressing therapeutic exercises/activities.                  Learning Progress Summary             Patient Acceptance, E,D, VU,NR by  at 4/12/2024 1146   Significant Other Acceptance, E,D, VU,NR by  at 4/12/2024 1146                         Point: Precautions (Done)       Description:   Instruct learner(s) on prescribed precautions during self-care and functional transfers.                  Learning Progress Summary             Patient Acceptance, E,D, VU,NR by  at 4/12/2024 1146    Acceptance, E,TB, VU,DU by  at 4/10/2024 0853   Significant Other Acceptance, E,D, VU,NR by  at 4/12/2024 1146                         Point: Body mechanics (Done)       Description:   Instruct learner(s) on proper positioning and spine alignment during self-care, functional mobility activities and/or exercises.                  Learning Progress Summary             Patient Acceptance, E,D, VU,NR by  at 4/12/2024 1146    Acceptance, E,TB, VU,DU by  at 4/10/2024 0853   Significant Other Acceptance, E,D, VU,NR by  at 4/12/2024 1146                                         User Key       Initials Effective Dates Name Provider Type Discipline     08/09/23 -  Karolina Prabhakar OT Occupational Therapist OT     02/05/24 -  Adelaida Morse OT Occupational Therapist OT                  OT Recommendation and Plan     Plan of Care Review  Plan of Care Reviewed With: patient, spouse  Progress: improving  Outcome Evaluation: Pt demo improvement w/ mobility and self-care. Pt improved to CGA for HH distance ambulation and self-care while at sink. Will continue IPOT services . d/c rec is IPR.     Time Calculation:         Time Calculation- OT       Row Name 04/12/24 1146             Time Calculation- OT    OT Start Time 1100  -       OT Received On 04/12/24  -         Timed Charges    10176 - OT Therapeutic Activity Minutes 16  -KL      51078 - OT Self Care/Mgmt Minutes 15  -KL         Total Minutes    Timed Charges Total Minutes 31  -KL       Total Minutes 31  -KL                User Key  (r) = Recorded By, (t) = Taken By, (c) = Cosigned By      Initials Name Provider Type    Adelaida Johnson OT Occupational Therapist                  Therapy Charges for Today       Code Description Service Date Service Provider Modifiers Qty    [id number]  OT THERAPEUTIC ACT EA 15 MIN 4/12/2024 Adelaida Morse OT GO 1    [id number]  OT SELF CARE/MGMT/TRAIN EA 15 MIN 4/12/2024 Adelaida Morse OT GO 1                 Adelaida Morse OT  4/12/2024

## 2024-04-13 PROCEDURE — 25010000002 THIAMINE HCL 200 MG/2ML SOLUTION: Performed by: INTERNAL MEDICINE

## 2024-04-13 PROCEDURE — 99232 SBSQ HOSP IP/OBS MODERATE 35: CPT | Performed by: NURSE PRACTITIONER

## 2024-04-13 RX ADMIN — ASPIRIN 81 MG: 81 TABLET, COATED ORAL at 08:11

## 2024-04-13 RX ADMIN — LOSARTAN POTASSIUM 50 MG: 50 TABLET, FILM COATED ORAL at 08:12

## 2024-04-13 RX ADMIN — OLANZAPINE 10 MG: 5 TABLET, FILM COATED ORAL at 21:10

## 2024-04-13 RX ADMIN — THIAMINE HYDROCHLORIDE 200 MG: 100 INJECTION, SOLUTION INTRAMUSCULAR; INTRAVENOUS at 06:26

## 2024-04-13 RX ADMIN — THIAMINE HYDROCHLORIDE 200 MG: 100 INJECTION, SOLUTION INTRAMUSCULAR; INTRAVENOUS at 14:36

## 2024-04-13 RX ADMIN — Medication 5 MG: at 21:10

## 2024-04-13 RX ADMIN — Medication 10 ML: at 08:12

## 2024-04-13 RX ADMIN — BUPROPION HYDROCHLORIDE 150 MG: 150 TABLET, EXTENDED RELEASE ORAL at 08:11

## 2024-04-13 RX ADMIN — Medication 1 TABLET: at 08:11

## 2024-04-13 RX ADMIN — FOLIC ACID 1 MG: 1 TABLET ORAL at 08:11

## 2024-04-13 RX ADMIN — MAGNESIUM OXIDE TAB 400 MG (241.3 MG ELEMENTAL MG) 400 MG: 400 (241.3 MG) TAB at 08:11

## 2024-04-13 RX ADMIN — SENNOSIDES AND DOCUSATE SODIUM 2 TABLET: 8.6; 5 TABLET ORAL at 21:11

## 2024-04-13 RX ADMIN — SENNOSIDES AND DOCUSATE SODIUM 2 TABLET: 8.6; 5 TABLET ORAL at 08:11

## 2024-04-13 RX ADMIN — APIXABAN 5 MG: 5 TABLET, FILM COATED ORAL at 21:10

## 2024-04-13 RX ADMIN — ACETAMINOPHEN 1000 MG: 500 TABLET ORAL at 08:16

## 2024-04-13 RX ADMIN — ACETAMINOPHEN 1000 MG: 500 TABLET ORAL at 14:36

## 2024-04-13 RX ADMIN — APIXABAN 10 MG: 5 TABLET, FILM COATED ORAL at 08:11

## 2024-04-13 RX ADMIN — Medication 1000 MCG: at 08:11

## 2024-04-13 NOTE — PROGRESS NOTES
[facility] Medicine Services  PROGRESS NOTE    Patient Name: Kamila Garcia  : 1970  MRN: [id number]    Date of Admission: 2024  Primary Care Physician: Latricia Mishra APRN    Subjective   Subjective     CC:  Left facial paresthesias     HPI:  Patient is resting in bed in NAD. She states headache is better. No acute events overnight per nursing       Objective   Objective     Vital Signs:   Temp:  [97.5 °F (36.4 °C)-98.6 °F (37 °C)] 98.1 °F (36.7 °C)  Heart Rate:  [61-86] 63  Resp:  [16-18] 16  BP: (132-168)/(67-88) 132/67     Physical Exam:  Constitutional: No acute distress, awake, alert  HENT: NCAT, mucous membranes moist  Respiratory: Clear to auscultation bilaterally, respiratory effort normal room air   Cardiovascular: RRR, no murmurs, rubs, or gallops  Gastrointestinal: Positive bowel sounds, soft, nontender, nondistended  Musculoskeletal: No bilateral ankle edema  Psychiatric: Appropriate affect, cooperative  Neurologic: Oriented x 3, left sided weakness 3/4 , Cranial Nerves grossly intact to confrontation, speech clear  Skin: No rashes      Results Reviewed:  LAB RESULTS:      Lab 24  0607 04/10/24  04324  0842   WBC 4.45 4.97 4.44   HEMOGLOBIN 12.1 11.7* 13.0   HEMATOCRIT 38.4 36.6 40.8   PLATELETS 265 257 277   NEUTROS ABS 2.30  --  2.42   IMMATURE GRANS (ABS) 0.01  --  0.01   LYMPHS ABS 1.27  --  1.30   MONOS ABS 0.51  --  0.45   EOS ABS 0.32  --  0.21   MCV 87.9 88.2 86.1   PROTIME  --   --  13.9   APTT  --   --  27.0*         Lab 24  0527 24  0607 04/10/24  0432 24  0842   SODIUM 139 140 142 141   POTASSIUM 4.3 3.9 4.1 4.2   CHLORIDE 108* 107 108* 106   CO2 22.0 24.0 23.0 24.0   ANION GAP 9.0 9.0 11.0 11.0   BUN 12 9 7 12   CREATININE 0.80 0.71 0.63 0.80   EGFR 87.1 100.6 104.9 87.1   GLUCOSE 92 94 103* 110*   CALCIUM 8.6 8.5* 8.6 8.4*   MAGNESIUM 2.1 2.2  --  2.0   HEMOGLOBIN A1C  --   --  4.90  --    TSH  --  7.510*  --    --          Lab 04/09/24  0842   TOTAL PROTEIN 7.3   ALBUMIN 4.2   GLOBULIN 3.1   ALT (SGPT) 8   AST (SGOT) 23   BILIRUBIN 0.6   ALK PHOS 91         Lab 04/10/24  1640 04/10/24  0432 04/09/24  0842   PROBNP  --   --  273.0   HSTROP T 8 7 9   PROTIME  --   --  13.9   INR  --   --  1.06         Lab 04/10/24  0432   CHOLESTEROL 168   LDL CHOL 70   HDL CHOL 86*   TRIGLYCERIDES 62         Lab 04/11/24  1027   FOLATE 10.60   VITAMIN B 12 222         Brief Urine Lab Results  (Last result in the past 365 days)        Color   Clarity   Blood   Leuk Est   Nitrite   Protein   CREAT   Urine HCG        04/09/24 1002 Yellow   Clear   Trace   Trace   Negative   Negative                   Microbiology Results Abnormal       None            EEG    Result Date: 4/11/2024  Reason for referral: 53 y.o.female with syncope Technical Summary:  A 19 channel digital EEG was performed using the international 10-20 placement system, including eye leads and EKG leads. Duration: 20 minutes Findings: The patient is awake.  A medium amplitude well-regulated tenderness posterior rhythm is evident symmetrically over the occipital leads.  Low amplitude intermixed alpha and theta activity are seen anteriorly.  Drowsiness is seen with mild slowing of the background but stage II sleep is not seen.  Photic stimulation yields a symmetric driving response at several flash frequencies.  Hyperventilation is not performed.  No focal features or epileptiform activity are seen. Video: Available Technical quality: Superior EKG: Regular, 60 bpm SUMMARY: Normal EEG in the awake and lightly drowsy states No focal features or epileptiform activity are seen     Impression: Normal study This report is transcribed using the Dragon dictation system.       Results for orders placed during the hospital encounter of 04/09/24    Adult Transthoracic Echo Complete W/ Cont if Necessary Per Protocol (With Agitated Saline)    Interpretation Summary    Left ventricular  systolic function is normal. Calculated left ventricular EF = 53.6%    Left ventricular wall thickness is consistent with mild concentric hypertrophy.    Saline test results are negative.    Estimated right ventricular systolic pressure from tricuspid regurgitation is normal (<35 mmHg).    The aortic valve exhibits sclerosis.      Current medications:  Scheduled Meds:apixaban, 5 mg, Oral, Q12H  aspirin, 81 mg, Oral, Daily  buPROPion XL, 150 mg, Oral, Daily  folic acid, 1 mg, Oral, Daily  losartan, 50 mg, Oral, Q24H  magnesium oxide, 400 mg, Oral, Daily  multivitamin with minerals, 1 tablet, Oral, Daily  OLANZapine, 10 mg, Oral, Nightly  senna-docusate sodium, 2 tablet, Oral, BID  sodium chloride, 10 mL, Intravenous, Q12H  thiamine (B-1) IV, 200 mg, Intravenous, Q8H   Followed by  [START ON 4/16/2024] thiamine, 100 mg, Oral, Daily  vitamin B-12, 1,000 mcg, Oral, Daily      Continuous Infusions:   PRN Meds:.  acetaminophen    senna-docusate sodium **AND** polyethylene glycol **AND** bisacodyl **AND** bisacodyl    LORazepam **OR** midazolam **OR** LORazepam **OR** midazolam **OR** midazolam **OR** midazolam    Magnesium Standard Dose Replacement - Follow Nurse / BPA Driven Protocol    melatonin    nitroglycerin    sodium chloride    sodium chloride    Assessment & Plan   Assessment & Plan     Active Hospital Problems    Diagnosis  POA    **Suspected cerebrovascular accident (CVA) [R09.89]  Yes    Syncope and collapse [R55]  Unknown    Hypertensive urgency [I16.0]  Yes    Obesity (BMI 30-39.9) [E66.9]  Yes    ETOH use [Z78.9]  Yes    Anxiety and depression [F41.9, F32.A]  Yes    H/O LLE DVT & PE (July 2021) [Z86.718]  Not Applicable      Resolved Hospital Problems   No resolved problems to display.        Brief Hospital Course to date:  Kamila Garcia is a 53 y.o. female  w/ hx previous dvt & pe (July 2021) who stopped taking eliquis ~1 month ago, anxiety/depression, obesity, migraines (per chart, patient  "denies) who presented after a syncopal event which promped co-workers to call ems, regained consciousness en route to [facility] ED where upon arrival developed numbness and tingling to left face, arm and code stroke called. Evaluated by neuro-stroke service, ct head negative, cta h&n were negative for flow limiting stenosis and ct perfusion was negative for lvo. Ultimately was deemed candidate for thrombolytic therapy w/ tnkase administered 4/9/24 at 11:48 , also received dose of depakote, and was admitted to icu for further evaluation. Repeat ct head was negative.continued to have headache, left facial arm paresthesias. Subsequent mri brain evening 4/9/24 was negative as well. Repeat ct head 4/10/24 was again negative. Carotid duplex negative. Echo showed normal ef, saline test negative. Neuro-stroke service felt the symptoms were not due to cerebral ischemia, likely a stroke \"mimic\" such as functional or atypical migraine. General neurology was consulted and transferred out of icu 4/11/24.    Plan was partially entered by my partner and I have reviewed and updated as appropriate on 4/13/24     Syncopal episode (unclear etiology)  -echo ok  -orthostatics normal 4/11/24  -no dysrhythmias on tele  -cards evaluated: set up w/ holter monitor upon discharge; follow up 6 weeks in cards clinic     Mild concussion (w/ post-traumatic vertigo and post-traumatic headache)  Persistent Left sided facial and arm paresthesias and ataxia  Borderline b12 deficiency  -s/p tnkase in ED 4/9  -ctperfusion negative, cta h&n negative  -subsequent mri negative 4/9  -echo normal  -subsequent ct head 4/10 again negative  -neuro-stroke followed: per 4/10/24: feels ongoing paresthesias less likely brain ischemic, more likely stroke \"mimic\" including \"functional\" vs atypical migraine  -oral b12 replacement (for borderline low b12 level)  **General neurology following: EEG normal  -pt/ot recommending inpt rehab  --ASA indefinitely       Hx etoh " abuse  -5 beer daily  -start ciwa protocol/prn benzos protocol (? Component of withdrawal)  --CIWA 1 this am      Htn  -start losartan 50mg (titrate prn)     Mildly elevated tsh  -tsh 7.5; normal free t4  -follow up pcp, repeat tsh 4-6 weeks     Hx previous pe & dvt  -formerly on eliquis but stopped taking last month states insurnce did cover it, sent to our pharmacy and copay is only $5, they state covered by insurance   -continue asa   -restart eliquis per Neurology on 4/12        Mood d/o  -stop abilify  -continue zyprexa  --restart Wellbutrin 150 mg daily for 2 weeks then increase to 300 mg daily thereafter.   --follow up with Psychiatrist           Expected Discharge Location and Transportation: rehab   Expected Discharge   Expected Discharge Date: 4/15/2024; Expected Discharge Time:      DVT prophylaxis:  Medical and mechanical DVT prophylaxis orders are present.         AM-PAC 6 Clicks Score (PT): 18 (04/12/24 7851)    CODE STATUS:   There are no questions and answers to display.       Ysabel Brown, APRN  04/13/24

## 2024-04-14 PROCEDURE — 99232 SBSQ HOSP IP/OBS MODERATE 35: CPT | Performed by: PSYCHIATRY & NEUROLOGY

## 2024-04-14 PROCEDURE — 99232 SBSQ HOSP IP/OBS MODERATE 35: CPT | Performed by: NURSE PRACTITIONER

## 2024-04-14 PROCEDURE — 97530 THERAPEUTIC ACTIVITIES: CPT

## 2024-04-14 PROCEDURE — 25010000002 THIAMINE HCL 200 MG/2ML SOLUTION: Performed by: INTERNAL MEDICINE

## 2024-04-14 PROCEDURE — 25010000002 THIAMINE PER 100 MG: Performed by: INTERNAL MEDICINE

## 2024-04-14 RX ORDER — L.ACID,PARA/B.BIFIDUM/S.THERM 8B CELL
1 CAPSULE ORAL DAILY
Status: DISCONTINUED | OUTPATIENT
Start: 2024-04-14 | End: 2024-04-16 | Stop reason: HOSPADM

## 2024-04-14 RX ORDER — CEPHALEXIN 250 MG/1
500 CAPSULE ORAL EVERY 6 HOURS SCHEDULED
Status: DISCONTINUED | OUTPATIENT
Start: 2024-04-14 | End: 2024-04-16 | Stop reason: HOSPADM

## 2024-04-14 RX ADMIN — APIXABAN 5 MG: 5 TABLET, FILM COATED ORAL at 21:31

## 2024-04-14 RX ADMIN — OLANZAPINE 10 MG: 5 TABLET, FILM COATED ORAL at 21:30

## 2024-04-14 RX ADMIN — Medication 1000 MCG: at 08:07

## 2024-04-14 RX ADMIN — ASPIRIN 81 MG: 81 TABLET, COATED ORAL at 08:07

## 2024-04-14 RX ADMIN — FOLIC ACID 1 MG: 1 TABLET ORAL at 08:07

## 2024-04-14 RX ADMIN — CEPHALEXIN 500 MG: 250 CAPSULE ORAL at 17:33

## 2024-04-14 RX ADMIN — Medication 5 MG: at 21:31

## 2024-04-14 RX ADMIN — THIAMINE HYDROCHLORIDE 200 MG: 100 INJECTION, SOLUTION INTRAMUSCULAR; INTRAVENOUS at 06:47

## 2024-04-14 RX ADMIN — Medication 10 ML: at 21:31

## 2024-04-14 RX ADMIN — MAGNESIUM OXIDE TAB 400 MG (241.3 MG ELEMENTAL MG) 400 MG: 400 (241.3 MG) TAB at 08:08

## 2024-04-14 RX ADMIN — BUPROPION HYDROCHLORIDE 150 MG: 150 TABLET, EXTENDED RELEASE ORAL at 08:07

## 2024-04-14 RX ADMIN — THIAMINE HYDROCHLORIDE 200 MG: 100 INJECTION, SOLUTION INTRAMUSCULAR; INTRAVENOUS at 13:24

## 2024-04-14 RX ADMIN — THIAMINE HYDROCHLORIDE 200 MG: 100 INJECTION, SOLUTION INTRAMUSCULAR; INTRAVENOUS at 21:31

## 2024-04-14 RX ADMIN — CEPHALEXIN 500 MG: 250 CAPSULE ORAL at 13:24

## 2024-04-14 RX ADMIN — POLYETHYLENE GLYCOL 3350 17 G: 17 POWDER, FOR SOLUTION ORAL at 08:07

## 2024-04-14 RX ADMIN — Medication 10 ML: at 08:08

## 2024-04-14 RX ADMIN — ACETAMINOPHEN 1000 MG: 500 TABLET ORAL at 08:07

## 2024-04-14 RX ADMIN — APIXABAN 5 MG: 5 TABLET, FILM COATED ORAL at 08:07

## 2024-04-14 RX ADMIN — SENNOSIDES AND DOCUSATE SODIUM 2 TABLET: 8.6; 5 TABLET ORAL at 21:31

## 2024-04-14 RX ADMIN — BISACODYL 5 MG: 5 TABLET, COATED ORAL at 17:33

## 2024-04-14 RX ADMIN — LOSARTAN POTASSIUM 50 MG: 50 TABLET, FILM COATED ORAL at 08:07

## 2024-04-14 RX ADMIN — Medication 1 TABLET: at 08:07

## 2024-04-14 RX ADMIN — Medication 1 CAPSULE: at 13:24

## 2024-04-14 RX ADMIN — THIAMINE HYDROCHLORIDE 200 MG: 100 INJECTION, SOLUTION INTRAMUSCULAR; INTRAVENOUS at 06:58

## 2024-04-14 RX ADMIN — Medication 10 ML: at 06:58

## 2024-04-14 NOTE — PROGRESS NOTES
[facility] Medicine Services  PROGRESS NOTE    Patient Name: Kamila Garcia  : 1970  MRN: [id number]    Date of Admission: 2024  Primary Care Physician: Latricia Mishra APRN    Subjective   Subjective     CC:  Left facial paresthesias     HPI:  Patient is resting in bed in NAD. She rested well. IV removed from right arm yesterday and area still red and warmth today. Patient states area is tender       Objective   Objective     Vital Signs:   Temp:  [98 °F (36.7 °C)-98.2 °F (36.8 °C)] 98 °F (36.7 °C)  Heart Rate:  [61-89] 84  Resp:  [16-18] 18  BP: (117-133)/(61-76) 133/73     Physical Exam:  Constitutional: No acute distress, awake, alert  HENT: NCAT, mucous membranes moist  Respiratory: Clear to auscultation bilaterally, respiratory effort normal room air   Cardiovascular: RRR, no murmurs, rubs, or gallops  Gastrointestinal: Positive bowel sounds, soft, nontender, nondistended  Musculoskeletal: No bilateral ankle edema  Psychiatric: flat affect, cooperative  Neurologic: Oriented x 3, left sided weakness 3/4 , Cranial Nerves grossly intact to confrontation, speech clear  Skin: No rashes, right FA erythema, mild edema and warmth noted       Results Reviewed:  LAB RESULTS:      Lab 24  0607 04/10/24  04324  0842   WBC 4.45 4.97 4.44   HEMOGLOBIN 12.1 11.7* 13.0   HEMATOCRIT 38.4 36.6 40.8   PLATELETS 265 257 277   NEUTROS ABS 2.30  --  2.42   IMMATURE GRANS (ABS) 0.01  --  0.01   LYMPHS ABS 1.27  --  1.30   MONOS ABS 0.51  --  0.45   EOS ABS 0.32  --  0.21   MCV 87.9 88.2 86.1   PROTIME  --   --  13.9   APTT  --   --  27.0*         Lab 24  0527 24  0607 04/10/24  0432 24  0842   SODIUM 139 140 142 141   POTASSIUM 4.3 3.9 4.1 4.2   CHLORIDE 108* 107 108* 106   CO2 22.0 24.0 23.0 24.0   ANION GAP 9.0 9.0 11.0 11.0   BUN 12 9 7 12   CREATININE 0.80 0.71 0.63 0.80   EGFR 87.1 100.6 104.9 87.1   GLUCOSE 92 94 103* 110*   CALCIUM 8.6 8.5* 8.6  8.4*   MAGNESIUM 2.1 2.2  --  2.0   HEMOGLOBIN A1C  --   --  4.90  --    TSH  --  7.510*  --   --          Lab 04/09/24  0842   TOTAL PROTEIN 7.3   ALBUMIN 4.2   GLOBULIN 3.1   ALT (SGPT) 8   AST (SGOT) 23   BILIRUBIN 0.6   ALK PHOS 91         Lab 04/10/24  1640 04/10/24  0432 04/09/24  0842   PROBNP  --   --  273.0   HSTROP T 8 7 9   PROTIME  --   --  13.9   INR  --   --  1.06         Lab 04/10/24  0432   CHOLESTEROL 168   LDL CHOL 70   HDL CHOL 86*   TRIGLYCERIDES 62         Lab 04/11/24  1027   FOLATE 10.60   VITAMIN B 12 222         Brief Urine Lab Results  (Last result in the past 365 days)        Color   Clarity   Blood   Leuk Est   Nitrite   Protein   CREAT   Urine HCG        04/09/24 1002 Yellow   Clear   Trace   Trace   Negative   Negative                   Microbiology Results Abnormal       None            No radiology results from the last 24 hrs    Results for orders placed during the hospital encounter of 04/09/24    Adult Transthoracic Echo Complete W/ Cont if Necessary Per Protocol (With Agitated Saline)    Interpretation Summary    Left ventricular systolic function is normal. Calculated left ventricular EF = 53.6%    Left ventricular wall thickness is consistent with mild concentric hypertrophy.    Saline test results are negative.    Estimated right ventricular systolic pressure from tricuspid regurgitation is normal (<35 mmHg).    The aortic valve exhibits sclerosis.      Current medications:  Scheduled Meds:apixaban, 5 mg, Oral, Q12H  aspirin, 81 mg, Oral, Daily  buPROPion XL, 150 mg, Oral, Daily  folic acid, 1 mg, Oral, Daily  losartan, 50 mg, Oral, Q24H  magnesium oxide, 400 mg, Oral, Daily  multivitamin with minerals, 1 tablet, Oral, Daily  OLANZapine, 10 mg, Oral, Nightly  senna-docusate sodium, 2 tablet, Oral, BID  sodium chloride, 10 mL, Intravenous, Q12H  thiamine (B-1) IV, 200 mg, Intravenous, Q8H   Followed by  [START ON 4/16/2024] thiamine, 100 mg, Oral, Daily  vitamin B-12, 1,000 mcg,  Oral, Daily      Continuous Infusions:   PRN Meds:.  acetaminophen    senna-docusate sodium **AND** polyethylene glycol **AND** bisacodyl **AND** bisacodyl    LORazepam **OR** midazolam **OR** LORazepam **OR** midazolam **OR** midazolam **OR** midazolam    Magnesium Standard Dose Replacement - Follow Nurse / BPA Driven Protocol    melatonin    nitroglycerin    sodium chloride    sodium chloride    Assessment & Plan   Assessment & Plan     Active Hospital Problems    Diagnosis  POA    **Suspected cerebrovascular accident (CVA) [R09.89]  Yes    Syncope and collapse [R55]  Unknown    Hypertensive urgency [I16.0]  Yes    Obesity (BMI 30-39.9) [E66.9]  Yes    ETOH use [Z78.9]  Yes    Anxiety and depression [F41.9, F32.A]  Yes    H/O LLE DVT & PE (July 2021) [Z86.718]  Not Applicable      Resolved Hospital Problems   No resolved problems to display.        Brief Hospital Course to date:  Kamila Garcia is a 53 y.o. female  w/ hx previous dvt & pe (July 2021) who stopped taking eliquis ~1 month ago, anxiety/depression, obesity, migraines (per chart, patient denies) who presented after a syncopal event which promped co-workers to call ems, regained consciousness en route to [facility] ED where upon arrival developed numbness and tingling to left face, arm and code stroke called. Evaluated by neuro-stroke service, ct head negative, cta h&n were negative for flow limiting stenosis and ct perfusion was negative for lvo. Ultimately was deemed candidate for thrombolytic therapy w/ tnkase administered 4/9/24 at 11:48 , also received dose of depakote, and was admitted to icu for further evaluation. Repeat ct head was negative.continued to have headache, left facial arm paresthesias. Subsequent mri brain evening 4/9/24 was negative as well. Repeat ct head 4/10/24 was again negative. Carotid duplex negative. Echo showed normal ef, saline test negative. Neuro-stroke service felt the symptoms were not due to cerebral ischemia,  "likely a stroke \"mimic\" such as functional or atypical migraine. General neurology was consulted and transferred out of icu 4/11/24.    Plan was partially entered by my partner and I have reviewed and updated as appropriate on 4/14/24     Syncopal episode (unclear etiology)  -echo ok  -orthostatics normal 4/11/24  -no dysrhythmias on tele  -cards evaluated:  holter monitor has already been placed  follow up 6 weeks in cards clinic     Mild concussion (w/ post-traumatic vertigo and post-traumatic headache)  Persistent Left sided facial and arm paresthesias and ataxia  Borderline b12 deficiency  -s/p tnkase in ED 4/9  -ctperfusion negative, cta h&n negative  -subsequent mri negative 4/9  -echo normal  -subsequent ct head 4/10 again negative  -neuro-stroke followed: per 4/10/24: feels ongoing paresthesias less likely brain ischemic, more likely stroke \"mimic\" including \"functional\" vs atypical migraine  -oral b12 replacement (for borderline low b12 level)  **General neurology following: EEG normal  -pt/ot recommending inpt rehab  --ASA indefinitely     Right arm Phlebitis  -- IV removed and area marked  -- elevate and apply heat  -- Keflex 500 mg 4x a day for 5 days        Hx etoh abuse  -5 beer daily  -start ciwa protocol/prn benzos protocol (? Component of withdrawal)     Htn  -start losartan 50mg (titrate prn)     Mildly elevated tsh  -tsh 7.5; normal free t4  -follow up pcp, repeat tsh 4-6 weeks     Hx previous pe & dvt  -formerly on eliquis but stopped taking last month states insurnce did cover it, sent to our pharmacy and copay is only $5, they state covered by insurance   -continue asa   -restart eliquis per Neurology on 4/12        Mood d/o  -stop abilify  -continue zyprexa  --restart Wellbutrin 150 mg daily for 2 weeks then increase to 300 mg daily thereafter.   --follow up with Psychiatrist           Expected Discharge Location and Transportation: rehab   Expected Discharge   Expected Discharge Date: " 4/15/2024; Expected Discharge Time:      DVT prophylaxis:  Medical and mechanical DVT prophylaxis orders are present.         AM-PAC 6 Clicks Score (PT): 18 (04/12/24 4444)    CODE STATUS:   There are no questions and answers to display.       Ysabel Brown, APRN  04/14/24

## 2024-04-14 NOTE — THERAPY TREATMENT NOTE
Patient Name: Kamila Garcia  : 1970    MRN: [id number]                              Today's Date: 2024       Admit Date: 2024    Visit Dx:     ICD-10-CM ICD-9-CM   1. Numbness and tingling of left side of face  R20.0 782.0    R20.2    2. Cognitive communication deficit  R41.841 799.52   3. Oral phase dysphagia  R13.11 787.21     Patient Active Problem List   Diagnosis    Acute pulmonary embolism    Chest pain    H/O LLE DVT & PE (2021)    History of pulmonary embolism    Insomnia    Hypertension    Palpitations    Suspected cerebrovascular accident (CVA)    Hypertensive urgency    Obesity (BMI 30-39.9)    ETOH use    Anxiety and depression    Acute CVA (cerebrovascular accident)    Syncope and collapse     Past Medical History:   Diagnosis Date    Anxiety and depression     Chest pain     Disease of thyroid gland     reports slightly elevated when in hospital    DVT (deep venous thrombosis)     ETOH use 2024    History of pulmonary embolus (PE)     Hypertension     Hypothyroidism 2024    Migraines     Obesity (BMI 30-39.9) 2024    Shortness of breath     with chest pain episodes     Past Surgical History:   Procedure Laterality Date    CARDIAC CATHETERIZATION Left 3/11/2022    Procedure: Left Heart Cath;  Surgeon: Peter Anguiano MD;  Location:  [facility];  Service: Cardiology;  Laterality: Left;  Hold Eliquis 2 days prior to [facility]    ENDOSCOPY N/A 2022    Procedure: ESOPHAGOGASTRODUODENOSCOPY;  Surgeon: Brunner, Mark I, MD;  Location:  [facility];  Service: Gastroenterology;  Laterality: N/A;    GASTRIC BYPASS      lap cm en y    IR STENT PLACEMENT Left 10/2021    xin surgical in cath lab placed stent in left leg      General Information       Row Name 24 1516          Physical Therapy Time and Intention    Document Type therapy note (daily note)  -KG     Mode of Treatment physical therapy  -KG       Row Name 24 1518           General Information    Existing Precautions/Restrictions fall  -KG     Barriers to Rehab none identified  -KG       Row Name 04/14/24 1516          Cognition    Orientation Status (Cognition) oriented x 3  -KG       Row Name 04/14/24 1516          Safety Issues, Functional Mobility    Safety Issues Affecting Function (Mobility) awareness of need for assistance;insight into deficits/self-awareness;safety precaution awareness;safety precautions follow-through/compliance  -KG     Impairments Affecting Function (Mobility) balance;coordination;endurance/activity tolerance;postural/trunk control;strength  -KG               User Key  (r) = Recorded By, (t) = Taken By, (c) = Cosigned By      Initials Name Provider Type    KG Emely Vale, PT Physical Therapist                   Mobility       Row Name 04/14/24 1517          Bed Mobility    Bed Mobility supine-sit  -KG     Supine-Sit [place] (Bed Mobility) supervision  -KG     Assistive Device (Bed Mobility) bed rails;head of bed elevated  -KG     Comment, (Bed Mobility) Pt demonstrated appropriate sequencing.  -KG       Row Name 04/14/24 1517          Transfers    Comment, (Transfers) VC's for sequencing and safe hand placement.  -KG       Row Name 04/14/24 1517          Sit-Stand Transfer    Sit-Stand [place] (Transfers) contact guard;verbal cues  -KG     Assistive Device (Sit-Stand Transfers) walker, front-wheeled  -KG       Row Name 04/14/24 1517          Gait/Stairs (Locomotion)    [place] Level (Gait) minimum assist (75% patient effort);verbal cues  progressed to CGA  -KG     Assistive Device (Gait) walker, front-wheeled  -KG     Distance in Feet (Gait) 70  40+30+70  -KG     Deviations/Abnormal Patterns (Gait) left sided deviations;oleg decreased;stride length decreased  -KG     Bilateral Gait Deviations forward flexed posture;heel strike decreased  -KG     Left Sided Gait Deviations weight shift ability decreased  -KG     Comment,  (Gait/Stairs) Pt demonstrated step to gait pattern with very slow oleg and short steps. Pt with decreased weight shifting onto L LE contributing to decreased step length on R. Frequent cues for upright posture with forward gaze. Pt with tendency to look down at LEs while ambulating. Pt ambulated 40ft+ 30ft +70ft with two standing rest breaks due to c/o fatigue and B LE weakness.  -KG               User Key  (r) = Recorded By, (t) = Taken By, (c) = Cosigned By      Initials Name Provider Type    KG Emely Vale, PT Physical Therapist                   Obj/Interventions       Row Name 04/14/24 1519          Balance    Balance Assessment sitting static balance;standing static balance;standing dynamic balance  -KG     Dynamic Sitting Balance supervision  -KG     Position, Sitting Balance unsupported;sitting edge of bed  -KG     Static Standing Balance contact guard  -KG     Dynamic Standing Balance contact guard  -KG     Position/Device Used, Standing Balance supported;walker, rolling  -KG               User Key  (r) = Recorded By, (t) = Taken By, (c) = Cosigned By      Initials Name Provider Type    KG Emely Vale, PT Physical Therapist                   Goals/Plan    No documentation.                  Clinical Impression       Row Name 04/14/24 1519          Pain    Pretreatment Pain Rating 0/10 - no pain  -KG     Posttreatment Pain Rating 0/10 - no pain  -KG       Row Name 04/14/24 1519          Plan of Care Review    Plan of Care Reviewed With patient  -KG     Progress improving  -KG     Outcome Evaluation Pt ambulated 40ft +30ft +70ft with RW and Davy, progressing to periods of CGA. Pt required two standing rest breaks due to c/o fatigue and B LE weakness. Pt required frequent cues for upright posture with forward gaze and increased stride length. Decreased weight shifting onto L LE contributing to decreased step length on R. Mobility limited by increased fatigue. Continue to recommend PT  skilled care and D/C to  rehab facility.  -KG       Row Name 04/14/24 1519          Vital Signs    Pre Systolic BP Rehab 131  -KG     Pre Treatment Diastolic BP 70  -KG     Pretreatment Heart Rate (beats/min) 78  -KG     Posttreatment Heart Rate (beats/min) 71  -KG     Pre SpO2 (%) 96  -KG     O2 Delivery Pre Treatment room air  -KG     Post SpO2 (%) 94  -KG     O2 Delivery Post Treatment room air  -KG     Pre Patient Position Supine  -KG     Intra Patient Position Standing  -KG     Post Patient Position Sitting  -KG       Row Name 04/14/24 1519          Positioning and Restraints    Pre-Treatment Position in bed  -KG     Post Treatment Position chair  -KG     In Chair notified nsg;reclined;call light within reach;encouraged to call for assist;exit alarm on;RUE elevated;LUE elevated;legs elevated  -KG               User Key  (r) = Recorded By, (t) = Taken By, (c) = Cosigned By      Initials Name Provider Type    Emely Austin, PT Physical Therapist                   Outcome Measures       Row Name 04/14/24 1520          How much help from another person do you currently need...    Turning from your back to your side while in flat bed without using bedrails? 3  -KG     Moving from lying on back to sitting on the side of a flat bed without bedrails? 3  -KG     Moving to and from a bed to a chair (including a wheelchair)? 3  -KG     Standing up from a chair using your arms (e.g., wheelchair, bedside chair)? 3  -KG     Climbing 3-5 steps with a railing? 2  -KG     To walk in hospital room? 3  -KG     AM-PAC 6 Clicks Score (PT) 17  -KG     Highest Level of Mobility Goal 5 --> Static standing  -KG       Row Name 04/14/24 1520          Functional Assessment    Outcome Measure Options AM-PAC 6 Clicks Basic Mobility (PT)  -KG               User Key  (r) = Recorded By, (t) = Taken By, (c) = Cosigned By      Initials Name Provider Type    Emely Austin, PT Physical Therapist                                  Physical Therapy Education       Title: PT OT SLP Therapies (Done)       Topic: Physical Therapy (Done)       Point: Mobility training (Done)       Learning Progress Summary             Patient Acceptance, E, VU,NR by KG at 4/14/2024 1321    Acceptance, E, VU by KR at 4/12/2024 1526    Acceptance, E, NR by ND at 4/10/2024 1103   Family Acceptance, E, VU by KR at 4/12/2024 1526                         Point: Home exercise program (Done)       Learning Progress Summary             Patient Acceptance, E, VU,NR by KG at 4/14/2024 1321                         Point: Body mechanics (Done)       Learning Progress Summary             Patient Acceptance, E, VU,NR by KG at 4/14/2024 1321    Acceptance, E, VU by KR at 4/12/2024 1526    Acceptance, E, NR by ND at 4/10/2024 1103   Family Acceptance, E, VU by KR at 4/12/2024 1526                         Point: Precautions (Done)       Learning Progress Summary             Patient Acceptance, E, VU,NR by KG at 4/14/2024 1321    Acceptance, E, VU by KR at 4/12/2024 1526    Acceptance, E, NR by ND at 4/10/2024 1103   Family Acceptance, E, VU by KR at 4/12/2024 1526                                         User Key       Initials Effective Dates Name Provider Type Discipline    KG 05/22/20 -  Emely Vale, PT Physical Therapist PT    KR 12/30/22 -  Maria Del Rosario Weston PT Physical Therapist PT    ND 11/16/23 -  Ale Gomez PT Physical Therapist PT                  PT Recommendation and Plan     Plan of Care Reviewed With: patient  Progress: improving  Outcome Evaluation: Pt ambulated 40ft +30ft +70ft with RW and Davy, progressing to periods of CGA. Pt required two standing rest breaks due to c/o fatigue and B LE weakness. Pt required frequent cues for upright posture with forward gaze and increased stride length. Decreased weight shifting onto L LE contributing to decreased step length on R. Mobility limited by increased fatigue. Continue to recommend PT skilled care  and D/C to  rehab facility.     Time Calculation:         PT Charges       Row Name 04/14/24 1321             Time Calculation    Start Time 1321  -KG      PT Received On 04/14/24  -KG      PT Goal Re-Cert Due Date 04/20/24  -KG         Time Calculation- PT    Total Timed Code Minutes- PT 24 minute(s)  -KG         Timed Charges    53671 - PT Therapeutic Activity Minutes 24  -KG         Total Minutes    Timed Charges Total Minutes 24  -KG       Total Minutes 24  -KG                User Key  (r) = Recorded By, (t) = Taken By, (c) = Cosigned By      Initials Name Provider Type    Emely Austin, PT Physical Therapist                  Therapy Charges for Today       Code Description Service Date Service Provider Modifiers Qty    94617791014 HC PT THERAPEUTIC ACT EA 15 MIN 4/14/2024 Emely Vale, PT GP 2            PT G-Codes  Outcome Measure Options: AM-PAC 6 Clicks Basic Mobility (PT)  AM-PAC 6 Clicks Score (PT): 17  AM-PAC 6 Clicks Score (OT): 20  Modified Huntington Scale: 4 - Moderately severe disability.  Unable to walk without assistance, and unable to attend to own bodily needs without assistance.       Dory Vale, AL  4/14/2024

## 2024-04-14 NOTE — PLAN OF CARE
Goal Outcome Evaluation:  Plan of Care Reviewed With: patient        Progress: improving  Outcome Evaluation: Pt ambulated 40ft +30ft +70ft with RW and Davy, progressing to periods of CGA. Pt required two standing rest breaks due to c/o fatigue and B LE weakness. Pt required frequent cues for upright posture with forward gaze and increased stride length. Decreased weight shifting onto L LE contributing to decreased step length on R. Mobility limited by increased fatigue. Continue to recommend PT skilled care and D/C to IP rehab facility.

## 2024-04-14 NOTE — PROGRESS NOTES
Neurology       Patient Care Team:  Latricia Mishra APRN as PCP - General (Family Medicine)    Chief complaint: Syncope    History: Patient's had no further problems.    Her mood is improving somewhat with appropriate.    The headaches are down to 2.      Past Medical History:   Diagnosis Date    Anxiety and depression     Chest pain     Disease of thyroid gland     reports slightly elevated when in hospital    DVT (deep venous thrombosis)     ETOH use 04/09/2024    History of pulmonary embolus (PE)     Hypertension     Hypothyroidism 04/09/2024    Migraines     Obesity (BMI 30-39.9) 04/09/2024    Shortness of breath     with chest pain episodes       Vital Signs   Vitals:    04/13/24 1915 04/13/24 2325 04/14/24 0300 04/14/24 0725   BP: 130/73 117/61 133/71 133/73   BP Location: Right arm Right arm Right arm Right arm   Patient Position: Lying Lying Lying Lying   Pulse: 72 70 73 61   Resp: 16 16 16 16   Temp: 98.2 °F (36.8 °C) 98.2 °F (36.8 °C) 98.2 °F (36.8 °C) 98.2 °F (36.8 °C)   TempSrc: Oral Oral Axillary Oral   SpO2:    94%   Weight:       Height:           Physical Exam:   General: Awake and alert              Neuro: Oriented to person place and time.    Subjective numbness.        Results Review:  Reviewed  Results from last 7 days   Lab Units 04/11/24  0607   WBC 10*3/mm3 4.45   HEMOGLOBIN g/dL 12.1   HEMATOCRIT % 38.4   PLATELETS 10*3/mm3 265     Results from last 7 days   Lab Units 04/12/24  0527 04/11/24  0607 04/10/24  0432 04/09/24  0842   SODIUM mmol/L 139 140 142 141   POTASSIUM mmol/L 4.3 3.9 4.1 4.2   CHLORIDE mmol/L 108* 107 108* 106   CO2 mmol/L 22.0 24.0 23.0 24.0   BUN mg/dL 12 9 7 12   CREATININE mg/dL 0.80 0.71 0.63 0.80   CALCIUM mg/dL 8.6 8.5* 8.6 8.4*   BILIRUBIN mg/dL  --   --   --  0.6   ALK PHOS U/L  --   --   --  91   ALT (SGPT) U/L  --   --   --  8   AST (SGOT) U/L  --   --   --  23   GLUCOSE mg/dL 92 94 103* 110*       Imaging Results (Last 24 Hours)       ** No results found for  the last 24 hours. **            Assessment:  Syncope with mild closed head injury    Posttraumatic headache.    Posttraumatic vertigo resolved.        Plan:  Inpatient rehab    Increasing bupropion to 2 weeks to 300 mg.    Continue Eliquis.        Comment:  Improving.         I discussed the patients findings and my recommendations with patient and primary care team    Jewel Rose MD  04/14/24  11:19 EDT

## 2024-04-15 PROCEDURE — 99231 SBSQ HOSP IP/OBS SF/LOW 25: CPT | Performed by: PSYCHIATRY & NEUROLOGY

## 2024-04-15 PROCEDURE — 25010000002 THIAMINE HCL 200 MG/2ML SOLUTION: Performed by: INTERNAL MEDICINE

## 2024-04-15 PROCEDURE — 97116 GAIT TRAINING THERAPY: CPT

## 2024-04-15 PROCEDURE — 99232 SBSQ HOSP IP/OBS MODERATE 35: CPT | Performed by: STUDENT IN AN ORGANIZED HEALTH CARE EDUCATION/TRAINING PROGRAM

## 2024-04-15 RX ORDER — SODIUM PHOSPHATE,MONO-DIBASIC 19G-7G/118
1 ENEMA (ML) RECTAL ONCE
Status: COMPLETED | OUTPATIENT
Start: 2024-04-15 | End: 2024-04-15

## 2024-04-15 RX ADMIN — CEPHALEXIN 500 MG: 250 CAPSULE ORAL at 06:48

## 2024-04-15 RX ADMIN — Medication 1 TABLET: at 08:01

## 2024-04-15 RX ADMIN — THIAMINE HYDROCHLORIDE 200 MG: 100 INJECTION, SOLUTION INTRAMUSCULAR; INTRAVENOUS at 06:48

## 2024-04-15 RX ADMIN — CEPHALEXIN 500 MG: 250 CAPSULE ORAL at 23:44

## 2024-04-15 RX ADMIN — APIXABAN 5 MG: 5 TABLET, FILM COATED ORAL at 08:01

## 2024-04-15 RX ADMIN — ASPIRIN 81 MG: 81 TABLET, COATED ORAL at 08:02

## 2024-04-15 RX ADMIN — Medication 10 ML: at 22:14

## 2024-04-15 RX ADMIN — BUPROPION HYDROCHLORIDE 150 MG: 150 TABLET, EXTENDED RELEASE ORAL at 08:01

## 2024-04-15 RX ADMIN — LOSARTAN POTASSIUM 50 MG: 50 TABLET, FILM COATED ORAL at 08:02

## 2024-04-15 RX ADMIN — APIXABAN 5 MG: 5 TABLET, FILM COATED ORAL at 22:08

## 2024-04-15 RX ADMIN — OLANZAPINE 10 MG: 5 TABLET, FILM COATED ORAL at 22:08

## 2024-04-15 RX ADMIN — Medication 1 CAPSULE: at 08:01

## 2024-04-15 RX ADMIN — FOLIC ACID 1 MG: 1 TABLET ORAL at 08:02

## 2024-04-15 RX ADMIN — Medication 5 MG: at 22:08

## 2024-04-15 RX ADMIN — CEPHALEXIN 500 MG: 250 CAPSULE ORAL at 13:51

## 2024-04-15 RX ADMIN — THIAMINE HYDROCHLORIDE 200 MG: 100 INJECTION, SOLUTION INTRAMUSCULAR; INTRAVENOUS at 13:51

## 2024-04-15 RX ADMIN — SODIUM PHOSPHATE 1 ENEMA: 7; 19 ENEMA RECTAL at 09:35

## 2024-04-15 RX ADMIN — MAGNESIUM OXIDE TAB 400 MG (241.3 MG ELEMENTAL MG) 400 MG: 400 (241.3 MG) TAB at 08:01

## 2024-04-15 RX ADMIN — Medication 1000 MCG: at 08:02

## 2024-04-15 RX ADMIN — SENNOSIDES AND DOCUSATE SODIUM 2 TABLET: 8.6; 5 TABLET ORAL at 08:01

## 2024-04-15 RX ADMIN — Medication 10 ML: at 08:02

## 2024-04-15 RX ADMIN — THIAMINE HYDROCHLORIDE 200 MG: 100 INJECTION, SOLUTION INTRAMUSCULAR; INTRAVENOUS at 22:09

## 2024-04-15 RX ADMIN — CEPHALEXIN 500 MG: 250 CAPSULE ORAL at 17:14

## 2024-04-15 RX ADMIN — CEPHALEXIN 500 MG: 250 CAPSULE ORAL at 01:17

## 2024-04-15 NOTE — PROGRESS NOTES
Neurology       Patient Care Team:  Latricia Mishra APRN as PCP - General (Family Medicine)    Chief complaint: Constipation    History: Patient just had an enema.    Otherwise she is feeling reasonably well.    Headache is minimal.      Past Medical History:   Diagnosis Date    Anxiety and depression     Chest pain     Disease of thyroid gland     reports slightly elevated when in hospital    DVT (deep venous thrombosis)     ETOH use 04/09/2024    History of pulmonary embolus (PE)     Hypertension     Hypothyroidism 04/09/2024    Migraines     Obesity (BMI 30-39.9) 04/09/2024    Shortness of breath     with chest pain episodes       Vital Signs   Vitals:    04/14/24 1743 04/14/24 1906 04/14/24 2359 04/15/24 0700   BP:  129/60 122/56 128/76   BP Location:  Right arm Right arm Right arm   Patient Position:  Lying Lying Lying   Pulse: 73 77 71 64   Resp:  16 18 18   Temp:  97.8 °F (36.6 °C) 98.3 °F (36.8 °C) 97.8 °F (36.6 °C)   TempSrc:  Oral Oral Oral   SpO2: 98% 95% 94% 96%   Weight:       Height:           Physical Exam:   General: Awake and alert              Neuro: Awaiting MRI results    Results Review:  No new result  Results from last 7 days   Lab Units 04/11/24  0607   WBC 10*3/mm3 4.45   HEMOGLOBIN g/dL 12.1   HEMATOCRIT % 38.4   PLATELETS 10*3/mm3 265     Results from last 7 days   Lab Units 04/12/24  0527 04/11/24  0607 04/10/24  0432 04/09/24  0842   SODIUM mmol/L 139 140 142 141   POTASSIUM mmol/L 4.3 3.9 4.1 4.2   CHLORIDE mmol/L 108* 107 108* 106   CO2 mmol/L 22.0 24.0 23.0 24.0   BUN mg/dL 12 9 7 12   CREATININE mg/dL 0.80 0.71 0.63 0.80   CALCIUM mg/dL 8.6 8.5* 8.6 8.4*   BILIRUBIN mg/dL  --   --   --  0.6   ALK PHOS U/L  --   --   --  91   ALT (SGPT) U/L  --   --   --  8   AST (SGOT) U/L  --   --   --  23   GLUCOSE mg/dL 92 94 103* 110*       Imaging Results (Last 24 Hours)       ** No results found for the last 24 hours. **            Assessment:  Syncope with mild closed head  injury.    Posttraumatic headache improved posttraumatic vertigo resolved    Plan:  Patient rehab    Comment:  Okay to discharge anytime         I discussed the patients findings and my recommendations with patient and primary care team    Jewel Rose MD  04/15/24  11:24 EDT

## 2024-04-15 NOTE — PLAN OF CARE
Problem: Skin Injury Risk Increased  Goal: Skin Health and Integrity  Intervention: Optimize Skin Protection  Recent Flowsheet Documentation  Taken 4/15/2024 0800 by Yoly Cutler RN  Pressure Reduction Techniques:   frequent weight shift encouraged   heels elevated off bed   pressure points protected   sit time limited to 2 hours  Head of Bed (HOB) Positioning: HOB elevated  Pressure Reduction Devices: heel offloading device utilized  Skin Protection: adhesive use limited     Problem: Adult Inpatient Plan of Care  Goal: Absence of Hospital-Acquired Illness or Injury  Intervention: Identify and Manage Fall Risk  Recent Flowsheet Documentation  Taken 4/15/2024 0800 by Yoly Cutler RN  Safety Promotion/Fall Prevention:   safety round/check completed   toileting scheduled  Intervention: Prevent Skin Injury  Recent Flowsheet Documentation  Taken 4/15/2024 0800 by Yoly Cutler RN  Body Position: position changed independently  Skin Protection: adhesive use limited  Intervention: Prevent and Manage VTE (Venous Thromboembolism) Risk  Recent Flowsheet Documentation  Taken 4/15/2024 0800 by Yoly Cutler RN  VTE Prevention/Management: (eliquis) --  Intervention: Prevent Infection  Recent Flowsheet Documentation  Taken 4/15/2024 0800 by Yoly Cutler RN  Infection Prevention: environmental surveillance performed  Goal: Optimal Comfort and Wellbeing  Intervention: Provide Person-Centered Care  Recent Flowsheet Documentation  Taken 4/15/2024 0800 by Yoly Cutler RN  Trust Relationship/Rapport:   choices provided   care explained     Problem: Pain Acute  Goal: Acceptable Pain Control and Functional Ability  Intervention: Prevent or Manage Pain  Recent Flowsheet Documentation  Taken 4/15/2024 0800 by Yoly Cutler RN  Sensory Stimulation Regulation: auditory stimulation minimized  Bowel Elimination Promotion: adequate fluid intake promoted  Medication Review/Management: medications  reviewed  Intervention: Optimize Psychosocial Wellbeing  Recent Flowsheet Documentation  Taken 4/15/2024 0800 by Yoly Cutler RN  Supportive Measures: active listening utilized  Diversional Activities:   television   smartphone     Problem: Fall Injury Risk  Goal: Absence of Fall and Fall-Related Injury  Intervention: Identify and Manage Contributors  Recent Flowsheet Documentation  Taken 4/15/2024 0800 by Yoly Cutler RN  Medication Review/Management: medications reviewed  Intervention: Promote Injury-Free Environment  Recent Flowsheet Documentation  Taken 4/15/2024 0800 by Yoly Cutler RN  Safety Promotion/Fall Prevention:   safety round/check completed   toileting scheduled     Problem: Decreased Participation and Engagement (Excessive Substance Use)  Goal: Increased Participation and Engagement (Excessive Substance Use)  Intervention: Facilitate Participation and Engagement  Recent Flowsheet Documentation  Taken 4/15/2024 0800 by Yoly Cutler RN  Supportive Measures: active listening utilized     Problem: Social, Occupational or Functional Impairment (Excessive Substance Use)  Goal: Enhanced Social, Occupational or Functional Skills (Excessive Substance Use)  Intervention: Promote Social, Occupational and Functional Ability  Recent Flowsheet Documentation  Taken 4/15/2024 0800 by Yoly Cutler RN  Trust Relationship/Rapport:   choices provided   care explained     Problem: Anxiety  Goal: Anxiety Reduction or Resolution  Intervention: Promote Anxiety Reduction  Recent Flowsheet Documentation  Taken 4/15/2024 0800 by Yoly Cutler RN  Supportive Measures: active listening utilized  Family/Support System Care: self-care encouraged   Goal Outcome Evaluation:

## 2024-04-15 NOTE — PROGRESS NOTES
[facility] Medicine Services  PROGRESS NOTE    Patient Name: Kamila Garcia  : 1970  MRN: [id number]    Date of Admission: 2024  Primary Care Physician: Latricia Mishra APRN    Subjective   Subjective     CC:  Left facial paresthesias     HPI:  Sitting up in bed.  Reports left arm and leg weakness and numbness and tingling.  Reports not having a bowel movement since hospitalization.  No urinary symptoms.  No nausea or vomiting.  Eating and drinking okay.    Objective   Objective     Vital Signs:   Temp:  [97.7 °F (36.5 °C)-98.3 °F (36.8 °C)] 98.2 °F (36.8 °C)  Heart Rate:  [64-86] 79  Resp:  [16-18] 18  BP: (122-146)/(56-85) 146/85     Physical Exam:  Constitutional: No acute distress, awake, alert  HENT: NCAT, mucous membranes moist  Respiratory: Clear to auscultation bilaterally, respiratory effort normal   Cardiovascular: RRR, no murmurs, rubs, or gallops  Gastrointestinal: Positive bowel sounds, soft, nontender, nondistended  Musculoskeletal: No bilateral ankle edema  Psychiatric: Flat affect, cooperative  Neurologic: Oriented x 3, left-sided weakness 4/5 UE and LE  Skin: No rashes    Results Reviewed:  LAB RESULTS:      Lab 24  0607 04/10/24  0432 04/09/24  0842   WBC 4.45 4.97 4.44   HEMOGLOBIN 12.1 11.7* 13.0   HEMATOCRIT 38.4 36.6 40.8   PLATELETS 265 257 277   NEUTROS ABS 2.30  --  2.42   IMMATURE GRANS (ABS) 0.01  --  0.01   LYMPHS ABS 1.27  --  1.30   MONOS ABS 0.51  --  0.45   EOS ABS 0.32  --  0.21   MCV 87.9 88.2 86.1   PROTIME  --   --  13.9   APTT  --   --  27.0*         Lab 24  0527 24  0607 04/10/24  0432 24  0842   SODIUM 139 140 142 141   POTASSIUM 4.3 3.9 4.1 4.2   CHLORIDE 108* 107 108* 106   CO2 22.0 24.0 23.0 24.0   ANION GAP 9.0 9.0 11.0 11.0   BUN 12 9 7 12   CREATININE 0.80 0.71 0.63 0.80   EGFR 87.1 100.6 104.9 87.1   GLUCOSE 92 94 103* 110*   CALCIUM 8.6 8.5* 8.6 8.4*   MAGNESIUM 2.1 2.2  --  2.0   HEMOGLOBIN A1C   --   --  4.90  --    TSH  --  7.510*  --   --          Lab 04/09/24  0842   TOTAL PROTEIN 7.3   ALBUMIN 4.2   GLOBULIN 3.1   ALT (SGPT) 8   AST (SGOT) 23   BILIRUBIN 0.6   ALK PHOS 91         Lab 04/10/24  1640 04/10/24  0432 04/09/24  0842   PROBNP  --   --  273.0   HSTROP T 8 7 9   PROTIME  --   --  13.9   INR  --   --  1.06         Lab 04/10/24  0432   CHOLESTEROL 168   LDL CHOL 70   HDL CHOL 86*   TRIGLYCERIDES 62         Lab 04/11/24  1027   FOLATE 10.60   VITAMIN B 12 222         Brief Urine Lab Results  (Last result in the past 365 days)        Color   Clarity   Blood   Leuk Est   Nitrite   Protein   CREAT   Urine HCG        04/09/24 1002 Yellow   Clear   Trace   Trace   Negative   Negative                   Microbiology Results Abnormal       None            No radiology results from the last 24 hrs    Results for orders placed during the hospital encounter of 04/09/24    Adult Transthoracic Echo Complete W/ Cont if Necessary Per Protocol (With Agitated Saline)    Interpretation Summary    Left ventricular systolic function is normal. Calculated left ventricular EF = 53.6%    Left ventricular wall thickness is consistent with mild concentric hypertrophy.    Saline test results are negative.    Estimated right ventricular systolic pressure from tricuspid regurgitation is normal (<35 mmHg).    The aortic valve exhibits sclerosis.      Current medications:  Scheduled Meds:apixaban, 5 mg, Oral, Q12H  aspirin, 81 mg, Oral, Daily  buPROPion XL, 150 mg, Oral, Daily  cephalexin, 500 mg, Oral, Q6H  folic acid, 1 mg, Oral, Daily  lactobacillus acidophilus, 1 capsule, Oral, Daily  losartan, 50 mg, Oral, Q24H  magnesium oxide, 400 mg, Oral, Daily  multivitamin with minerals, 1 tablet, Oral, Daily  OLANZapine, 10 mg, Oral, Nightly  senna-docusate sodium, 2 tablet, Oral, BID  sodium chloride, 10 mL, Intravenous, Q12H  thiamine (B-1) IV, 200 mg, Intravenous, Q8H   Followed by  [START ON 4/16/2024] thiamine, 100 mg, Oral,  Daily  vitamin B-12, 1,000 mcg, Oral, Daily      Continuous Infusions:   PRN Meds:.  acetaminophen    senna-docusate sodium **AND** polyethylene glycol **AND** bisacodyl **AND** bisacodyl    LORazepam **OR** midazolam **OR** LORazepam **OR** midazolam **OR** midazolam **OR** midazolam    Magnesium Standard Dose Replacement - Follow Nurse / BPA Driven Protocol    melatonin    nitroglycerin    sodium chloride    sodium chloride    Assessment & Plan   Assessment & Plan     Active Hospital Problems    Diagnosis  POA    **Suspected cerebrovascular accident (CVA) [R09.89]  Yes    Syncope and collapse [R55]  Unknown    Hypertensive urgency [I16.0]  Yes    Obesity (BMI 30-39.9) [E66.9]  Yes    ETOH use [Z78.9]  Yes    Anxiety and depression [F41.9, F32.A]  Yes    H/O LLE DVT & PE (July 2021) [Z86.718]  Not Applicable      Resolved Hospital Problems   No resolved problems to display.        Brief Hospital Course to date:  Kamila Garcia is a 53 y.o. female  w/ hx previous dvt & pe (July 2021) who stopped taking eliquis ~1 month PTA, anxiety/depression, obesity, migraines (per chart, patient denied) who presented after a syncopal event which promped co-workers to call ems, regained consciousness en route to [facility] ED where upon arrival developed numbness and tingling to left face, arm and code stroke called. Evaluated by neuro-stroke service, ct head negative, cta h&n were negative for flow limiting stenosis and ct perfusion was negative for lvo. Ultimately was deemed candidate for thrombolytic therapy w/ tnkase administered 4/9/24 at 11:48, also received dose of depakote, and was admitted to icu for further evaluation. Repeat ct head was negative. Continued to have headache, left facial arm paresthesias. Subsequent mri brain evening 4/9/24 was negative as well. Repeat ct head 4/10/24 was again negative. Carotid duplex negative. Echo showed normal ef, saline test negative. Neuro-stroke service felt the symptoms were not  "due to cerebral ischemia, likely a stroke \"mimic\" such as functional or atypical migraine. General neurology was consulted and transferred out of icu 4/11/24.    This patient's problems and plans were partially entered by my partner and updated as appropriate by me 04/15/24.     Syncopal episode (unclear etiology)  -echo ok  -orthostatics normal 4/11/24  -no dysrhythmias on tele  -cards evaluated:  holter monitor has already been placed  follow up 6 weeks in cards clinic     Mild concussion (w/ post-traumatic vertigo and post-traumatic headache)  Persistent Left sided facial and arm paresthesias and ataxia  Borderline b12 deficiency  -s/p tnkase in ED 4/9  -ctperfusion negative, cta h&n negative  -subsequent mri negative 4/9  -echo normal  -subsequent ct head 4/10 again negative  -neuro-stroke followed: per 4/10/24: feels ongoing paresthesias less likely brain ischemic, more likely stroke \"mimic\" including \"functional\" vs atypical migraine  -oral b12 replacement (for borderline low b12 level)  **General neurology followed: EEG normal  -pt/ot recommending inpt rehab  --ASA indefinitely     Right arm Phlebitis  -- IV removed and area marked  -- elevate and apply heat  -- Keflex 500 mg 4x a day for 5 days, probiotic     Hx etoh abuse  -5 beer daily  -ciwa protocol/prn benzos protocol (? Component of withdrawal)     Htn  -losartan 50mg (titrate prn)     Mildly elevated tsh  -tsh 7.5; normal free t4  -follow up pcp, repeat tsh 4-6 weeks     Hx previous pe & dvt  -formerly on eliquis but stopped taking last month states insurnce did cover it, sent to our pharmacy and copay is only $5, they state covered by insurance   -continue asa   -restarted eliquis per Neurology on 4/12        Mood d/o  -stop abilify  -continue zyprexa  --restarted Wellbutrin 150 mg daily for 2 weeks then increase to 300 mg daily thereafter.   --follow up with Psychiatrist       Constipation  -Status post enema on 4/15, had a bowel " movement    Expected Discharge Location and Transportation: rehab, anytime  Expected Discharge   Expected Discharge Date: 4/16/2024; Expected Discharge Time:      DVT prophylaxis:  Medical and mechanical DVT prophylaxis orders are present.         AM-PAC 6 Clicks Score (PT): 17 (04/15/24 0800)    CODE STATUS:   There are no questions and answers to display.       Jo Ordoñez MD  04/15/24

## 2024-04-15 NOTE — PAYOR COMM NOTE
"Ref # [id number]   Clinical update. Fax: [phone]  Kamila Garcia (53 y.o. Female)       Date of Birth   1970    Social Security Number       Address   [address] [place] [address]    Home Phone   [phone]    MRN   [id number]       Mosque   Jew    Marital Status                               Admission Date   4/9/24    Admission Type   Emergency    Admitting Provider   Jo Ordoñez MD    Attending Provider   Jo Ordoñez MD    Department, Room/Bed   [facility] 3E, S336/1       Discharge Date       Discharge Disposition       Discharge Destination                                 Attending Provider: Jo Ordoñez MD    Allergies: Hydrocodone, Lactose Intolerance (Gi)    Isolation: None   Infection: None   Code Status: Prior    Ht: 167.6 cm (65.98\")   Wt: 101 kg (222 lb 10.6 oz)    Admission Cmt: None   Principal Problem: Suspected cerebrovascular accident (CVA) [R09.89]                   Active Insurance as of 4/9/2024       Primary Coverage       Payor Plan Insurance Group Employer/Plan Group    [employer] BLUE [employer] [facility] [employer] BLUE [facility] PPO [id number]       Payor Plan Address Payor Plan Phone Number Payor Plan Fax Number Effective Dates    [address] [phone]  10/18/2020 - None Entered    [place] [address]         Subscriber Name Subscriber Birth Date Member ID       JANICE GARCIA 6/4/1973 [id number]                     Emergency Contacts        (Rel.) Home Phone Work Phone Mobile Phone    Janice Garcia (Spouse) [phone] -- --                 Physician Progress Notes (last 72 hours)        Jewel Rose MD at 04/15/24 1124          Neurology       Patient Care Team:  Latricia Mishra APRN as PCP - General (Family Medicine)    Chief complaint: Constipation    History: Patient just had an enema.    Otherwise she is feeling reasonably well.    Headache is minimal.      Past Medical History:   Diagnosis " Date    Anxiety and depression     Chest pain     Disease of thyroid gland     reports slightly elevated when in hospital    DVT (deep venous thrombosis)     ETOH use 04/09/2024    History of pulmonary embolus (PE)     Hypertension     Hypothyroidism 04/09/2024    Migraines     Obesity (BMI 30-39.9) 04/09/2024    Shortness of breath     with chest pain episodes       Vital Signs   Vitals:    04/14/24 1743 04/14/24 1906 04/14/24 2359 04/15/24 0700   BP:  129/60 122/56 128/76   BP Location:  Right arm Right arm Right arm   Patient Position:  Lying Lying Lying   Pulse: 73 77 71 64   Resp:  16 18 18   Temp:  97.8 °F (36.6 °C) 98.3 °F (36.8 °C) 97.8 °F (36.6 °C)   TempSrc:  Oral Oral Oral   SpO2: 98% 95% 94% 96%   Weight:       Height:           Physical Exam:   General: Awake and alert              Neuro: Awaiting MRI results    Results Review:  No new result  Results from last 7 days   Lab Units 04/11/24  0607   WBC 10*3/mm3 4.45   HEMOGLOBIN g/dL 12.1   HEMATOCRIT % 38.4   PLATELETS 10*3/mm3 265     Results from last 7 days   Lab Units 04/12/24  0527 04/11/24  0607 04/10/24  0432 04/09/24  0842   SODIUM mmol/L 139 140 142 141   POTASSIUM mmol/L 4.3 3.9 4.1 4.2   CHLORIDE mmol/L 108* 107 108* 106   CO2 mmol/L 22.0 24.0 23.0 24.0   BUN mg/dL 12 9 7 12   CREATININE mg/dL 0.80 0.71 0.63 0.80   CALCIUM mg/dL 8.6 8.5* 8.6 8.4*   BILIRUBIN mg/dL  --   --   --  0.6   ALK PHOS U/L  --   --   --  91   ALT (SGPT) U/L  --   --   --  8   AST (SGOT) U/L  --   --   --  23   GLUCOSE mg/dL 92 94 103* 110*       Imaging Results (Last 24 Hours)       ** No results found for the last 24 hours. **            Assessment:  Syncope with mild closed head injury.    Posttraumatic headache improved posttraumatic vertigo resolved    Plan:  Patient rehab    Comment:  Okay to discharge anytime         I discussed the patients findings and my recommendations with patient and primary care team    Jewel Rose MD  04/15/24  11:24  EDT          Electronically signed by Jewel Rose MD at 04/15/24 1126       Jewel Rose MD at 04/14/24 1118          Neurology       Patient Care Team:  Latricia Mishra APRN as PCP - General (Family Medicine)    Chief complaint: Syncope    History: Patient's had no further problems.    Her mood is improving somewhat with appropriate.    The headaches are down to 2.      Past Medical History:   Diagnosis Date    Anxiety and depression     Chest pain     Disease of thyroid gland     reports slightly elevated when in hospital    DVT (deep venous thrombosis)     ETOH use 04/09/2024    History of pulmonary embolus (PE)     Hypertension     Hypothyroidism 04/09/2024    Migraines     Obesity (BMI 30-39.9) 04/09/2024    Shortness of breath     with chest pain episodes       Vital Signs   Vitals:    04/13/24 1915 04/13/24 2325 04/14/24 0300 04/14/24 0725   BP: 130/73 117/61 133/71 133/73   BP Location: Right arm Right arm Right arm Right arm   Patient Position: Lying Lying Lying Lying   Pulse: 72 70 73 61   Resp: 16 16 16 16   Temp: 98.2 °F (36.8 °C) 98.2 °F (36.8 °C) 98.2 °F (36.8 °C) 98.2 °F (36.8 °C)   TempSrc: Oral Oral Axillary Oral   SpO2:    94%   Weight:       Height:           Physical Exam:   General: Awake and alert              Neuro: Oriented to person place and time.    Subjective numbness.        Results Review:  Reviewed  Results from last 7 days   Lab Units 04/11/24  0607   WBC 10*3/mm3 4.45   HEMOGLOBIN g/dL 12.1   HEMATOCRIT % 38.4   PLATELETS 10*3/mm3 265     Results from last 7 days   Lab Units 04/12/24  0527 04/11/24  0607 04/10/24  0432 04/09/24  0842   SODIUM mmol/L 139 140 142 141   POTASSIUM mmol/L 4.3 3.9 4.1 4.2   CHLORIDE mmol/L 108* 107 108* 106   CO2 mmol/L 22.0 24.0 23.0 24.0   BUN mg/dL 12 9 7 12   CREATININE mg/dL 0.80 0.71 0.63 0.80   CALCIUM mg/dL 8.6 8.5* 8.6 8.4*   BILIRUBIN mg/dL  --   --   --  0.6   ALK PHOS U/L  --   --   --  91   ALT (SGPT) U/L  --   --   --  8    AST (SGOT) U/L  --   --   --  23   GLUCOSE mg/dL 92 94 103* 110*       Imaging Results (Last 24 Hours)       ** No results found for the last 24 hours. **            Assessment:  Syncope with mild closed head injury    Posttraumatic headache.    Posttraumatic vertigo resolved.        Plan:  Inpatient rehab    Increasing bupropion to 2 weeks to 300 mg.    Continue Eliquis.        Comment:  Improving.         I discussed the patients findings and my recommendations with patient and primary care team    Jewel Rose MD  24  11:19 EDT          Electronically signed by Jewel Rose MD at 24 1120       Ysabel Brown APRN at 24 0845              [facility] Medicine Services  PROGRESS NOTE    Patient Name: Kamila Garcia  : 1970  MRN: [id number]    Date of Admission: 2024  Primary Care Physician: Latricia Mishra APRN    Subjective   Subjective     CC:  Left facial paresthesias     HPI:  Patient is resting in bed in NAD. She rested well. IV removed from right arm yesterday and area still red and warmth today. Patient states area is tender       Objective   Objective     Vital Signs:   Temp:  [98 °F (36.7 °C)-98.2 °F (36.8 °C)] 98 °F (36.7 °C)  Heart Rate:  [61-89] 84  Resp:  [16-18] 18  BP: (117-133)/(61-76) 133/73     Physical Exam:  Constitutional: No acute distress, awake, alert  HENT: NCAT, mucous membranes moist  Respiratory: Clear to auscultation bilaterally, respiratory effort normal room air   Cardiovascular: RRR, no murmurs, rubs, or gallops  Gastrointestinal: Positive bowel sounds, soft, nontender, nondistended  Musculoskeletal: No bilateral ankle edema  Psychiatric: flat affect, cooperative  Neurologic: Oriented x 3, left sided weakness 3/4 , Cranial Nerves grossly intact to confrontation, speech clear  Skin: No rashes, right FA erythema, mild edema and warmth noted       Results Reviewed:  LAB RESULTS:      Lab 24  0607  04/10/24  0432 04/09/24  0842   WBC 4.45 4.97 4.44   HEMOGLOBIN 12.1 11.7* 13.0   HEMATOCRIT 38.4 36.6 40.8   PLATELETS 265 257 277   NEUTROS ABS 2.30  --  2.42   IMMATURE GRANS (ABS) 0.01  --  0.01   LYMPHS ABS 1.27  --  1.30   MONOS ABS 0.51  --  0.45   EOS ABS 0.32  --  0.21   MCV 87.9 88.2 86.1   PROTIME  --   --  13.9   APTT  --   --  27.0*         Lab 04/12/24  0527 04/11/24  0607 04/10/24  0432 04/09/24  0842   SODIUM 139 140 142 141   POTASSIUM 4.3 3.9 4.1 4.2   CHLORIDE 108* 107 108* 106   CO2 22.0 24.0 23.0 24.0   ANION GAP 9.0 9.0 11.0 11.0   BUN 12 9 7 12   CREATININE 0.80 0.71 0.63 0.80   EGFR 87.1 100.6 104.9 87.1   GLUCOSE 92 94 103* 110*   CALCIUM 8.6 8.5* 8.6 8.4*   MAGNESIUM 2.1 2.2  --  2.0   HEMOGLOBIN A1C  --   --  4.90  --    TSH  --  7.510*  --   --          Lab 04/09/24  0842   TOTAL PROTEIN 7.3   ALBUMIN 4.2   GLOBULIN 3.1   ALT (SGPT) 8   AST (SGOT) 23   BILIRUBIN 0.6   ALK PHOS 91         Lab 04/10/24  1640 04/10/24  0432 04/09/24  0842   PROBNP  --   --  273.0   HSTROP T 8 7 9   PROTIME  --   --  13.9   INR  --   --  1.06         Lab 04/10/24  0432   CHOLESTEROL 168   LDL CHOL 70   HDL CHOL 86*   TRIGLYCERIDES 62         Lab 04/11/24  1027   FOLATE 10.60   VITAMIN B 12 222         Brief Urine Lab Results  (Last result in the past 365 days)        Color   Clarity   Blood   Leuk Est   Nitrite   Protein   CREAT   Urine HCG        04/09/24 1002 Yellow   Clear   Trace   Trace   Negative   Negative                   Microbiology Results Abnormal       None            No radiology results from the last 24 hrs    Results for orders placed during the hospital encounter of 04/09/24    Adult Transthoracic Echo Complete W/ Cont if Necessary Per Protocol (With Agitated Saline)    Interpretation Summary    Left ventricular systolic function is normal. Calculated left ventricular EF = 53.6%    Left ventricular wall thickness is consistent with mild concentric hypertrophy.    Saline test results are  negative.    Estimated right ventricular systolic pressure from tricuspid regurgitation is normal (<35 mmHg).    The aortic valve exhibits sclerosis.      Current medications:  Scheduled Meds:apixaban, 5 mg, Oral, Q12H  aspirin, 81 mg, Oral, Daily  buPROPion XL, 150 mg, Oral, Daily  folic acid, 1 mg, Oral, Daily  losartan, 50 mg, Oral, Q24H  magnesium oxide, 400 mg, Oral, Daily  multivitamin with minerals, 1 tablet, Oral, Daily  OLANZapine, 10 mg, Oral, Nightly  senna-docusate sodium, 2 tablet, Oral, BID  sodium chloride, 10 mL, Intravenous, Q12H  thiamine (B-1) IV, 200 mg, Intravenous, Q8H   Followed by  [START ON 4/16/2024] thiamine, 100 mg, Oral, Daily  vitamin B-12, 1,000 mcg, Oral, Daily      Continuous Infusions:   PRN Meds:.  acetaminophen    senna-docusate sodium **AND** polyethylene glycol **AND** bisacodyl **AND** bisacodyl    LORazepam **OR** midazolam **OR** LORazepam **OR** midazolam **OR** midazolam **OR** midazolam    Magnesium Standard Dose Replacement - Follow Nurse / BPA Driven Protocol    melatonin    nitroglycerin    sodium chloride    sodium chloride    Assessment & Plan   Assessment & Plan     Active Hospital Problems    Diagnosis  POA    **Suspected cerebrovascular accident (CVA) [R09.89]  Yes    Syncope and collapse [R55]  Unknown    Hypertensive urgency [I16.0]  Yes    Obesity (BMI 30-39.9) [E66.9]  Yes    ETOH use [Z78.9]  Yes    Anxiety and depression [F41.9, F32.A]  Yes    H/O LLE DVT & PE (July 2021) [Z86.718]  Not Applicable      Resolved Hospital Problems   No resolved problems to display.        Brief Hospital Course to date:  Kamlia Garcia is a 53 y.o. female  w/ hx previous dvt & pe (July 2021) who stopped taking eliquis ~1 month ago, anxiety/depression, obesity, migraines (per chart, patient denies) who presented after a syncopal event which promped co-workers to call ems, regained consciousness en route to [facility] ED where upon arrival developed numbness and tingling to left  "face, arm and code stroke called. Evaluated by neuro-stroke service, ct head negative, cta h&n were negative for flow limiting stenosis and ct perfusion was negative for lvo. Ultimately was deemed candidate for thrombolytic therapy w/ tnkase administered 4/9/24 at 11:48 , also received dose of depakote, and was admitted to icu for further evaluation. Repeat ct head was negative.continued to have headache, left facial arm paresthesias. Subsequent mri brain evening 4/9/24 was negative as well. Repeat ct head 4/10/24 was again negative. Carotid duplex negative. Echo showed normal ef, saline test negative. Neuro-stroke service felt the symptoms were not due to cerebral ischemia, likely a stroke \"mimic\" such as functional or atypical migraine. General neurology was consulted and transferred out of icu 4/11/24.    Plan was partially entered by my partner and I have reviewed and updated as appropriate on 4/14/24     Syncopal episode (unclear etiology)  -echo ok  -orthostatics normal 4/11/24  -no dysrhythmias on tele  -cards evaluated:  holter monitor has already been placed  follow up 6 weeks in cards clinic     Mild concussion (w/ post-traumatic vertigo and post-traumatic headache)  Persistent Left sided facial and arm paresthesias and ataxia  Borderline b12 deficiency  -s/p tnkase in ED 4/9  -ctperfusion negative, cta h&n negative  -subsequent mri negative 4/9  -echo normal  -subsequent ct head 4/10 again negative  -neuro-stroke followed: per 4/10/24: feels ongoing paresthesias less likely brain ischemic, more likely stroke \"mimic\" including \"functional\" vs atypical migraine  -oral b12 replacement (for borderline low b12 level)  **General neurology following: EEG normal  -pt/ot recommending inpt rehab  --ASA indefinitely     Right arm Phlebitis  -- IV removed and area marked  -- elevate and apply heat  -- Keflex 500 mg 4x a day for 5 days        Hx etoh abuse  -5 beer daily  -start ciwa protocol/prn benzos protocol (? " Component of withdrawal)     Htn  -start losartan 50mg (titrate prn)     Mildly elevated tsh  -tsh 7.5; normal free t4  -follow up pcp, repeat tsh 4-6 weeks     Hx previous pe & dvt  -formerly on eliquis but stopped taking last month states insurnce did cover it, sent to our pharmacy and copay is only $5, they state covered by insurance   -continue asa   -restart eliquis per Neurology on         Mood d/o  -stop abilify  -continue zyprexa  --restart Wellbutrin 150 mg daily for 2 weeks then increase to 300 mg daily thereafter.   --follow up with Psychiatrist           Expected Discharge Location and Transportation: rehab   Expected Discharge   Expected Discharge Date: 4/15/2024; Expected Discharge Time:      DVT prophylaxis:  Medical and mechanical DVT prophylaxis orders are present.         AM-PAC 6 Clicks Score (PT): 18 (24 1525)    CODE STATUS:   There are no questions and answers to display.       RAFFY Boyer  24        Electronically signed by Ysabel Brown APRN at 24 1211       Ysabel Brown APRN at 24 0811              [facility] Medicine Services  PROGRESS NOTE    Patient Name: Kamila Garcia  : 1970  MRN: [id number]    Date of Admission: 2024  Primary Care Physician: Latricia Mishra, RAFFY    Subjective   Subjective     CC:  Left facial paresthesias     HPI:  Patient is resting in bed in NAD. She states headache is better. No acute events overnight per nursing       Objective   Objective     Vital Signs:   Temp:  [97.5 °F (36.4 °C)-98.6 °F (37 °C)] 98.1 °F (36.7 °C)  Heart Rate:  [61-86] 63  Resp:  [16-18] 16  BP: (132-168)/(67-88) 132/67     Physical Exam:  Constitutional: No acute distress, awake, alert  HENT: NCAT, mucous membranes moist  Respiratory: Clear to auscultation bilaterally, respiratory effort normal room air   Cardiovascular: RRR, no murmurs, rubs, or gallops  Gastrointestinal: Positive bowel  sounds, soft, nontender, nondistended  Musculoskeletal: No bilateral ankle edema  Psychiatric: Appropriate affect, cooperative  Neurologic: Oriented x 3, left sided weakness 3/4 , Cranial Nerves grossly intact to confrontation, speech clear  Skin: No rashes      Results Reviewed:  LAB RESULTS:      Lab 04/11/24  0607 04/10/24  0432 04/09/24  0842   WBC 4.45 4.97 4.44   HEMOGLOBIN 12.1 11.7* 13.0   HEMATOCRIT 38.4 36.6 40.8   PLATELETS 265 257 277   NEUTROS ABS 2.30  --  2.42   IMMATURE GRANS (ABS) 0.01  --  0.01   LYMPHS ABS 1.27  --  1.30   MONOS ABS 0.51  --  0.45   EOS ABS 0.32  --  0.21   MCV 87.9 88.2 86.1   PROTIME  --   --  13.9   APTT  --   --  27.0*         Lab 04/12/24  0527 04/11/24  0607 04/10/24  0432 04/09/24  0842   SODIUM 139 140 142 141   POTASSIUM 4.3 3.9 4.1 4.2   CHLORIDE 108* 107 108* 106   CO2 22.0 24.0 23.0 24.0   ANION GAP 9.0 9.0 11.0 11.0   BUN 12 9 7 12   CREATININE 0.80 0.71 0.63 0.80   EGFR 87.1 100.6 104.9 87.1   GLUCOSE 92 94 103* 110*   CALCIUM 8.6 8.5* 8.6 8.4*   MAGNESIUM 2.1 2.2  --  2.0   HEMOGLOBIN A1C  --   --  4.90  --    TSH  --  7.510*  --   --          Lab 04/09/24  0842   TOTAL PROTEIN 7.3   ALBUMIN 4.2   GLOBULIN 3.1   ALT (SGPT) 8   AST (SGOT) 23   BILIRUBIN 0.6   ALK PHOS 91         Lab 04/10/24  1640 04/10/24  0432 04/09/24  0842   PROBNP  --   --  273.0   HSTROP T 8 7 9   PROTIME  --   --  13.9   INR  --   --  1.06         Lab 04/10/24  0432   CHOLESTEROL 168   LDL CHOL 70   HDL CHOL 86*   TRIGLYCERIDES 62         Lab 04/11/24  1027   FOLATE 10.60   VITAMIN B 12 222         Brief Urine Lab Results  (Last result in the past 365 days)        Color   Clarity   Blood   Leuk Est   Nitrite   Protein   CREAT   Urine HCG        04/09/24 1002 Yellow   Clear   Trace   Trace   Negative   Negative                   Microbiology Results Abnormal       None            EEG    Result Date: 4/11/2024  Reason for referral: 53 y.o.female with syncope Technical Summary:  A 19 channel  digital EEG was performed using the international 10-20 placement system, including eye leads and EKG leads. Duration: 20 minutes Findings: The patient is awake.  A medium amplitude well-regulated tenderness posterior rhythm is evident symmetrically over the occipital leads.  Low amplitude intermixed alpha and theta activity are seen anteriorly.  Drowsiness is seen with mild slowing of the background but stage II sleep is not seen.  Photic stimulation yields a symmetric driving response at several flash frequencies.  Hyperventilation is not performed.  No focal features or epileptiform activity are seen. Video: Available Technical quality: Superior EKG: Regular, 60 bpm SUMMARY: Normal EEG in the awake and lightly drowsy states No focal features or epileptiform activity are seen     Impression: Normal study This report is transcribed using the Dragon dictation system.       Results for orders placed during the hospital encounter of 04/09/24    Adult Transthoracic Echo Complete W/ Cont if Necessary Per Protocol (With Agitated Saline)    Interpretation Summary    Left ventricular systolic function is normal. Calculated left ventricular EF = 53.6%    Left ventricular wall thickness is consistent with mild concentric hypertrophy.    Saline test results are negative.    Estimated right ventricular systolic pressure from tricuspid regurgitation is normal (<35 mmHg).    The aortic valve exhibits sclerosis.      Current medications:  Scheduled Meds:apixaban, 5 mg, Oral, Q12H  aspirin, 81 mg, Oral, Daily  buPROPion XL, 150 mg, Oral, Daily  folic acid, 1 mg, Oral, Daily  losartan, 50 mg, Oral, Q24H  magnesium oxide, 400 mg, Oral, Daily  multivitamin with minerals, 1 tablet, Oral, Daily  OLANZapine, 10 mg, Oral, Nightly  senna-docusate sodium, 2 tablet, Oral, BID  sodium chloride, 10 mL, Intravenous, Q12H  thiamine (B-1) IV, 200 mg, Intravenous, Q8H   Followed by  [START ON 4/16/2024] thiamine, 100 mg, Oral, Daily  vitamin  B-12, 1,000 mcg, Oral, Daily      Continuous Infusions:   PRN Meds:.  acetaminophen    senna-docusate sodium **AND** polyethylene glycol **AND** bisacodyl **AND** bisacodyl    LORazepam **OR** midazolam **OR** LORazepam **OR** midazolam **OR** midazolam **OR** midazolam    Magnesium Standard Dose Replacement - Follow Nurse / BPA Driven Protocol    melatonin    nitroglycerin    sodium chloride    sodium chloride    Assessment & Plan   Assessment & Plan     Active Hospital Problems    Diagnosis  POA    **Suspected cerebrovascular accident (CVA) [R09.89]  Yes    Syncope and collapse [R55]  Unknown    Hypertensive urgency [I16.0]  Yes    Obesity (BMI 30-39.9) [E66.9]  Yes    ETOH use [Z78.9]  Yes    Anxiety and depression [F41.9, F32.A]  Yes    H/O LLE DVT & PE (July 2021) [Z86.718]  Not Applicable      Resolved Hospital Problems   No resolved problems to display.        Brief Hospital Course to date:  Kamila Garcia is a 53 y.o. female  w/ hx previous dvt & pe (July 2021) who stopped taking eliquis ~1 month ago, anxiety/depression, obesity, migraines (per chart, patient denies) who presented after a syncopal event which promped co-workers to call ems, regained consciousness en route to [facility] ED where upon arrival developed numbness and tingling to left face, arm and code stroke called. Evaluated by neuro-stroke service, ct head negative, cta h&n were negative for flow limiting stenosis and ct perfusion was negative for lvo. Ultimately was deemed candidate for thrombolytic therapy w/ tnkase administered 4/9/24 at 11:48 , also received dose of depakote, and was admitted to icu for further evaluation. Repeat ct head was negative.continued to have headache, left facial arm paresthesias. Subsequent mri brain evening 4/9/24 was negative as well. Repeat ct head 4/10/24 was again negative. Carotid duplex negative. Echo showed normal ef, saline test negative. Neuro-stroke service felt the symptoms were not due to  "cerebral ischemia, likely a stroke \"mimic\" such as functional or atypical migraine. General neurology was consulted and transferred out of icu 4/11/24.    Plan was partially entered by my partner and I have reviewed and updated as appropriate on 4/13/24     Syncopal episode (unclear etiology)  -echo ok  -orthostatics normal 4/11/24  -no dysrhythmias on tele  -cards evaluated: set up w/ holter monitor upon discharge; follow up 6 weeks in cards clinic     Mild concussion (w/ post-traumatic vertigo and post-traumatic headache)  Persistent Left sided facial and arm paresthesias and ataxia  Borderline b12 deficiency  -s/p tnkase in ED 4/9  -ctperfusion negative, cta h&n negative  -subsequent mri negative 4/9  -echo normal  -subsequent ct head 4/10 again negative  -neuro-stroke followed: per 4/10/24: feels ongoing paresthesias less likely brain ischemic, more likely stroke \"mimic\" including \"functional\" vs atypical migraine  -oral b12 replacement (for borderline low b12 level)  **General neurology following: EEG normal  -pt/ot recommending inpt rehab  --ASA indefinitely       Hx etoh abuse  -5 beer daily  -start ciwa protocol/prn benzos protocol (? Component of withdrawal)  --CIWA 1 this am      Htn  -start losartan 50mg (titrate prn)     Mildly elevated tsh  -tsh 7.5; normal free t4  -follow up pcp, repeat tsh 4-6 weeks     Hx previous pe & dvt  -formerly on eliquis but stopped taking last month states insurnce did cover it, sent to our pharmacy and copay is only $5, they state covered by insurance   -continue asa   -restart eliquis per Neurology on 4/12        Mood d/o  -stop abilify  -continue zyprexa  --restart Wellbutrin 150 mg daily for 2 weeks then increase to 300 mg daily thereafter.   --follow up with Psychiatrist           Expected Discharge Location and Transportation: rehab   Expected Discharge   Expected Discharge Date: 4/15/2024; Expected Discharge Time:      DVT prophylaxis:  Medical and mechanical DVT " "prophylaxis orders are present.         AM-PAC 6 Clicks Score (PT): 18 (24 8914)    CODE STATUS:   There are no questions and answers to display.       RAFFY Boyer  24        Electronically signed by Ysabel Brown APRN at 24 1241       VelasquezDana good APRN at 24 1320              [facility] Medicine Services  PROGRESS NOTE    Patient Name: Kamila Garcia  : 1970  MRN: [id number]    Date of Admission: 2024  Primary Care Physician: Latricia Mishra APRN    Subjective   Subjective     CC:  Left facial paresthesias    HPI:  Headache remains, but improved. Cont to have left sided numbness but it is improving as well. Feels her left leg is very weak and going to \"give out\" when she is walking. Encouraged to reconsider inpat rehab. No BM yet during this hospital stay.       Objective   Objective     Vital Signs:   Temp:  [97.6 °F (36.4 °C)-98.3 °F (36.8 °C)] 98.2 °F (36.8 °C)  Heart Rate:  [56-85] 78  Resp:  [16-20] 16  BP: (137-165)/(75-93) 143/79     Physical Exam:  Constitutional: No acute distress, awake, alert  HENT: NCAT, mucous membranes moist  Respiratory: Clear to auscultation bilaterally, respiratory effort normal   Cardiovascular: RRR, no murmurs, rubs, or gallops  Gastrointestinal: Positive bowel sounds, soft, nontender, nondistended  Musculoskeletal: No bilateral ankle edema  Psychiatric: Flat affect, cooperative  Neurologic: Oriented x 3, left leg and arm slightly weaker, no facial asymmetry, speech clear  Skin: No rashes     Results Reviewed:  LAB RESULTS:      Lab 24  0607 04/10/24  0432 24  0842   WBC 4.45 4.97 4.44   HEMOGLOBIN 12.1 11.7* 13.0   HEMATOCRIT 38.4 36.6 40.8   PLATELETS 265 257 277   NEUTROS ABS 2.30  --  2.42   IMMATURE GRANS (ABS) 0.01  --  0.01   LYMPHS ABS 1.27  --  1.30   MONOS ABS 0.51  --  0.45   EOS ABS 0.32  --  0.21   MCV 87.9 88.2 86.1   PROTIME  --   --  13.9   APTT  --   --  " 27.0*         Lab 04/12/24  0527 04/11/24  0607 04/10/24  0432 04/09/24  0842   SODIUM 139 140 142 141   POTASSIUM 4.3 3.9 4.1 4.2   CHLORIDE 108* 107 108* 106   CO2 22.0 24.0 23.0 24.0   ANION GAP 9.0 9.0 11.0 11.0   BUN 12 9 7 12   CREATININE 0.80 0.71 0.63 0.80   EGFR 87.1 100.6 104.9 87.1   GLUCOSE 92 94 103* 110*   CALCIUM 8.6 8.5* 8.6 8.4*   MAGNESIUM 2.1 2.2  --  2.0   HEMOGLOBIN A1C  --   --  4.90  --    TSH  --  7.510*  --   --          Lab 04/09/24  0842   TOTAL PROTEIN 7.3   ALBUMIN 4.2   GLOBULIN 3.1   ALT (SGPT) 8   AST (SGOT) 23   BILIRUBIN 0.6   ALK PHOS 91         Lab 04/10/24  1640 04/10/24  0432 04/09/24  0842   PROBNP  --   --  273.0   HSTROP T 8 7 9   PROTIME  --   --  13.9   INR  --   --  1.06         Lab 04/10/24  0432   CHOLESTEROL 168   LDL CHOL 70   HDL CHOL 86*   TRIGLYCERIDES 62         Lab 04/11/24  1027   FOLATE 10.60   VITAMIN B 12 222         Brief Urine Lab Results  (Last result in the past 365 days)        Color   Clarity   Blood   Leuk Est   Nitrite   Protein   CREAT   Urine HCG        04/09/24 1002 Yellow   Clear   Trace   Trace   Negative   Negative                   Microbiology Results Abnormal       None            EEG    Result Date: 4/11/2024  Reason for referral: 53 y.o.female with syncope Technical Summary:  A 19 channel digital EEG was performed using the international 10-20 placement system, including eye leads and EKG leads. Duration: 20 minutes Findings: The patient is awake.  A medium amplitude well-regulated tenderness posterior rhythm is evident symmetrically over the occipital leads.  Low amplitude intermixed alpha and theta activity are seen anteriorly.  Drowsiness is seen with mild slowing of the background but stage II sleep is not seen.  Photic stimulation yields a symmetric driving response at several flash frequencies.  Hyperventilation is not performed.  No focal features or epileptiform activity are seen. Video: Available Technical quality: Superior EKG:  Regular, 60 bpm SUMMARY: Normal EEG in the awake and lightly drowsy states No focal features or epileptiform activity are seen     Impression: Normal study This report is transcribed using the Dragon dictation system.       Results for orders placed during the hospital encounter of 04/09/24    Adult Transthoracic Echo Complete W/ Cont if Necessary Per Protocol (With Agitated Saline)    Interpretation Summary    Left ventricular systolic function is normal. Calculated left ventricular EF = 53.6%    Left ventricular wall thickness is consistent with mild concentric hypertrophy.    Saline test results are negative.    Estimated right ventricular systolic pressure from tricuspid regurgitation is normal (<35 mmHg).    The aortic valve exhibits sclerosis.      Current medications:  Scheduled Meds:apixaban, 10 mg, Oral, Q12H   Followed by  [START ON 4/19/2024] apixaban, 5 mg, Oral, Q12H  aspirin, 81 mg, Oral, Daily  buPROPion XL, 150 mg, Oral, Daily  folic acid, 1 mg, Oral, Daily  losartan, 50 mg, Oral, Q24H  magnesium oxide, 400 mg, Oral, Daily  multivitamin with minerals, 1 tablet, Oral, Daily  OLANZapine, 10 mg, Oral, Nightly  senna-docusate sodium, 2 tablet, Oral, BID  sodium chloride, 10 mL, Intravenous, Q12H  thiamine (B-1) IV, 200 mg, Intravenous, Q8H   Followed by  [START ON 4/16/2024] thiamine, 100 mg, Oral, Daily  vitamin B-12, 1,000 mcg, Oral, Daily      Continuous Infusions:   PRN Meds:.  acetaminophen    senna-docusate sodium **AND** polyethylene glycol **AND** bisacodyl **AND** bisacodyl    LORazepam **OR** midazolam **OR** LORazepam **OR** midazolam **OR** midazolam **OR** midazolam    Magnesium Standard Dose Replacement - Follow Nurse / BPA Driven Protocol    melatonin    nitroglycerin    sodium chloride    sodium chloride    Assessment & Plan   Assessment & Plan     Active Hospital Problems    Diagnosis  POA    **Suspected cerebrovascular accident (CVA) [R09.89]  Yes    Syncope and collapse [R55]  Unknown  "   Hypertensive urgency [I16.0]  Yes    Obesity (BMI 30-39.9) [E66.9]  Yes    ETOH use [Z78.9]  Yes    Anxiety and depression [F41.9, F32.A]  Yes    H/O LLE DVT & PE (July 2021) [Z86.718]  Not Applicable      Resolved Hospital Problems   No resolved problems to display.        Brief Hospital Course to date:  Kamila Garcia is a 53 y.o. female w/ hx previous dvt & pe (July 2021) who stopped taking eliquis ~1 month ago, anxiety/depression, obesity, migraines (per chart, patient denies) who presented after a syncopal event which promped co-workers to call ems, regained consciousness en route to [facility] ED where upon arrival developed numbness and tingling to left face, arm and code stroke called. Evaluated by neuro-stroke service, ct head negative, cta h&n were negative for flow limiting stenosis and ct perfusion was negative for lvo. Ultimately was deemed candidate for thrombolytic therapy w/ tnkase administered 4/9/24 at 11:48 , also received dose of depakote, and was admitted to icu for further evaluation. Repeat ct head was negative.continued to have headache, left facial arm paresthesias. Subsequent mri brain evening 4/9/24 was negative as well. Repeat ct head 4/10/24 was again negative. Carotid duplex negative. Echo showed normal ef, saline test negative. Neuro-stroke service felt the symptoms were not due to cerebral ischemia, likely a stroke \"mimic\" such as functional or atypical migraine. General neurology was consulted and transferred out of icu 4/11/24.    Syncopal episode (unclear etiology)  -echo ok  -orthostatics normal 4/11/24  -no dysrhythmias on tele  -cards evaluated: set up w/ holter monitor upon discharge; follow up 6 weeks in cards clinic    Mild concussion (w/ post-traumatic vertigo and post-traumatic headache)  Persistent Left sided facial and arm paresthesias and ataxia  Borderline b12 deficiency  -s/p tnkase in ED 4/9  -ctperfusion negative, cta h&n negative  -subsequent mri negative " "4/9  -echo normal  -subsequent ct head 4/10 again negative  -neuro-stroke followed: per 4/10/24: feels ongoing paresthesias less likely brain ischemic, more likely stroke \"mimic\" including \"functional\" vs atypical migraine  -oral b12 replacement (for borderline low b12 level)  **General neurology following: EEG normal  -pt/ot recommending inpt rehab  --ASA indefinitely      Hx etoh abuse  -5 beer daily  -start ciwa protocol/prn benzos protocol (? Component of withdrawal)    Htn  -start losartan 50mg (titrate prn)    Mildly elevated tsh  -tsh 7.5; normal free t4  -follow up pcp, repeat tsh 4-6 weeks    Hx previous pe & dvt  -formerly on eliquis but stopped taking last month  -continue asa currently  -restart eliquis now per Neurology       Mood d/o  -stop abilify  -continue zyprexa  --restart Wellbutrin 150 mg daily for 2 weeks then increase to 300 mg daily thereafter.   --follow up with Psychiatrist         Am labs: bmp, mag    Expected Discharge Location and Transportation: [facility] rehab soon  Expected Discharge   Expected Discharge Date: 4/15/2024; Expected Discharge Time:      DVT prophylaxis:  Medical and mechanical DVT prophylaxis orders are present.         AM-PAC 6 Clicks Score (PT): 17 (04/12/24 0800)    CODE STATUS:   There are no questions and answers to display.       RAFFY Mccall  04/12/24        Electronically signed by Dana Eng APRN at 04/12/24 1344       Jewel Rose MD at 04/12/24 1227          Neurology       Patient Care Team:  Latricia Mishra APRN as PCP - General (Family Medicine)    Chief complaint: Headache and gait disturbance    History: Walking poorly but the headache is better.    Cardiology has recommended 2-week monitor and resuming Eliquis.    Rehab bed pending  Past Medical History:   Diagnosis Date    Anxiety and depression     Chest pain     Disease of thyroid gland     reports slightly elevated when in hospital    DVT (deep venous thrombosis)     ETOH use " 04/09/2024    History of pulmonary embolus (PE)     Hypertension     Hypothyroidism 04/09/2024    Migraines     Obesity (BMI 30-39.9) 04/09/2024    Shortness of breath     with chest pain episodes       Vital Signs   Vitals:    04/12/24 0533 04/12/24 0720 04/12/24 1105 04/12/24 1220   BP: 156/93 146/81 143/79    BP Location: Right arm Right arm Right arm    Patient Position: Lying Lying Lying    Pulse: 58 56 72 78   Resp: 19 18 16    Temp:  97.7 °F (36.5 °C) 98.2 °F (36.8 °C)    TempSrc:  Oral Oral    SpO2: 98% 97% 99%    Weight:       Height:           Physical Exam:   General: Flat affect              Neuro: Oriented to person place and time.        Results Review:  Labs reviewed  Results from last 7 days   Lab Units 04/11/24  0607   WBC 10*3/mm3 4.45   HEMOGLOBIN g/dL 12.1   HEMATOCRIT % 38.4   PLATELETS 10*3/mm3 265     Results from last 7 days   Lab Units 04/12/24  0527 04/11/24  0607 04/10/24  0432 04/09/24  0842   SODIUM mmol/L 139 140 142 141   POTASSIUM mmol/L 4.3 3.9 4.1 4.2   CHLORIDE mmol/L 108* 107 108* 106   CO2 mmol/L 22.0 24.0 23.0 24.0   BUN mg/dL 12 9 7 12   CREATININE mg/dL 0.80 0.71 0.63 0.80   CALCIUM mg/dL 8.6 8.5* 8.6 8.4*   BILIRUBIN mg/dL  --   --   --  0.6   ALK PHOS U/L  --   --   --  91   ALT (SGPT) U/L  --   --   --  8   AST (SGOT) U/L  --   --   --  23   GLUCOSE mg/dL 92 94 103* 110*       Imaging Results (Last 24 Hours)       ** No results found for the last 24 hours. **            Assessment:  Syncopal spell with closed head injury    Posttraumatic headache.    Posttraumatic vertigo.    Disturbance with functional elements.    History of DVT    Plan:  Inpatient rehab.    Resume Eliquis 5 mg twice daily.     to try to determine why this was not getting paid for by her insurance.    Comment:  Slow progress         I discussed the patients findings and my recommendations with patient, family, and primary care team    Jewel Rose MD  04/12/24  12:27  EDT          Electronically signed by Jewel Rose MD at 04/12/24 0911

## 2024-04-15 NOTE — THERAPY TREATMENT NOTE
Patient Name: Kamila Garcia  : 1970    MRN: [id number]                              Today's Date: 4/15/2024       Admit Date: 2024    Visit Dx:     ICD-10-CM ICD-9-CM   1. Numbness and tingling of left side of face  R20.0 782.0    R20.2    2. Cognitive communication deficit  R41.841 799.52   3. Oral phase dysphagia  R13.11 787.21     Patient Active Problem List   Diagnosis    Acute pulmonary embolism    Chest pain    H/O LLE DVT & PE (2021)    History of pulmonary embolism    Insomnia    Hypertension    Palpitations    Suspected cerebrovascular accident (CVA)    Hypertensive urgency    Obesity (BMI 30-39.9)    ETOH use    Anxiety and depression    Acute CVA (cerebrovascular accident)    Syncope and collapse     Past Medical History:   Diagnosis Date    Anxiety and depression     Chest pain     Disease of thyroid gland     reports slightly elevated when in hospital    DVT (deep venous thrombosis)     ETOH use 2024    History of pulmonary embolus (PE)     Hypertension     Hypothyroidism 2024    Migraines     Obesity (BMI 30-39.9) 2024    Shortness of breath     with chest pain episodes     Past Surgical History:   Procedure Laterality Date    CARDIAC CATHETERIZATION Left 3/11/2022    Procedure: Left Heart Cath;  Surgeon: Peter Anguiano MD;  Location:  [facility];  Service: Cardiology;  Laterality: Left;  Hold Eliquis 2 days prior to [facility]    ENDOSCOPY N/A 2022    Procedure: ESOPHAGOGASTRODUODENOSCOPY;  Surgeon: Brunner, Mark I, MD;  Location:  [facility];  Service: Gastroenterology;  Laterality: N/A;    GASTRIC BYPASS      lap cm en y    IR STENT PLACEMENT Left 10/2021    xin surgical in cath lab placed stent in left leg      General Information       Row Name 04/15/24 1412          Physical Therapy Time and Intention    Document Type therapy note (daily note)  -LR     Mode of Treatment physical therapy;individual therapy  -LR       Row  Name 04/15/24 1412          General Information    Patient Profile Reviewed yes  -LR     Existing Precautions/Restrictions fall;other (see comments)  L sided weakness  -LR     Barriers to Rehab none identified  -LR       Row Name 04/15/24 1412          Cognition    Orientation Status (Cognition) oriented x 4  -LR       Row Name 04/15/24 1412          Safety Issues, Functional Mobility    Safety Issues Affecting Function (Mobility) safety precautions follow-through/compliance;safety precaution awareness  -LR     Impairments Affecting Function (Mobility) balance;pain;strength;endurance/activity tolerance;sensation/sensory awareness  -LR               User Key  (r) = Recorded By, (t) = Taken By, (c) = Cosigned By      Initials Name Provider Type    LR Sepideh Sewell, PT Physical Therapist                   Mobility       Row Name 04/15/24 1412          Bed Mobility    Bed Mobility supine-sit;sit-supine  -LR     Supine-Sit [place] (Bed Mobility) verbal cues;standby assist  -LR     Sit-Supine [place] (Bed Mobility) verbal cues;standby assist  -LR     Assistive Device (Bed Mobility) head of bed elevated;bed rails  -LR     Comment, (Bed Mobility) Verbal cues for correct technique for t/f in and out of bed. Denied dizziness upon sitting up.  -LR       Row Name 04/15/24 1412          Transfers    Comment, (Transfers) Verbal cues for correct hand placement with t/f and correct approach to EOB.  -LR       Row Name 04/15/24 1412          Bed-Chair Transfer    Bed-Chair [place] (Transfers) not tested  -LR       Row Name 04/15/24 1412          Sit-Stand Transfer    Sit-Stand [place] (Transfers) verbal cues;contact guard  -LR     Assistive Device (Sit-Stand Transfers) walker, front-wheeled  -LR       Row Name 04/15/24 1412          Gait/Stairs (Locomotion)    [place] Level (Gait) verbal cues;contact guard  -LR     Assistive Device (Gait) walker, front-wheeled  -LR     Distance in Feet (Gait) 80  80  feet plus 80 feet after rest break  -LR     Deviations/Abnormal Patterns (Gait) bilateral deviations;oleg decreased;gait speed decreased;stride length decreased;left sided deviations;ataxic  -LR     Bilateral Gait Deviations forward flexed posture;heel strike decreased  -LR     Left Sided Gait Deviations weight shift ability decreased  -LR     [place] Level (Stairs) not tested  -LR     Comment, (Gait/Stairs) Patient ambulated with step to gait pattern at slow pace with forward flexed posture. Patient with short stance phase on L d/t c/o increased pins/needles sensation with weight bearing. Verbal cues for correct sequencing of steps and advancement of RW. Cues to stay closer to RW. Required one brief standing rest break d/t B UE/LE fatigue. Unable to progress to step through gait pattern d/t pins/needles sensation in plantar aspect of L foot with weight bearing. Gait limited by this and fatigue/weakness.  -LR               User Key  (r) = Recorded By, (t) = Taken By, (c) = Cosigned By      Initials Name Provider Type    LR Sepideh Sewell, PT Physical Therapist                   Obj/Interventions       Row Name 04/15/24 1412          Motor Skills    Therapeutic Exercise ankle;knee;hip;other (see comments)  cues for technique; no assist required  -LR       Row Name 04/15/24 1412          Hip (Therapeutic Exercise)    Hip (Therapeutic Exercise) strengthening exercise  -LR     Hip Strengthening (Therapeutic Exercise) bilateral;aBduction;marching while seated;sitting;10 repetitions  -LR       Row Name 04/15/24 1412          Knee (Therapeutic Exercise)    Knee (Therapeutic Exercise) strengthening exercise;isometric exercises  -LR     Knee Isometrics (Therapeutic Exercise) bilateral;quad sets;supine;10 repetitions  -LR     Knee Strengthening (Therapeutic Exercise) bilateral;SLR (straight leg raise);LAQ (long arc quad);sitting;supine;10 repetitions  -LR       Row Name 04/15/24 1412          Ankle  (Therapeutic Exercise)    Ankle (Therapeutic Exercise) AROM (active range of motion)  -LR     Ankle AROM (Therapeutic Exercise) bilateral;dorsiflexion;plantarflexion;supine;10 repetitions  -LR       Row Name 04/15/24 1412          Balance    Balance Assessment sitting static balance;sitting dynamic balance;standing static balance;standing dynamic balance  -LR     Static Sitting Balance independent  -LR     Dynamic Sitting Balance supervision  -LR     Position, Sitting Balance unsupported;sitting edge of bed  -LR     Static Standing Balance contact guard  -LR     Dynamic Standing Balance contact guard  -LR     Position/Device Used, Standing Balance supported;walker, rolling  -LR               User Key  (r) = Recorded By, (t) = Taken By, (c) = Cosigned By      Initials Name Provider Type    LR Sepideh Sewell, PT Physical Therapist                   Goals/Plan       Row Name 04/15/24 1412          Bed Mobility Goal 1 (PT)    Activity/Assistive Device (Bed Mobility Goal 1, PT) sit to supine/supine to sit  -LR     [place] Level/Cues Needed (Bed Mobility Goal 1, PT) independent  -LR     Time Frame (Bed Mobility Goal 1, PT) long term goal (LTG);5 days  -LR     Progress/Outcomes (Bed Mobility Goal 1, PT) goal ongoing;good progress toward goal  -LR       Row Name 04/15/24 1412          Transfer Goal 1 (PT)    Activity/Assistive Device (Transfer Goal 1, PT) sit-to-stand/stand-to-sit;bed-to-chair/chair-to-bed;walker, rolling  -LR     [place] Level/Cues Needed (Transfer Goal 1, PT) modified independence  -LR     Time Frame (Transfer Goal 1, PT) long term goal (LTG);10 days  -LR     Progress/Outcome (Transfer Goal 1, PT) good progress toward goal;goal revised this date;goal ongoing  -LR       Row Name 04/15/24 1412          Gait Training Goal 1 (PT)    Activity/Assistive Device (Gait Training Goal 1, PT) gait (walking locomotion);increase endurance/gait distance;decrease fall risk;assistive device use;walker,  rolling  -LR     [place] Level (Gait Training Goal 1, PT) modified independence  -LR     Distance (Gait Training Goal 1, PT) 150 feet  -LR     Time Frame (Gait Training Goal 1, PT) long term goal (LTG);10 days  -LR     Progress/Outcome (Gait Training Goal 1, PT) goal ongoing;good progress toward goal  -LR               User Key  (r) = Recorded By, (t) = Taken By, (c) = Cosigned By      Initials Name Provider Type    LR Sepideh Sewell, PT Physical Therapist                   Clinical Impression       Row Name 04/15/24 1412          Pain    Pretreatment Pain Rating 0/10 - no pain  -LR     Posttreatment Pain Rating 0/10 - no pain  -LR     Pain Intervention(s) Ambulation/increased activity;Repositioned  -LR       Row Name 04/15/24 1412          Plan of Care Review    Plan of Care Reviewed With patient  -LR     Progress improving  -LR     Outcome Evaluation Patient with increased independence with ambulation today but unable to progress to step through gait pattern d/t c/o pins/needles sensation in plantar aspect of L foot with weight bearing. Unable to tolerate stance phase long enough on L LE for step through gait pattern. Fatigues quickly. Patient currently below baseline functioning, demonstrating decreased functional mobility status, decreased endurance, and decreased strength. Will address these deficits to promote return to PLOF. Recommend IRF at d/c.  -LR       Row Name 04/15/24 1412          Therapy Assessment/Plan (PT)    Rehab Potential (PT) good, to achieve stated therapy goals  -LR     Criteria for Skilled Interventions Met (PT) yes;meets criteria;skilled treatment is necessary  -LR     Therapy Frequency (PT) daily  -LR       Row Name 04/15/24 1412          Positioning and Restraints    Pre-Treatment Position in bed  -LR     Post Treatment Position bed  patient declined [facility] d/t fatigue  -LR     In Bed notified nsg;supine;call light within reach;encouraged to call for assist;exit alarm on;side  rails up x2;with family/caregiver  -LR               User Key  (r) = Recorded By, (t) = Taken By, (c) = Cosigned By      Initials Name Provider Type    LR Sepideh Sewell, PT Physical Therapist                   Outcome Measures       Row Name 04/15/24 1412 04/15/24 0800       How much help from another person do you currently need...    Turning from your back to your side while in flat bed without using bedrails? 4  -LR 3  -SW    Moving from lying on back to sitting on the side of a flat bed without bedrails? 3  -LR 3  -SW    Moving to and from a bed to a chair (including a wheelchair)? 3  -LR 3  -SW    Standing up from a chair using your arms (e.g., wheelchair, bedside chair)? 3  -LR 3  -SW    Climbing 3-5 steps with a railing? 2  -LR 2  -SW    To walk in hospital room? 3  -LR 3  -SW    AM-PAC 6 Clicks Score (PT) 18  -LR 17  -SW    Highest Level of Mobility Goal 6 --> Walk 10 steps or more  -LR 5 --> Static standing  -SW      Row Name 04/15/24 1412          Modified [place] Scale    Modified Mi Scale 3 - Moderate disability.  Requiring some help, but able to walk without assistance.  -LR       Row Name 04/15/24 1412          Functional Assessment    Outcome Measure Options AM-PAC 6 Clicks Basic Mobility (PT)  -LR               User Key  (r) = Recorded By, (t) = Taken By, (c) = Cosigned By      Initials Name Provider Type    Sepideh [place], PT Physical Therapist    Yoly Cooper RN Registered Nurse                                 Physical Therapy Education       Title: PT OT SLP Therapies (Done)       Topic: Physical Therapy (Done)       Point: Mobility training (Done)       Learning Progress Summary             Patient Acceptance, E,D, VU,NR by LR at 4/15/2024 1412    Comment: Educated on safety with mobility, benefits of mobility, correct supine<->sit t/f technique, correct sit<->stand t/f technique, correct gait mechanics, LE HEP, and progression of POC.    Acceptance, E, VU,NR by KG  at 4/14/2024 1321    Acceptance, E, VU by KR at 4/12/2024 1526    Acceptance, E, NR by ND at 4/10/2024 1103   Family Acceptance, E, VU by KR at 4/12/2024 1526   Significant Other Acceptance, E,D, VU,NR by LR at 4/15/2024 1412    Comment: Educated on safety with mobility, benefits of mobility, correct supine<->sit t/f technique, correct sit<->stand t/f technique, correct gait mechanics, LE HEP, and progression of POC.                         Point: Home exercise program (Done)       Learning Progress Summary             Patient Acceptance, E,D, VU,NR by LR at 4/15/2024 1412    Comment: Educated on safety with mobility, benefits of mobility, correct supine<->sit t/f technique, correct sit<->stand t/f technique, correct gait mechanics, LE HEP, and progression of POC.    Acceptance, E, VU,NR by KG at 4/14/2024 1321   Significant Other Acceptance, E,D, VU,NR by LR at 4/15/2024 1412    Comment: Educated on safety with mobility, benefits of mobility, correct supine<->sit t/f technique, correct sit<->stand t/f technique, correct gait mechanics, LE HEP, and progression of POC.                         Point: Body mechanics (Done)       Learning Progress Summary             Patient Acceptance, E,D, VU,NR by LR at 4/15/2024 1412    Comment: Educated on safety with mobility, benefits of mobility, correct supine<->sit t/f technique, correct sit<->stand t/f technique, correct gait mechanics, LE HEP, and progression of POC.    Acceptance, E, VU,NR by KG at 4/14/2024 1321    Acceptance, E, VU by KR at 4/12/2024 1526    Acceptance, E, NR by ND at 4/10/2024 1103   Family Acceptance, E, VU by KR at 4/12/2024 1526   Significant Other Acceptance, E,D, VU,NR by LR at 4/15/2024 1412    Comment: Educated on safety with mobility, benefits of mobility, correct supine<->sit t/f technique, correct sit<->stand t/f technique, correct gait mechanics, LE HEP, and progression of POC.                         Point: Precautions (Done)       Learning  Progress Summary             Patient Acceptance, E,D, VU,NR by LR at 4/15/2024 1412    Comment: Educated on safety with mobility, benefits of mobility, correct supine<->sit t/f technique, correct sit<->stand t/f technique, correct gait mechanics, LE HEP, and progression of POC.    Acceptance, E, VU,NR by KG at 4/14/2024 1321    Acceptance, E, VU by KR at 4/12/2024 1526    Acceptance, E, NR by ND at 4/10/2024 1103   Family Acceptance, E, VU by KR at 4/12/2024 1526   Significant Other Acceptance, E,D, VU,NR by LR at 4/15/2024 1412    Comment: Educated on safety with mobility, benefits of mobility, correct supine<->sit t/f technique, correct sit<->stand t/f technique, correct gait mechanics, LE HEP, and progression of POC.                                         User Key       Initials Effective Dates Name Provider Type Discipline    LR 02/03/23 -  Sepideh Sewell, PT Physical Therapist PT    KG 05/22/20 -  Emely Vale, PT Physical Therapist PT    KR 12/30/22 -  Maria Del Rosario Weston, PT Physical Therapist PT    ND 11/16/23 -  Ale Gomez, PT Physical Therapist PT                  PT Recommendation and Plan     Plan of Care Reviewed With: patient  Progress: improving  Outcome Evaluation: Patient with increased independence with ambulation today but unable to progress to step through gait pattern d/t c/o pins/needles sensation in plantar aspect of L foot with weight bearing. Unable to tolerate stance phase long enough on L LE for step through gait pattern. Fatigues quickly. Patient currently below baseline functioning, demonstrating decreased functional mobility status, decreased endurance, and decreased strength. Will address these deficits to promote return to PLOF. Recommend IRF at d/c.     Time Calculation:         PT Charges       Row Name 04/15/24 1412             Time Calculation    Start Time 1412  -LR      PT Received On 04/15/24  -      PT Goal Re-Cert Due Date 04/20/24  -         Timed  Charges    [id number] - PT Therapeutic Exercise Minutes 8  -LR      [id number] - Gait Training Minutes  10  -LR         Total Minutes    Timed Charges Total Minutes 18  -LR       Total Minutes 18  -LR                User Key  (r) = Recorded By, (t) = Taken By, (c) = Cosigned By      Initials Name Provider Type    LR Sepideh Sewell, PT Physical Therapist                  Therapy Charges for Today       Code Description Service Date Service Provider Modifiers Qty    04343103296 HC GAIT TRAINING EA 15 MIN 4/15/2024 Sepideh Sewell, PT GP 1            PT G-Codes  Outcome Measure Options: AM-PAC 6 Clicks Basic Mobility (PT)  AM-PAC 6 Clicks Score (PT): 18  AM-PAC 6 Clicks Score (OT): 20  Modified Mi Scale: 3 - Moderate disability.  Requiring some help, but able to walk without assistance.  PT Discharge Summary  Anticipated Discharge Disposition (PT): inpatient rehabilitation facility    Sepideh Sewell, [place]  4/15/2024

## 2024-04-15 NOTE — CASE MANAGEMENT/SOCIAL WORK
Continued Stay Note  [facility]     Patient Name: Kamila Garcia  MRN: [id number]  Today's Date: 4/15/2024    Admit Date: 4/9/2024    Plan: IRF   Discharge Plan       Row Name 04/15/24 1601       Plan    Plan IRF    Patient/Family in Agreement with Plan yes    Plan Comments Discussed in MDR. Ms. Garcia's referral to [facility] was accepted and Liaison will call in the morning 4/16/24 with the unit she will be going to. Updated Ms. Garcia and she was agreeable to go to rehab. CM will continue to follow.    Final Discharge Disposition Code 30 - still a patient                   Discharge Codes    No documentation.                 Expected Discharge Date and Time       Expected Discharge Date Expected Discharge Time    Apr 16, 2024               Cassidy Montoya RN

## 2024-04-15 NOTE — PLAN OF CARE
Goal Outcome Evaluation:  Plan of Care Reviewed With: patient        Progress: improving  Outcome Evaluation: Patient with increased independence with ambulation today but unable to progress to step through gait pattern d/t c/o pins/needles sensation in plantar aspect of L foot with weight bearing. Unable to tolerate stance phase long enough on L LE for step through gait pattern. Fatigues quickly. Patient currently below baseline functioning, demonstrating decreased functional mobility status, decreased endurance, and decreased strength. Will address these deficits to promote return to PLOF. Recommend IRF at d/c.      Anticipated Discharge Disposition (PT): inpatient rehabilitation facility

## 2024-04-16 ENCOUNTER — TELEPHONE (OUTPATIENT)
Dept: NEUROLOGY | Facility: CLINIC | Age: 54
End: 2024-04-16
Payer: COMMERCIAL

## 2024-04-16 ENCOUNTER — READMISSION MANAGEMENT (OUTPATIENT)
Dept: CALL CENTER | Facility: HOSPITAL | Age: 54
End: 2024-04-16
Payer: COMMERCIAL

## 2024-04-16 VITALS
BODY MASS INDEX: 35.79 KG/M2 | HEART RATE: 66 BPM | SYSTOLIC BLOOD PRESSURE: 109 MMHG | WEIGHT: 222.66 LBS | OXYGEN SATURATION: 91 % | DIASTOLIC BLOOD PRESSURE: 74 MMHG | TEMPERATURE: 97.7 F | RESPIRATION RATE: 18 BRPM | HEIGHT: 66 IN

## 2024-04-16 PROCEDURE — 97116 GAIT TRAINING THERAPY: CPT

## 2024-04-16 PROCEDURE — 99231 SBSQ HOSP IP/OBS SF/LOW 25: CPT | Performed by: PSYCHIATRY & NEUROLOGY

## 2024-04-16 PROCEDURE — 99239 HOSP IP/OBS DSCHRG MGMT >30: CPT | Performed by: INTERNAL MEDICINE

## 2024-04-16 PROCEDURE — 25010000002 THIAMINE HCL 200 MG/2ML SOLUTION: Performed by: INTERNAL MEDICINE

## 2024-04-16 PROCEDURE — 97110 THERAPEUTIC EXERCISES: CPT

## 2024-04-16 PROCEDURE — 97535 SELF CARE MNGMENT TRAINING: CPT

## 2024-04-16 RX ORDER — BUPROPION HYDROCHLORIDE 150 MG/1
150 TABLET ORAL DAILY
Start: 2024-04-17

## 2024-04-16 RX ORDER — MULTIPLE VITAMINS W/ MINERALS TAB 9MG-400MCG
1 TAB ORAL DAILY
Start: 2024-04-17

## 2024-04-16 RX ORDER — CEPHALEXIN 500 MG/1
500 CAPSULE ORAL EVERY 6 HOURS SCHEDULED
Qty: 12 CAPSULE | Refills: 0 | Status: SHIPPED | OUTPATIENT
Start: 2024-04-16 | End: 2024-04-19

## 2024-04-16 RX ORDER — LOSARTAN POTASSIUM 50 MG/1
50 TABLET ORAL
Start: 2024-04-17

## 2024-04-16 RX ORDER — FOLIC ACID 1 MG/1
1 TABLET ORAL DAILY
Start: 2024-04-17

## 2024-04-16 RX ORDER — L.ACID,PARA/B.BIFIDUM/S.THERM 8B CELL
1 CAPSULE ORAL DAILY
Start: 2024-04-17

## 2024-04-16 RX ORDER — LANOLIN ALCOHOL/MO/W.PET/CERES
100 CREAM (GRAM) TOPICAL DAILY
Start: 2024-04-16

## 2024-04-16 RX ADMIN — Medication 1 CAPSULE: at 08:28

## 2024-04-16 RX ADMIN — CEPHALEXIN 500 MG: 250 CAPSULE ORAL at 11:44

## 2024-04-16 RX ADMIN — MAGNESIUM OXIDE TAB 400 MG (241.3 MG ELEMENTAL MG) 400 MG: 400 (241.3 MG) TAB at 08:28

## 2024-04-16 RX ADMIN — Medication 1000 MCG: at 08:28

## 2024-04-16 RX ADMIN — Medication 1 TABLET: at 08:28

## 2024-04-16 RX ADMIN — SENNOSIDES AND DOCUSATE SODIUM 2 TABLET: 8.6; 5 TABLET ORAL at 08:28

## 2024-04-16 RX ADMIN — LOSARTAN POTASSIUM 50 MG: 50 TABLET, FILM COATED ORAL at 08:28

## 2024-04-16 RX ADMIN — Medication 10 ML: at 08:29

## 2024-04-16 RX ADMIN — APIXABAN 5 MG: 5 TABLET, FILM COATED ORAL at 08:28

## 2024-04-16 RX ADMIN — FOLIC ACID 1 MG: 1 TABLET ORAL at 08:28

## 2024-04-16 RX ADMIN — BUPROPION HYDROCHLORIDE 150 MG: 150 TABLET, EXTENDED RELEASE ORAL at 08:28

## 2024-04-16 RX ADMIN — THIAMINE HYDROCHLORIDE 200 MG: 100 INJECTION, SOLUTION INTRAMUSCULAR; INTRAVENOUS at 05:44

## 2024-04-16 RX ADMIN — CEPHALEXIN 500 MG: 250 CAPSULE ORAL at 05:44

## 2024-04-16 RX ADMIN — ASPIRIN 81 MG: 81 TABLET, COATED ORAL at 08:28

## 2024-04-16 NOTE — DISCHARGE SUMMARY
[facility] Medicine Services  DISCHARGE SUMMARY    Patient Name: Kamila Garcia  : 1970  MRN: [id number]    Date of Admission: 2024  8:32 AM  Date of Discharge:  2024  Primary Care Physician: Latricia Mishra APRN    Consults       Date and Time Order Name Status Description    2024 12:34 PM Inpatient Cardiology Consult Completed     4/10/2024  1:56 PM Inpatient Neurology Consult General Completed             Hospital Course     Presenting Problem: CVA    Active Hospital Problems    Diagnosis  POA    **Suspected cerebrovascular accident (CVA) [R09.89]  Yes    Syncope and collapse [R55]  Unknown    Hypertensive urgency [I16.0]  Yes    Obesity (BMI 30-39.9) [E66.9]  Yes    ETOH use [Z78.9]  Yes    Anxiety and depression [F41.9, F32.A]  Yes    H/O LLE DVT & PE (2021) [Z86.718]  Not Applicable      Resolved Hospital Problems   No resolved problems to display.          Hospital Course:  Kamila Garcia is a 53 y.o. female w/ hx previous dvt & pe (2021) who stopped taking eliquis ~1 month PTA, anxiety/depression, obesity, migraines (per chart, patient denied) who presented after a syncopal event which promped co-workers to call ems, regained consciousness en route to [facility] ED where upon arrival developed numbness and tingling to left face, arm and code stroke called. Evaluated by neuro-stroke service, ct head negative, cta h&n were negative for flow limiting stenosis and ct perfusion was negative for lvo. Ultimately was deemed candidate for thrombolytic therapy w/ tnkase administered 24 at 11:48, also received dose of depakote, and was admitted to icu for further evaluation. Repeat ct head was negative. Continued to have headache, left facial arm paresthesias. Subsequent mri brain evening 24 was negative as well. Repeat ct head 4/10/24 was again negative. Carotid duplex negative. Echo showed normal ef, saline test negative. Neuro-stroke  "service felt the symptoms were not due to cerebral ischemia, likely a stroke \"mimic\" such as functional or atypical migraine, transferred out of icu 4/11/24.       Syncopal episode (unclear etiology)  -echo WNL  -orthostatics normal   -no dysrhythmias on tele  -cards evaluated:  holter monitor has already been placed  follow up 6 weeks in cards clinic     Mild concussion (w/ post-traumatic vertigo and post-traumatic headache)  Persistent Left sided facial and arm paresthesias and ataxia  Borderline b12 deficiency  -s/p tnkase in ED 4/9  -ctperfusion negative, cta h&n negative  -subsequent mri negative 4/9  -echo normal  -subsequent ct head 4/10 again negative  -neuro-stroke followed: per 4/10/24: feels ongoing paresthesias less likely brain ischemic, more likely stroke \"mimic\" including \"functional\" vs atypical migraine  -oral b12 replacement (for borderline low b12 level)  **General neurology followed: EEG normal  --ASA indefinitely      Right arm Phlebitis  -- IV removed and area marked  -- elevate and apply heat  -- Keflex 500 mg 4x a day for 5 days     Hx ETOH abuse  -5 beer daily, CIWA protocol, not requiring any PRNs     Htn  -losartan 50mg      Mildly elevated TSH  -tsh 7.5; normal free t4  -follow up pcp, repeat tsh 4-6 weeks     Hx previous DVT/PE  -continue Eliquis, restarted 4/12     Mood d/o  -stop abilify  -continue zyprexa  --restarted Wellbutrin 150 mg daily for 2 weeks then increase to 300 mg daily thereafter.   --follow up with Psychiatrist       Discharge Follow Up Recommendations for outpatient labs/diagnostics:  - PCP in 1-2 weeks  - Cardiology in 6 weeks  - f/u with [facility]    Day of Discharge     HPI:   Patient sitting up in bedside chair working with PT, headache 2/10, otherwise doing well.     Review of Systems  Gen- No fevers, chills  CV- No chest pain, palpitations  Resp- No cough, dyspnea  GI- No N/V/D, abd pain    Vital Signs:   Temp:  [97.4 °F (36.3 °C)-98.4 °F (36.9 °C)] 97.7 °F " (36.5 °C)  Heart Rate:  [66-89] 66  Resp:  [16-19] 18  BP: ()/(56-85) 109/74      Physical Exam:  Constitutional: No acute distress, awake, alert  HENT: NCAT, mucous membranes moist  Respiratory: Clear to auscultation bilaterally, respiratory effort normal   Cardiovascular: RRR, no murmurs, rubs, or gallops  Gastrointestinal: soft, nontender, nondistended  Musculoskeletal: No bilateral ankle edema  Psychiatric: Appropriate affect, cooperative  Neurologic: Oriented x 3, speech clear, no focal deficits  Skin: No rashes      Pertinent  and/or Most Recent Results     LAB RESULTS:      Lab 04/11/24  0607 04/10/24  0432   WBC 4.45 4.97   HEMOGLOBIN 12.1 11.7*   HEMATOCRIT 38.4 36.6   PLATELETS 265 257   NEUTROS ABS 2.30  --    IMMATURE GRANS (ABS) 0.01  --    LYMPHS ABS 1.27  --    MONOS ABS 0.51  --    EOS ABS 0.32  --    MCV 87.9 88.2         Lab 04/12/24  0527 04/11/24  0607 04/10/24  0432   SODIUM 139 140 142   POTASSIUM 4.3 3.9 4.1   CHLORIDE 108* 107 108*   CO2 22.0 24.0 23.0   ANION GAP 9.0 9.0 11.0   BUN 12 9 7   CREATININE 0.80 0.71 0.63   EGFR 87.1 100.6 104.9   GLUCOSE 92 94 103*   CALCIUM 8.6 8.5* 8.6   MAGNESIUM 2.1 2.2  --    HEMOGLOBIN A1C  --   --  4.90   TSH  --  7.510*  --              Lab 04/10/24  1640 04/10/24  0432   HSTROP T 8 7         Lab 04/10/24  0432   CHOLESTEROL 168   LDL CHOL 70   HDL CHOL 86*   TRIGLYCERIDES 62         Lab 04/11/24  1027   FOLATE 10.60   VITAMIN B 12 222         Brief Urine Lab Results  (Last result in the past 365 days)        Color   Clarity   Blood   Leuk Est   Nitrite   Protein   CREAT   Urine HCG        04/09/24 1002 Yellow   Clear   Trace   Trace   Negative   Negative                 Microbiology Results (last 10 days)       ** No results found for the last 240 hours. **            EEG    Result Date: 4/11/2024  Reason for referral: 53 y.o.female with syncope Technical Summary:  A 19 channel digital EEG was performed using the international 10-20 placement  system, including eye leads and EKG leads. Duration: 20 minutes Findings: The patient is awake.  A medium amplitude well-regulated tenderness posterior rhythm is evident symmetrically over the occipital leads.  Low amplitude intermixed alpha and theta activity are seen anteriorly.  Drowsiness is seen with mild slowing of the background but stage II sleep is not seen.  Photic stimulation yields a symmetric driving response at several flash frequencies.  Hyperventilation is not performed.  No focal features or epileptiform activity are seen. Video: Available Technical quality: Superior EKG: Regular, 60 bpm SUMMARY: Normal EEG in the awake and lightly drowsy states No focal features or epileptiform activity are seen     Normal study This report is transcribed using the Dragon dictation system.      Duplex Carotid Ultrasound CAR    Result Date: 4/10/2024    Right internal carotid artery demonstrates normal flow without evidence of hemodynamically significant stenosis.   Left internal carotid artery demonstrates normal flow without evidence of hemodynamically significant stenosis.   Bilateral vertebral artery flow is antegrade.     CT Head Without Contrast    Result Date: 4/10/2024  CT HEAD WO CONTRAST Date of Exam: 4/10/2024 11:40 AM EDT Indication: Stroke, follow up 24 Hours Post Thrombolytic Administration. Comparison: MRI 1 day prior. Technique: Axial CT images were obtained of the head without contrast administration.  Automated exposure control and iterative construction methods were used. FINDINGS: Gray-white differentiation is maintained and there is no evidence of intracranial hemorrhage, mass or mass effect. Age-related changes of the brain are present including volume loss and typical periventricular sequela of chronic small vessel ischemia. There is otherwise no evidence of intracranial hemorrhage, mass or mass effect. The ventricles are normal in size and configuration accounting for surrounding volume loss.  The orbits are normal and the paranasal sinuses are grossly clear.     Stable CT head without evidence of hemorrhage. Electronically Signed: Mike Kidd MD  4/10/2024 11:58 AM EDT  Workstation ID: [id number]    Adult Transthoracic Echo Complete W/ Cont if Necessary Per Protocol (With Agitated Saline)    Result Date: 4/10/2024    Left ventricular systolic function is normal. Calculated left ventricular EF = 53.6%   Left ventricular wall thickness is consistent with mild concentric hypertrophy.   Saline test results are negative.   Estimated right ventricular systolic pressure from tricuspid regurgitation is normal (<35 mmHg).   The aortic valve exhibits sclerosis.     MRI Brain Without Contrast    Result Date: 4/9/2024  MRI BRAIN WO CONTRAST Date of Exam: 4/9/2024 9:36 PM EDT Indication: Stroke, follow up left sided weakness and numbness.  Comparison: Same day CT Technique:  Routine multiplanar/multisequence sequence images of the brain were obtained without contrast administration. Findings: Diffusion imaging demonstrates no evidence of acute or subacute infarct. There are a few scattered subcortical and periventricular white matter hyperintensities, which may represent sequela of mild small vessel ischemic disease. No extra-axial fluid collection. Normal ventricular volume and configuration. Patent basal cisterns. Normal flow voids within the visualized intracranial vessels. No fracture or suspicious osseous lesion. Normal appearance of the orbits. Paranasal sinuses are predominantly well aerated. No significant mastoid air cell effusion. Soft tissues are unremarkable.     Impression: No evidence of acute infarct. Probable sequela of mild chronic small vessel ischemic disease. Electronically Signed: Francisco Myers MD  4/9/2024 10:11 PM EDT  Workstation ID: [id number]    CT CEREBRAL PERFUSION WITH & WITHOUT CONTRAST    Addendum Date: 4/9/2024    ADDENDUM #1 Technique: CTA of the head and neck was performed after the  uneventful intravenous administration of 150 mL Isovue-370.  Reconstructed coronal and sagittal images were also obtained. In addition, a 3-D volume rendered image was created for interpretation. Automated exposure control and iterative reconstruction methods were used. Electronically Signed: Jose Alfredo Tejeda MD  4/9/2024 6:05 PM EDT  Workstation ID: [id number] ORIGINAL REPORT: CT CEREBRAL PERFUSION W WO CONTRAST, CT ANGIOGRAM HEAD W AI ANALYSIS OF LVO, CT ANGIOGRAM NECK Date of Exam: 4/9/2024 11:21 AM EDT Indication: Neuro deficit, acute, stroke suspected.  Comparison: None available. Technique: Axial CT images of the brain were obtained prior to and after the administration of . Core blood volume, core blood flow, mean transit time, and Tmax images were obtained utilizing the Rapid software protocol. A limited CT angiogram of the head was also performed to measure the blood vessel density. The radiation dose reduction device was turned on for each scan per the ALARA (As Low as Reasonably Achievable) protocol. FINDINGS: Vascular Findings: The right common carotid, internal carotid, middle cerebral, anterior cerebral, vertebral, and posterior cerebral arteries are patent without abrupt cut off or aneurysmal dilation. The left common carotid, internal carotid, middle cerebral, anterior cerebral, vertebral, and posterior cerebral arteries are patent without abrupt cut off or aneurysmal dilation. Basilar artery appears patent and appears unremarkable. Non-vascular Findings: For description of nonvascular intracranial findings, please refer to the noncontrast head CT performed the same date. No acute abnormality is identified within the visualized soft tissue or bony structures of the neck. The visualized lung apices are clear. CT Perfusion: CBF (<30%) volume: 0 mL Tmax (>6.0s) volume: 0 mL Mismatch volume: 0 mL Mismatch ratio: None IMPRESSION: 1.No acute abnormality identified within the large arteries of the head or  neck. 2.Significant stenosis of the bilateral internal carotid arteries. 3.CT perfusion study demonstrates no territorial ischemia or core infarct. Electronically Signed: Jose Alfredo Tejeda MD  4/9/2024 11:47 AM EDT  Workstation ID: [id number]    Result Date: 4/9/2024  CT CEREBRAL PERFUSION W WO CONTRAST, CT ANGIOGRAM HEAD W AI ANALYSIS OF LVO, CT ANGIOGRAM NECK Date of Exam: 4/9/2024 11:21 AM EDT Indication: Neuro deficit, acute, stroke suspected.  Comparison: None available. Technique: Axial CT images of the brain were obtained prior to and after the administration of . Core blood volume, core blood flow, mean transit time, and Tmax images were obtained utilizing the Rapid software protocol. A limited CT angiogram of the head was also performed to measure the blood vessel density. The radiation dose reduction device was turned on for each scan per the ALARA (As Low as Reasonably Achievable) protocol. FINDINGS: Vascular Findings: The right common carotid, internal carotid, middle cerebral, anterior cerebral, vertebral, and posterior cerebral arteries are patent without abrupt cut off or aneurysmal dilation. The left common carotid, internal carotid, middle cerebral, anterior cerebral, vertebral, and posterior cerebral arteries are patent without abrupt cut off or aneurysmal dilation. Basilar artery appears patent and appears unremarkable. Non-vascular Findings: For description of nonvascular intracranial findings, please refer to the noncontrast head CT performed the same date. No acute abnormality is identified within the visualized soft tissue or bony structures of the neck. The visualized lung apices are clear. CT Perfusion: CBF (<30%) volume: 0 mL Tmax (>6.0s) volume: 0 mL Mismatch volume: 0 mL Mismatch ratio: None     1.No acute abnormality identified within the large arteries of the head or neck. 2.Significant stenosis of the bilateral internal carotid arteries. 3.CT perfusion study demonstrates no territorial  ischemia or core infarct. Electronically Signed: Jose Alfredo Tejeda MD  4/9/2024 11:47 AM EDT  Workstation ID: [id number]    CT Angiogram Head w AI Analysis of LVO    Addendum Date: 4/9/2024    ADDENDUM #1 Technique: CTA of the head and neck was performed after the uneventful intravenous administration of 150 mL Isovue-370.  Reconstructed coronal and sagittal images were also obtained. In addition, a 3-D volume rendered image was created for interpretation. Automated exposure control and iterative reconstruction methods were used. Electronically Signed: Jose Alfredo Tejeda MD  4/9/2024 6:05 PM EDT  Workstation ID: [id number] ORIGINAL REPORT: CT CEREBRAL PERFUSION W WO CONTRAST, CT ANGIOGRAM HEAD W AI ANALYSIS OF LVO, CT ANGIOGRAM NECK Date of Exam: 4/9/2024 11:21 AM EDT Indication: Neuro deficit, acute, stroke suspected.  Comparison: None available. Technique: Axial CT images of the brain were obtained prior to and after the administration of . Core blood volume, core blood flow, mean transit time, and Tmax images were obtained utilizing the Rapid software protocol. A limited CT angiogram of the head was also performed to measure the blood vessel density. The radiation dose reduction device was turned on for each scan per the ALARA (As Low as Reasonably Achievable) protocol. FINDINGS: Vascular Findings: The right common carotid, internal carotid, middle cerebral, anterior cerebral, vertebral, and posterior cerebral arteries are patent without abrupt cut off or aneurysmal dilation. The left common carotid, internal carotid, middle cerebral, anterior cerebral, vertebral, and posterior cerebral arteries are patent without abrupt cut off or aneurysmal dilation. Basilar artery appears patent and appears unremarkable. Non-vascular Findings: For description of nonvascular intracranial findings, please refer to the noncontrast head CT performed the same date. No acute abnormality is identified within the visualized soft tissue or bony  structures of the neck. The visualized lung apices are clear. CT Perfusion: CBF (<30%) volume: 0 mL Tmax (>6.0s) volume: 0 mL Mismatch volume: 0 mL Mismatch ratio: None IMPRESSION: 1.No acute abnormality identified within the large arteries of the head or neck. 2.Significant stenosis of the bilateral internal carotid arteries. 3.CT perfusion study demonstrates no territorial ischemia or core infarct. Electronically Signed: Jose Alfredo Tejeda MD  4/9/2024 11:47 AM EDT  Workstation ID: [id number]    Result Date: 4/9/2024  CT CEREBRAL PERFUSION W WO CONTRAST, CT ANGIOGRAM HEAD W AI ANALYSIS OF LVO, CT ANGIOGRAM NECK Date of Exam: 4/9/2024 11:21 AM EDT Indication: Neuro deficit, acute, stroke suspected.  Comparison: None available. Technique: Axial CT images of the brain were obtained prior to and after the administration of . Core blood volume, core blood flow, mean transit time, and Tmax images were obtained utilizing the Rapid software protocol. A limited CT angiogram of the head was also performed to measure the blood vessel density. The radiation dose reduction device was turned on for each scan per the ALARA (As Low as Reasonably Achievable) protocol. FINDINGS: Vascular Findings: The right common carotid, internal carotid, middle cerebral, anterior cerebral, vertebral, and posterior cerebral arteries are patent without abrupt cut off or aneurysmal dilation. The left common carotid, internal carotid, middle cerebral, anterior cerebral, vertebral, and posterior cerebral arteries are patent without abrupt cut off or aneurysmal dilation. Basilar artery appears patent and appears unremarkable. Non-vascular Findings: For description of nonvascular intracranial findings, please refer to the noncontrast head CT performed the same date. No acute abnormality is identified within the visualized soft tissue or bony structures of the neck. The visualized lung apices are clear. CT Perfusion: CBF (<30%) volume: 0 mL Tmax (>6.0s)  volume: 0 mL Mismatch volume: 0 mL Mismatch ratio: None     1.No acute abnormality identified within the large arteries of the head or neck. 2.Significant stenosis of the bilateral internal carotid arteries. 3.CT perfusion study demonstrates no territorial ischemia or core infarct. Electronically Signed: Jose Alfredo Tejeda MD  4/9/2024 11:47 AM EDT  Workstation ID: [id number]    CT Angiogram Neck    Addendum Date: 4/9/2024    ADDENDUM #1 Technique: CTA of the head and neck was performed after the uneventful intravenous administration of 150 mL Isovue-370.  Reconstructed coronal and sagittal images were also obtained. In addition, a 3-D volume rendered image was created for interpretation. Automated exposure control and iterative reconstruction methods were used. Electronically Signed: Jose Alfredo Tejeda MD  4/9/2024 6:05 PM EDT  Workstation ID: [id number] ORIGINAL REPORT: CT CEREBRAL PERFUSION W WO CONTRAST, CT ANGIOGRAM HEAD W AI ANALYSIS OF LVO, CT ANGIOGRAM NECK Date of Exam: 4/9/2024 11:21 AM EDT Indication: Neuro deficit, acute, stroke suspected.  Comparison: None available. Technique: Axial CT images of the brain were obtained prior to and after the administration of . Core blood volume, core blood flow, mean transit time, and Tmax images were obtained utilizing the Rapid software protocol. A limited CT angiogram of the head was also performed to measure the blood vessel density. The radiation dose reduction device was turned on for each scan per the ALARA (As Low as Reasonably Achievable) protocol. FINDINGS: Vascular Findings: The right common carotid, internal carotid, middle cerebral, anterior cerebral, vertebral, and posterior cerebral arteries are patent without abrupt cut off or aneurysmal dilation. The left common carotid, internal carotid, middle cerebral, anterior cerebral, vertebral, and posterior cerebral arteries are patent without abrupt cut off or aneurysmal dilation. Basilar artery appears patent and  appears unremarkable. Non-vascular Findings: For description of nonvascular intracranial findings, please refer to the noncontrast head CT performed the same date. No acute abnormality is identified within the visualized soft tissue or bony structures of the neck. The visualized lung apices are clear. CT Perfusion: CBF (<30%) volume: 0 mL Tmax (>6.0s) volume: 0 mL Mismatch volume: 0 mL Mismatch ratio: None IMPRESSION: 1.No acute abnormality identified within the large arteries of the head or neck. 2.Significant stenosis of the bilateral internal carotid arteries. 3.CT perfusion study demonstrates no territorial ischemia or core infarct. Electronically Signed: Jose Alfredo Tejeda MD  4/9/2024 11:47 AM EDT  Workstation ID: [id number]    Result Date: 4/9/2024  CT CEREBRAL PERFUSION W WO CONTRAST, CT ANGIOGRAM HEAD W AI ANALYSIS OF LVO, CT ANGIOGRAM NECK Date of Exam: 4/9/2024 11:21 AM EDT Indication: Neuro deficit, acute, stroke suspected.  Comparison: None available. Technique: Axial CT images of the brain were obtained prior to and after the administration of . Core blood volume, core blood flow, mean transit time, and Tmax images were obtained utilizing the Rapid software protocol. A limited CT angiogram of the head was also performed to measure the blood vessel density. The radiation dose reduction device was turned on for each scan per the ALARA (As Low as Reasonably Achievable) protocol. FINDINGS: Vascular Findings: The right common carotid, internal carotid, middle cerebral, anterior cerebral, vertebral, and posterior cerebral arteries are patent without abrupt cut off or aneurysmal dilation. The left common carotid, internal carotid, middle cerebral, anterior cerebral, vertebral, and posterior cerebral arteries are patent without abrupt cut off or aneurysmal dilation. Basilar artery appears patent and appears unremarkable. Non-vascular Findings: For description of nonvascular intracranial findings, please refer to  the noncontrast head CT performed the same date. No acute abnormality is identified within the visualized soft tissue or bony structures of the neck. The visualized lung apices are clear. CT Perfusion: CBF (<30%) volume: 0 mL Tmax (>6.0s) volume: 0 mL Mismatch volume: 0 mL Mismatch ratio: None     1.No acute abnormality identified within the large arteries of the head or neck. 2.Significant stenosis of the bilateral internal carotid arteries. 3.CT perfusion study demonstrates no territorial ischemia or core infarct. Electronically Signed: Jose Alfredo Tejeda MD  4/9/2024 11:47 AM EDT  Workstation ID: [id number]    CT Head Without Contrast    Result Date: 4/9/2024  CT HEAD WO CONTRAST Date of Exam: 4/9/2024 2:31 PM EDT Indication: Stroke, follow up headache. Comparison: Noncontrast head CT from earlier the same day Technique: Axial CT images were obtained of the head without contrast administration.  Automated exposure control and iterative construction methods were used. FINDINGS:  No significant mass effect, midline shift, intracranial hemorrhage, or hydrocephalus is identified. No extra-axial fluid collection is identified.   The calvarium and overlying soft tissues are unremarkable. The paranasal sinuses and bilateral mastoid air cells are adequately aerated. The visualized bony orbits, globes, and retrobulbar soft tissues are unremarkable.     No acute intracranial abnormality is identified. Electronically Signed: Jose Alfredo Tejeda MD  4/9/2024 2:43 PM EDT  Workstation ID: [id number]    XR Chest 1 View    Result Date: 4/9/2024  XR CHEST 1 VW Date of Exam: 4/9/2024 9:50 AM EDT Indication: Chest pain, syncope Comparison: 4/5/2024 Findings: Cardiomediastinal silhouette is unremarkable.  No airspace disease, pneumothorax, nor pleural effusion. No acute osseous abnormality identified.     Impression: No acute process identified Electronically Signed: Denis Nelson MD  4/9/2024 10:07 AM EDT  Workstation ID: [id number]    CT Head  Without Contrast    Result Date: 4/9/2024  CT HEAD WO CONTRAST Date of Exam: 4/9/2024 9:15 AM EDT Indication: Head trauma, minor, normal mental status (Age 18-64y) Headache, new or worsening (Age >= 50y) Syncope/presyncope, cerebrovascular cause suspected Syncope x 2, left parietal head injury, HA. Comparison:4/5/24 Technique: Axial CT images were obtained of the head without contrast administration.  Automated exposure control and iterative construction methods were used. FINDINGS:  The brain parenchyma appears unremarkable in volume and morphology.  No significant mass effect, midline shift, intracranial hemorrhage, or hydrocephalus is identified. No extra-axial fluid collection is identified.   The calvarium and overlying soft tissues are unremarkable. The paranasal sinuses and bilateral mastoid air cells are adequately aerated. The visualized bony orbits, globes, and retrobulbar soft tissues are unremarkable.     1.No acute intracranial abnormality is identified. Electronically Signed: Jose Alfredo Tejeda MD  4/9/2024 9:27 AM EDT  Workstation ID: [id number]    CT Angiogram Chest    Result Date: 4/5/2024  CT ANGIOGRAM CHEST, CT HEAD WO CONTRAST Date of Exam: 4/5/2024 10:57 AM EDT Indication: pe. Comparison: None available. CTA chest Technique: CTA of the chest was performed after the uneventful intravenous administration of 80 mL Isovue-370. Reconstructed coronal and sagittal images were also obtained. In addition, a 3-D volume rendered image was created for interpretation. Automated exposure control and iterative reconstruction methods were used. Findings: Pulmonary arteries: Adequate opacification of the pulmonary arteries. No evidence of acute pulmonary embolism. Lungs and Pleura: The lungs are clear. No nodule. No consolidation. No pleural fluid. Mediastinum/Mayelin: No mediastinal or hilar lymphadenopathy. Lymph nodes: No axillary or supraclavicular adenopathy. Cardiovascular: The cardiac chambers are within normal  limits. The pericardium is normal. The a sending aorta is mildly ectatic measuring 3.6 cm. No evidence of aneurysm or dissection. There is a patent three-vessel arch.  Upper Abdomen: Surgical change status post gastric bypass. Bones and Soft Tissue: No suspicious osseous lesion.     Impression: 1. No evidence of pulmonary embolism 2. No acute pulmonary process CT brain Technique: Multiple axial CT images obtained through the brain without contrast. Multiplanar reformats performed COMPARISON: CT brain dated 1/27/2022 FINDINGS: There is no evidence of hemorrhage. There is no mass effect or midline shift. Mild diffuse brain atrophy There is no extracerebral collection. Ventricles are normal in size and configuration for patient's stated age. Posterior fossa is within normal limits. Calvarium and skull base appear intact.  Visualized sinuses show no air fluid levels. Visualized orbits are unremarkable. IMPRESSION: No acute intracranial abnormality Electronically Signed: Avtar Auguste MD  4/5/2024 11:20 AM EDT  Workstation ID: [id number]    CT Head Without Contrast    Result Date: 4/5/2024  CT ANGIOGRAM CHEST, CT HEAD WO CONTRAST Date of Exam: 4/5/2024 10:57 AM EDT Indication: pe. Comparison: None available. CTA chest Technique: CTA of the chest was performed after the uneventful intravenous administration of 80 mL Isovue-370. Reconstructed coronal and sagittal images were also obtained. In addition, a 3-D volume rendered image was created for interpretation. Automated exposure control and iterative reconstruction methods were used. Findings: Pulmonary arteries: Adequate opacification of the pulmonary arteries. No evidence of acute pulmonary embolism. Lungs and Pleura: The lungs are clear. No nodule. No consolidation. No pleural fluid. Mediastinum/Mayelin: No mediastinal or hilar lymphadenopathy. Lymph nodes: No axillary or supraclavicular adenopathy. Cardiovascular: The cardiac chambers are within normal limits. The  pericardium is normal. The a sending aorta is mildly ectatic measuring 3.6 cm. No evidence of aneurysm or dissection. There is a patent three-vessel arch.  Upper Abdomen: Surgical change status post gastric bypass. Bones and Soft Tissue: No suspicious osseous lesion.     Impression: 1. No evidence of pulmonary embolism 2. No acute pulmonary process CT brain Technique: Multiple axial CT images obtained through the brain without contrast. Multiplanar reformats performed COMPARISON: CT brain dated 1/27/2022 FINDINGS: There is no evidence of hemorrhage. There is no mass effect or midline shift. Mild diffuse brain atrophy There is no extracerebral collection. Ventricles are normal in size and configuration for patient's stated age. Posterior fossa is within normal limits. Calvarium and skull base appear intact.  Visualized sinuses show no air fluid levels. Visualized orbits are unremarkable. IMPRESSION: No acute intracranial abnormality Electronically Signed: Avtar Auguste MD  4/5/2024 11:20 AM EDT  Workstation ID: [id number]     Results for orders placed during the hospital encounter of 04/09/24    Duplex Carotid Ultrasound CAR    Interpretation Summary    Right internal carotid artery demonstrates normal flow without evidence of hemodynamically significant stenosis.    Left internal carotid artery demonstrates normal flow without evidence of hemodynamically significant stenosis.    Bilateral vertebral artery flow is antegrade.      Results for orders placed during the hospital encounter of 04/09/24    Duplex Carotid Ultrasound CAR    Interpretation Summary    Right internal carotid artery demonstrates normal flow without evidence of hemodynamically significant stenosis.    Left internal carotid artery demonstrates normal flow without evidence of hemodynamically significant stenosis.    Bilateral vertebral artery flow is antegrade.      Results for orders placed during the hospital encounter of 04/09/24    Adult  Transthoracic Echo Complete W/ Cont if Necessary Per Protocol (With Agitated Saline)    Interpretation Summary    Left ventricular systolic function is normal. Calculated left ventricular EF = 53.6%    Left ventricular wall thickness is consistent with mild concentric hypertrophy.    Saline test results are negative.    Estimated right ventricular systolic pressure from tricuspid regurgitation is normal (<35 mmHg).    The aortic valve exhibits sclerosis.      Plan for Follow-up of Pending Labs/Results:     Discharge Details        Discharge Medications        New Medications        Instructions Start Date   buPROPion  MG 24 hr tablet  Commonly known as: WELLBUTRIN XL   150 mg, Oral, Daily   Start Date: April 17, 2024     cephalexin 500 MG capsule  Commonly known as: KEFLEX   500 mg, Oral, Every 6 Hours Scheduled      cyanocobalamin 1000 MCG tablet  Commonly known as: VITAMIN B-12   1,000 mcg, Oral, Daily   Start Date: April 17, 2024     folic acid 1 MG tablet  Commonly known as: FOLVITE   1 mg, Oral, Daily   Start Date: April 17, 2024     lactobacillus acidophilus capsule capsule   1 capsule, Oral, Daily   Start Date: April 17, 2024     losartan 50 MG tablet  Commonly known as: COZAAR   50 mg, Oral, Every 24 Hours Scheduled   Start Date: April 17, 2024     multivitamin with minerals tablet tablet   1 tablet, Oral, Daily   Start Date: April 17, 2024     thiamine 100 MG tablet  Commonly known as: VITAMIN B1   100 mg, Oral, Daily             Continue These Medications        Instructions Start Date   apixaban 5 MG tablet tablet  Commonly known as: ELIQUIS   Start On 7/8: Take 1 tablet by mouth Every 12 (Twelve) Hours.      eszopiclone 3 MG tablet  Commonly known as: LUNESTA   3 mg, Oral, Nightly, Take immediately before bedtime       OLANZapine 10 MG tablet  Commonly known as: zyPREXA   10 mg, Oral, Nightly             Stop These Medications      ARIPiprazole 15 MG tablet  Commonly known as: ABILIFY      isosorbide mononitrate 30 MG 24 hr tablet  Commonly known as: IMDUR     lisinopril 10 MG tablet  Commonly known as: PRINIVIL,ZESTRIL     nitroglycerin 0.4 MG SL tablet  Commonly known as: NITROSTAT     ondansetron 4 MG tablet  Commonly known as: ZOFRAN     pantoprazole 40 MG EC tablet  Commonly known as: PROTONIX              Allergies   Allergen Reactions    Hydrocodone Hives    Lactose Intolerance (Gi) GI Intolerance         Discharge Disposition:  Home or Self Care    Diet:  Hospital:  Diet Order   Procedures    Diet: Cardiac; Healthy Heart (2-3 Na+); Texture: Soft to Chew (NDD 3); Soft to Chew: Whole Meat; Fluid Consistency: Thin (IDDSI 0)            Activity:  - as tolerated    Restrictions or Other Recommendations:  - none       CODE STATUS:  Full Code  There are no questions and answers to display.       No future appointments.              Adilene Godinez DO  04/16/24      Time Spent on Discharge:  I spent  35  minutes on this discharge activity which included: face-to-face encounter with the patient, reviewing the data in the system, coordination of the care with the nursing staff as well as consultants, documentation, and entering orders.

## 2024-04-16 NOTE — DISCHARGE PLACEMENT REQUEST
"FROM: Cassidy GUERRA, RN,  [phone]  Kamila Garcia (53 y.o. Female)       Date of Birth   1970    Social Security Number       Address   [address] [place] [address]    Home Phone   [phone]    MRN   [id number]       Restorationist   Oriental orthodox    Marital Status                               Admission Date   24    Admission Type   Emergency    Admitting Provider   Adilene Godinez DO    Attending Provider   Adilene Godinez DO    Department, Room/Bed   [facility] 3E, S336/1       Discharge Date       Discharge Disposition   Home or Self Care    Discharge Destination                                 Attending Provider: Adilene Godinez DO    Allergies: Hydrocodone, Lactose Intolerance (Gi)    Isolation: None   Infection: None   Code Status: Prior    Ht: 167.6 cm (65.98\")   Wt: 101 kg (222 lb 10.6 oz)    Admission Cmt: None   Principal Problem: Suspected cerebrovascular accident (CVA) [R09.89]                   Active Insurance as of 2024       Primary Coverage       Payor Plan Insurance Group Employer/Plan Group    [facility] [employer] [facility] PPO [id number]       Payor Plan Address Payor Plan Phone Number Payor Plan Fax Number Effective Dates    [address] [phone]  10/18/2020 - None Entered    [place] [id number]         Subscriber Name Subscriber Birth Date Member ID       JANICE GARCIA 1975 [id number]                     Emergency Contacts        (Rel.) Home Phone Work Phone Mobile Phone    Janice Garcia (Spouse) [phone] -- --                 Discharge Summary        Adilene Godinez DO at [facility]              [facility]  DISCHARGE SUMMARY    Patient Name: Kamila Garcia  : 1970  MRN: [id number]    Date of Admission: 2024  8:32 AM  Date of Discharge:  2024  Primary Care Physician: Latricia Mishra, " "APRN    Consults       Date and Time Order Name Status Description    4/11/2024 12:34 PM Inpatient Cardiology Consult Completed     4/10/2024  1:56 PM Inpatient Neurology Consult General Completed             Hospital Course     Presenting Problem: CVA    Active Hospital Problems    Diagnosis  POA    **Suspected cerebrovascular accident (CVA) [R09.89]  Yes    Syncope and collapse [R55]  Unknown    Hypertensive urgency [I16.0]  Yes    Obesity (BMI 30-39.9) [E66.9]  Yes    ETOH use [Z78.9]  Yes    Anxiety and depression [F41.9, F32.A]  Yes    H/O LLE DVT & PE (July 2021) [Z86.718]  Not Applicable      Resolved Hospital Problems   No resolved problems to display.          Hospital Course:  Kamila Garcia is a 53 y.o. female w/ hx previous dvt & pe (July 2021) who stopped taking eliquis ~1 month PTA, anxiety/depression, obesity, migraines (per chart, patient denied) who presented after a syncopal event which promped co-workers to call ems, regained consciousness en route to [facility] ED where upon arrival developed numbness and tingling to left face, arm and code stroke called. Evaluated by neuro-stroke service, ct head negative, cta h&n were negative for flow limiting stenosis and ct perfusion was negative for lvo. Ultimately was deemed candidate for thrombolytic therapy w/ tnkase administered 4/9/24 at 11:48, also received dose of depakote, and was admitted to icu for further evaluation. Repeat ct head was negative. Continued to have headache, left facial arm paresthesias. Subsequent mri brain evening 4/9/24 was negative as well. Repeat ct head 4/10/24 was again negative. Carotid duplex negative. Echo showed normal ef, saline test negative. Neuro-stroke service felt the symptoms were not due to cerebral ischemia, likely a stroke \"mimic\" such as functional or atypical migraine, transferred out of icu 4/11/24.       Syncopal episode (unclear etiology)  -echo WNL  -orthostatics normal   -no dysrhythmias on " "tele  -cards evaluated:  holter monitor has already been placed  follow up 6 weeks in cards clinic     Mild concussion (w/ post-traumatic vertigo and post-traumatic headache)  Persistent Left sided facial and arm paresthesias and ataxia  Borderline b12 deficiency  -s/p tnkase in ED 4/9  -ctperfusion negative, cta h&n negative  -subsequent mri negative 4/9  -echo normal  -subsequent ct head 4/10 again negative  -neuro-stroke followed: per 4/10/24: feels ongoing paresthesias less likely brain ischemic, more likely stroke \"mimic\" including \"functional\" vs atypical migraine  -oral b12 replacement (for borderline low b12 level)  **General neurology followed: EEG normal  --ASA indefinitely      Right arm Phlebitis  -- IV removed and area marked  -- elevate and apply heat  -- Keflex 500 mg 4x a day for 5 days     Hx ETOH abuse  -5 beer daily, CIWA protocol, not requiring any PRNs     Htn  -losartan 50mg      Mildly elevated TSH  -tsh 7.5; normal free t4  -follow up pcp, repeat tsh 4-6 weeks     Hx previous DVT/PE  -continue Eliquis, restarted 4/12     Mood d/o  -stop abilify  -continue zyprexa  --restarted Wellbutrin 150 mg daily for 2 weeks then increase to 300 mg daily thereafter.   --follow up with Psychiatrist       Discharge Follow Up Recommendations for outpatient labs/diagnostics:   - PCP in 1-2 weeks  - Cardiology in 6 weeks    Day of Discharge     HPI:   Patient sitting up in bedside chair working with PT, headache 2/10, otherwise doing well.     Review of Systems  Gen- No fevers, chills  CV- No chest pain, palpitations  Resp- No cough, dyspnea  GI- No N/V/D, abd pain    Vital Signs:   Temp:  [97.4 °F (36.3 °C)-98.4 °F (36.9 °C)] 97.7 °F (36.5 °C)  Heart Rate:  [66-89] 66  Resp:  [16-19] 18  BP: ()/(56-85) 109/74      Physical Exam:  Constitutional: No acute distress, awake, alert  HENT: NCAT, mucous membranes moist  Respiratory: Clear to auscultation bilaterally, respiratory effort normal   Cardiovascular: " RRR, no murmurs, rubs, or gallops  Gastrointestinal: soft, nontender, nondistended  Musculoskeletal: No bilateral ankle edema  Psychiatric: Appropriate affect, cooperative  Neurologic: Oriented x 3, speech clear, no focal deficits  Skin: No rashes      Pertinent  and/or Most Recent Results     LAB RESULTS:      Lab 04/11/24  0607 04/10/24  0432   WBC 4.45 4.97   HEMOGLOBIN 12.1 11.7*   HEMATOCRIT 38.4 36.6   PLATELETS 265 257   NEUTROS ABS 2.30  --    IMMATURE GRANS (ABS) 0.01  --    LYMPHS ABS 1.27  --    MONOS ABS 0.51  --    EOS ABS 0.32  --    MCV 87.9 88.2         Lab 04/12/24  0527 04/11/24  0607 04/10/24  0432   SODIUM 139 140 142   POTASSIUM 4.3 3.9 4.1   CHLORIDE 108* 107 108*   CO2 22.0 24.0 23.0   ANION GAP 9.0 9.0 11.0   BUN 12 9 7   CREATININE 0.80 0.71 0.63   EGFR 87.1 100.6 104.9   GLUCOSE 92 94 103*   CALCIUM 8.6 8.5* 8.6   MAGNESIUM 2.1 2.2  --    HEMOGLOBIN A1C  --   --  4.90   TSH  --  7.510*  --              Lab 04/10/24  1640 04/10/24  0432   HSTROP T 8 7         Lab 04/10/24  0432   CHOLESTEROL 168   LDL CHOL 70   HDL CHOL 86*   TRIGLYCERIDES 62         Lab 04/11/24  1027   FOLATE 10.60   VITAMIN B 12 222         Brief Urine Lab Results  (Last result in the past 365 days)        Color   Clarity   Blood   Leuk Est   Nitrite   Protein   CREAT   Urine HCG        04/09/24 1002 Yellow   Clear   Trace   Trace   Negative   Negative                 Microbiology Results (last 10 days)       ** No results found for the last 240 hours. **            EEG    Result Date: 4/11/2024  Reason for referral: 53 y.o.female with syncope Technical Summary:  A 19 channel digital EEG was performed using the international 10-20 placement system, including eye leads and EKG leads. Duration: 20 minutes Findings: The patient is awake.  A medium amplitude well-regulated tenderness posterior rhythm is evident symmetrically over the occipital leads.  Low amplitude intermixed alpha and theta activity are seen anteriorly.   Drowsiness is seen with mild slowing of the background but stage II sleep is not seen.  Photic stimulation yields a symmetric driving response at several flash frequencies.  Hyperventilation is not performed.  No focal features or epileptiform activity are seen. Video: Available Technical quality: Superior EKG: Regular, 60 bpm SUMMARY: Normal EEG in the awake and lightly drowsy states No focal features or epileptiform activity are seen     Normal study This report is transcribed using the Dragon dictation system.      Duplex Carotid Ultrasound CAR    Result Date: 4/10/2024    Right internal carotid artery demonstrates normal flow without evidence of hemodynamically significant stenosis.   Left internal carotid artery demonstrates normal flow without evidence of hemodynamically significant stenosis.   Bilateral vertebral artery flow is antegrade.     CT Head Without Contrast    Result Date: 4/10/2024  CT HEAD WO CONTRAST Date of Exam: 4/10/2024 11:40 AM EDT Indication: Stroke, follow up 24 Hours Post Thrombolytic Administration. Comparison: MRI 1 day prior. Technique: Axial CT images were obtained of the head without contrast administration.  Automated exposure control and iterative construction methods were used. FINDINGS: Gray-white differentiation is maintained and there is no evidence of intracranial hemorrhage, mass or mass effect. Age-related changes of the brain are present including volume loss and typical periventricular sequela of chronic small vessel ischemia. There is otherwise no evidence of intracranial hemorrhage, mass or mass effect. The ventricles are normal in size and configuration accounting for surrounding volume loss. The orbits are normal and the paranasal sinuses are grossly clear.     Stable CT head without evidence of hemorrhage. Electronically Signed: Mike Kidd MD  4/10/2024 11:58 AM EDT  Workstation ID: [id number]    Adult Transthoracic Echo Complete W/ Cont if Necessary Per Protocol  (With Agitated Saline)    Result Date: 4/10/2024    Left ventricular systolic function is normal. Calculated left ventricular EF = 53.6%   Left ventricular wall thickness is consistent with mild concentric hypertrophy.   Saline test results are negative.   Estimated right ventricular systolic pressure from tricuspid regurgitation is normal (<35 mmHg).   The aortic valve exhibits sclerosis.     MRI Brain Without Contrast    Result Date: 4/9/2024  MRI BRAIN WO CONTRAST Date of Exam: 4/9/2024 9:36 PM EDT Indication: Stroke, follow up left sided weakness and numbness.  Comparison: Same day CT Technique:  Routine multiplanar/multisequence sequence images of the brain were obtained without contrast administration. Findings: Diffusion imaging demonstrates no evidence of acute or subacute infarct. There are a few scattered subcortical and periventricular white matter hyperintensities, which may represent sequela of mild small vessel ischemic disease. No extra-axial fluid collection. Normal ventricular volume and configuration. Patent basal cisterns. Normal flow voids within the visualized intracranial vessels. No fracture or suspicious osseous lesion. Normal appearance of the orbits. Paranasal sinuses are predominantly well aerated. No significant mastoid air cell effusion. Soft tissues are unremarkable.     Impression: No evidence of acute infarct. Probable sequela of mild chronic small vessel ischemic disease. Electronically Signed: Farncisco Myers MD  4/9/2024 10:11 PM EDT  Workstation ID: [id number]    CT CEREBRAL PERFUSION WITH & WITHOUT CONTRAST    Addendum Date: 4/9/2024    ADDENDUM #1 Technique: CTA of the head and neck was performed after the uneventful intravenous administration of 150 mL Isovue-370.  Reconstructed coronal and sagittal images were also obtained. In addition, a 3-D volume rendered image was created for interpretation. Automated exposure control and iterative reconstruction methods were used.  Electronically Signed: Jose Alfredo Tejeda MD  4/9/2024 6:05 PM EDT  Workstation ID: [id number] ORIGINAL REPORT: CT CEREBRAL PERFUSION W WO CONTRAST, CT ANGIOGRAM HEAD W AI ANALYSIS OF LVO, CT ANGIOGRAM NECK Date of Exam: 4/9/2024 11:21 AM EDT Indication: Neuro deficit, acute, stroke suspected.  Comparison: None available. Technique: Axial CT images of the brain were obtained prior to and after the administration of . Core blood volume, core blood flow, mean transit time, and Tmax images were obtained utilizing the Rapid software protocol. A limited CT angiogram of the head was also performed to measure the blood vessel density. The radiation dose reduction device was turned on for each scan per the ALARA (As Low as Reasonably Achievable) protocol. FINDINGS: Vascular Findings: The right common carotid, internal carotid, middle cerebral, anterior cerebral, vertebral, and posterior cerebral arteries are patent without abrupt cut off or aneurysmal dilation. The left common carotid, internal carotid, middle cerebral, anterior cerebral, vertebral, and posterior cerebral arteries are patent without abrupt cut off or aneurysmal dilation. Basilar artery appears patent and appears unremarkable. Non-vascular Findings: For description of nonvascular intracranial findings, please refer to the noncontrast head CT performed the same date. No acute abnormality is identified within the visualized soft tissue or bony structures of the neck. The visualized lung apices are clear. CT Perfusion: CBF (<30%) volume: 0 mL Tmax (>6.0s) volume: 0 mL Mismatch volume: 0 mL Mismatch ratio: None IMPRESSION: 1.No acute abnormality identified within the large arteries of the head or neck. 2.Significant stenosis of the bilateral internal carotid arteries. 3.CT perfusion study demonstrates no territorial ischemia or core infarct. Electronically Signed: Jose Alfredo Tejeda MD  4/9/2024 11:47 AM EDT  Workstation ID: [id number]    Result Date: 4/9/2024  CT  CEREBRAL PERFUSION W WO CONTRAST, CT ANGIOGRAM HEAD W AI ANALYSIS OF LVO, CT ANGIOGRAM NECK Date of Exam: 4/9/2024 11:21 AM EDT Indication: Neuro deficit, acute, stroke suspected.  Comparison: None available. Technique: Axial CT images of the brain were obtained prior to and after the administration of . Core blood volume, core blood flow, mean transit time, and Tmax images were obtained utilizing the Rapid software protocol. A limited CT angiogram of the head was also performed to measure the blood vessel density. The radiation dose reduction device was turned on for each scan per the ALARA (As Low as Reasonably Achievable) protocol. FINDINGS: Vascular Findings: The right common carotid, internal carotid, middle cerebral, anterior cerebral, vertebral, and posterior cerebral arteries are patent without abrupt cut off or aneurysmal dilation. The left common carotid, internal carotid, middle cerebral, anterior cerebral, vertebral, and posterior cerebral arteries are patent without abrupt cut off or aneurysmal dilation. Basilar artery appears patent and appears unremarkable. Non-vascular Findings: For description of nonvascular intracranial findings, please refer to the noncontrast head CT performed the same date. No acute abnormality is identified within the visualized soft tissue or bony structures of the neck. The visualized lung apices are clear. CT Perfusion: CBF (<30%) volume: 0 mL Tmax (>6.0s) volume: 0 mL Mismatch volume: 0 mL Mismatch ratio: None     1.No acute abnormality identified within the large arteries of the head or neck. 2.Significant stenosis of the bilateral internal carotid arteries. 3.CT perfusion study demonstrates no territorial ischemia or core infarct. Electronically Signed: Jose Alfredo Tejeda MD  4/9/2024 11:47 AM EDT  Workstation ID: [id number]    CT Angiogram Head w AI Analysis of LVO    Addendum Date: 4/9/2024    ADDENDUM #1 Technique: CTA of the head and neck was performed after the uneventful  intravenous administration of 150 mL Isovue-370.  Reconstructed coronal and sagittal images were also obtained. In addition, a 3-D volume rendered image was created for interpretation. Automated exposure control and iterative reconstruction methods were used. Electronically Signed: Jose Alfredo Tejeda MD  4/9/2024 6:05 PM EDT  Workstation ID: [id number] ORIGINAL REPORT: CT CEREBRAL PERFUSION W WO CONTRAST, CT ANGIOGRAM HEAD W AI ANALYSIS OF LVO, CT ANGIOGRAM NECK Date of Exam: 4/9/2024 11:21 AM EDT Indication: Neuro deficit, acute, stroke suspected.  Comparison: None available. Technique: Axial CT images of the brain were obtained prior to and after the administration of . Core blood volume, core blood flow, mean transit time, and Tmax images were obtained utilizing the Rapid software protocol. A limited CT angiogram of the head was also performed to measure the blood vessel density. The radiation dose reduction device was turned on for each scan per the ALARA (As Low as Reasonably Achievable) protocol. FINDINGS: Vascular Findings: The right common carotid, internal carotid, middle cerebral, anterior cerebral, vertebral, and posterior cerebral arteries are patent without abrupt cut off or aneurysmal dilation. The left common carotid, internal carotid, middle cerebral, anterior cerebral, vertebral, and posterior cerebral arteries are patent without abrupt cut off or aneurysmal dilation. Basilar artery appears patent and appears unremarkable. Non-vascular Findings: For description of nonvascular intracranial findings, please refer to the noncontrast head CT performed the same date. No acute abnormality is identified within the visualized soft tissue or bony structures of the neck. The visualized lung apices are clear. CT Perfusion: CBF (<30%) volume: 0 mL Tmax (>6.0s) volume: 0 mL Mismatch volume: 0 mL Mismatch ratio: None IMPRESSION: 1.No acute abnormality identified within the large arteries of the head or neck.  2.Significant stenosis of the bilateral internal carotid arteries. 3.CT perfusion study demonstrates no territorial ischemia or core infarct. Electronically Signed: Jose Alfredo Tejeda MD  4/9/2024 11:47 AM EDT  Workstation ID: [id number]    Result Date: 4/9/2024  CT CEREBRAL PERFUSION W WO CONTRAST, CT ANGIOGRAM HEAD W AI ANALYSIS OF LVO, CT ANGIOGRAM NECK Date of Exam: 4/9/2024 11:21 AM EDT Indication: Neuro deficit, acute, stroke suspected.  Comparison: None available. Technique: Axial CT images of the brain were obtained prior to and after the administration of . Core blood volume, core blood flow, mean transit time, and Tmax images were obtained utilizing the Rapid software protocol. A limited CT angiogram of the head was also performed to measure the blood vessel density. The radiation dose reduction device was turned on for each scan per the ALARA (As Low as Reasonably Achievable) protocol. FINDINGS: Vascular Findings: The right common carotid, internal carotid, middle cerebral, anterior cerebral, vertebral, and posterior cerebral arteries are patent without abrupt cut off or aneurysmal dilation. The left common carotid, internal carotid, middle cerebral, anterior cerebral, vertebral, and posterior cerebral arteries are patent without abrupt cut off or aneurysmal dilation. Basilar artery appears patent and appears unremarkable. Non-vascular Findings: For description of nonvascular intracranial findings, please refer to the noncontrast head CT performed the same date. No acute abnormality is identified within the visualized soft tissue or bony structures of the neck. The visualized lung apices are clear. CT Perfusion: CBF (<30%) volume: 0 mL Tmax (>6.0s) volume: 0 mL Mismatch volume: 0 mL Mismatch ratio: None     1.No acute abnormality identified within the large arteries of the head or neck. 2.Significant stenosis of the bilateral internal carotid arteries. 3.CT perfusion study demonstrates no territorial ischemia  or core infarct. Electronically Signed: Jose Alfredo Tejeda MD  4/9/2024 11:47 AM EDT  Workstation ID: [id number]    CT Angiogram Neck    Addendum Date: 4/9/2024    ADDENDUM #1 Technique: CTA of the head and neck was performed after the uneventful intravenous administration of 150 mL Isovue-370.  Reconstructed coronal and sagittal images were also obtained. In addition, a 3-D volume rendered image was created for interpretation. Automated exposure control and iterative reconstruction methods were used. Electronically Signed: Jose Alfredo Tejeda MD  4/9/2024 6:05 PM EDT  Workstation ID: [id number] ORIGINAL REPORT: CT CEREBRAL PERFUSION W WO CONTRAST, CT ANGIOGRAM HEAD W AI ANALYSIS OF LVO, CT ANGIOGRAM NECK Date of Exam: 4/9/2024 11:21 AM EDT Indication: Neuro deficit, acute, stroke suspected.  Comparison: None available. Technique: Axial CT images of the brain were obtained prior to and after the administration of . Core blood volume, core blood flow, mean transit time, and Tmax images were obtained utilizing the Rapid software protocol. A limited CT angiogram of the head was also performed to measure the blood vessel density. The radiation dose reduction device was turned on for each scan per the ALARA (As Low as Reasonably Achievable) protocol. FINDINGS: Vascular Findings: The right common carotid, internal carotid, middle cerebral, anterior cerebral, vertebral, and posterior cerebral arteries are patent without abrupt cut off or aneurysmal dilation. The left common carotid, internal carotid, middle cerebral, anterior cerebral, vertebral, and posterior cerebral arteries are patent without abrupt cut off or aneurysmal dilation. Basilar artery appears patent and appears unremarkable. Non-vascular Findings: For description of nonvascular intracranial findings, please refer to the noncontrast head CT performed the same date. No acute abnormality is identified within the visualized soft tissue or bony structures of the neck. The  visualized lung apices are clear. CT Perfusion: CBF (<30%) volume: 0 mL Tmax (>6.0s) volume: 0 mL Mismatch volume: 0 mL Mismatch ratio: None IMPRESSION: 1.No acute abnormality identified within the large arteries of the head or neck. 2.Significant stenosis of the bilateral internal carotid arteries. 3.CT perfusion study demonstrates no territorial ischemia or core infarct. Electronically Signed: Jose Alfredo Tejeda MD  4/9/2024 11:47 AM EDT  Workstation ID: [id number]    Result Date: 4/9/2024  CT CEREBRAL PERFUSION W WO CONTRAST, CT ANGIOGRAM HEAD W AI ANALYSIS OF LVO, CT ANGIOGRAM NECK Date of Exam: 4/9/2024 11:21 AM EDT Indication: Neuro deficit, acute, stroke suspected.  Comparison: None available. Technique: Axial CT images of the brain were obtained prior to and after the administration of . Core blood volume, core blood flow, mean transit time, and Tmax images were obtained utilizing the Rapid software protocol. A limited CT angiogram of the head was also performed to measure the blood vessel density. The radiation dose reduction device was turned on for each scan per the ALARA (As Low as Reasonably Achievable) protocol. FINDINGS: Vascular Findings: The right common carotid, internal carotid, middle cerebral, anterior cerebral, vertebral, and posterior cerebral arteries are patent without abrupt cut off or aneurysmal dilation. The left common carotid, internal carotid, middle cerebral, anterior cerebral, vertebral, and posterior cerebral arteries are patent without abrupt cut off or aneurysmal dilation. Basilar artery appears patent and appears unremarkable. Non-vascular Findings: For description of nonvascular intracranial findings, please refer to the noncontrast head CT performed the same date. No acute abnormality is identified within the visualized soft tissue or bony structures of the neck. The visualized lung apices are clear. CT Perfusion: CBF (<30%) volume: 0 mL Tmax (>6.0s) volume: 0 mL Mismatch volume: 0  mL Mismatch ratio: None     1.No acute abnormality identified within the large arteries of the head or neck. 2.Significant stenosis of the bilateral internal carotid arteries. 3.CT perfusion study demonstrates no territorial ischemia or core infarct. Electronically Signed: Jose Alfredo Tejeda MD  4/9/2024 11:47 AM EDT  Workstation ID: [id number]    CT Head Without Contrast    Result Date: 4/9/2024  CT HEAD WO CONTRAST Date of Exam: 4/9/2024 2:31 PM EDT Indication: Stroke, follow up headache. Comparison: Noncontrast head CT from earlier the same day Technique: Axial CT images were obtained of the head without contrast administration.  Automated exposure control and iterative construction methods were used. FINDINGS:  No significant mass effect, midline shift, intracranial hemorrhage, or hydrocephalus is identified. No extra-axial fluid collection is identified.   The calvarium and overlying soft tissues are unremarkable. The paranasal sinuses and bilateral mastoid air cells are adequately aerated. The visualized bony orbits, globes, and retrobulbar soft tissues are unremarkable.     No acute intracranial abnormality is identified. Electronically Signed: Jose Alfredo Tejeda MD  4/9/2024 2:43 PM EDT  Workstation ID: [id number]    XR Chest 1 View    Result Date: 4/9/2024  XR CHEST 1 VW Date of Exam: 4/9/2024 9:50 AM EDT Indication: Chest pain, syncope Comparison: 4/5/2024 Findings: Cardiomediastinal silhouette is unremarkable.  No airspace disease, pneumothorax, nor pleural effusion. No acute osseous abnormality identified.     Impression: No acute process identified Electronically Signed: Denis Nelson MD  4/9/2024 10:07 AM EDT  Workstation ID: [id number]    CT Head Without Contrast    Result Date: 4/9/2024  CT HEAD WO CONTRAST Date of Exam: 4/9/2024 9:15 AM EDT Indication: Head trauma, minor, normal mental status (Age 18-64y) Headache, new or worsening (Age >= 50y) Syncope/presyncope, cerebrovascular cause suspected Syncope x 2,  left parietal head injury, HA. Comparison:4/5/24 Technique: Axial CT images were obtained of the head without contrast administration.  Automated exposure control and iterative construction methods were used. FINDINGS:  The brain parenchyma appears unremarkable in volume and morphology.  No significant mass effect, midline shift, intracranial hemorrhage, or hydrocephalus is identified. No extra-axial fluid collection is identified.   The calvarium and overlying soft tissues are unremarkable. The paranasal sinuses and bilateral mastoid air cells are adequately aerated. The visualized bony orbits, globes, and retrobulbar soft tissues are unremarkable.     1.No acute intracranial abnormality is identified. Electronically Signed: Jose Alfredo Tejeda MD  4/9/2024 9:27 AM EDT  Workstation ID: [id number]    CT Angiogram Chest    Result Date: 4/5/2024  CT ANGIOGRAM CHEST, CT HEAD WO CONTRAST Date of Exam: 4/5/2024 10:57 AM EDT Indication: pe. Comparison: None available. CTA chest Technique: CTA of the chest was performed after the uneventful intravenous administration of 80 mL Isovue-370. Reconstructed coronal and sagittal images were also obtained. In addition, a 3-D volume rendered image was created for interpretation. Automated exposure control and iterative reconstruction methods were used. Findings: Pulmonary arteries: Adequate opacification of the pulmonary arteries. No evidence of acute pulmonary embolism. Lungs and Pleura: The lungs are clear. No nodule. No consolidation. No pleural fluid. Mediastinum/Mayelin: No mediastinal or hilar lymphadenopathy. Lymph nodes: No axillary or supraclavicular adenopathy. Cardiovascular: The cardiac chambers are within normal limits. The pericardium is normal. The a sending aorta is mildly ectatic measuring 3.6 cm. No evidence of aneurysm or dissection. There is a patent three-vessel arch.  Upper Abdomen: Surgical change status post gastric bypass. Bones and Soft Tissue: No suspicious  osseous lesion.     Impression: 1. No evidence of pulmonary embolism 2. No acute pulmonary process CT brain Technique: Multiple axial CT images obtained through the brain without contrast. Multiplanar reformats performed COMPARISON: CT brain dated 1/27/2022 FINDINGS: There is no evidence of hemorrhage. There is no mass effect or midline shift. Mild diffuse brain atrophy There is no extracerebral collection. Ventricles are normal in size and configuration for patient's stated age. Posterior fossa is within normal limits. Calvarium and skull base appear intact.  Visualized sinuses show no air fluid levels. Visualized orbits are unremarkable. IMPRESSION: No acute intracranial abnormality Electronically Signed: Avtar Auguste MD  4/5/2024 11:20 AM EDT  Workstation ID: [id number]    CT Head Without Contrast    Result Date: 4/5/2024  CT ANGIOGRAM CHEST, CT HEAD WO CONTRAST Date of Exam: 4/5/2024 10:57 AM EDT Indication: pe. Comparison: None available. CTA chest Technique: CTA of the chest was performed after the uneventful intravenous administration of 80 mL Isovue-370. Reconstructed coronal and sagittal images were also obtained. In addition, a 3-D volume rendered image was created for interpretation. Automated exposure control and iterative reconstruction methods were used. Findings: Pulmonary arteries: Adequate opacification of the pulmonary arteries. No evidence of acute pulmonary embolism. Lungs and Pleura: The lungs are clear. No nodule. No consolidation. No pleural fluid. Mediastinum/Mayelin: No mediastinal or hilar lymphadenopathy. Lymph nodes: No axillary or supraclavicular adenopathy. Cardiovascular: The cardiac chambers are within normal limits. The pericardium is normal. The a sending aorta is mildly ectatic measuring 3.6 cm. No evidence of aneurysm or dissection. There is a patent three-vessel arch.  Upper Abdomen: Surgical change status post gastric bypass. Bones and Soft Tissue: No suspicious osseous lesion.      Impression: 1. No evidence of pulmonary embolism 2. No acute pulmonary process CT brain Technique: Multiple axial CT images obtained through the brain without contrast. Multiplanar reformats performed COMPARISON: CT brain dated 1/27/2022 FINDINGS: There is no evidence of hemorrhage. There is no mass effect or midline shift. Mild diffuse brain atrophy There is no extracerebral collection. Ventricles are normal in size and configuration for patient's stated age. Posterior fossa is within normal limits. Calvarium and skull base appear intact.  Visualized sinuses show no air fluid levels. Visualized orbits are unremarkable. IMPRESSION: No acute intracranial abnormality Electronically Signed: Avtar Auguste MD  4/5/2024 11:20 AM EDT  Workstation ID: [id number]     Results for orders placed during the hospital encounter of 04/09/24    Duplex Carotid Ultrasound CAR    Interpretation Summary    Right internal carotid artery demonstrates normal flow without evidence of hemodynamically significant stenosis.    Left internal carotid artery demonstrates normal flow without evidence of hemodynamically significant stenosis.    Bilateral vertebral artery flow is antegrade.      Results for orders placed during the hospital encounter of 04/09/24    Duplex Carotid Ultrasound CAR    Interpretation Summary    Right internal carotid artery demonstrates normal flow without evidence of hemodynamically significant stenosis.    Left internal carotid artery demonstrates normal flow without evidence of hemodynamically significant stenosis.    Bilateral vertebral artery flow is antegrade.      Results for orders placed during the hospital encounter of 04/09/24    Adult Transthoracic Echo Complete W/ Cont if Necessary Per Protocol (With Agitated Saline)    Interpretation Summary    Left ventricular systolic function is normal. Calculated left ventricular EF = 53.6%    Left ventricular wall thickness is consistent with mild concentric  hypertrophy.    Saline test results are negative.    Estimated right ventricular systolic pressure from tricuspid regurgitation is normal (<35 mmHg).    The aortic valve exhibits sclerosis.      Plan for Follow-up of Pending Labs/Results:     Discharge Details        Discharge Medications        New Medications        Instructions Start Date   buPROPion  MG 24 hr tablet  Commonly known as: WELLBUTRIN XL   150 mg, Oral, Daily   Start Date: April 17, 2024     cephalexin 500 MG capsule  Commonly known as: KEFLEX   500 mg, Oral, Every 6 Hours Scheduled      cyanocobalamin 1000 MCG tablet  Commonly known as: VITAMIN B-12   1,000 mcg, Oral, Daily   Start Date: April 17, 2024     folic acid 1 MG tablet  Commonly known as: FOLVITE   1 mg, Oral, Daily   Start Date: April 17, 2024     lactobacillus acidophilus capsule capsule   1 capsule, Oral, Daily   Start Date: April 17, 2024     losartan 50 MG tablet  Commonly known as: COZAAR   50 mg, Oral, Every 24 Hours Scheduled   Start Date: April 17, 2024     multivitamin with minerals tablet tablet   1 tablet, Oral, Daily   Start Date: April 17, 2024     thiamine 100 MG tablet  Commonly known as: VITAMIN B1   100 mg, Oral, Daily             Continue These Medications        Instructions Start Date   apixaban 5 MG tablet tablet  Commonly known as: ELIQUIS   Start On 7/8: Take 1 tablet by mouth Every 12 (Twelve) Hours.      eszopiclone 3 MG tablet  Commonly known as: LUNESTA   3 mg, Oral, Nightly, Take immediately before bedtime       OLANZapine 10 MG tablet  Commonly known as: zyPREXA   10 mg, Oral, Nightly             Stop These Medications      ARIPiprazole 15 MG tablet  Commonly known as: ABILIFY     isosorbide mononitrate 30 MG 24 hr tablet  Commonly known as: IMDUR     lisinopril 10 MG tablet  Commonly known as: PRINIVIL,ZESTRIL     nitroglycerin 0.4 MG SL tablet  Commonly known as: NITROSTAT     ondansetron 4 MG tablet  Commonly known as: ZOFRAN     pantoprazole 40 MG EC  tablet  Commonly known as: PROTONIX              Allergies   Allergen Reactions    Hydrocodone Hives    Lactose Intolerance (Gi) GI Intolerance         Discharge Disposition:  Home or Self Care    Diet:  Hospital:  Diet Order   Procedures    Diet: Cardiac; Healthy Heart (2-3 Na+); Texture: Soft to Chew (NDD 3); Soft to Chew: Whole Meat; Fluid Consistency: Thin (IDDSI 0)            Activity:  - as tolerated    Restrictions or Other Recommendations:  - none       CODE STATUS:  Full Code  There are no questions and answers to display.       No future appointments.              Adilene Godinez DO  04/16/24      Time Spent on Discharge:  I spent  35  minutes on this discharge activity which included: face-to-face encounter with the patient, reviewing the data in the system, coordination of the care with the nursing staff as well as consultants, documentation, and entering orders.            Electronically signed by Adilene Godinez DO at 04/16/24 1038

## 2024-04-16 NOTE — THERAPY TREATMENT NOTE
Patient Name: Kamila Garcia  : 1970    MRN: [id number]                              Today's Date: 2024       Admit Date: 2024    Visit Dx:     ICD-10-CM ICD-9-CM   1. Numbness and tingling of left side of face  R20.0 782.0    R20.2    2. Cognitive communication deficit  R41.841 799.52   3. Oral phase dysphagia  R13.11 787.21     Patient Active Problem List   Diagnosis    Acute pulmonary embolism    Chest pain    H/O LLE DVT & PE (2021)    History of pulmonary embolism    Insomnia    Hypertension    Palpitations    Suspected cerebrovascular accident (CVA)    Hypertensive urgency    Obesity (BMI 30-39.9)    ETOH use    Anxiety and depression    Acute CVA (cerebrovascular accident)    Syncope and collapse     Past Medical History:   Diagnosis Date    Anxiety and depression     Chest pain     Disease of thyroid gland     reports slightly elevated when in hospital    DVT (deep venous thrombosis)     ETOH use 2024    History of pulmonary embolus (PE)     Hypertension     Hypothyroidism 2024    Migraines     Obesity (BMI 30-39.9) 2024    Shortness of breath     with chest pain episodes     Past Surgical History:   Procedure Laterality Date    CARDIAC CATHETERIZATION Left 3/11/2022    Procedure: Left Heart Cath;  Surgeon: Peter Anguiano MD;  Location:  [email or web address]ATION;  Service: Cardiology;  Laterality: Left;  Hold Eliquis 2 days prior to [place]    ENDOSCOPY N/A 2022    Procedure: ESOPHAGOGASTRODUODENOSCOPY;  Surgeon: Brunner, Mark I, MD;  Location:  [facility];  Service: Gastroenterology;  Laterality: N/A;    GASTRIC BYPASS      lap cm en y    IR STENT PLACEMENT Left 10/2021    xin surgical in cath lab placed stent in left leg      General Information       Row Name 24 0923          Physical Therapy Time and Intention    Document Type therapy note (daily note)  -CK     Mode of Treatment individual therapy;physical therapy  -CK       Row  Name 04/16/24 0923          General Information    Patient Profile Reviewed yes  -CK     Existing Precautions/Restrictions fall;other (see comments)  L sided weakness/foot tingling  -CK     Barriers to Rehab none identified  -CK       Row Name 04/16/24 0923          Cognition    Orientation Status (Cognition) oriented x 4  -CK       Row Name 04/16/24 0923          Safety Issues, Functional Mobility    Safety Issues Affecting Function (Mobility) positioning of assistive device;safety precaution awareness  -CK     Impairments Affecting Function (Mobility) balance;endurance/activity tolerance;strength;sensation/sensory awareness  -CK               User Key  (r) = Recorded By, (t) = Taken By, (c) = Cosigned By      Initials Name Provider Type    CK Cara Jones PT Physical Therapist                   Mobility       Row Name 04/16/24 0924          Bed Mobility    Comment, (Bed Mobility) [facility] pre/post treatment  -CK       Row Name 04/16/24 0924          Sit-Stand Transfer    Sit-Stand [place] (Transfers) contact guard  -CK     Assistive Device (Sit-Stand Transfers) walker, front-wheeled  -CK       Row Name 04/16/24 0924          Gait/Stairs (Locomotion)    [place] Level (Gait) verbal cues;contact guard;1 person assist  -CK     Assistive Device (Gait) walker, front-wheeled  -CK     Distance in Feet (Gait) 120  -CK     Deviations/Abnormal Patterns (Gait) bilateral deviations;oleg decreased;festinating/shuffling;gait speed decreased;stride length decreased  -CK     Bilateral Gait Deviations forward flexed posture;heel strike decreased  -CK     Left Sided Gait Deviations weight shift ability decreased  -CK     Comment, (Gait/Stairs) Patient ambulated in [place] on RA  initially with step to gait pattern. Cues provided for increased RLE step length and patient was able to progress to step through gait pattern although with markedly decreased step length. Additional cue provided for upright posture with closer  proximity of AD and forward gaze. Patient makes good efforts to correct gait mechanics. Limited by tingling in LLE.  -CK               User Key  (r) = Recorded By, (t) = Taken By, (c) = Cosigned By      Initials Name Provider Type    Cara Santoro PT Physical Therapist                   Obj/Interventions       Row Name 04/16/24 0925          Motor Skills    Therapeutic Exercise hip;knee;ankle;other (see comments)  coordination activity alternating tapping object on floor with each lower extremity  -CK       Row Name 04/16/24 0925          Hip (Therapeutic Exercise)    Hip (Therapeutic Exercise) strengthening exercise  -CK     Hip Strengthening (Therapeutic Exercise) bilateral;aBduction;aDduction;marching while seated;10 repetitions  -CK       Row Name 04/16/24 0925          Knee (Therapeutic Exercise)    Knee (Therapeutic Exercise) strengthening exercise  -CK     Knee Strengthening (Therapeutic Exercise) bilateral;LAQ (long arc quad);10 repetitions  -CK       Row Name 04/16/24 0925          Ankle (Therapeutic Exercise)    Ankle (Therapeutic Exercise) AROM (active range of motion)  -CK     Ankle AROM (Therapeutic Exercise) bilateral;dorsiflexion;plantarflexion;10 repetitions  -CK       Row Name 04/16/24 0925          Balance    Balance Assessment sitting static balance;standing static balance;standing dynamic balance  -CK     Static Sitting Balance independent  -CK     Position, Sitting Balance unsupported;sitting in chair  -CK     Static Standing Balance contact guard  -CK     Dynamic Standing Balance contact guard  -CK     Position/Device Used, Standing Balance supported;walker, front-wheeled  -CK     Comment, Balance no LOB  -CK               User Key  (r) = Recorded By, (t) = Taken By, (c) = Cosigned By      Initials Name Provider Type    Cara Santoro PT Physical Therapist                   Goals/Plan    No documentation.                  Clinical Impression       Row Name 04/16/24 0926           Pain    Pretreatment Pain Rating 2/10  -CK     Posttreatment Pain Rating 2/10  -CK     Pain Location generalized  -CK     Pain Location - head  -CK     Pain Intervention(s) Ambulation/increased activity;Repositioned;Rest  -CK       Row Name 04/16/24 0926          Plan of Care Review    Plan of Care Reviewed With patient  -CK     Progress improving  -CK     Outcome Evaluation Patient continues to give good effort with all therapy interventions. She ambulated 120' CGA with FWW, gait mechanics improving but still with markedly decreased speed. Patient still mobilizing below her baseline indicating IPPT intervention. Continue to recommend D/C to IPR.  -CK       Row Name 04/16/24 0926          Vital Signs    Pre Systolic BP Rehab 108  -CK     Pre Treatment Diastolic BP 57  -CK     Post Systolic BP Rehab 119  -CK     Post Treatment Diastolic BP 58  -CK     Pretreatment Heart Rate (beats/min) 71  -CK     Intratreatment Heart Rate (beats/min) 102  -CK     Pre SpO2 (%) 96  -CK     O2 Delivery Pre Treatment room air  -CK     Intra SpO2 (%) 95  -CK     O2 Delivery Intra Treatment room air  -CK     O2 Delivery Post Treatment room air  -CK     Pre Patient Position Sitting  -CK     Intra Patient Position Standing  -CK     Post Patient Position Sitting  -CK       Row Name 04/16/24 0926          Positioning and Restraints    Pre-Treatment Position sitting in chair/recliner  -CK     Post Treatment Position chair  -CK     In Chair reclined;call light within reach;encouraged to call for assist;exit alarm on;waffle cushion;notified nsg  -CK               User Key  (r) = Recorded By, (t) = Taken By, (c) = Cosigned By      Initials Name Provider Type    CK Cara Jones, PT Physical Therapist                   Outcome Measures       Row Name 04/16/24 0929          How much help from another person do you currently need...    Turning from your back to your side while in flat bed without using bedrails? 4  -CK     Moving from lying  on back to sitting on the side of a flat bed without bedrails? 3  -CK     Moving to and from a bed to a chair (including a wheelchair)? 3  -CK     Standing up from a chair using your arms (e.g., wheelchair, bedside chair)? 3  -CK     Climbing 3-5 steps with a railing? 2  -CK     To walk in hospital room? 3  -CK     AM-PAC 6 Clicks Score (PT) 18  -CK     Highest Level of Mobility Goal 6 --> Walk 10 steps or more  -CK       Row Name 04/16/24 0929 04/16/24 0908       Functional Assessment    Outcome Measure Options AM-PAC 6 Clicks Basic Mobility (PT)  -CK AM-PAC 6 Clicks Daily Activity (OT)  -TB              User Key  (r) = Recorded By, (t) = Taken By, (c) = Cosigned By      Initials Name Provider Type    TB Joslyn Kahn, OT Occupational Therapist    CK Cara Jones, PT Physical Therapist                                 Physical Therapy Education       Title: PT OT SLP Therapies (Done)       Topic: Physical Therapy (Done)       Point: Mobility training (Done)       Learning Progress Summary             Patient Acceptance, E, VU by CK at 4/16/2024 0929    Acceptance, E,D, VU,NR by LR at 4/15/2024 1412    Comment: Educated on safety with mobility, benefits of mobility, correct supine<->sit t/f technique, correct sit<->stand t/f technique, correct gait mechanics, LE HEP, and progression of POC.    Acceptance, E, VU,NR by KG at 4/14/2024 1321    Acceptance, E, VU by KR at 4/12/2024 1526    Acceptance, E, NR by ND at 4/10/2024 1103   Family Acceptance, E, VU by KR at 4/12/2024 1526   Significant Other Acceptance, E,D, VU,NR by LR at 4/15/2024 1412    Comment: Educated on safety with mobility, benefits of mobility, correct supine<->sit t/f technique, correct sit<->stand t/f technique, correct gait mechanics, LE HEP, and progression of POC.                         Point: Home exercise program (Done)       Learning Progress Summary             Patient Acceptance, E, VU by CK at 4/16/2024 0929    Acceptance,  E,D, VU,NR by LR at 4/15/2024 1412    Comment: Educated on safety with mobility, benefits of mobility, correct supine<->sit t/f technique, correct sit<->stand t/f technique, correct gait mechanics, LE HEP, and progression of POC.    Acceptance, E, VU,NR by KG at 4/14/2024 1321   Significant Other Acceptance, E,D, VU,NR by LR at 4/15/2024 1412    Comment: Educated on safety with mobility, benefits of mobility, correct supine<->sit t/f technique, correct sit<->stand t/f technique, correct gait mechanics, LE HEP, and progression of POC.                         Point: Body mechanics (Done)       Learning Progress Summary             Patient Acceptance, E, VU by CK at 4/16/2024 0929    Acceptance, E,D, VU,NR by LR at 4/15/2024 1412    Comment: Educated on safety with mobility, benefits of mobility, correct supine<->sit t/f technique, correct sit<->stand t/f technique, correct gait mechanics, LE HEP, and progression of POC.    Acceptance, E, VU,NR by KG at 4/14/2024 1321    Acceptance, E, VU by KR at 4/12/2024 1526    Acceptance, E, NR by ND at 4/10/2024 1103   Family Acceptance, E, VU by KR at 4/12/2024 1526   Significant Other Acceptance, E,D, VU,NR by LR at 4/15/2024 1412    Comment: Educated on safety with mobility, benefits of mobility, correct supine<->sit t/f technique, correct sit<->stand t/f technique, correct gait mechanics, LE HEP, and progression of POC.                         Point: Precautions (Done)       Learning Progress Summary             Patient Acceptance, E, VU by CK at 4/16/2024 0929    Acceptance, E,D, VU,NR by LR at 4/15/2024 1412    Comment: Educated on safety with mobility, benefits of mobility, correct supine<->sit t/f technique, correct sit<->stand t/f technique, correct gait mechanics, LE HEP, and progression of POC.    Acceptance, E, VU,NR by KG at 4/14/2024 1321    Acceptance, E, VU by KR at 4/12/2024 1526    Acceptance, E, NR by ND at 4/10/2024 1103   Family Acceptance, E, VU by KR at  4/12/2024 1526   Significant Other Acceptance, E,D, VU,NR by LR at 4/15/2024 1412    Comment: Educated on safety with mobility, benefits of mobility, correct supine<->sit t/f technique, correct sit<->stand t/f technique, correct gait mechanics, LE HEP, and progression of POC.                                         User Key       Initials Effective Dates Name Provider Type Discipline    LR 02/03/23 -  Sepideh Sewell, PT Physical Therapist PT    KG 05/22/20 -  Emely Vale, PT Physical Therapist PT    CK 02/06/24 -  Cara Jones, PT Physical Therapist PT    KR 12/30/22 -  Maria Del Rosario Weston, PT Physical Therapist PT    ND 11/16/23 -  Ale Gomez, PT Physical Therapist PT                  PT Recommendation and Plan     Plan of Care Reviewed With: patient  Progress: improving  Outcome Evaluation: Patient continues to give good effort with all therapy interventions. She ambulated 120' CGA with FWW, gait mechanics improving but still with markedly decreased speed. Patient still mobilizing below her baseline indicating IPPT intervention. Continue to recommend D/C to IPR.     Time Calculation:         PT Charges       Row Name 04/16/24 0930             Time Calculation    Start Time 0852  -CK      PT Received On 04/16/24  -CK         Timed Charges    61976 - PT Therapeutic Exercise Minutes 10  -CK      43298 - Gait Training Minutes  17  -CK         Total Minutes    Timed Charges Total Minutes 27  -CK       Total Minutes 27  -CK                User Key  (r) = Recorded By, (t) = Taken By, (c) = Cosigned By      Initials Name Provider Type    CK Cara Jones, PT Physical Therapist                  Therapy Charges for Today       Code Description Service Date Service Provider Modifiers Qty    07671060952 HC PT THER PROC EA 15 MIN 4/16/2024 Cara Jones, PT GP 1    11043873818 HC GAIT TRAINING EA 15 MIN 4/16/2024 Cara Jones, PT GP 1            PT G-Codes  Outcome Measure Options:  AM-PAC 6 Clicks Basic Mobility (PT)  AM-PAC 6 Clicks Score (PT): 18  AM-PAC 6 Clicks Score (OT): 20  Modified [place] Scale: 3 - Moderate disability.  Requiring some help, but able to walk without assistance.  PT Discharge Summary  Anticipated Discharge Disposition (PT): inpatient rehabilitation facility    Cara Jones, PT  4/16/2024

## 2024-04-16 NOTE — PLAN OF CARE
Problem: Adult Inpatient Plan of Care  Goal: Plan of Care Review  Recent Flowsheet Documentation  Taken 4/16/2024 0905 by Joslyn Kahn OT  Progress: improving  Plan of Care Reviewed With: patient  Outcome Evaluation: Pt participates in therapy with good effort. Remains below her occupational baseline with L side weakness and functional endurance deficits. Pt is up in room/bathroom with RW support and CGAx1. No dizziness or LOB. Pt completes LB dressing and toileting tasks with SBA. OT will continue to follow IP. Recommend IRF to support return to PLOF.

## 2024-04-16 NOTE — PROGRESS NOTES
Neurology       Patient Care Team:  Latricia Mishra APRN as PCP - General (Family Medicine)    Chief complaint: Headache and vertigo    History: Headache and vertigo completely subsided.    Patient was able to walk 120 feet with physical therapy.    She has been accepted to [facility] and is leaving today.      Past Medical History:   Diagnosis Date    Anxiety and depression     Chest pain     Disease of thyroid gland     reports slightly elevated when in hospital    DVT (deep venous thrombosis)     ETOH use 04/09/2024    History of pulmonary embolus (PE)     Hypertension     Hypothyroidism 04/09/2024    Migraines     Obesity (BMI 30-39.9) 04/09/2024    Shortness of breath     with chest pain episodes       Vital Signs   Vitals:    04/15/24 2313 04/15/24 2342 04/16/24 0545 04/16/24 0730   BP: 95/61 104/56 109/74    BP Location: Right arm Right arm Right arm    Patient Position: Lying Lying Lying    Pulse: 70 73 66    Resp: 18 16 19 18   Temp: 98.4 °F (36.9 °C)  97.4 °F (36.3 °C) 97.7 °F (36.5 °C)   TempSrc: Oral  Oral Oral   SpO2: 98% 93% 91%    Weight:       Height:           Physical Exam:   General: Awake and alert              Neuro: Calm and cooperative.    Oriented to person place and time.    Patient is normal.        Results Review:  No new visual  Results from last 7 days   Lab Units 04/11/24  0607   WBC 10*3/mm3 4.45   HEMOGLOBIN g/dL 12.1   HEMATOCRIT % 38.4   PLATELETS 10*3/mm3 265     Results from last 7 days   Lab Units 04/12/24  0527 04/11/24  0607 04/10/24  0432   SODIUM mmol/L 139 140 142   POTASSIUM mmol/L 4.3 3.9 4.1   CHLORIDE mmol/L 108* 107 108*   CO2 mmol/L 22.0 24.0 23.0   BUN mg/dL 12 9 7   CREATININE mg/dL 0.80 0.71 0.63   CALCIUM mg/dL 8.6 8.5* 8.6   GLUCOSE mg/dL 92 94 103*       Imaging Results (Last 24 Hours)       ** No results found for the last 24 hours. **            Assessment:  Syncope with mild closed head injury.    Posttraumatic headache and posttraumatic vertigo  resolved.    Weakness, improving    Plan:  Rehab.    Outpatient follow-up [facility] in 6 weeks    Comment:  Improving steadily.  I expect a full recovery         I discussed the patients findings and my recommendations with patient    Jewel Rose MD  04/16/24  10:39 EDT

## 2024-04-16 NOTE — CASE MANAGEMENT/SOCIAL WORK
Case Management Discharge Note        Final Note: Ms. Garcia is being discharged today, 4/16/24. She will be going to [facility]. Nurse to call report to [phone].  faxed the discharge summary to [phone]. [facility] will transport at 1430. She will need to be at the Maternity Entrance at 1420.       Selected Continued Care - Admitted Since 4/9/2024       Destination    No services have been selected for the patient.                Durable Medical Equipment    No services have been selected for the patient.                Dialysis/Infusion    No services have been selected for the patient.                Home Medical Care    No services have been selected for the patient.                Therapy    No services have been selected for the patient.                Community Resources    No services have been selected for the patient.                Community & DME    No services have been selected for the patient.                         Final Discharge Disposition Code: 62 - inpatient rehab facility

## 2024-04-16 NOTE — OUTREACH NOTE
Prep Survey      Flowsheet Row Responses   Orthodoxy facility patient discharged from? [facility]   Is LACE score < 7 ? No   Eligibility Not Eligible   What are the reasons patient is not eligible? [facility]   Does the patient have one of the following disease processes/diagnoses(primary or secondary)? Stroke   Prep survey completed? Yes            FLORESITA GUSTAFSON - Registered Nurse

## 2024-04-16 NOTE — THERAPY TREATMENT NOTE
Patient Name: Kamila Garcia  : 1970    MRN: [id number]                              Today's Date: 2024       Admit Date: 2024    Visit Dx:     ICD-10-CM ICD-9-CM   1. Numbness and tingling of left side of face  R20.0 782.0    R20.2    2. Cognitive communication deficit  R41.841 799.52   3. Oral phase dysphagia  R13.11 787.21     Patient Active Problem List   Diagnosis    Acute pulmonary embolism    Chest pain    H/O LLE DVT & PE (2021)    History of pulmonary embolism    Insomnia    Hypertension    Palpitations    Suspected cerebrovascular accident (CVA)    Hypertensive urgency    Obesity (BMI 30-39.9)    ETOH use    Anxiety and depression    Acute CVA (cerebrovascular accident)    Syncope and collapse     Past Medical History:   Diagnosis Date    Anxiety and depression     Chest pain     Disease of thyroid gland     reports slightly elevated when in hospital    DVT (deep venous thrombosis)     ETOH use 2024    History of pulmonary embolus (PE)     Hypertension     Hypothyroidism 2024    Migraines     Obesity (BMI 30-39.9) 2024    Shortness of breath     with chest pain episodes     Past Surgical History:   Procedure Laterality Date    CARDIAC CATHETERIZATION Left 3/11/2022    Procedure: Left Heart Cath;  Surgeon: Peter Anguiano MD;  Location:  [facility] CATH INVASIVE LOCATION;  Service: Cardiology;  Laterality: Left;  Hold Eliquis 2 days prior to [facility]    ENDOSCOPY N/A 2022    Procedure: ESOPHAGOGASTRODUODENOSCOPY;  Surgeon: Brunner, Mark I, MD;  Location:  [facility];  Service: Gastroenterology;  Laterality: N/A;    GASTRIC BYPASS      lap cm en y    IR STENT PLACEMENT Left 10/2021    xin surgical in cath lab placed stent in left leg      General Information       Row Name 24 0900          OT Time and Intention    Document Type therapy note (daily note)  -TB     Mode of Treatment occupational therapy;individual therapy  -TB       Row Name  04/16/24 0900          General Information    Patient Profile Reviewed yes  -TB     Existing Precautions/Restrictions fall;other (see comments)  L side weakness  -TB     Barriers to Rehab none identified  -TB       Row Name 04/16/24 0900          Occupational Profile    Reason for Services/Referral (Occupational Profile) Occupational decline  -TB       Row Name 04/16/24 0900          Cognition    Orientation Status (Cognition) oriented x 4  -TB       Row Name 04/16/24 0900          Safety Issues, Functional Mobility    Safety Issues Affecting Function (Mobility) safety precaution awareness;safety precautions follow-through/compliance;positioning of assistive device  -TB     Impairments Affecting Function (Mobility) balance;endurance/activity tolerance;strength;sensation/sensory awareness  -TB     Comment, Safety Issues/Impairments (Mobility) Pt is up in room/bathroom with RW support and CGAx1. No dizziness or LOB.  -TB               User Key  (r) = Recorded By, (t) = Taken By, (c) = Cosigned By      Initials Name Provider Type    TB Joslyn Kahn OT Occupational Therapist                     Mobility/ADL's       Row Name 04/16/24 0901          Bed Mobility    Bed Mobility supine-sit;scooting/bridging  -TB     Scooting/Bridging [place] (Bed Mobility) supervision  -TB     Supine-Sit [place] (Bed Mobility) supervision  -TB     Assistive Device (Bed Mobility) head of bed elevated;bed rails  -TB     Comment, (Bed Mobility) Pt transitions to EOB sitting with good effort.  -TB       Row Name 04/16/24 0901          Transfers    Transfers sit-stand transfer;stand-sit transfer;toilet transfer;bed-chair transfer  -TB     Comment, (Transfers) Education and cues for hand placement, RW sequencing, and safety.  -TB       Row Name 04/16/24 0901          Bed-Chair Transfer    Bed-Chair [place] (Transfers) contact guard;1 person assist;verbal cues  -TB     Assistive Device (Bed-Chair Transfers) walker,  front-wheeled  -TB       Row Name 04/16/24 0901          Sit-Stand Transfer    Sit-Stand [place] (Transfers) contact guard;1 person assist;verbal cues  -TB     Assistive Device (Sit-Stand Transfers) walker, front-wheeled  -TB       Row Name 04/16/24 0901          Stand-Sit Transfer    Stand-Sit [place] (Transfers) contact guard;1 person assist;verbal cues  -TB     Assistive Device (Stand-Sit Transfers) walker, front-wheeled  -TB       Row Name 04/16/24 0901          Toilet Transfer    Type (Toilet Transfer) sit-stand;stand-sit  -TB     [place] Level (Toilet Transfer) contact guard;1 person assist;verbal cues  -TB     Assistive Device (Toilet Transfer) grab bars/safety frame;walker, front-wheeled  -TB       Row Name 04/16/24 0901          Functional Mobility    Functional Mobility- Ind. Level contact guard assist;1 person;verbal cues required  -TB     Functional Mobility- Device walker, front-wheeled  -TB     Functional Mobility-Distance (Feet) 40  -TB     Functional Mobility- Safety Issues sequencing ability decreased  -TB     Functional Mobility- Comment Pt up in room/bathroom with RW support and CGAx1. No dizziness or LOB.  -TB     Patient was able to Ambulate yes  -TB       Row Name 04/16/24 0901          Activities of Daily Living    BADL Assessment/Intervention grooming;toileting;upper body dressing  -TB       Row Name 04/16/24 0901          Upper Body Dressing Assessment/Training    [place] Level (Upper Body Dressing) don;pajama/robe;front opening garment;set up;standby assist  -TB     Position (Upper Body Dressing) edge of bed sitting  -TB       Row Name 04/16/24 0901          Lower Body Dressing Assessment/Training    [place] Level (Lower Body Dressing) don;socks;standby assist  -TB     Position (Lower Body Dressing) edge of bed sitting  -TB       Row Name 04/16/24 0901          Grooming Assessment/Training    [place] Level (Grooming) wash face, hands;set up  -TB     Position  (Grooming) supported sitting  -TB       Row Name 04/16/24 0901          Toileting Assessment/Training    [place] Level (Toileting) contact guard assist;adjust/manage clothing;independent;perform perineal hygiene  -TB     Position (Toileting) unsupported sitting;supported standing  -TB               User Key  (r) = Recorded By, (t) = Taken By, (c) = Cosigned By      Initials Name Provider Type    Joslyn Nuñez OT Occupational Therapist                   Obj/Interventions       Row Name 04/16/24 0904          Balance    Balance Assessment sitting dynamic balance;sit to stand dynamic balance;standing dynamic balance  -TB     Dynamic Sitting Balance supervision  -TB     Position, Sitting Balance unsupported;sitting in chair;sitting edge of bed;other (see comments)  commode  -TB     Sit to Stand Dynamic Balance contact guard;1-person assist;verbal cues  -TB     Dynamic Standing Balance contact guard;verbal cues;1-person assist  -TB     Position/Device Used, Standing Balance supported;walker, front-wheeled  -TB     Balance Interventions sitting;standing;sit to stand;supported;dynamic;dynamic reaching;occupation based/functional task;UE activity with balance activity  -TB     Comment, Balance No LOB with dynamic OOB activity  -TB               User Key  (r) = Recorded By, (t) = Taken By, (c) = Cosigned By      Initials Name Provider Type    Joslyn Nuñez OT Occupational Therapist                   Goals/Plan    No documentation.                  Clinical Impression       Row Name 04/16/24 0905          Pain Assessment    Pretreatment Pain Rating 0/10 - no pain  -TB     Posttreatment Pain Rating 0/10 - no pain  -TB     Pre/Posttreatment Pain Comment Pt denies pain with dynamic EOB/OOB activity  -TB     Pain Intervention(s) Ambulation/increased activity;Repositioned;Elevated  -TB       Row Name 04/16/24 0905          Plan of Care Review    Plan of Care Reviewed With patient  -TB     Progress  improving  -TB     Outcome Evaluation Pt participates in therapy with good effort. Remains below her occupational baseline with L side weakness and functional endurance deficits. Pt is up in room/bathroom with RW support and CGAx1. No dizziness or LOB. Pt completes LB dressing and toileting tasks with SBA. OT will continue to follow IP. Recommend IRF to support return to PLOF.  -TB       Row Name 04/16/24 0905          Therapy Plan Review/Discharge Plan (OT)    Anticipated Discharge Disposition (OT) inpatient rehabilitation facility  -TB       Row Name 04/16/24 0905          Vital Signs    Pre Systolic BP Rehab 108  RN cleared OT  -TB     Pre Treatment Diastolic BP 57  -TB     Pre SpO2 (%) 95  -TB     O2 Delivery Pre Treatment room air  -TB     Post SpO2 (%) 94  -TB     O2 Delivery Post Treatment room air  -TB     Pre Patient Position Supine  -TB     Intra Patient Position Standing  -TB     Post Patient Position Sitting  -TB       Row Name 04/16/24 0905          Positioning and Restraints    Pre-Treatment Position in bed  -TB     Post Treatment Position chair  -TB     In Chair reclined;call light within reach;encouraged to call for assist;exit alarm on;waffle cushion;legs elevated;with nsg  -TB               User Key  (r) = Recorded By, (t) = Taken By, (c) = Cosigned By      Initials Name Provider Type    TB Joslyn Kahn, OT Occupational Therapist                   Outcome Measures       Row Name 04/16/24 0908          How much help from another is currently needed...    Putting on and taking off regular lower body clothing? 3  -TB     Bathing (including washing, rinsing, and drying) 3  -TB     Toileting (which includes using toilet bed pan or urinal) 3  -TB     Putting on and taking off regular upper body clothing 3  -TB     Taking care of personal grooming (such as brushing teeth) 4  -TB     Eating meals 4  -TB     AM-PAC 6 Clicks Score (OT) 20  -TB       Row Name 04/16/24 0908          Functional  Assessment    Outcome Measure Options AM-PAC 6 Clicks Daily Activity (OT)  -TB               User Key  (r) = Recorded By, (t) = Taken By, (c) = Cosigned By      Initials Name Provider Type    Joslyn Nuñez OT Occupational Therapist                    Occupational Therapy Education       Title: PT OT SLP Therapies (Done)       Topic: Occupational Therapy (Done)       Point: ADL training (Done)       Description:   Instruct learner(s) on proper safety adaptation and remediation techniques during self care or transfers.   Instruct in proper use of assistive devices.                  Learning Progress Summary             Patient Acceptance, E,D, VU,DU,NR by  at 4/16/2024 0909    Acceptance, E,D, VU,NR by  at 4/12/2024 1146    Acceptance, E,TB, VU,DU by  at 4/10/2024 0853   Significant Other Acceptance, E,D, VU,NR by  at 4/12/2024 1146                         Point: Home exercise program (Done)       Description:   Instruct learner(s) on appropriate technique for monitoring, assisting and/or progressing therapeutic exercises/activities.                  Learning Progress Summary             Patient Acceptance, E,D, VU,NR by  at 4/12/2024 1146   Significant Other Acceptance, E,D, VU,NR by  at 4/12/2024 1146                         Point: Precautions (Done)       Description:   Instruct learner(s) on prescribed precautions during self-care and functional transfers.                  Learning Progress Summary             Patient Acceptance, E,D, VU,NR by  at 4/12/2024 1146    Acceptance, E,TB, VU,DU by  at 4/10/2024 0853   Significant Other Acceptance, E,D, VU,NR by  at 4/12/2024 1146                         Point: Body mechanics (Done)       Description:   Instruct learner(s) on proper positioning and spine alignment during self-care, functional mobility activities and/or exercises.                  Learning Progress Summary             Patient Acceptance, E,D, VU,NR by  at 4/12/2024 1146     Acceptance, E,TB, VU,DU by TITO at 4/10/2024 0853   Significant Other Acceptance, E,D, VU,NR by CARMEN at 4/12/2024 1146                                         User Key       Initials Effective Dates Name Provider Type Discipline    TB 07/11/23 -  Joslyn Kahn OT Occupational Therapist OT    TITO 08/09/23 -  Karolina Prabhakar OT Occupational Therapist OT    CARMEN 02/05/24 -  Adelaida Morse OT Occupational Therapist OT                  OT Recommendation and Plan     Plan of Care Review  Plan of Care Reviewed With: patient  Progress: improving  Outcome Evaluation: Pt participates in therapy with good effort. Remains below her occupational baseline with L side weakness and functional endurance deficits. Pt is up in room/bathroom with RW support and CGAx1. No dizziness or LOB. Pt completes LB dressing and toileting tasks with SBA. OT will continue to follow IP. Recommend IRF to support return to PLOF.     Time Calculation:         Time Calculation- OT       Row Name 04/16/24 0815             Time Calculation- OT    OT Start Time 0815  -TB      OT Received On 04/16/24  -TB      OT Goal Re-Cert Due Date 04/20/24  -TB         Timed Charges    65845 - OT Self Care/Mgmt Minutes 23  -TB         Total Minutes    Timed Charges Total Minutes 23  -TB       Total Minutes 23  -TB                User Key  (r) = Recorded By, (t) = Taken By, (c) = Cosigned By      Initials Name Provider Type    TB Joslyn Kahn OT Occupational Therapist                  Therapy Charges for Today       Code Description Service Date Service Provider Modifiers Qty    [id number] HC OT SELF CARE/MGMT/TRAIN EA 15 MIN 4/16/2024 Joslyn Kahn OT GO 2                 Joslyn Kahn OT  4/16/2024

## 2024-04-16 NOTE — PLAN OF CARE
Goal Outcome Evaluation:  Plan of Care Reviewed With: patient        Progress: improving  Outcome Evaluation: Patient continues to give good effort with all therapy interventions. She ambulated 120' CGA with FWW, gait mechanics improving but still with markedly decreased speed. Patient still mobilizing below her baseline indicating IPPT intervention. Continue to recommend D/C to IPR.      Anticipated Discharge Disposition (PT): inpatient rehabilitation facility

## 2024-04-16 NOTE — PAYOR COMM NOTE
"Kamila Stark (53 y.o. Female)       Date of Birth   1970    Social Security Number       Address   [address] [place] [address]    Home Phone   [phone]    MRN   [id number]       Church   [employer]    Marital Status                               Admission Date   24    Admission Type   Emergency    Admitting Provider   Adilene Godinez DO    Attending Provider   Adilene Godinez DO    Department, Room/Bed   [facility] 3E, S336/1       Discharge Date       Discharge Disposition   Home or Self Care    Discharge Destination                                 Attending Provider: Adilene Godinez DO    Allergies: Hydrocodone, Lactose Intolerance (Gi)    Isolation: None   Infection: None   Code Status: Prior    Ht: 167.6 cm (65.98\")   Wt: 101 kg (222 lb 10.6 oz)    Admission Cmt: None   Principal Problem: Suspected cerebrovascular accident (CVA) [R09.89]                   Active Insurance as of 2024       Primary Coverage       Payor Plan Insurance Group Employer/Plan Group    [facility] [facility] PPO [id number]       Payor Plan Address Payor Plan Phone Number Payor Plan Fax Number Effective Dates    [address] [phone]  10/18/2020 - None Entered    [place] [address]         Subscriber Name Subscriber Birth Date Member ID       JANICE STARK 1975 [id number]                     Emergency Contacts        (Rel.) Home Phone Work Phone Mobile Phone    Janice Stark (Spouse) [phone] -- --                 Discharge Summary        Adilene Godinez DO at [address] [place] [address] Medicine Services  DISCHARGE SUMMARY    Patient Name: Kamila Stark  : 1970  MRN: [id number]    Date of Admission: 2024  8:32 AM  Date of Discharge:  2024  Primary Care Physician: Latricia Mishra APRN    Consults       Date and Time Order Name Status " "Description    4/11/2024 12:34 PM Inpatient Cardiology Consult Completed     4/10/2024  1:56 PM Inpatient Neurology Consult General Completed             Hospital Course     Presenting Problem: CVA    Active Hospital Problems    Diagnosis  POA    **Suspected cerebrovascular accident (CVA) [R09.89]  Yes    Syncope and collapse [R55]  Unknown    Hypertensive urgency [I16.0]  Yes    Obesity (BMI 30-39.9) [E66.9]  Yes    ETOH use [Z78.9]  Yes    Anxiety and depression [F41.9, F32.A]  Yes    H/O LLE DVT & PE (July 2021) [Z86.718]  Not Applicable      Resolved Hospital Problems   No resolved problems to display.          Hospital Course:  Kamila Garcia is a 53 y.o. female w/ hx previous dvt & pe (July 2021) who stopped taking eliquis ~1 month PTA, anxiety/depression, obesity, migraines (per chart, patient denied) who presented after a syncopal event which promped co-workers to call ems, regained consciousness en route to [facility] ED where upon arrival developed numbness and tingling to left face, arm and code stroke called. Evaluated by neuro-stroke service, ct head negative, cta h&n were negative for flow limiting stenosis and ct perfusion was negative for lvo. Ultimately was deemed candidate for thrombolytic therapy w/ tnkase administered 4/9/24 at 11:48, also received dose of depakote, and was admitted to icu for further evaluation. Repeat ct head was negative. Continued to have headache, left facial arm paresthesias. Subsequent mri brain evening 4/9/24 was negative as well. Repeat ct head 4/10/24 was again negative. Carotid duplex negative. Echo showed normal ef, saline test negative. Neuro-stroke service felt the symptoms were not due to cerebral ischemia, likely a stroke \"mimic\" such as functional or atypical migraine, transferred out of icu 4/11/24.       Syncopal episode (unclear etiology)  -echo WNL  -orthostatics normal   -no dysrhythmias on tele  -cards evaluated:  holter monitor has already been placed  " "follow up 6 weeks in cards clinic     Mild concussion (w/ post-traumatic vertigo and post-traumatic headache)  Persistent Left sided facial and arm paresthesias and ataxia  Borderline b12 deficiency  -s/p tnkase in ED 4/9  -ctperfusion negative, cta h&n negative  -subsequent mri negative 4/9  -echo normal  -subsequent ct head 4/10 again negative  -neuro-stroke followed: per 4/10/24: feels ongoing paresthesias less likely brain ischemic, more likely stroke \"mimic\" including \"functional\" vs atypical migraine  -oral b12 replacement (for borderline low b12 level)  **General neurology followed: EEG normal  --ASA indefinitely      Right arm Phlebitis  -- IV removed and area marked  -- elevate and apply heat  -- Keflex 500 mg 4x a day for 5 days     Hx ETOH abuse  -5 beer daily, CIWA protocol, not requiring any PRNs     Htn  -losartan 50mg      Mildly elevated TSH  -tsh 7.5; normal free t4  -follow up pcp, repeat tsh 4-6 weeks     Hx previous DVT/PE  -continue Eliquis, restarted 4/12     Mood d/o  -stop abilify  -continue zyprexa  --restarted Wellbutrin 150 mg daily for 2 weeks then increase to 300 mg daily thereafter.   --follow up with Psychiatrist       Discharge Follow Up Recommendations for outpatient labs/diagnostics:  - PCP in 1-2 weeks  - Cardiology in 6 weeks  - f/u with [facility]    Day of Discharge     HPI:   Patient sitting up in bedside chair working with PT, headache 2/10, otherwise doing well.     Review of Systems  Gen- No fevers, chills  CV- No chest pain, palpitations  Resp- No cough, dyspnea  GI- No N/V/D, abd pain    Vital Signs:   Temp:  [97.4 °F (36.3 °C)-98.4 °F (36.9 °C)] 97.7 °F (36.5 °C)  Heart Rate:  [66-89] 66  Resp:  [16-19] 18  BP: ()/(56-85) 109/74      Physical Exam:  Constitutional: No acute distress, awake, alert  HENT: NCAT, mucous membranes moist  Respiratory: Clear to auscultation bilaterally, respiratory effort normal   Cardiovascular: RRR, no murmurs, rubs, or " gallops  Gastrointestinal: soft, nontender, nondistended  Musculoskeletal: No bilateral ankle edema  Psychiatric: Appropriate affect, cooperative  Neurologic: Oriented x 3, speech clear, no focal deficits  Skin: No rashes      Pertinent  and/or Most Recent Results     LAB RESULTS:      Lab 04/11/24  0607 04/10/24  0432   WBC 4.45 4.97   HEMOGLOBIN 12.1 11.7*   HEMATOCRIT 38.4 36.6   PLATELETS 265 257   NEUTROS ABS 2.30  --    IMMATURE GRANS (ABS) 0.01  --    LYMPHS ABS 1.27  --    MONOS ABS 0.51  --    EOS ABS 0.32  --    MCV 87.9 88.2         Lab 04/12/24  0527 04/11/24  0607 04/10/24  0432   SODIUM 139 140 142   POTASSIUM 4.3 3.9 4.1   CHLORIDE 108* 107 108*   CO2 22.0 24.0 23.0   ANION GAP 9.0 9.0 11.0   BUN 12 9 7   CREATININE 0.80 0.71 0.63   EGFR 87.1 100.6 104.9   GLUCOSE 92 94 103*   CALCIUM 8.6 8.5* 8.6   MAGNESIUM 2.1 2.2  --    HEMOGLOBIN A1C  --   --  4.90   TSH  --  7.510*  --              Lab 04/10/24  1640 04/10/24  0432   HSTROP T 8 7         Lab 04/10/24  0432   CHOLESTEROL 168   LDL CHOL 70   HDL CHOL 86*   TRIGLYCERIDES 62         Lab 04/11/24  1027   FOLATE 10.60   VITAMIN B 12 222         Brief Urine Lab Results  (Last result in the past 365 days)        Color   Clarity   Blood   Leuk Est   Nitrite   Protein   CREAT   Urine HCG        04/09/24 1002 Yellow   Clear   Trace   Trace   Negative   Negative                 Microbiology Results (last 10 days)       ** No results found for the last 240 hours. **            EEG    Result Date: 4/11/2024  Reason for referral: 53 y.o.female with syncope Technical Summary:  A 19 channel digital EEG was performed using the international 10-20 placement system, including eye leads and EKG leads. Duration: 20 minutes Findings: The patient is awake.  A medium amplitude well-regulated tenderness posterior rhythm is evident symmetrically over the occipital leads.  Low amplitude intermixed alpha and theta activity are seen anteriorly.  Drowsiness is seen with mild  slowing of the background but stage II sleep is not seen.  Photic stimulation yields a symmetric driving response at several flash frequencies.  Hyperventilation is not performed.  No focal features or epileptiform activity are seen. Video: Available Technical quality: Superior EKG: Regular, 60 bpm SUMMARY: Normal EEG in the awake and lightly drowsy states No focal features or epileptiform activity are seen     Normal study This report is transcribed using the Dragon dictation system.      Duplex Carotid Ultrasound CAR    Result Date: 4/10/2024    Right internal carotid artery demonstrates normal flow without evidence of hemodynamically significant stenosis.   Left internal carotid artery demonstrates normal flow without evidence of hemodynamically significant stenosis.   Bilateral vertebral artery flow is antegrade.     CT Head Without Contrast    Result Date: 4/10/2024  CT HEAD WO CONTRAST Date of Exam: 4/10/2024 11:40 AM EDT Indication: Stroke, follow up 24 Hours Post Thrombolytic Administration. Comparison: MRI 1 day prior. Technique: Axial CT images were obtained of the head without contrast administration.  Automated exposure control and iterative construction methods were used. FINDINGS: Gray-white differentiation is maintained and there is no evidence of intracranial hemorrhage, mass or mass effect. Age-related changes of the brain are present including volume loss and typical periventricular sequela of chronic small vessel ischemia. There is otherwise no evidence of intracranial hemorrhage, mass or mass effect. The ventricles are normal in size and configuration accounting for surrounding volume loss. The orbits are normal and the paranasal sinuses are grossly clear.     Stable CT head without evidence of hemorrhage. Electronically Signed: Mike Kidd MD  4/10/2024 11:58 AM EDT  Workstation ID: [id number]    Adult Transthoracic Echo Complete W/ Cont if Necessary Per Protocol (With Agitated  Saline)    Result Date: 4/10/2024    Left ventricular systolic function is normal. Calculated left ventricular EF = 53.6%   Left ventricular wall thickness is consistent with mild concentric hypertrophy.   Saline test results are negative.   Estimated right ventricular systolic pressure from tricuspid regurgitation is normal (<35 mmHg).   The aortic valve exhibits sclerosis.     MRI Brain Without Contrast    Result Date: 4/9/2024  MRI BRAIN WO CONTRAST Date of Exam: 4/9/2024 9:36 PM EDT Indication: Stroke, follow up left sided weakness and numbness.  Comparison: Same day CT Technique:  Routine multiplanar/multisequence sequence images of the brain were obtained without contrast administration. Findings: Diffusion imaging demonstrates no evidence of acute or subacute infarct. There are a few scattered subcortical and periventricular white matter hyperintensities, which may represent sequela of mild small vessel ischemic disease. No extra-axial fluid collection. Normal ventricular volume and configuration. Patent basal cisterns. Normal flow voids within the visualized intracranial vessels. No fracture or suspicious osseous lesion. Normal appearance of the orbits. Paranasal sinuses are predominantly well aerated. No significant mastoid air cell effusion. Soft tissues are unremarkable.     Impression: No evidence of acute infarct. Probable sequela of mild chronic small vessel ischemic disease. Electronically Signed: Francisco Myers MD  4/9/2024 10:11 PM EDT  Workstation ID: [id number]    CT CEREBRAL PERFUSION WITH & WITHOUT CONTRAST    Addendum Date: 4/9/2024    ADDENDUM #1 Technique: CTA of the head and neck was performed after the uneventful intravenous administration of 150 mL Isovue-370.  Reconstructed coronal and sagittal images were also obtained. In addition, a 3-D volume rendered image was created for interpretation. Automated exposure control and iterative reconstruction methods were used. Electronically Signed:  Jose Alfredo Tejeda MD  4/9/2024 6:05 PM EDT  Workstation ID: [id number] ORIGINAL REPORT: CT CEREBRAL PERFUSION W WO CONTRAST, CT ANGIOGRAM HEAD W AI ANALYSIS OF LVO, CT ANGIOGRAM NECK Date of Exam: 4/9/2024 11:21 AM EDT Indication: Neuro deficit, acute, stroke suspected.  Comparison: None available. Technique: Axial CT images of the brain were obtained prior to and after the administration of . Core blood volume, core blood flow, mean transit time, and Tmax images were obtained utilizing the Rapid software protocol. A limited CT angiogram of the head was also performed to measure the blood vessel density. The radiation dose reduction device was turned on for each scan per the ALARA (As Low as Reasonably Achievable) protocol. FINDINGS: Vascular Findings: The right common carotid, internal carotid, middle cerebral, anterior cerebral, vertebral, and posterior cerebral arteries are patent without abrupt cut off or aneurysmal dilation. The left common carotid, internal carotid, middle cerebral, anterior cerebral, vertebral, and posterior cerebral arteries are patent without abrupt cut off or aneurysmal dilation. Basilar artery appears patent and appears unremarkable. Non-vascular Findings: For description of nonvascular intracranial findings, please refer to the noncontrast head CT performed the same date. No acute abnormality is identified within the visualized soft tissue or bony structures of the neck. The visualized lung apices are clear. CT Perfusion: CBF (<30%) volume: 0 mL Tmax (>6.0s) volume: 0 mL Mismatch volume: 0 mL Mismatch ratio: None IMPRESSION: 1.No acute abnormality identified within the large arteries of the head or neck. 2.Significant stenosis of the bilateral internal carotid arteries. 3.CT perfusion study demonstrates no territorial ischemia or core infarct. Electronically Signed: Jose Alfredo Tejeda MD  4/9/2024 11:47 AM EDT  Workstation ID: [id number]    Result Date: 4/9/2024  CT CEREBRAL PERFUSION W WO  CONTRAST, CT ANGIOGRAM HEAD W AI ANALYSIS OF LVO, CT ANGIOGRAM NECK Date of Exam: 4/9/2024 11:21 AM EDT Indication: Neuro deficit, acute, stroke suspected.  Comparison: None available. Technique: Axial CT images of the brain were obtained prior to and after the administration of . Core blood volume, core blood flow, mean transit time, and Tmax images were obtained utilizing the Rapid software protocol. A limited CT angiogram of the head was also performed to measure the blood vessel density. The radiation dose reduction device was turned on for each scan per the ALARA (As Low as Reasonably Achievable) protocol. FINDINGS: Vascular Findings: The right common carotid, internal carotid, middle cerebral, anterior cerebral, vertebral, and posterior cerebral arteries are patent without abrupt cut off or aneurysmal dilation. The left common carotid, internal carotid, middle cerebral, anterior cerebral, vertebral, and posterior cerebral arteries are patent without abrupt cut off or aneurysmal dilation. Basilar artery appears patent and appears unremarkable. Non-vascular Findings: For description of nonvascular intracranial findings, please refer to the noncontrast head CT performed the same date. No acute abnormality is identified within the visualized soft tissue or bony structures of the neck. The visualized lung apices are clear. CT Perfusion: CBF (<30%) volume: 0 mL Tmax (>6.0s) volume: 0 mL Mismatch volume: 0 mL Mismatch ratio: None     1.No acute abnormality identified within the large arteries of the head or neck. 2.Significant stenosis of the bilateral internal carotid arteries. 3.CT perfusion study demonstrates no territorial ischemia or core infarct. Electronically Signed: Jose Alfredo Tejeda MD  4/9/2024 11:47 AM EDT  Workstation ID: [id number]    CT Angiogram Head w AI Analysis of LVO    Addendum Date: 4/9/2024    ADDENDUM #1 Technique: CTA of the head and neck was performed after the uneventful intravenous  administration of 150 mL Isovue-370.  Reconstructed coronal and sagittal images were also obtained. In addition, a 3-D volume rendered image was created for interpretation. Automated exposure control and iterative reconstruction methods were used. Electronically Signed: Jose Alfredo Tejeda MD  4/9/2024 6:05 PM EDT  Workstation ID: [id number] ORIGINAL REPORT: CT CEREBRAL PERFUSION W WO CONTRAST, CT ANGIOGRAM HEAD W AI ANALYSIS OF LVO, CT ANGIOGRAM NECK Date of Exam: 4/9/2024 11:21 AM EDT Indication: Neuro deficit, acute, stroke suspected.  Comparison: None available. Technique: Axial CT images of the brain were obtained prior to and after the administration of . Core blood volume, core blood flow, mean transit time, and Tmax images were obtained utilizing the Rapid software protocol. A limited CT angiogram of the head was also performed to measure the blood vessel density. The radiation dose reduction device was turned on for each scan per the ALARA (As Low as Reasonably Achievable) protocol. FINDINGS: Vascular Findings: The right common carotid, internal carotid, middle cerebral, anterior cerebral, vertebral, and posterior cerebral arteries are patent without abrupt cut off or aneurysmal dilation. The left common carotid, internal carotid, middle cerebral, anterior cerebral, vertebral, and posterior cerebral arteries are patent without abrupt cut off or aneurysmal dilation. Basilar artery appears patent and appears unremarkable. Non-vascular Findings: For description of nonvascular intracranial findings, please refer to the noncontrast head CT performed the same date. No acute abnormality is identified within the visualized soft tissue or bony structures of the neck. The visualized lung apices are clear. CT Perfusion: CBF (<30%) volume: 0 mL Tmax (>6.0s) volume: 0 mL Mismatch volume: 0 mL Mismatch ratio: None IMPRESSION: 1.No acute abnormality identified within the large arteries of the head or neck. 2.Significant  stenosis of the bilateral internal carotid arteries. 3.CT perfusion study demonstrates no territorial ischemia or core infarct. Electronically Signed: Jose Alfredo Tejeda MD  4/9/2024 11:47 AM EDT  Workstation ID: [id number]    Result Date: 4/9/2024  CT CEREBRAL PERFUSION W WO CONTRAST, CT ANGIOGRAM HEAD W AI ANALYSIS OF LVO, CT ANGIOGRAM NECK Date of Exam: 4/9/2024 11:21 AM EDT Indication: Neuro deficit, acute, stroke suspected.  Comparison: None available. Technique: Axial CT images of the brain were obtained prior to and after the administration of . Core blood volume, core blood flow, mean transit time, and Tmax images were obtained utilizing the Rapid software protocol. A limited CT angiogram of the head was also performed to measure the blood vessel density. The radiation dose reduction device was turned on for each scan per the ALARA (As Low as Reasonably Achievable) protocol. FINDINGS: Vascular Findings: The right common carotid, internal carotid, middle cerebral, anterior cerebral, vertebral, and posterior cerebral arteries are patent without abrupt cut off or aneurysmal dilation. The left common carotid, internal carotid, middle cerebral, anterior cerebral, vertebral, and posterior cerebral arteries are patent without abrupt cut off or aneurysmal dilation. Basilar artery appears patent and appears unremarkable. Non-vascular Findings: For description of nonvascular intracranial findings, please refer to the noncontrast head CT performed the same date. No acute abnormality is identified within the visualized soft tissue or bony structures of the neck. The visualized lung apices are clear. CT Perfusion: CBF (<30%) volume: 0 mL Tmax (>6.0s) volume: 0 mL Mismatch volume: 0 mL Mismatch ratio: None     1.No acute abnormality identified within the large arteries of the head or neck. 2.Significant stenosis of the bilateral internal carotid arteries. 3.CT perfusion study demonstrates no territorial ischemia or core  infarct. Electronically Signed: Jose Alfredo Tejeda MD  4/9/2024 11:47 AM EDT  Workstation ID: [id number]    CT Angiogram Neck    Addendum Date: 4/9/2024    ADDENDUM #1 Technique: CTA of the head and neck was performed after the uneventful intravenous administration of 150 mL Isovue-370.  Reconstructed coronal and sagittal images were also obtained. In addition, a 3-D volume rendered image was created for interpretation. Automated exposure control and iterative reconstruction methods were used. Electronically Signed: Jose Alfredo Tejeda MD  4/9/2024 6:05 PM EDT  Workstation ID: [id number] ORIGINAL REPORT: CT CEREBRAL PERFUSION W WO CONTRAST, CT ANGIOGRAM HEAD W AI ANALYSIS OF LVO, CT ANGIOGRAM NECK Date of Exam: 4/9/2024 11:21 AM EDT Indication: Neuro deficit, acute, stroke suspected.  Comparison: None available. Technique: Axial CT images of the brain were obtained prior to and after the administration of . Core blood volume, core blood flow, mean transit time, and Tmax images were obtained utilizing the Rapid software protocol. A limited CT angiogram of the head was also performed to measure the blood vessel density. The radiation dose reduction device was turned on for each scan per the ALARA (As Low as Reasonably Achievable) protocol. FINDINGS: Vascular Findings: The right common carotid, internal carotid, middle cerebral, anterior cerebral, vertebral, and posterior cerebral arteries are patent without abrupt cut off or aneurysmal dilation. The left common carotid, internal carotid, middle cerebral, anterior cerebral, vertebral, and posterior cerebral arteries are patent without abrupt cut off or aneurysmal dilation. Basilar artery appears patent and appears unremarkable. Non-vascular Findings: For description of nonvascular intracranial findings, please refer to the noncontrast head CT performed the same date. No acute abnormality is identified within the visualized soft tissue or bony structures of the neck. The  visualized lung apices are clear. CT Perfusion: CBF (<30%) volume: 0 mL Tmax (>6.0s) volume: 0 mL Mismatch volume: 0 mL Mismatch ratio: None IMPRESSION: 1.No acute abnormality identified within the large arteries of the head or neck. 2.Significant stenosis of the bilateral internal carotid arteries. 3.CT perfusion study demonstrates no territorial ischemia or core infarct. Electronically Signed: Jose Alfredo Tejeda MD  4/9/2024 11:47 AM EDT  Workstation ID: [id number]    Result Date: 4/9/2024  CT CEREBRAL PERFUSION W WO CONTRAST, CT ANGIOGRAM HEAD W AI ANALYSIS OF LVO, CT ANGIOGRAM NECK Date of Exam: 4/9/2024 11:21 AM EDT Indication: Neuro deficit, acute, stroke suspected.  Comparison: None available. Technique: Axial CT images of the brain were obtained prior to and after the administration of . Core blood volume, core blood flow, mean transit time, and Tmax images were obtained utilizing the Rapid software protocol. A limited CT angiogram of the head was also performed to measure the blood vessel density. The radiation dose reduction device was turned on for each scan per the ALARA (As Low as Reasonably Achievable) protocol. FINDINGS: Vascular Findings: The right common carotid, internal carotid, middle cerebral, anterior cerebral, vertebral, and posterior cerebral arteries are patent without abrupt cut off or aneurysmal dilation. The left common carotid, internal carotid, middle cerebral, anterior cerebral, vertebral, and posterior cerebral arteries are patent without abrupt cut off or aneurysmal dilation. Basilar artery appears patent and appears unremarkable. Non-vascular Findings: For description of nonvascular intracranial findings, please refer to the noncontrast head CT performed the same date. No acute abnormality is identified within the visualized soft tissue or bony structures of the neck. The visualized lung apices are clear. CT Perfusion: CBF (<30%) volume: 0 mL Tmax (>6.0s) volume: 0 mL Mismatch volume: 0  mL Mismatch ratio: None     1.No acute abnormality identified within the large arteries of the head or neck. 2.Significant stenosis of the bilateral internal carotid arteries. 3.CT perfusion study demonstrates no territorial ischemia or core infarct. Electronically Signed: Jose Alfredo Tejeda MD  4/9/2024 11:47 AM EDT  Workstation ID: [id number]    CT Head Without Contrast    Result Date: 4/9/2024  CT HEAD WO CONTRAST Date of Exam: 4/9/2024 2:31 PM EDT Indication: Stroke, follow up headache. Comparison: Noncontrast head CT from earlier the same day Technique: Axial CT images were obtained of the head without contrast administration.  Automated exposure control and iterative construction methods were used. FINDINGS:  No significant mass effect, midline shift, intracranial hemorrhage, or hydrocephalus is identified. No extra-axial fluid collection is identified.   The calvarium and overlying soft tissues are unremarkable. The paranasal sinuses and bilateral mastoid air cells are adequately aerated. The visualized bony orbits, globes, and retrobulbar soft tissues are unremarkable.     No acute intracranial abnormality is identified. Electronically Signed: Jose Alfredo Tejeda MD  4/9/2024 2:43 PM EDT  Workstation ID: [id number]    XR Chest 1 View    Result Date: 4/9/2024  XR CHEST 1 VW Date of Exam: 4/9/2024 9:50 AM EDT Indication: Chest pain, syncope Comparison: 4/5/2024 Findings: Cardiomediastinal silhouette is unremarkable.  No airspace disease, pneumothorax, nor pleural effusion. No acute osseous abnormality identified.     Impression: No acute process identified Electronically Signed: Denis Nelson MD  4/9/2024 10:07 AM EDT  Workstation ID: [id number]    CT Head Without Contrast    Result Date: 4/9/2024  CT HEAD WO CONTRAST Date of Exam: 4/9/2024 9:15 AM EDT Indication: Head trauma, minor, normal mental status (Age 18-64y) Headache, new or worsening (Age >= 50y) Syncope/presyncope, cerebrovascular cause suspected Syncope x 2,  left parietal head injury, HA. Comparison:4/5/24 Technique: Axial CT images were obtained of the head without contrast administration.  Automated exposure control and iterative construction methods were used. FINDINGS:  The brain parenchyma appears unremarkable in volume and morphology.  No significant mass effect, midline shift, intracranial hemorrhage, or hydrocephalus is identified. No extra-axial fluid collection is identified.   The calvarium and overlying soft tissues are unremarkable. The paranasal sinuses and bilateral mastoid air cells are adequately aerated. The visualized bony orbits, globes, and retrobulbar soft tissues are unremarkable.     1.No acute intracranial abnormality is identified. Electronically Signed: Jose Alfredo Tejeda MD  4/9/2024 9:27 AM EDT  Workstation ID: [id number]    CT Angiogram Chest    Result Date: 4/5/2024  CT ANGIOGRAM CHEST, CT HEAD WO CONTRAST Date of Exam: 4/5/2024 10:57 AM EDT Indication: pe. Comparison: None available. CTA chest Technique: CTA of the chest was performed after the uneventful intravenous administration of 80 mL Isovue-370. Reconstructed coronal and sagittal images were also obtained. In addition, a 3-D volume rendered image was created for interpretation. Automated exposure control and iterative reconstruction methods were used. Findings: Pulmonary arteries: Adequate opacification of the pulmonary arteries. No evidence of acute pulmonary embolism. Lungs and Pleura: The lungs are clear. No nodule. No consolidation. No pleural fluid. Mediastinum/Mayelin: No mediastinal or hilar lymphadenopathy. Lymph nodes: No axillary or supraclavicular adenopathy. Cardiovascular: The cardiac chambers are within normal limits. The pericardium is normal. The a sending aorta is mildly ectatic measuring 3.6 cm. No evidence of aneurysm or dissection. There is a patent three-vessel arch.  Upper Abdomen: Surgical change status post gastric bypass. Bones and Soft Tissue: No suspicious  osseous lesion.     Impression: 1. No evidence of pulmonary embolism 2. No acute pulmonary process CT brain Technique: Multiple axial CT images obtained through the brain without contrast. Multiplanar reformats performed COMPARISON: CT brain dated 1/27/2022 FINDINGS: There is no evidence of hemorrhage. There is no mass effect or midline shift. Mild diffuse brain atrophy There is no extracerebral collection. Ventricles are normal in size and configuration for patient's stated age. Posterior fossa is within normal limits. Calvarium and skull base appear intact.  Visualized sinuses show no air fluid levels. Visualized orbits are unremarkable. IMPRESSION: No acute intracranial abnormality Electronically Signed: Avtar Auguste MD  4/5/2024 11:20 AM EDT  Workstation ID: [id number]    CT Head Without Contrast    Result Date: 4/5/2024  CT ANGIOGRAM CHEST, CT HEAD WO CONTRAST Date of Exam: 4/5/2024 10:57 AM EDT Indication: pe. Comparison: None available. CTA chest Technique: CTA of the chest was performed after the uneventful intravenous administration of 80 mL Isovue-370. Reconstructed coronal and sagittal images were also obtained. In addition, a 3-D volume rendered image was created for interpretation. Automated exposure control and iterative reconstruction methods were used. Findings: Pulmonary arteries: Adequate opacification of the pulmonary arteries. No evidence of acute pulmonary embolism. Lungs and Pleura: The lungs are clear. No nodule. No consolidation. No pleural fluid. Mediastinum/Mayelin: No mediastinal or hilar lymphadenopathy. Lymph nodes: No axillary or supraclavicular adenopathy. Cardiovascular: The cardiac chambers are within normal limits. The pericardium is normal. The a sending aorta is mildly ectatic measuring 3.6 cm. No evidence of aneurysm or dissection. There is a patent three-vessel arch.  Upper Abdomen: Surgical change status post gastric bypass. Bones and Soft Tissue: No suspicious osseous lesion.      Impression: 1. No evidence of pulmonary embolism 2. No acute pulmonary process CT brain Technique: Multiple axial CT images obtained through the brain without contrast. Multiplanar reformats performed COMPARISON: CT brain dated 1/27/2022 FINDINGS: There is no evidence of hemorrhage. There is no mass effect or midline shift. Mild diffuse brain atrophy There is no extracerebral collection. Ventricles are normal in size and configuration for patient's stated age. Posterior fossa is within normal limits. Calvarium and skull base appear intact.  Visualized sinuses show no air fluid levels. Visualized orbits are unremarkable. IMPRESSION: No acute intracranial abnormality Electronically Signed: Avtar Auguste MD  4/5/2024 11:20 AM EDT  Workstation ID: [id number]     Results for orders placed during the hospital encounter of 04/09/24    Duplex Carotid Ultrasound CAR    Interpretation Summary    Right internal carotid artery demonstrates normal flow without evidence of hemodynamically significant stenosis.    Left internal carotid artery demonstrates normal flow without evidence of hemodynamically significant stenosis.    Bilateral vertebral artery flow is antegrade.      Results for orders placed during the hospital encounter of 04/09/24    Duplex Carotid Ultrasound CAR    Interpretation Summary    Right internal carotid artery demonstrates normal flow without evidence of hemodynamically significant stenosis.    Left internal carotid artery demonstrates normal flow without evidence of hemodynamically significant stenosis.    Bilateral vertebral artery flow is antegrade.      Results for orders placed during the hospital encounter of 04/09/24    Adult Transthoracic Echo Complete W/ Cont if Necessary Per Protocol (With Agitated Saline)    Interpretation Summary    Left ventricular systolic function is normal. Calculated left ventricular EF = 53.6%    Left ventricular wall thickness is consistent with mild concentric  hypertrophy.    Saline test results are negative.    Estimated right ventricular systolic pressure from tricuspid regurgitation is normal (<35 mmHg).    The aortic valve exhibits sclerosis.      Plan for Follow-up of Pending Labs/Results:     Discharge Details        Discharge Medications        New Medications        Instructions Start Date   buPROPion  MG 24 hr tablet  Commonly known as: WELLBUTRIN XL   150 mg, Oral, Daily   Start Date: April 17, 2024     cephalexin 500 MG capsule  Commonly known as: KEFLEX   500 mg, Oral, Every 6 Hours Scheduled      cyanocobalamin 1000 MCG tablet  Commonly known as: VITAMIN B-12   1,000 mcg, Oral, Daily   Start Date: April 17, 2024     folic acid 1 MG tablet  Commonly known as: FOLVITE   1 mg, Oral, Daily   Start Date: April 17, 2024     lactobacillus acidophilus capsule capsule   1 capsule, Oral, Daily   Start Date: April 17, 2024     losartan 50 MG tablet  Commonly known as: COZAAR   50 mg, Oral, Every 24 Hours Scheduled   Start Date: April 17, 2024     multivitamin with minerals tablet tablet   1 tablet, Oral, Daily   Start Date: April 17, 2024     thiamine 100 MG tablet  Commonly known as: VITAMIN B1   100 mg, Oral, Daily             Continue These Medications        Instructions Start Date   apixaban 5 MG tablet tablet  Commonly known as: ELIQUIS   Start On 7/8: Take 1 tablet by mouth Every 12 (Twelve) Hours.      eszopiclone 3 MG tablet  Commonly known as: LUNESTA   3 mg, Oral, Nightly, Take immediately before bedtime       OLANZapine 10 MG tablet  Commonly known as: zyPREXA   10 mg, Oral, Nightly             Stop These Medications      ARIPiprazole 15 MG tablet  Commonly known as: ABILIFY     isosorbide mononitrate 30 MG 24 hr tablet  Commonly known as: IMDUR     lisinopril 10 MG tablet  Commonly known as: PRINIVIL,ZESTRIL     nitroglycerin 0.4 MG SL tablet  Commonly known as: NITROSTAT     ondansetron 4 MG tablet  Commonly known as: ZOFRAN     pantoprazole 40 MG EC  tablet  Commonly known as: PROTONIX              Allergies   Allergen Reactions    Hydrocodone Hives    Lactose Intolerance (Gi) GI Intolerance         Discharge Disposition:  Home or Self Care    Diet:  Hospital:  Diet Order   Procedures    Diet: Cardiac; Healthy Heart (2-3 Na+); Texture: Soft to Chew (NDD 3); Soft to Chew: Whole Meat; Fluid Consistency: Thin (IDDSI 0)            Activity:  - as tolerated    Restrictions or Other Recommendations:  - none       CODE STATUS:  Full Code  There are no questions and answers to display.       No future appointments.              Adilene Godinez DO  04/16/24      Time Spent on Discharge:  I spent  35  minutes on this discharge activity which included: face-to-face encounter with the patient, reviewing the data in the system, coordination of the care with the nursing staff as well as consultants, documentation, and entering orders.            Electronically signed by Adilene Godinez DO at 04/16/24 1046

## 2024-04-16 NOTE — NURSING NOTE
Report called to Emmy PAPPAS at [facility] on CVA2. Phone number provided to emmy if any other questions arise, IV removed per MD order. Emmy PAPPAS notified of patient time of transport at 1430.   Total Cumulative Dose (Cgy): 4408.32 Bill For Ultrasound Evaluation (): Yes Change Daily Dosage Administered Mid Treatment?: No Add X Modifier?: AG - Unusual Non-Overlapping Service Prescription Used: 1 Treatment Documentation: Physician Orders: Radiotherapy Treatment Plan: Superficial Radiotherapy with Ultrasound\\n\\nPlan: This patient has been treated today with image-guided superficial radiation therapy for non-melanoma skin cancer.  Written informed consent has been previously obtained from this patient for this treatment. This consent is documented in the patient's chart. The patient gave verbal consent to continue treatment today.\\nThe patient was treated with a specific radiation dose and setup as prescribed by the provider listed on this visit note. A Radiation Therapist performed administration of radiation under the supervision of a provider.\\nThe treatment parameters and cumulative dose are indicated above. Prior to administering the radiation, the patient underwent a verification therapeutic radiology simulation-aided field setting defining relevant normal and abnormal target anatomy and acquiring images with a separate and distinct diagnostic high-frequency ultrasound to delineate tissues and determine whether to proceed with delivery of therapeutic, in addition to retrieve data necessary to develop an optimal radiation treatment process for the patient. The field placement simulation documents any change seen in overall tumor volume documented in the patient’s record, allows the clinician to indicate any needed changes in the treatment plan and/or prescription, provides diagnostic evaluation as the basis for performing the therapeutic procedure, and clearly identifies the information needed to decide to proceed with the therapeutic procedure. This process includes verification of the treatment port(s) and proper treatment positioning. All treatment ports were photographed within electronic medical records. The patient's lead blocking along with gross tumor volume and margin was confirmed. Considering superficial radiotherapy is clinical in setup, this requires the physician and radiation therapist to clarify the location interest being treated against initial images, ultrasound, pathology, and patient anatomy. Care was taken to ensure [place] treated were geometrically accurate and properly positioned using therapeutic radiology simulation-aided field setting verification per fraction. This process is also utilized to determine if any prescription or setup changes are necessary. These steps are therefore medically necessary to ensure safe and effective administration of radiation. Ongoing therapeutic radiology simulation-aided field setting verification is ordered throughout the course of therapy.  A high-frequency ultrasound image was acquired today for a two-dimensional evaluation of the tumor volume, depth, width, breadth, review of prior response to treatment, provide geometric accuracy of field placement, and determine whether to proceed with therapeutic delivery.\\nThe field placement and ultrasound imaging, per fraction, is separate and distinct from the initial simulation and is an important task in providing safe administration of superficial radiation therapy. Physician evaluation of the ultrasound information will be ongoing throughout the course of treatment and is deemed medically necessary to ensure the efficacy of treatment, whether to proceed with therapeutic delivery, and determine any necessary changes. Today's images were evaluated for determination of continuation of treatment with the current plan or with necessary changes as appropriate. According to the review of verification therapeutic radiology simulation-aided field setting and imaging, no change is required.\\nAdditionally, the use of ultrasound visualization and targeted assessment allows the patient to be able to see her cancer progress, encouraging the patient to complete and maintain compliance through the full course of prescribed radiotherapy. Per Dr. Wili Marques, continued ultrasound guidance and therapeutic radiology simulation-aided field setting verification per fraction is required for field placement, measurement of tumor depth, tissues evaluation, progress, acute effect monitoring, and determination for therapeutic treatment delivery is appropriate. Additional Comments (Add Customization Of Note Here): Pt returns today from her skin break since her last treatment on 2-20-24.  Her skin is mildy erythemic and has a slight tanning coloration now.  New skin is forming and she is not in any discomfort.  I will submit a dose decay calculation today to determine if additional treatments are necessary because of her skin break.  Pt is aware that additional treatments may be necessary. Fraction Number: 16 Ultrasound Not Used Text: Ultrasound was not performed today due to Energy (Kv): 70 Ultrasound Used Text: High frequency ultrasound depth is 1.65 mm, which is -0.16 mm in difference from previous imaging. Calculate Total Cumulative Dose Automatically Or Manually: Manually Daily Dosage (Cgy): 275.52 Bill For Simulation (Per Medicare, Typical Course Of Radiation Therapy Will Require Between One To Three Simulations): Yes- (Simple- 1 Site: [id number])

## 2024-04-17 DIAGNOSIS — R51.9 NONINTRACTABLE HEADACHE, UNSPECIFIED CHRONICITY PATTERN, UNSPECIFIED HEADACHE TYPE: Primary | ICD-10-CM

## 2024-04-18 NOTE — TELEPHONE ENCOUNTER
Per Dr. Bautista not felt to be a stroke. Per Dr. Rose follow up outpatient neurology clinic for complex migraine. A referral has been put in for General Neurology.

## 2024-05-10 ENCOUNTER — TELEPHONE (OUTPATIENT)
Dept: CARDIOLOGY | Facility: HOSPITAL | Age: 54
End: 2024-05-10
Payer: COMMERCIAL

## 2024-08-06 ENCOUNTER — APPOINTMENT (OUTPATIENT)
Dept: CT IMAGING | Facility: HOSPITAL | Age: 54
End: 2024-08-06
Payer: COMMERCIAL

## 2024-08-06 ENCOUNTER — APPOINTMENT (OUTPATIENT)
Dept: GENERAL RADIOLOGY | Facility: HOSPITAL | Age: 54
End: 2024-08-06
Payer: COMMERCIAL

## 2024-08-06 ENCOUNTER — HOSPITAL ENCOUNTER (OUTPATIENT)
Facility: HOSPITAL | Age: 54
Setting detail: OBSERVATION
Discharge: ANOTHER HOSPITAL | End: 2024-08-09
Attending: EMERGENCY MEDICINE | Admitting: INTERNAL MEDICINE
Payer: COMMERCIAL

## 2024-08-06 ENCOUNTER — APPOINTMENT (OUTPATIENT)
Dept: MRI IMAGING | Facility: HOSPITAL | Age: 54
End: 2024-08-06
Payer: COMMERCIAL

## 2024-08-06 DIAGNOSIS — F10.920 ALCOHOLIC INTOXICATION WITHOUT COMPLICATION: ICD-10-CM

## 2024-08-06 DIAGNOSIS — R41.841 COGNITIVE COMMUNICATION DEFICIT: ICD-10-CM

## 2024-08-06 DIAGNOSIS — R53.1 LEFT-SIDED WEAKNESS: Primary | ICD-10-CM

## 2024-08-06 DIAGNOSIS — R53.1 ACUTE LEFT-SIDED WEAKNESS: ICD-10-CM

## 2024-08-06 LAB
AMPHET+METHAMPHET UR QL: NEGATIVE
AMPHETAMINES UR QL: NEGATIVE
APTT PPP: 28.2 SECONDS (ref 22–39)
BARBITURATES UR QL SCN: NEGATIVE
BASOPHILS # BLD AUTO: 0.06 10*3/MM3 (ref 0–0.2)
BASOPHILS NFR BLD AUTO: 1 % (ref 0–1.5)
BENZODIAZ UR QL SCN: NEGATIVE
BUN BLDA-MCNC: 7 MG/DL (ref 8–26)
BUPRENORPHINE SERPL-MCNC: NEGATIVE NG/ML
CA-I BLDA-SCNC: 1.07 MMOL/L (ref 1.2–1.32)
CANNABINOIDS SERPL QL: NEGATIVE
CHLORIDE BLDA-SCNC: 106 MMOL/L (ref 98–109)
CO2 BLDA-SCNC: 19 MMOL/L (ref 24–29)
COCAINE UR QL: NEGATIVE
CREAT BLDA-MCNC: 1 MG/DL (ref 0.6–1.3)
DEPRECATED RDW RBC AUTO: 41.2 FL (ref 37–54)
EGFRCR SERPLBLD CKD-EPI 2021: 66.3 ML/MIN/1.73
EOSINOPHIL # BLD AUTO: 0.1 10*3/MM3 (ref 0–0.4)
EOSINOPHIL NFR BLD AUTO: 1.7 % (ref 0.3–6.2)
ERYTHROCYTE [DISTWIDTH] IN BLOOD BY AUTOMATED COUNT: 13.1 % (ref 12.3–15.4)
ETHANOL BLD-MCNC: 120 MG/DL (ref 0–10)
FENTANYL UR-MCNC: NEGATIVE NG/ML
GLUCOSE BLDC GLUCOMTR-MCNC: 83 MG/DL (ref 70–130)
HCT VFR BLD AUTO: 44.3 % (ref 34–46.6)
HCT VFR BLDA CALC: 45 % (ref 38–51)
HGB BLD-MCNC: 14.9 G/DL (ref 12–15.9)
HGB BLDA-MCNC: 15.3 G/DL (ref 12–17)
HOLD SPECIMEN: NORMAL
IMM GRANULOCYTES # BLD AUTO: 0.01 10*3/MM3 (ref 0–0.05)
IMM GRANULOCYTES NFR BLD AUTO: 0.2 % (ref 0–0.5)
INR PPP: 1.2 (ref 0.8–1.2)
LYMPHOCYTES # BLD AUTO: 2.45 10*3/MM3 (ref 0.7–3.1)
LYMPHOCYTES NFR BLD AUTO: 41 % (ref 19.6–45.3)
MCH RBC QN AUTO: 28.8 PG (ref 26.6–33)
MCHC RBC AUTO-ENTMCNC: 33.6 G/DL (ref 31.5–35.7)
MCV RBC AUTO: 85.7 FL (ref 79–97)
METHADONE UR QL SCN: NEGATIVE
MONOCYTES # BLD AUTO: 0.44 10*3/MM3 (ref 0.1–0.9)
MONOCYTES NFR BLD AUTO: 7.4 % (ref 5–12)
NEUTROPHILS NFR BLD AUTO: 2.91 10*3/MM3 (ref 1.7–7)
NEUTROPHILS NFR BLD AUTO: 48.7 % (ref 42.7–76)
NRBC BLD AUTO-RTO: 0 /100 WBC (ref 0–0.2)
OPIATES UR QL: NEGATIVE
OXYCODONE UR QL SCN: NEGATIVE
PCP UR QL SCN: NEGATIVE
PLATELET # BLD AUTO: 293 10*3/MM3 (ref 140–450)
PMV BLD AUTO: 9.9 FL (ref 6–12)
POTASSIUM BLDA-SCNC: 4 MMOL/L (ref 3.5–4.9)
PROTHROMBIN TIME: 14.3 SECONDS (ref 12.8–15.2)
RBC # BLD AUTO: 5.17 10*6/MM3 (ref 3.77–5.28)
SODIUM BLD-SCNC: 138 MMOL/L (ref 138–146)
TRICYCLICS UR QL SCN: NEGATIVE
TROPONIN T SERPL HS-MCNC: <6 NG/L
WBC NRBC COR # BLD AUTO: 5.97 10*3/MM3 (ref 3.4–10.8)
WHOLE BLOOD HOLD COAG: NORMAL
WHOLE BLOOD HOLD SPECIMEN: NORMAL

## 2024-08-06 PROCEDURE — 96374 THER/PROPH/DIAG INJ IV PUSH: CPT

## 2024-08-06 PROCEDURE — 85610 PROTHROMBIN TIME: CPT

## 2024-08-06 PROCEDURE — 99214 OFFICE O/P EST MOD 30 MIN: CPT

## 2024-08-06 PROCEDURE — 25010000002 DIPHENHYDRAMINE PER 50 MG: Performed by: EMERGENCY MEDICINE

## 2024-08-06 PROCEDURE — 84484 ASSAY OF TROPONIN QUANT: CPT | Performed by: EMERGENCY MEDICINE

## 2024-08-06 PROCEDURE — 25510000001 IOPAMIDOL PER 1 ML: Performed by: EMERGENCY MEDICINE

## 2024-08-06 PROCEDURE — 70498 CT ANGIOGRAPHY NECK: CPT

## 2024-08-06 PROCEDURE — 70551 MRI BRAIN STEM W/O DYE: CPT

## 2024-08-06 PROCEDURE — 80307 DRUG TEST PRSMV CHEM ANLYZR: CPT | Performed by: EMERGENCY MEDICINE

## 2024-08-06 PROCEDURE — 96375 TX/PRO/DX INJ NEW DRUG ADDON: CPT

## 2024-08-06 PROCEDURE — 71045 X-RAY EXAM CHEST 1 VIEW: CPT

## 2024-08-06 PROCEDURE — 70496 CT ANGIOGRAPHY HEAD: CPT

## 2024-08-06 PROCEDURE — 25810000003 SODIUM CHLORIDE 0.9 % SOLUTION: Performed by: EMERGENCY MEDICINE

## 2024-08-06 PROCEDURE — 80047 BASIC METABLC PNL IONIZED CA: CPT

## 2024-08-06 PROCEDURE — 85025 COMPLETE CBC W/AUTO DIFF WBC: CPT | Performed by: EMERGENCY MEDICINE

## 2024-08-06 PROCEDURE — 36415 COLL VENOUS BLD VENIPUNCTURE: CPT

## 2024-08-06 PROCEDURE — 82077 ASSAY SPEC XCP UR&BREATH IA: CPT | Performed by: EMERGENCY MEDICINE

## 2024-08-06 PROCEDURE — 85014 HEMATOCRIT: CPT

## 2024-08-06 PROCEDURE — 99285 EMERGENCY DEPT VISIT HI MDM: CPT

## 2024-08-06 PROCEDURE — 0042T HC CT CEREBRAL PERFUSION W/WO CONTRAST: CPT

## 2024-08-06 PROCEDURE — 85730 THROMBOPLASTIN TIME PARTIAL: CPT | Performed by: EMERGENCY MEDICINE

## 2024-08-06 PROCEDURE — 70450 CT HEAD/BRAIN W/O DYE: CPT

## 2024-08-06 PROCEDURE — 93005 ELECTROCARDIOGRAM TRACING: CPT | Performed by: EMERGENCY MEDICINE

## 2024-08-06 PROCEDURE — 25010000002 METOCLOPRAMIDE PER 10 MG: Performed by: EMERGENCY MEDICINE

## 2024-08-06 RX ORDER — METOCLOPRAMIDE HYDROCHLORIDE 5 MG/ML
10 INJECTION INTRAMUSCULAR; INTRAVENOUS ONCE
Status: COMPLETED | OUTPATIENT
Start: 2024-08-06 | End: 2024-08-06

## 2024-08-06 RX ORDER — DIPHENHYDRAMINE HYDROCHLORIDE 50 MG/ML
25 INJECTION INTRAMUSCULAR; INTRAVENOUS ONCE
Status: COMPLETED | OUTPATIENT
Start: 2024-08-06 | End: 2024-08-06

## 2024-08-06 RX ORDER — SODIUM CHLORIDE 0.9 % (FLUSH) 0.9 %
10 SYRINGE (ML) INJECTION AS NEEDED
Status: DISCONTINUED | OUTPATIENT
Start: 2024-08-06 | End: 2024-08-09 | Stop reason: HOSPADM

## 2024-08-06 RX ADMIN — METOCLOPRAMIDE 10 MG: 5 INJECTION, SOLUTION INTRAMUSCULAR; INTRAVENOUS at 20:11

## 2024-08-06 RX ADMIN — SODIUM CHLORIDE 1000 ML: 9 INJECTION, SOLUTION INTRAVENOUS at 20:10

## 2024-08-06 RX ADMIN — IOPAMIDOL 150 ML: 755 INJECTION, SOLUTION INTRAVENOUS at 19:39

## 2024-08-06 RX ADMIN — DIPHENHYDRAMINE HYDROCHLORIDE 25 MG: 50 INJECTION INTRAMUSCULAR; INTRAVENOUS at 20:11

## 2024-08-06 NOTE — CONSULTS
Stroke Consult Note    Patient Name: Kamila Garcia      MRN: [id number]  Age: 54 y.o.     Sex: female     : 1970  Primary Care Physician: Latricia Mishra APRN  Referring Physician: Dr. Washington, [facility] ED  Handedness: Right  Race:   Time Stroke Team Called:         Time Patient Seen:   Chief Complaint/Reason for Consultation: Left side weakness and numbness with visual deficits    HPI  Last Known Normal Date/Time: 1600 on 2024     This patient is a 54-year-old female with past medical history significant for hypertension, migraines (noted in chart, patient denies), remote DVT and PE, anxiety/depression, obesity (s/p gastric bypass ), and alcohol use (patient endorses 1 beer per night, 2024 admission note states patient endorsed 4 beers daily at that time) who presents to [facility] ED with concern for left-sided weakness and numbness with left visual field deficits.  Of note, patient was seen at [facility] 2024 for acute onset left-sided weakness and numbness and received TNK on 2024.  Subsequent MRI was negative for acute stroke and per Dr. Bautista's note symptoms were considered to be functional versus complex migraine, although MRI negative stroke could not be excluded at that time.  Patient has been on home Eliquis and aspirin since 2024 discharged from [facility].  Patient reports adherence to antithrombotic regimen.  Patient states above symptoms started at approximately 1630 this day.  Patient also endorses a headache that has been present since that time.  On my exam, NIH is 5 for mild LUE and LLE drift and left side decreased sensation to light touch.  Left HH is present during visual field assessment, however blink to threat is intact bilaterally from all visual fields.  Of additional note, during motor function assessment patient raises all extremities passively without difficulty then a few seconds later states she cannot keep her LUE or LLE elevated in both extremities  exhibit mild drift.  Code stroke CTh showed no evidence of acute intracranial abnormality.  CT perfusion showed no evidence of core infarct or significant territorial ischemic tissue at risk.  CTA H/N showed no evidence of LVO, aneurysm, or significant flow-limiting stenosis.  Patient was deemed not a candidate for IV thrombolytic therapy or emergent neurointervention.  Migraine cocktail was ordered per ED provider.  Stat MRI brain without contrast showed no evidence of acute infarct.    Upon reassessment by ED provider, the patient's neurologic symptoms had not improved.  Patient had difficulty walking because of her left-sided weakness.  Based on this reassessment, it was determined the patient would benefit from hospital admission for further workup.  Patient will be admitted to the hospital medicine service.    Review of Systems   Constitutional:  Negative for chills and fatigue.   HENT:  Negative for trouble swallowing.    Eyes:  Positive for visual disturbance.   Respiratory:  Negative for shortness of breath.    Cardiovascular:  Negative for chest pain.   Gastrointestinal:  Negative for abdominal pain.   Musculoskeletal:  Negative for myalgias.   Neurological:  Positive for weakness and numbness. Negative for tremors, facial asymmetry, speech difficulty, light-headedness and headaches.   Psychiatric/Behavioral:  Negative for behavioral problems. The patient is not nervous/anxious.       Objective  Neurological Exam  Mental Status  Alert. Oriented to person, place, time and situation. Speech is normal. Language is fluent with no aphasia. Attention and concentration are normal. Fund of knowledge is appropriate for level of education.    Cranial Nerves  CN II: Left homonymous hemianopsia. Left HH present during visual field assessment, however blink to threat present bilaterally in all visual fields..  CN III, IV, VI: Extraocular movements intact bilaterally. Pupils equal round and reactive to light  bilaterally.  CN V:  Right: Facial sensation is normal.  Left: Diminished sensation of the entire left side of the face.  CN VII:  Right: There is no facial weakness.  Left: There is no facial weakness.  CN VIII: Hearing grossly intact bilaterally.  CN IX, X: Palate elevates symmetrically  CN XII: Tongue midline without atrophy or fasciculations.    Motor  Normal muscle bulk throughout. Normal muscle tone.  RUE/RLE 5/5 strength.  LUE/LLE can be raised passively without difficulty, however 2 to 3 seconds into drift test patient states she cannot keep either extremity elevated and both extremities exhibit mild drift without falling to bed..    Sensory  Light touch abnormality:   Decree sensation to light touch LUE/LLE.    Coordination  Right: Finger-to-nose normal. Rapid alternating movement normal.Left: Finger-to-nose normal. Rapid alternating movement normal.    Physical Exam  Constitutional:       General: She is not in acute distress.  HENT:      Head: Normocephalic and atraumatic.      Mouth/Throat:      Mouth: Mucous membranes are moist.   Eyes:      Extraocular Movements: Extraocular movements intact.      Pupils: Pupils are equal, round, and reactive to light.   Cardiovascular:      Rate and Rhythm: Normal rate and regular rhythm.   Pulmonary:      Effort: Pulmonary effort is normal.   Musculoskeletal:         General: No swelling or deformity.   Skin:     General: Skin is warm and dry.   Neurological:      Mental Status: She is alert.   Psychiatric:         Mood and Affect: Mood normal.         Speech: Speech normal.         Behavior: Behavior normal.     Temp:  [98.3 °F (36.8 °C)] 98.3 °F (36.8 °C)  Heart Rate:  [64] 64  Resp:  [18] 18  BP: (135)/(117) 135/117    Past Medical History:   Diagnosis Date    Anxiety and depression     Chest pain     Disease of thyroid gland     reports slightly elevated when in hospital    DVT (deep venous thrombosis)     ETOH use 04/09/2024    History of pulmonary embolus (PE)      Hypertension     Hypothyroidism 04/09/2024    Migraines     Obesity (BMI 30-39.9) 04/09/2024    Shortness of breath     with chest pain episodes     Past Surgical History:   Procedure Laterality Date    CARDIAC CATHETERIZATION Left 3/11/2022    Procedure: Left Heart Cath;  Surgeon: Peter Anguiano MD;  Location:  [facility]ATH INVASIVE LOCATION;  Service: Cardiology;  Laterality: Left;  Hold Eliquis 2 days prior to [facility]    ENDOSCOPY N/A 1/12/2022    Procedure: ESOPHAGOGASTRODUODENOSCOPY;  Surgeon: Brunner, Mark I, MD;  Location:  [facility];  Service: Gastroenterology;  Laterality: N/A;    GASTRIC BYPASS  2007    lap cm en y    IR STENT PLACEMENT Left 10/2021    [facility] surgical in cath lab placed stent in left leg     Family History   Problem Relation Age of Onset    Heart attack Mother 50    Cancer Mother     Stroke Father     Breast cancer Maternal Grandmother     No Known Problems Maternal Grandfather     No Known Problems Paternal Grandmother     No Known Problems Paternal Grandfather      Social History     Socioeconomic History    Marital status:    Tobacco Use    Smoking status: Never    Smokeless tobacco: Never   Vaping Use    Vaping status: Never Used   Substance and Sexual Activity    Alcohol use: Yes     Alcohol/week: 4.0 standard drinks of alcohol     Types: 4 Cans of beer per week     Comment: daily    Drug use: Never    Sexual activity: Defer     Allergies   Allergen Reactions    Hydrocodone Hives    Lactose Intolerance (Gi) GI Intolerance     Prior to Admission medications    Medication Sig Start Date End Date Taking? Authorizing Provider   apixaban (ELIQUIS) 5 MG tablet tablet Start On 7/8: Take 1 tablet by mouth Every 12 (Twelve) Hours. 4/13/24   Ysabel Brown, RAFFY   buPROPion XL (WELLBUTRIN XL) 150 MG 24 hr tablet Take 1 tablet by mouth Daily. 4/17/24   Adilene Godinez,    eszopiclone (LUNESTA) 3 MG tablet Take 1 tablet by mouth Every Night. Take immediately before  bedtime    Emergency, Nurse Epic, RN   folic acid (FOLVITE) 1 MG tablet Take 1 tablet by mouth Daily. 4/17/24   Adilene Godinez DO   lactobacillus acidophilus (RISAQUAD) capsule capsule Take 1 capsule by mouth Daily. 4/17/24   Adilene Godinez DO   losartan (COZAAR) 50 MG tablet Take 1 tablet by mouth Daily. 4/17/24   Adilene Godinez DO   multivitamin with minerals tablet tablet Take 1 tablet by mouth Daily. 4/17/24   Adilene Godinez DO   OLANZapine (zyPREXA) 10 MG tablet Take 1 tablet by mouth Every Night.    Emergency, Nurse Epic, RN   thiamine (VITAMIN B1) 100 MG tablet Take 1 tablet by mouth Daily. 4/16/24   Adilene Godinez DO   vitamin B-12 (VITAMIN B-12) 1000 MCG tablet Take 1 tablet by mouth Daily. 4/17/24   Adilene Godinez DO     Acute Stroke Data  Alteplase (tPA) Inclusion / Exclusion Criteria  Person Administering Scale: Rajiv Serrano PA-C    Inclusion Criteria  [x]   18 years of age or greater   [x]   Onset of symptoms < 4.5 hours before beginning treatment (stroke onset = time patient was last seen well or without symptoms).   []   Diagnosis of acute ischemic stroke causing measurable disabling deficit (Complete Hemianopia, Any Aphasia, Visual or Sensory Extinction, Any weakness limiting sustained effort against gravity)   []   Any remaining deficit considered potentially disabling in view of patient and practitioner   Exclusion criteria (Do not proceed with Alteplase if any are checked under exclusion criteria)  []   Onset unknown or GREATER than 4.5 hours   []   ICH on CT/MRI   []   CT demonstrates hypodensity representing acute or subacute infarct   []   Significant head trauma or prior stroke in the previous 3 months   []   Symptoms suggestive of subarachnoid hemorrhage   []   History of un-ruptured intracranial aneurysm GREATER than 10 mm   []   Recent intracranial or intraspinal surgery within the last 3 months   []   Arterial puncture at a non-compressible site in the previous  7 days   []   Active internal bleeding   []   Acute bleeding tendency   []   Platelet count LESS than 100,000 for known hematological diseases such as leukemia, thrombocytopenia or chronic cirrhosis   []   Current use of anticoagulant with INR GREATER than 1.7 or PT GREATER than 15 seconds, aPTT GREATER than 40 seconds   []   Heparin received within 48 hours, resulting in abnormally elevated aPTT GREATER than upper limit of normal   [x]   Current use of direct thrombin inhibitors or direct factor Xa inhibitors in the past 48 hours   []   Elevated blood pressure refractory to treatment (systolic GREATER than 185 mm/Hg or diastolic  GREATER than 110 mm/Hg   []   Suspected infective endocarditis and aortic arch dissection   []   Current use of therapeutic treatment dose of low-molecular-weight heparin (LMWH) within the previous 24 hours   []   Structural GI malignancy or bleed   Relative exclusion for all patients  []   Only minor non-disabling symptoms   []   Pregnancy   []   Seizure at onset with postictal residual neurological impairments   []   Major surgery or previous trauma within past 14 days   []   History of previous spontaneous ICH, intracranial neoplasm, or AV malformation   []   Postpartum (within previous 14 days)   []   Recent GI or urinary tract hemorrhage (within previous 21 days)   []   Recent acute MI (within previous 3 months)   []   History of un-ruptured intracranial aneurysm LESS than 10 mm   []   History of ruptured intracranial aneurysm   []   Blood glucose LESS than 50 mg/dL (2.7 mmol/L)   []   Dural puncture within the last 7 days   []   Known GREATER than 10 cerebral microbleeds   Additional exclusions for patients with symptoms onset between 3 and 4.5 hours.  []   Age > 80.   []   On any anticoagulants regardless of INR  >>> Warfarin (Coumadin), Heparin, Enoxaparin (Lovenox), fondaparinux (Arixtra), bivalirudin (Angiomax), Argatroban, dabigatran (Pradaxa), rivaroxaban (Xarelto), or apixaban  (Eliquis)   []   Severe stroke (NIHSS > 25).   []   History of BOTH diabetes and previous ischemic stroke.   []   The risks and benefits have been discussed with the patient or family related to the administration of IV Alteplase for stroke symptoms.   []   I have discussed and reviewed the patient's case and imaging with the attending prior to IV Alteplase.   N/A Time Alteplase administered     MODIFIED RAYMOND SCALE (to be assessed for each patient having history of stroke) []Stroke history but not assessed  [x]0: No symptoms at all  []1: No significant disability despite symptoms  []2: Slight disability  []3: Moderate disability  []4: Moderately severe disability  []5: Severe disability  []6: Death      NIH Stroke Scale  Time: 1830  Person Administering Scale: Rajiv Serrano PA-C    1a  Level of consciousness: 0=alert; keenly responsive   1b. LOC questions:  0=Performs both tasks correctly   1c. LOC commands: 0=Performs both tasks correctly   2.  Best Gaze: 0=normal   3.  Visual: 2=Complete hemianopia   4. Facial Palsy: 0=Normal symmetric movement   5a.  Motor left arm: 1=Drift, limb holds 90 (or 45) degrees but drifts down before full 10 seconds: does not hit bed   5b.  Motor right arm: 0=No drift, limb holds 90 (or 45) degrees for full 10 seconds   6a. motor left le=Drift, limb holds 90 (or 45) degrees but drifts down before full 10 seconds: does not hit bed   6b  Motor right le=No drift, limb holds 90 (or 45) degrees for full 10 seconds   7. Limb Ataxia: 0=Absent   8.  Sensory: 1=Mild to moderate sensory loss; patient feels pinprick is less sharp or is dull on the affected side; there is a loss of superficial pain with pinprick but patient is aware She is being touched   9. Best Language:  0=No aphasia, normal   10. Dysarthria: 0=Normal   11. Extinction and Inattention: 0=No abnormality    Total:    5     Hospital Meds  Scheduled-    Infusions-     PRNs-   sodium chloride    Results Reviewed  I have  personally reviewed current lab, radiology, and data and agree with results.    MRI Brain Without Contrast    Result Date: 8/6/2024  Impression: 1.No acute intracranial process identified. 2.Findings suggestive of minimal chronic small vessel ischemic disease. Electronically Signed: José oPpe MD  8/6/2024 9:32 PM EDT  Workstation ID: [id number]    XR Chest 1 View    Result Date: 8/6/2024  Impression: No acute cardiopulmonary process. Electronically Signed: José Pope MD  8/6/2024 8:48 PM EDT  Workstation ID: [id number]    CT Angiogram Head w AI Analysis of LVO    Result Date: 8/6/2024  Impression: Major arterial vasculature within head and neck appears widely patent, with no hemodynamically significant stenosis, dissection, thrombus, or aneurysm. Electronically Signed: José Pope MD  8/6/2024 7:55 PM EDT  Workstation ID: [id number]    CT Angiogram Neck    Result Date: 8/6/2024  Impression: Major arterial vasculature within head and neck appears widely patent, with no hemodynamically significant stenosis, dissection, thrombus, or aneurysm. Electronically Signed: José Pope MD  8/6/2024 7:55 PM EDT  Workstation ID: [id number]    CT CEREBRAL PERFUSION WITH & WITHOUT CONTRAST    Result Date: 8/6/2024  Impression: Examination appears within normal limits. Electronically Signed: José Pope MD  8/6/2024 7:48 PM EDT  Workstation ID: [id number]    CT Head Without Contrast Stroke Protocol    Result Date: 8/6/2024  Impression: No acute intracranial pathology. Electronically Signed: Santiago Bacon MD  8/6/2024 7:37 PM EDT  Workstation ID: [id number]      Results for orders placed during the hospital encounter of 04/09/24    Adult Transthoracic Echo Complete W/ Cont if Necessary Per Protocol (With Agitated Saline)    Interpretation Summary    Left ventricular systolic function is normal. Calculated left ventricular EF = 53.6%    Left ventricular wall thickness is consistent with mild concentric hypertrophy.     Saline test results are negative.    Estimated right ventricular systolic pressure from tricuspid regurgitation is normal (<35 mmHg).    The aortic valve exhibits sclerosis.    Significant Labs  Sodium: 138  Creatinine: 1.00  Glucose: 83  WBC: 5.97  Hgb/HCT: 14.9/44.3  Platelets: 293    Assessment and Plan  This patient is a 54-year-old female with risk factors significant for HTN, migraines, remote DVT and PE, anxiety/depression, obesity (s/p gastric bypass 2007), alcohol use.  Patient seen at [facility] ED 4/2024 and received TNK for left-sided weakness and numbness.  Further eval determined symptoms were complex migraine vs functional symptoms vs MRI negative stroke.  Patient presented to [facility] ED 8/6/2024 with left-sided weakness and numbness with left visual field deficit.  NIH 5.  Code stroke CT imaging with no acute abnormality.  Stat MRI brain with no evidence of acute infarct.  Patient was not a candidate for IV thrombolytics or emergent neurointervention.    Antiplatelet PTA: Aspirin  Anticoagulant PTA: Eliquis    Left-sided weakness and numbness  Left visual field deficit  Headache  -Differentials include acute stroke, TIA, complex migraine, functional neurologic symptoms  -Code stroke CTh with no evidence of acute intracranial abnormality  -CTA H/N with no evidence of LVO, aneurysm, or significant flow-limiting stenosis  -CT perfusion with no evidence of core infarct or significant territorial ischemic tissue at risk  -MRI brain without contrast showed no evidence of acute infarct  -Continue home aspirin 81 mg daily  -Okay to continue home Eliquis  -Allow permissive hypertension, SBP goal < 200  -NPO pending bedside swallow eval  -TTE 4/2024 with negative saline test, no evidence of SARAVANAN/LAE  -LDL pending, start atorvastatin 80 mg nightly  -A1c pending  -Serial neurochecks per policy, stat CTh for any acute neurological change  -PT/OT/SLP as appropriate    Disposition: Admit to hospital medicine      Case  discussed with the patient, and Dr. Garcia.  Thank you for the consult. Stroke neurology will continue to follow.       Rajiv Serrano PA-C  [facility]

## 2024-08-07 PROBLEM — F10.90 ALCOHOL USE: Status: ACTIVE | Noted: 2024-08-07

## 2024-08-07 PROBLEM — R20.0 NUMBNESS AND TINGLING: Status: ACTIVE | Noted: 2024-08-07

## 2024-08-07 PROBLEM — Z78.9 ALCOHOL USE: Status: ACTIVE | Noted: 2024-08-07

## 2024-08-07 PROBLEM — R53.1 LEFT-SIDED WEAKNESS: Status: ACTIVE | Noted: 2024-08-07

## 2024-08-07 PROBLEM — R20.2 NUMBNESS AND TINGLING: Status: ACTIVE | Noted: 2024-08-07

## 2024-08-07 PROBLEM — R53.1 WEAKNESS: Status: ACTIVE | Noted: 2024-08-07

## 2024-08-07 LAB
ALBUMIN SERPL-MCNC: 3.4 G/DL (ref 3.5–5.2)
ALBUMIN/GLOB SERPL: 1.2 G/DL
ALP SERPL-CCNC: 103 U/L (ref 39–117)
ALT SERPL W P-5'-P-CCNC: 7 U/L (ref 1–33)
ANION GAP SERPL CALCULATED.3IONS-SCNC: 11 MMOL/L (ref 5–15)
AST SERPL-CCNC: 17 U/L (ref 1–32)
BACTERIA UR QL AUTO: ABNORMAL /HPF
BILIRUB SERPL-MCNC: 0.9 MG/DL (ref 0–1.2)
BILIRUB UR QL STRIP: NEGATIVE
BUN SERPL-MCNC: 8 MG/DL (ref 6–20)
BUN/CREAT SERPL: 10.1 (ref 7–25)
CALCIUM SPEC-SCNC: 8.4 MG/DL (ref 8.6–10.5)
CHLORIDE SERPL-SCNC: 105 MMOL/L (ref 98–107)
CHOLEST SERPL-MCNC: 173 MG/DL (ref 0–200)
CLARITY UR: CLEAR
CO2 SERPL-SCNC: 23 MMOL/L (ref 22–29)
COLOR UR: YELLOW
CREAT SERPL-MCNC: 0.79 MG/DL (ref 0.57–1)
DEPRECATED RDW RBC AUTO: 41.8 FL (ref 37–54)
EGFRCR SERPLBLD CKD-EPI 2021: 87.9 ML/MIN/1.73
ERYTHROCYTE [DISTWIDTH] IN BLOOD BY AUTOMATED COUNT: 13.3 % (ref 12.3–15.4)
FOLATE SERPL-MCNC: 12.2 NG/ML (ref 4.78–24.2)
GLOBULIN UR ELPH-MCNC: 2.8 GM/DL
GLUCOSE BLDC GLUCOMTR-MCNC: 98 MG/DL (ref 70–130)
GLUCOSE SERPL-MCNC: 90 MG/DL (ref 65–99)
GLUCOSE UR STRIP-MCNC: NEGATIVE MG/DL
HBA1C MFR BLD: 5.1 % (ref 4.8–5.6)
HCT VFR BLD AUTO: 40.6 % (ref 34–46.6)
HDLC SERPL-MCNC: 81 MG/DL (ref 40–60)
HGB BLD-MCNC: 13.5 G/DL (ref 12–15.9)
HGB UR QL STRIP.AUTO: ABNORMAL
HYALINE CASTS UR QL AUTO: ABNORMAL /LPF
KETONES UR QL STRIP: NEGATIVE
LDLC SERPL CALC-MCNC: 78 MG/DL (ref 0–100)
LDLC/HDLC SERPL: 0.95 {RATIO}
LEUKOCYTE ESTERASE UR QL STRIP.AUTO: NEGATIVE
MAGNESIUM SERPL-MCNC: 2 MG/DL (ref 1.6–2.6)
MCH RBC QN AUTO: 28.7 PG (ref 26.6–33)
MCHC RBC AUTO-ENTMCNC: 33.3 G/DL (ref 31.5–35.7)
MCV RBC AUTO: 86.4 FL (ref 79–97)
NITRITE UR QL STRIP: NEGATIVE
PH UR STRIP.AUTO: 5.5 [PH] (ref 5–8)
PLATELET # BLD AUTO: 245 10*3/MM3 (ref 140–450)
PMV BLD AUTO: 9.9 FL (ref 6–12)
POTASSIUM SERPL-SCNC: 4.1 MMOL/L (ref 3.5–5.2)
PROT SERPL-MCNC: 6.2 G/DL (ref 6–8.5)
PROT UR QL STRIP: NEGATIVE
QT INTERVAL: 494 MS
QTC INTERVAL: 489 MS
RBC # BLD AUTO: 4.7 10*6/MM3 (ref 3.77–5.28)
RBC # UR STRIP: ABNORMAL /HPF
REF LAB TEST METHOD: ABNORMAL
SODIUM SERPL-SCNC: 139 MMOL/L (ref 136–145)
SP GR UR STRIP: 1.05 (ref 1–1.03)
SQUAMOUS #/AREA URNS HPF: ABNORMAL /HPF
TRIGL SERPL-MCNC: 76 MG/DL (ref 0–150)
TSH SERPL DL<=0.05 MIU/L-ACNC: 9.9 UIU/ML (ref 0.27–4.2)
UROBILINOGEN UR QL STRIP: ABNORMAL
VIT B12 BLD-MCNC: 198 PG/ML (ref 211–946)
VLDLC SERPL-MCNC: 14 MG/DL (ref 5–40)
WBC # UR STRIP: ABNORMAL /HPF
WBC NRBC COR # BLD AUTO: 5.49 10*3/MM3 (ref 3.4–10.8)

## 2024-08-07 PROCEDURE — 99214 OFFICE O/P EST MOD 30 MIN: CPT | Performed by: PSYCHIATRY & NEUROLOGY

## 2024-08-07 PROCEDURE — 81001 URINALYSIS AUTO W/SCOPE: CPT | Performed by: NURSE PRACTITIONER

## 2024-08-07 PROCEDURE — 93010 ELECTROCARDIOGRAM REPORT: CPT | Performed by: INTERNAL MEDICINE

## 2024-08-07 PROCEDURE — 25810000003 SODIUM CHLORIDE 0.9 % SOLUTION: Performed by: NURSE PRACTITIONER

## 2024-08-07 PROCEDURE — 97161 PT EVAL LOW COMPLEX 20 MIN: CPT

## 2024-08-07 PROCEDURE — G0378 HOSPITAL OBSERVATION PER HR: HCPCS

## 2024-08-07 PROCEDURE — 92523 SPEECH SOUND LANG COMPREHEN: CPT

## 2024-08-07 PROCEDURE — 80061 LIPID PANEL: CPT

## 2024-08-07 PROCEDURE — 99222 1ST HOSP IP/OBS MODERATE 55: CPT | Performed by: NURSE PRACTITIONER

## 2024-08-07 PROCEDURE — 97165 OT EVAL LOW COMPLEX 30 MIN: CPT

## 2024-08-07 PROCEDURE — 83036 HEMOGLOBIN GLYCOSYLATED A1C: CPT | Performed by: NURSE PRACTITIONER

## 2024-08-07 PROCEDURE — 82948 REAGENT STRIP/BLOOD GLUCOSE: CPT

## 2024-08-07 PROCEDURE — 80050 GENERAL HEALTH PANEL: CPT | Performed by: NURSE PRACTITIONER

## 2024-08-07 PROCEDURE — 25010000002 DIPHENHYDRAMINE PER 50 MG: Performed by: PSYCHIATRY & NEUROLOGY

## 2024-08-07 PROCEDURE — 93005 ELECTROCARDIOGRAM TRACING: CPT | Performed by: NURSE PRACTITIONER

## 2024-08-07 PROCEDURE — 82746 ASSAY OF FOLIC ACID SERUM: CPT | Performed by: NURSE PRACTITIONER

## 2024-08-07 PROCEDURE — 25010000002 PROCHLORPERAZINE 10 MG/2ML SOLUTION: Performed by: PSYCHIATRY & NEUROLOGY

## 2024-08-07 PROCEDURE — 82607 VITAMIN B-12: CPT | Performed by: NURSE PRACTITIONER

## 2024-08-07 PROCEDURE — 83735 ASSAY OF MAGNESIUM: CPT | Performed by: NURSE PRACTITIONER

## 2024-08-07 RX ORDER — ACETAMINOPHEN 325 MG/1
650 TABLET ORAL ONCE
Status: COMPLETED | OUTPATIENT
Start: 2024-08-07 | End: 2024-08-07

## 2024-08-07 RX ORDER — FOLIC ACID 1 MG/1
1 TABLET ORAL DAILY
Status: DISCONTINUED | OUTPATIENT
Start: 2024-08-07 | End: 2024-08-09 | Stop reason: HOSPADM

## 2024-08-07 RX ORDER — BUPROPION HYDROCHLORIDE 150 MG/1
150 TABLET ORAL DAILY
Status: DISCONTINUED | OUTPATIENT
Start: 2024-08-07 | End: 2024-08-09 | Stop reason: HOSPADM

## 2024-08-07 RX ORDER — SODIUM CHLORIDE 0.9 % (FLUSH) 0.9 %
10 SYRINGE (ML) INJECTION AS NEEDED
Status: DISCONTINUED | OUTPATIENT
Start: 2024-08-07 | End: 2024-08-09 | Stop reason: HOSPADM

## 2024-08-07 RX ORDER — SODIUM CHLORIDE 9 MG/ML
100 INJECTION, SOLUTION INTRAVENOUS CONTINUOUS
Status: DISCONTINUED | OUTPATIENT
Start: 2024-08-07 | End: 2024-08-07

## 2024-08-07 RX ORDER — LANOLIN ALCOHOL/MO/W.PET/CERES
1000 CREAM (GRAM) TOPICAL DAILY
Status: DISCONTINUED | OUTPATIENT
Start: 2024-08-07 | End: 2024-08-09 | Stop reason: HOSPADM

## 2024-08-07 RX ORDER — BISACODYL 5 MG/1
5 TABLET, DELAYED RELEASE ORAL DAILY PRN
Status: DISCONTINUED | OUTPATIENT
Start: 2024-08-07 | End: 2024-08-09 | Stop reason: HOSPADM

## 2024-08-07 RX ORDER — ASPIRIN 300 MG/1
300 SUPPOSITORY RECTAL DAILY
Status: DISCONTINUED | OUTPATIENT
Start: 2024-08-07 | End: 2024-08-09 | Stop reason: HOSPADM

## 2024-08-07 RX ORDER — PROCHLORPERAZINE EDISYLATE 5 MG/ML
10 INJECTION INTRAMUSCULAR; INTRAVENOUS EVERY 6 HOURS
Status: COMPLETED | OUTPATIENT
Start: 2024-08-07 | End: 2024-08-08

## 2024-08-07 RX ORDER — ASPIRIN 81 MG/1
81 TABLET, CHEWABLE ORAL DAILY
Status: DISCONTINUED | OUTPATIENT
Start: 2024-08-07 | End: 2024-08-09 | Stop reason: HOSPADM

## 2024-08-07 RX ORDER — ATORVASTATIN CALCIUM 40 MG/1
80 TABLET, FILM COATED ORAL NIGHTLY
Status: DISCONTINUED | OUTPATIENT
Start: 2024-08-07 | End: 2024-08-09 | Stop reason: HOSPADM

## 2024-08-07 RX ORDER — BISACODYL 10 MG
10 SUPPOSITORY, RECTAL RECTAL DAILY PRN
Status: DISCONTINUED | OUTPATIENT
Start: 2024-08-07 | End: 2024-08-09 | Stop reason: HOSPADM

## 2024-08-07 RX ORDER — ACETAMINOPHEN 325 MG/1
650 TABLET ORAL EVERY 4 HOURS PRN
Status: DISCONTINUED | OUTPATIENT
Start: 2024-08-07 | End: 2024-08-09 | Stop reason: HOSPADM

## 2024-08-07 RX ORDER — DIPHENHYDRAMINE HYDROCHLORIDE 50 MG/ML
25 INJECTION INTRAMUSCULAR; INTRAVENOUS EVERY 6 HOURS
Status: COMPLETED | OUTPATIENT
Start: 2024-08-07 | End: 2024-08-08

## 2024-08-07 RX ORDER — OLANZAPINE 5 MG/1
10 TABLET ORAL NIGHTLY
Status: DISCONTINUED | OUTPATIENT
Start: 2024-08-07 | End: 2024-08-09 | Stop reason: HOSPADM

## 2024-08-07 RX ORDER — AMOXICILLIN 250 MG
2 CAPSULE ORAL 2 TIMES DAILY PRN
Status: DISCONTINUED | OUTPATIENT
Start: 2024-08-07 | End: 2024-08-09 | Stop reason: HOSPADM

## 2024-08-07 RX ORDER — SODIUM CHLORIDE 0.9 % (FLUSH) 0.9 %
10 SYRINGE (ML) INJECTION EVERY 12 HOURS SCHEDULED
Status: DISCONTINUED | OUTPATIENT
Start: 2024-08-07 | End: 2024-08-09 | Stop reason: HOSPADM

## 2024-08-07 RX ORDER — L.ACID,PARA/B.BIFIDUM/S.THERM 8B CELL
1 CAPSULE ORAL DAILY
Status: DISCONTINUED | OUTPATIENT
Start: 2024-08-07 | End: 2024-08-09 | Stop reason: HOSPADM

## 2024-08-07 RX ORDER — SODIUM CHLORIDE 9 MG/ML
40 INJECTION, SOLUTION INTRAVENOUS AS NEEDED
Status: DISCONTINUED | OUTPATIENT
Start: 2024-08-07 | End: 2024-08-09 | Stop reason: HOSPADM

## 2024-08-07 RX ORDER — MULTIPLE VITAMINS W/ MINERALS TAB 9MG-400MCG
1 TAB ORAL DAILY
Status: DISCONTINUED | OUTPATIENT
Start: 2024-08-07 | End: 2024-08-09 | Stop reason: HOSPADM

## 2024-08-07 RX ORDER — ONDANSETRON 4 MG/1
4 TABLET, ORALLY DISINTEGRATING ORAL EVERY 6 HOURS PRN
Status: DISCONTINUED | OUTPATIENT
Start: 2024-08-07 | End: 2024-08-09 | Stop reason: HOSPADM

## 2024-08-07 RX ORDER — POLYETHYLENE GLYCOL 3350 17 G/17G
17 POWDER, FOR SOLUTION ORAL DAILY PRN
Status: DISCONTINUED | OUTPATIENT
Start: 2024-08-07 | End: 2024-08-09 | Stop reason: HOSPADM

## 2024-08-07 RX ADMIN — ATORVASTATIN CALCIUM 80 MG: 40 TABLET, FILM COATED ORAL at 02:36

## 2024-08-07 RX ADMIN — Medication 10 ML: at 21:07

## 2024-08-07 RX ADMIN — ACETAMINOPHEN 650 MG: 325 TABLET ORAL at 10:15

## 2024-08-07 RX ADMIN — FOLIC ACID 1 MG: 1 TABLET ORAL at 10:15

## 2024-08-07 RX ADMIN — PROCHLORPERAZINE EDISYLATE 10 MG: 5 INJECTION INTRAMUSCULAR; INTRAVENOUS at 17:18

## 2024-08-07 RX ADMIN — ASPIRIN 81 MG 81 MG: 81 TABLET ORAL at 10:15

## 2024-08-07 RX ADMIN — DIPHENHYDRAMINE HYDROCHLORIDE 25 MG: 50 INJECTION INTRAMUSCULAR; INTRAVENOUS at 17:18

## 2024-08-07 RX ADMIN — DIPHENHYDRAMINE HYDROCHLORIDE 25 MG: 50 INJECTION INTRAMUSCULAR; INTRAVENOUS at 22:48

## 2024-08-07 RX ADMIN — BUPROPION HYDROCHLORIDE 150 MG: 150 TABLET, EXTENDED RELEASE ORAL at 10:15

## 2024-08-07 RX ADMIN — SODIUM CHLORIDE 100 ML/HR: 9 INJECTION, SOLUTION INTRAVENOUS at 02:36

## 2024-08-07 RX ADMIN — Medication 10 ML: at 21:08

## 2024-08-07 RX ADMIN — APIXABAN 5 MG: 5 TABLET, FILM COATED ORAL at 10:15

## 2024-08-07 RX ADMIN — THIAMINE HCL TAB 100 MG 100 MG: 100 TAB at 10:14

## 2024-08-07 RX ADMIN — Medication 1 TABLET: at 10:15

## 2024-08-07 RX ADMIN — OLANZAPINE 10 MG: 5 TABLET, FILM COATED ORAL at 21:07

## 2024-08-07 RX ADMIN — APIXABAN 5 MG: 5 TABLET, FILM COATED ORAL at 21:06

## 2024-08-07 RX ADMIN — PROCHLORPERAZINE EDISYLATE 10 MG: 5 INJECTION INTRAMUSCULAR; INTRAVENOUS at 22:48

## 2024-08-07 RX ADMIN — Medication 10 ML: at 02:36

## 2024-08-07 RX ADMIN — ACETAMINOPHEN 650 MG: 325 TABLET ORAL at 00:52

## 2024-08-07 RX ADMIN — Medication 1 CAPSULE: at 10:15

## 2024-08-07 RX ADMIN — CYANOCOBALAMIN TAB 1000 MCG 1000 MCG: 1000 TAB at 10:15

## 2024-08-07 RX ADMIN — ATORVASTATIN CALCIUM 80 MG: 40 TABLET, FILM COATED ORAL at 21:07

## 2024-08-07 RX ADMIN — OLANZAPINE 10 MG: 5 TABLET, FILM COATED ORAL at 03:04

## 2024-08-07 NOTE — THERAPY EVALUATION
Acute Care - Speech Language Pathology Initial Evaluation  [facility]  Cognitive-Communication Evaluation     Patient Name: Kamila Garcia  : 1970  MRN: [id number]  Today's Date: 2024               Admit Date: 2024     Visit Dx:    ICD-10-CM ICD-9-CM   1. Left-sided weakness  R53.1 728.87   2. Cognitive communication deficit  R41.841 799.52     Patient Active Problem List   Diagnosis    Acute pulmonary embolism    Chest pain    H/O LLE DVT & PE (2021)    History of pulmonary embolism    Insomnia    Hypertension    Palpitations    Suspected cerebrovascular accident (CVA)    Hypertensive urgency    Obesity (BMI 30-39.9)    ETOH use    Anxiety and depression    Syncope and collapse    Left-sided weakness    Numbness and tingling    Alcohol use     Past Medical History:   Diagnosis Date    Anxiety and depression     Chest pain     Disease of thyroid gland     reports slightly elevated when in hospital    DVT (deep venous thrombosis)     ETOH use 2024    History of pulmonary embolus (PE)     Hypertension     Hypothyroidism 2024    Migraines     Obesity (BMI 30-39.9) 2024    Shortness of breath     with chest pain episodes     Past Surgical History:   Procedure Laterality Date    CARDIAC CATHETERIZATION Left 3/11/2022    Procedure: Left Heart Cath;  Surgeon: Peter Anguiano MD;  Location: [facility] CATH INVASIVE LOCATION;  Service: Cardiology;  Laterality: Left;  Hold Eliquis 2 days prior to [facility]    ENDOSCOPY N/A 2022    Procedure: ESOPHAGOGASTRODUODENOSCOPY;  Surgeon: Brunner, Mark I, MD;  Location: [facility] ENDOSCOPY;  Service: Gastroenterology;  Laterality: N/A;    GASTRIC BYPASS      lap cm en y    IR STENT PLACEMENT Left 10/2021    xin surgical in cath lab placed stent in left leg       SLP Recommendation and Plan  SLP Diagnosis: mild, cognitive-linguistic disorder (24 1630)              SLC Criteria for Skilled Therapy Interventions Met: yes (24  1630)  Anticipated Discharge Disposition (SLP): home with home health (08/07/24 1630)        Therapy Frequency (SLP SLC): 5 days per week (08/07/24 1630)  Predicted Duration Therapy Intervention (Days): 1 week (08/07/24 1630)                             Plan of Care Reviewed With: patient (08/07/24 1712)      SLP EVALUATION (Last 72 Hours)       SLP SLC Evaluation       Row Name 08/07/24 1630                   Communication Assessment/Intervention    Document Type evaluation  -AC        Subjective Information no complaints  -AC        Patient Observations alert;cooperative  -AC        Patient/Family/Caregiver Comments/Observations No family present.  -AC        Patient Effort good  -AC           General Information    Patient Profile Reviewed yes  -AC        Pertinent History Of Current Problem L weakness, L visual deficits. MRI negative for acute stroke. Possible atypical migraine per chart. Hx CVA, alcohol use.  -AC        Precautions/Limitations, Vision other (see comments)  pt reported some L visual changes, but did not affect pt's ability to complete tasks during eval  -AC        Precautions/Limitations, Hearing WFL;for purposes of eval  -AC        Patient Level of Education Some college  -        Prior Level of Function-Communication WFL  -AC        Plans/Goals Discussed with patient;agreed upon  -        Barriers to Rehab none identified  -        Patient's Goals for Discharge patient did not state  -AC           Pain    Additional Documentation Pain Scale: Numbers Pre/Post-Treatment (Group)  -AC           Pain Scale: Numbers Pre/Post-Treatment    Pretreatment Pain Rating 0/10 - no pain  -AC        Posttreatment Pain Rating 0/10 - no pain  -AC           Comprehension Assessment/Intervention    Comprehension Assessment/Intervention Auditory Comprehension;Reading Comprehension  -AC           Auditory Comprehension Assessment/Intervention    Auditory Comprehension (Communication) WFL  -AC        Answers  Questions (Communication) WFL;complex;yes/no  -AC        Able to Follow Commands (Communication) WFL;2-step  -AC        Narrative Discourse WFL;conversational level  -AC           Reading Comprehension Assessment/Intervention    Reading Comprehension (Communication) WFL  -AC        Phrase Level WFL  -AC           Expression Assessment/Intervention    Expression Assessment/Intervention verbal expression;graphic expression  -AC           Verbal Expression Assessment/Intervention    Verbal Expression mild impairment  -AC        Automatic Speech (Communication) WFL;response to greeting  -AC        Responsive Naming mild impairment;delayed responses  -AC        Confrontational Naming mild impairment;delayed responses  -AC        Conversational Discourse/Fluency mild impairment;delayed responses  occasionally  -AC           Graphic Expression Assessment/Intervention    Graphic Expression WFL;dominant hand  -AC        Sentence Formulation WFL;complex  -AC           Motor Speech Assessment/Intervention    Motor Speech Function WFL;unfamiliar listener  -AC        Conversational Speech (Communication) WFL;simple  -AC        Speech intelligibility 100%;in connected speech;in quiet environment;with unfamiliar listener  -AC           Cursory Voice Assessment/Intervention    Quality and Resonance (Voice) WFL  -AC           Cognitive Assessment Intervention- SLP    Cognitive Function (Cognition) mild impairment  -AC        Orientation Status (Cognition) WFL;person;place;time;situation  -AC        Memory (Cognitive) WFL;short-term;immediate;unrelated;other (see comments)  immediate/5-min delayed recall: 5/5 words  -AC        Attention (Cognitive) WFL;sustained;selective;alternating  -AC        Thought Organization (Cognitive) mild impairment;concrete divergent;mental manipulation  -AC        Reasoning (Cognitive) mild impairment;mental flexibility  -AC        Problem Solving (Cognitive) WFL;temporal  -AC        Functional Math  (Cognitive) WFL;word problems;money calculation;calculator assisted  -AC        Right Hemisphere Function other (see comments);WFL;visuo-spatial  possible flat affect  -AC           SLP Evaluation Clinical Impressions    SLP Diagnosis mild;cognitive-linguistic disorder  -AC        Rehab Potential/Prognosis good  -AC        SLC Criteria for Skilled Therapy Interventions Met yes  -AC        Functional Impact restrictions in personal and social life  -AC           Recommendations    Therapy Frequency (SLP SLC) 5 days per week  -AC        Predicted Duration Therapy Intervention (Days) 1 week  -AC        Anticipated Discharge Disposition (SLP) home with home health  -AC                  User Key  (r) = Recorded By, (t) = Taken By, (c) = Cosigned By      Initials Name Effective Dates    Lelo Garibay MS CCC-SLP 02/03/23 -                        EDUCATION  The patient has been educated in the following areas:     Cognitive Impairment.           SLP GOALS       Row Name 08/07/24 1630             Patient will demonstrate functional cognitive-linguistic skills for return to discharge environment    [place] with minimal cues  -AC      Time frame by discharge  -AC         Organizational Skills Goal 1 (SLP)    Improve Thought Organization Through Goal 1 (SLP) completing a divergent naming task;completing mental manipulation task;90%;with minimal cues (75-90%)  -AC      Time Frame (Thought Organization Skills Goal 1, SLP) 1 week  -AC         Reasoning Goal 1 (SLP)    Improve Reasoning Through Goal 1 (SLP) complete mental flexibility task;90%;with minimal cues (75-90%)  -AC      Time Frame (Reasoning Goal 1, SLP) 1 week  -AC                User Key  (r) = Recorded By, (t) = Taken By, (c) = Cosigned By      Initials Name Provider Type    Lelo Garibay MS CCC-SLP Speech and Language Pathologist                              Time Calculation:      Time Calculation- SLP       Row Name 08/07/24 1712             Time  Calculation- SLP    SLP Start Time 1630  -      SLP Received On 08/07/24  -         Untimed Charges    [id number]-WS Eval Speech and Production w/ Language Minutes 39  -AC         Total Minutes    Untimed Charges Total Minutes 39  -AC       Total Minutes 39  -AC                User Key  (r) = Recorded By, (t) = Taken By, (c) = Cosigned By      Initials Name Provider Type     Lelo Wilks MS CCC-SLP Speech and Language Pathologist                    Therapy Charges for Today       Code Description Service Date Service Provider Modifiers Qty    [id number]  ST EVAL SPEECH AND PROD W LANG  3 8/7/2024 Lleo Wilks MS CCC-SLP GN 1                       Lelo Wilks MS CCC-SLP  8/7/2024

## 2024-08-07 NOTE — PLAN OF CARE
Goal Outcome Evaluation:  Plan of Care Reviewed With: patient        Progress: no change  Outcome Evaluation: Pt presents below her functional baseline with deficits including L sided weakness, L sided sensory deficits, impaired ADLs and mobility warranting skilled OT services. Pt performed bed mobility with supervision, STS and ambulation using the RW with CGA. Rec IPR at HI.      Anticipated Discharge Disposition (OT): inpatient rehabilitation facility

## 2024-08-07 NOTE — ED NOTES
Kamila Ibarra [facility]    Nursing Report ED to Floor:  Mental status: A&O x4  Ambulatory status: ambulatory with assistance  Oxygen Therapy:  RA  Cardiac Rhythm: Sinus rhythm  Admitted from: home  Safety Concerns:  n/a  Social Issues: n/a  ED Room #:  19    ED Nurse Phone Extension - [phone] or may call [phone].      HPI:   Chief Complaint   Patient presents with    Numbness       Past Medical History:  Past Medical History:   Diagnosis Date    Anxiety and depression     Chest pain     Disease of thyroid gland     reports slightly elevated when in hospital    DVT (deep venous thrombosis)     ETOH use 04/09/2024    History of pulmonary embolus (PE)     Hypertension     Hypothyroidism 04/09/2024    Migraines     Obesity (BMI 30-39.9) 04/09/2024    Shortness of breath     with chest pain episodes        Past Surgical History:  Past Surgical History:   Procedure Laterality Date    CARDIAC CATHETERIZATION Left 3/11/2022    Procedure: Left Heart Cath;  Surgeon: Peter Anguiano MD;  Location:  [facility] CATH INVASIVE LOCATION;  Service: Cardiology;  Laterality: Left;  Hold Eliquis 2 days prior to [facility]    ENDOSCOPY N/A 1/12/2022    Procedure: ESOPHAGOGASTRODUODENOSCOPY;  Surgeon: Brunner, Mark I, MD;  Location:  [facility] ENDOSCOPY;  Service: Gastroenterology;  Laterality: N/A;    GASTRIC BYPASS  2007    lap cm en y    IR STENT PLACEMENT Left 10/2021    xin surgical in cath lab placed stent in left leg        Admitting Doctor:   Tanya Tillman MD    Consulting Provider(s):  Consults       Date and Time Order Name Status Description    8/6/2024  7:35 PM Inpatient Neurology Consult Stroke Completed              Admitting Diagnosis:   There were no encounter diagnoses.    Most Recent Vitals:   Vitals:    08/06/24 2328 08/06/24 2343 08/07/24 0000 08/07/24 0030   BP: 114/64 134/63 141/68 112/63   BP Location:       Patient Position:       Pulse: 63 61 63 61   Resp:   16    Temp:       TempSrc:       SpO2: 96% 97% 97% 98%   Weight:        Height:           Active LDAs/IV Access:   Lines, Drains & Airways       Active LDAs       Name Placement date Placement time Site Days    Peripheral IV 08/06/24 1930 Anterior;Left Forearm 08/06/24 1930  Forearm  less than 1                    Labs (abnormal labs have a star):   Labs Reviewed   ETHANOL - Abnormal; Notable for the following components:       Result Value    Ethanol 120 (*)     All other components within normal limits    Narrative:     Elevated lactic acid concentration and lactate dehydrogenase(LD) activity may falsely elevate enzymatically determined ethanol levels. Not for legal purposes.    POCT CHEM 8 - Abnormal; Notable for the following components:    BUN 7 (*)     Total CO2 19 (*)     Ionized Calcium 1.07 (*)     All other components within normal limits   APTT - Normal    Narrative:     PTT = The equivalent PTT values for the therapeutic range of heparin levels at 0.3 to 0.5 U/ml are 60 to 70 seconds.   SINGLE HS TROPONIN T - Normal    Narrative:     High Sensitive Troponin T Reference Range:  <14.0 ng/L- Negative Female for AMI  <22.0 ng/L- Negative Male for AMI  >=14 - Abnormal Female indicating possible myocardial injury.  >=22 - Abnormal Male indicating possible myocardial injury.   Clinicians would have to utilize clinical acumen, EKG, Troponin, and serial changes to determine if it is an Acute Myocardial Infarction or myocardial injury due to an underlying chronic condition.        CBC WITH AUTO DIFFERENTIAL - Normal   URINE DRUG SCREEN - Normal    Narrative:     Cutoff For Drugs Screened:    Amphetamines               500 ng/ml  Barbiturates               200 ng/ml  Benzodiazepines            150 ng/ml  Cocaine                    150 ng/ml  Methadone                  200 ng/ml  Opiates                    100 ng/ml  Phencyclidine               25 ng/ml  THC                         50 ng/ml  Methamphetamine            500 ng/ml  Tricyclic Antidepressants  300 ng/ml  Oxycodone                   100 ng/ml  Buprenorphine               10 ng/ml    The normal value for all drugs tested is negative. This report includes unconfirmed screening results, with the cutoff values listed, to be used for medical treatment purposes only.  Unconfirmed results must not be used for non-medical purposes such as employment or legal testing.  Clinical consideration should be applied to any drug of abuse test, particularly when unconfirmed results are used.     FENTANYL, URINE - Normal    Narrative:     Negative Threshold:      Fentanyl 5 ng/mL     The normal value for the drug tested is negative. This report includes final unconfirmed screening results to be used for medical treatment purposes only. Unconfirmed results must not be used for non-medical purposes such as employment or legal testing. Clinical consideration should be applied to any drug of abuse test, particularly when unconfirmed results are used.          POCT PROTIME - INR - Normal   RAINBOW DRAW    Narrative:     The following orders were created for panel order Bath Draw.  Procedure                               Abnormality         Status                     ---------                               -----------         ------                     Green Top (Gel)[[id number]]                                  Final result               Lavender Top[[id number]]                                     Final result               Gold Top - SST[[id number]]                                   Final result               Gray Top[[id number]]                                         Final result               Light Blue Top[[id number]]                                   Final result                 Please view results for these tests on the individual orders.   POCT CHEM 8   POCT PROTIME - INR   CBC AND DIFFERENTIAL    Narrative:     The following orders were created for panel order CBC & Differential.  Procedure                               Abnormality         Status                      ---------                               -----------         ------                     CBC Auto Differential[[id number]]        Normal              Final result                 Please view results for these tests on the individual orders.   GREEN TOP   LAVENDER TOP   GOLD TOP - SST   GRAY TOP   LIGHT BLUE TOP       Meds Given in ED:   Medications   sodium chloride 0.9 % flush 10 mL (has no administration in time range)   diphenhydrAMINE (BENADRYL) injection 25 mg (25 mg Intravenous Given 8/6/24 2011)   metoclopramide (REGLAN) injection 10 mg (10 mg Intravenous Given 8/6/24 2011)   sodium chloride 0.9 % bolus 1,000 mL (0 mL Intravenous Stopped 8/6/24 2221)   iopamidol (ISOVUE-370) 76 % injection 150 mL (150 mL Intravenous Given 8/6/24 1939)   acetaminophen (TYLENOL) tablet 650 mg (650 mg Oral Given 8/7/24 0052)           Last NIH score:  Interval: baseline  1a. Level of Consciousness: 0-->Alert, keenly responsive  1b. LOC Questions: 0-->Answers both questions correctly  1c. LOC Commands: 0-->Performs both tasks correctly  2. Best Gaze: 1-->Partial gaze palsy, gaze is abnormal in one or both eyes, but forced deviation or total gaze paresis is not present  3. Visual: 1-->Partial hemianopia  4. Facial Palsy: 1-->Minor paralysis (flattened nasolabial fold, asymmetry on smiling)  5a. Motor Arm, Left: 1-->Drift, limb holds 90 (or 45) degrees, but drifts down before full 10 seconds, does not hit bed or other support  5b. Motor Arm, Right: 0-->No drift, limb holds 90 (or 45) degrees for full 10 secs  6a. Motor Leg, Left: 1-->Drift, leg falls by the end of the 5-sec period but does not hit bed  6b. Motor Leg, Right: 0-->No drift, leg holds 30 degree position for full 5 secs  7. Limb Ataxia: 0-->Absent  8. Sensory: 1-->Mild-to-moderate sensory loss, patient feels pinprick is less sharp or is dull on the affected side, or there is a loss of superficial pain with pinprick, but patient is aware of being touched  9.  Best Language: 1-->Mild-to-moderate aphasia, some obvious loss of fluency or facility of comprehension, without significant limitation on ideas expressed or form of expression. Reduction of speech and/or comprehension, however, makes conversation. . . (see row details)  10. Dysarthria: 0-->Normal  11. Extinction and Inattention (formerly Neglect): 0-->No abnormality    Total (NIH Stroke Scale): 7     Dysphagia screening results:  Patient Factors Component (Dysphagia:Stroke or Rule-out)  Best Eye Response: 4-->(E4) spontaneous (08/06/24 2300)  Best Motor Response: 6-->(M6) obeys commands (08/06/24 2300)  Best Verbal Response: 5-->(V5) oriented (08/06/24 2300)  [place] Coma Scale Score: 15 (08/06/24 2300)  Is there Facial Asymmetry/Weakness?: No (08/06/24 2300)  Is there Tongue Asymmetry/Weakness?: No (08/06/24 2300)  Is there Palatal Asymmetry/Weakness?: No (08/06/24 2300)  Patient Assessment Result: Pass - Proceed to Water Test (08/06/24 2300)     [place] Coma Scale:  No data recorded     CIWA:        Restraint Type:            Isolation Status:  No active isolations

## 2024-08-07 NOTE — PLAN OF CARE
Goal Outcome Evaluation:  Plan of Care Reviewed With: patient           Outcome Evaluation: Pt. presents below baseline function w/generalized weakness, L sided sensory deficits and balance deficits affecting her ability to safely participate in functional mobility. She performed bed mobility, transfers and ambulated 40' w/front wheeled walker, min assist. Activity limited by fatigue, weakness. Pt. would benefit from acute PT services to address stated deficits.      Anticipated Discharge Disposition (PT): inpatient rehabilitation facility

## 2024-08-07 NOTE — PLAN OF CARE
Problem: Adult Inpatient Plan of Care  Goal: Plan of Care Review  Outcome: Ongoing, Not Progressing  Flowsheets (Taken 8/7/2024 0610)  Plan of Care Reviewed With: patient  Goal: Patient-Specific Goal (Individualized)  Outcome: Ongoing, Not Progressing  Goal: Absence of Hospital-Acquired Illness or Injury  Outcome: Ongoing, Not Progressing  Intervention: Identify and Manage Fall Risk  Recent Flowsheet Documentation  Taken 8/7/2024 0603 by Eric Arango RN  Safety Promotion/Fall Prevention:   activity supervised   assistive device/personal items within reach   clutter free environment maintained   fall prevention program maintained   lighting adjusted   nonskid shoes/slippers when out of bed   room organization consistent   safety round/check completed  Taken 8/7/2024 0400 by Eric Arango RN  Safety Promotion/Fall Prevention:   activity supervised   assistive device/personal items within reach   clutter free environment maintained   fall prevention program maintained   lighting adjusted   nonskid shoes/slippers when out of bed   room organization consistent   safety round/check completed  Taken 8/7/2024 0212 by Eric Arango RN  Safety Promotion/Fall Prevention:   activity supervised   assistive device/personal items within reach   clutter free environment maintained   fall prevention program maintained   lighting adjusted   nonskid shoes/slippers when out of bed   room organization consistent   safety round/check completed  Intervention: Prevent Skin Injury  Recent Flowsheet Documentation  Taken 8/7/2024 0603 by Eric Arango RN  Body Position: position changed independently  Taken 8/7/2024 0400 by Eric Arango RN  Body Position: position changed independently  Taken 8/7/2024 0212 by Eric Arango RN  Body Position: position changed independently  Intervention: Prevent and Manage VTE (Venous Thromboembolism) Risk  Recent Flowsheet Documentation  Taken 8/7/2024 0603 by  Eric Arango, RN  Activity Management: activity encouraged  VTE Prevention/Management:   sequential compression devices on   bilateral  Taken 8/7/2024 0400 by Eric Arango, RN  Activity Management: activity encouraged  VTE Prevention/Management:   sequential compression devices on   bilateral  Taken 8/7/2024 0212 by Eric Arango, RN  Activity Management: activity encouraged  VTE Prevention/Management:   sequential compression devices on   bilateral  Taken 8/6/2024 2218 by Eric Arango, RN  Activity Management: bedrest  Intervention: Prevent Infection  Recent Flowsheet Documentation  Taken 8/7/2024 0603 by Eric Arango, RN  Infection Prevention:   environmental surveillance performed   hand hygiene promoted   rest/sleep promoted   single patient room provided  Taken 8/7/2024 0400 by Eric Arango RN  Infection Prevention:   environmental surveillance performed   hand hygiene promoted   rest/sleep promoted   single patient room provided  Taken 8/7/2024 0212 by Eric Arango RN  Infection Prevention:   environmental surveillance performed   hand hygiene promoted   rest/sleep promoted   single patient room provided  Goal: Optimal Comfort and Wellbeing  Outcome: Ongoing, Not Progressing  Intervention: Monitor Pain and Promote Comfort  Recent Flowsheet Documentation  Taken 8/7/2024 0603 by Eric Arango, RN  Pain Management Interventions:   position adjusted   pillow support provided  Taken 8/7/2024 0400 by Eric Arango RN  Pain Management Interventions:   position adjusted   pillow support provided  Taken 8/7/2024 0212 by Eric Arango RN  Pain Management Interventions:   position adjusted   pillow support provided  Intervention: Provide Person-Centered Care  Recent Flowsheet Documentation  Taken 8/7/2024 0603 by Eric Arango, RN  Trust Relationship/Rapport:   care explained   choices provided   emotional support provided   empathic listening  provided   questions answered   questions encouraged   reassurance provided   thoughts/feelings acknowledged  Taken 8/7/2024 0400 by Eric Arango, RN  Trust Relationship/Rapport:   care explained   choices provided   emotional support provided   empathic listening provided   questions answered   questions encouraged   reassurance provided   thoughts/feelings acknowledged  Taken 8/7/2024 0212 by Eric Arango, RN  Trust Relationship/Rapport:   care explained   choices provided   emotional support provided   empathic listening provided   questions answered   questions encouraged   reassurance provided   thoughts/feelings acknowledged  Goal: Readiness for Transition of Care  Outcome: Ongoing, Not Progressing     Problem: Skin Injury Risk Increased  Goal: Skin Health and Integrity  Outcome: Ongoing, Not Progressing  Intervention: Optimize Skin Protection  Recent Flowsheet Documentation  Taken 8/7/2024 0603 by Eric Arango RN  Head of Bed (HOB) Positioning: HOB elevated  Taken 8/7/2024 0400 by Eric Arango, RN  Head of Bed (HOB) Positioning: HOB elevated  Taken 8/7/2024 0212 by Eric Arango RN  Head of Bed (HOB) Positioning: HOB elevated   Goal Outcome Evaluation:  Plan of Care Reviewed With: patient

## 2024-08-07 NOTE — SIGNIFICANT NOTE
Significant Event Note    Event Summary  This patient is a 54-year-old female with past medical history significant for hypertension, migraines (noted in chart, patient denies), remote DVT and PE, anxiety/depression, obesity (s/p gastric bypass 2007), and alcohol use (patient endorses 1 beer per night, 4/2024 admission note states patient endorsed 4 beers daily at that time) who presents to [facility] ED with concern for left-sided weakness and numbness with left visual field deficits.  Of note, patient was seen at [facility] 4/2024 for acute onset left-sided weakness and numbness and received TNK on 4/9/2024.  Subsequent MRI was negative for acute stroke and per Dr. Bautista's note symptoms were considered to be functional versus complex migraine, although MRI negative stroke could not be excluded at that time.  Patient has been on home Eliquis and aspirin since 4/2024 discharge from [facility].  Patient reports adherence to antithrombotic regimen.  Patient states above symptoms started at approximately 1630 this day.  Patient also endorses a headache that has been present since that time.  On my exam, NIH is 5 for mild LUE and LLE drift and left side decreased sensation to light touch.  Left HH is present during visual field assessment, however blink to threat is intact bilaterally from all visual fields.  Of additional note, during motor function assessment patient raises all extremities passively without difficulty then a few seconds later states she cannot keep her LUE or LLE elevated and both extremities exhibit mild drift.  Code stroke CTh showed no evidence of acute intracranial abnormality.  CT perfusion showed no evidence of core infarct or significant territorial ischemic tissue at risk.  CTA H/N showed no evidence of LVO, aneurysm, or significant flow-limiting stenosis.  Patient was deemed not a candidate for IV thrombolytic therapy or emergent neurointervention.  Migraine cocktail was ordered per ED provider.  Stat MRI  brain without contrast showed no evidence of acute infarct.     NIH Stroke Scale  Time: 1830  Person Administering Scale: Rajiv Serrano PA-C     1a  Level of consciousness: 0=alert; keenly responsive   1b. LOC questions:  0=Performs both tasks correctly   1c. LOC commands: 0=Performs both tasks correctly   2.  Best Gaze: 0=normal   3.  Visual: 2=Complete hemianopia   4. Facial Palsy: 0=Normal symmetric movement   5a.  Motor left arm: 1=Drift, limb holds 90 (or 45) degrees but drifts down before full 10 seconds: does not hit bed   5b.  Motor right arm: 0=No drift, limb holds 90 (or 45) degrees for full 10 seconds   6a. motor left le=Drift, limb holds 90 (or 45) degrees but drifts down before full 10 seconds: does not hit bed   6b  Motor right le=No drift, limb holds 90 (or 45) degrees for full 10 seconds   7. Limb Ataxia: 0=Absent   8.  Sensory: 1=Mild to moderate sensory loss; patient feels pinprick is less sharp or is dull on the affected side; there is a loss of superficial pain with pinprick but patient is aware She is being touched   9. Best Language:  0=No aphasia, normal   10. Dysarthria: 0=Normal   11. Extinction and Inattention: 0=No abnormality     Total:    5     Results reviewed  MRI Brain Without Contrast  Result Date: 2024  Impression: 1.No acute intracranial process identified. 2.Findings suggestive of minimal chronic small vessel ischemic disease. Electronically Signed: José Pope MD  2024 9:32 PM EDT  Workstation ID: [id number]     CT Angiogram Head w AI Analysis of LVO  Result Date: 2024  Impression: Major arterial vasculature within head and neck appears widely patent, with no hemodynamically significant stenosis, dissection, thrombus, or aneurysm. Electronically Signed: José Pope MD  2024 7:55 PM EDT  Workstation ID: [id number]     CT Angiogram Neck  Result Date: 2024  Impression: Major arterial vasculature within head and neck appears widely patent, with  no hemodynamically significant stenosis, dissection, thrombus, or aneurysm. Electronically Signed: José Pope MD  8/6/2024 7:55 PM EDT  Workstation ID: [id number]     CT CEREBRAL PERFUSION WITH & WITHOUT CONTRAST  Result Date: 8/6/2024  Impression: Examination appears within normal limits. Electronically Signed: José Pope MD  8/6/2024 7:48 PM EDT  Workstation ID: [id number]     CT Head Without Contrast Stroke Protocol  Result Date: 8/6/2024  Impression: No acute intracranial pathology. Electronically Signed: Santiago Bacon MD  8/6/2024 7:37 PM EDT  Workstation ID: [id number]      Plan/Recommendations  -All CT and MRI imaging negative for acute infarct or other abnormality  -Patient s/p migraine cocktail per ED provider  -Given negative imaging as above, if symptoms resolve patient would be appropriate for discharge from stroke neurology perspective.  -Recommend outpatient follow-up in stroke clinic      Case discussed with the patient, family at bedside, and Dr. Garcia, [facility] ED.      Rajiv Serrano PA-C  [facility]

## 2024-08-07 NOTE — PLAN OF CARE
Goal Outcome Evaluation:  Plan of Care Reviewed With: patient                  Anticipated Discharge Disposition (SLP): home with home health    SLP Diagnosis: mild, cognitive-linguistic disorder (08/07/24 7110)

## 2024-08-07 NOTE — THERAPY EVALUATION
Patient Name: Kamila Garcia  : 1970    MRN: [id number]                              Today's Date: 2024       Admit Date: 2024    Visit Dx: No diagnosis found.  Patient Active Problem List   Diagnosis    Acute pulmonary embolism    Chest pain    H/O LLE DVT & PE (2021)    History of pulmonary embolism    Insomnia    Hypertension    Palpitations    Suspected cerebrovascular accident (CVA)    Hypertensive urgency    Obesity (BMI 30-39.9)    ETOH use    Anxiety and depression    Syncope and collapse    Left-sided weakness    Numbness and tingling    Alcohol use     Past Medical History:   Diagnosis Date    Anxiety and depression     Chest pain     Disease of thyroid gland     reports slightly elevated when in hospital    DVT (deep venous thrombosis)     ETOH use 2024    History of pulmonary embolus (PE)     Hypertension     Hypothyroidism 2024    Migraines     Obesity (BMI 30-39.9) 2024    Shortness of breath     with chest pain episodes     Past Surgical History:   Procedure Laterality Date    CARDIAC CATHETERIZATION Left 3/11/2022    Procedure: Left Heart Cath;  Surgeon: Peter Anguiano MD;  Location:  [facility] CATH INVASIVE LOCATION;  Service: Cardiology;  Laterality: Left;  Hold Eliquis 2 days prior to [facility]    ENDOSCOPY N/A 2022    Procedure: ESOPHAGOGASTRODUODENOSCOPY;  Surgeon: Brunner, Mark I, MD;  Location:  [facility] ENDOSCOPY;  Service: Gastroenterology;  Laterality: N/A;    GASTRIC BYPASS      lap cm en y    IR STENT PLACEMENT Left 10/2021    xin surgical in cath lab placed stent in left leg      General Information       Row Name 24 1414          OT Time and Intention    Document Type evaluation  -AN     Mode of Treatment occupational therapy  -AN       Row Name 24 1414          General Information    Patient Profile Reviewed yes  -AN     Prior Level of Function independent:;all household mobility;community mobility;gait;ADL's  Prior to  admission pt reports ambulating using a cane with 1 recent fall  -AN     Existing Precautions/Restrictions fall;other (see comments)  L sided weakness, hx of LLE buckling  -AN     Barriers to Rehab medically complex;previous functional deficit  -AN       Row Name 08/07/24 1414          Occupational Profile    Environmental Supports and Barriers (Occupational Profile) tub shower with shower chair  -AN       Row Name 08/07/24 1414          Living Environment    People in Home spouse  -AN       Row Name 08/07/24 1414          Home Main Entrance    Number of Stairs, Main Entrance three  -AN     Stair Railings, Main Entrance railings on both sides of stairs  -AN       Row Name 08/07/24 1414          Stairs Within Home, Primary    Number of Stairs, Within Home, Primary none  -AN       Row Name 08/07/24 1414          Cognition    Orientation Status (Cognition) oriented x 4  -AN       Row Name 08/07/24 1414          Safety Issues, Functional Mobility    Safety Issues Affecting Function (Mobility) safety precaution awareness;safety precautions follow-through/compliance;insight into deficits/self-awareness;judgment  -AN     Impairments Affecting Function (Mobility) balance;endurance/activity tolerance;postural/trunk control;range of motion (ROM);sensation/sensory awareness;strength  -AN               User Key  (r) = Recorded By, (t) = Taken By, (c) = Cosigned By      Initials Name Provider Type    Kailey Watkins OT Occupational Therapist                     Mobility/ADL's       Row Name 08/07/24 1419          Bed Mobility    Bed Mobility supine-sit-supine  -AN     Supine-Sit-Supine [place] (Bed Mobility) supervision  -AN     Assistive Device (Bed Mobility) head of bed elevated  -AN       Row Name 08/07/24 1419          Transfers    Transfers sit-stand transfer;stand-sit transfer  -AN     Comment, (Transfers) Cues for hand placement and transfer technique using the RW  -AN       Row Name 08/07/24 1419           Sit-Stand Transfer    Sit-Stand [place] (Transfers) verbal cues;contact guard  -AN     Assistive Device (Sit-Stand Transfers) walker, front-wheeled  -AN       Row Name 08/07/24 1419          Stand-Sit Transfer    Stand-Sit [place] (Transfers) verbal cues;contact guard  -AN     Assistive Device (Stand-Sit Transfers) walker, front-wheeled  -AN       Row Name 08/07/24 1419          Functional Mobility    Functional Mobility- Ind. Level contact guard assist  -AN     Functional Mobility- Device walker, front-wheeled  -AN     Functional Mobility-Distance (Feet) --  <household distance  -AN     Functional Mobility- Comment Pt ambulated short distance within the room using the RW with CGA for balance and safety. No LOB noted.  -AN       Row Name 08/07/24 1419          Activities of Daily Living    BADL Assessment/Intervention upper body dressing;lower body dressing  -AN       Row Name 08/07/24 1419          Upper Body Dressing Assessment/Training    [place] Level (Upper Body Dressing) don;pajama/robe;set up  -AN     Position (Upper Body Dressing) edge of bed sitting  -AN       Row Name 08/07/24 1419          Lower Body Dressing Assessment/Training    [place] Level (Lower Body Dressing) don;doff;socks;minimum assist (75% patient effort)  -AN     Position (Lower Body Dressing) edge of bed sitting  -AN               User Key  (r) = Recorded By, (t) = Taken By, (c) = Cosigned By      Initials Name Provider Type    Kailey Watkins OT Occupational Therapist                   Obj/Interventions       Row Name 08/07/24 1420          Sensory Assessment (Somatosensory)    Sensory Assessment (Somatosensory) left UE;left LE  -AN     Left UE Sensory Assessment general sensation  -AN     Left LE Sensory Assessment general sensation  -AN     Sensory Subjective Reports numbness  -AN     Sensory Assessment full body left sided weakness  -AN       Row Name 08/07/24 1420          Vision Assessment/Intervention     Visual Impairment/Limitations peripheral vision impaired left  -AN       Row Name 08/07/24 1420          Range of Motion Comprehensive    General Range of Motion bilateral upper extremity ROM WFL  -AN       Row Name 08/07/24 1420          Strength Comprehensive (MMT)    General Manual Muscle Testing (MMT) Assessment upper extremity strength deficits identified  -AN     Comment, General Manual Muscle Testing (MMT) Assessment RUE grossly 4/5, LUE grossly 3+/5  -AN       Row Name 08/07/24 1420          Motor Skills    Motor Skills coordination  -AN     Coordination fine motor deficit;gross motor deficit;left;upper extremity;minimal impairment  -AN       Row Name 08/07/24 1420          Balance    Balance Assessment sitting static balance;sitting dynamic balance;sit to stand dynamic balance;standing static balance;standing dynamic balance  -AN     Static Sitting Balance independent  -AN     Dynamic Sitting Balance independent  -AN     Position, Sitting Balance sitting edge of bed  -AN     Sit to Stand Dynamic Balance verbal cues;contact guard  -AN     Static Standing Balance contact guard  -AN     Dynamic Standing Balance contact guard  -AN     Position/Device Used, Standing Balance supported;walker, rolling  -AN     Balance Interventions standing;sit to stand;supported;static;dynamic;minimal challenge;occupation based/functional task  -AN               User Key  (r) = Recorded By, (t) = Taken By, (c) = Cosigned By      Initials Name Provider Type    AN Kailey Seals OT Occupational Therapist                   Goals/Plan       Row Name 08/07/24 1430          Transfer Goal 1 (OT)    Activity/Assistive Device (Transfer Goal 1, OT) sit-to-stand/stand-to-sit;toilet;walker, rolling  -AN     [place] Level/Cues Needed (Transfer Goal 1, OT) standby assist  -AN     Time Frame (Transfer Goal 1, OT) short term goal (STG);3 days  -AN       Row Name 08/07/24 1430          Dressing Goal 1 (OT)    Activity/Device (Dressing  Goal 1, OT) upper body dressing;lower body dressing  -AN     [place]/Cues Needed (Dressing Goal 1, OT) contact guard required  -AN     Time Frame (Dressing Goal 1, OT) long term goal (LTG);1 week  -AN       Row Name 08/07/24 1430          Toileting Goal 1 (OT)    Activity/Device (Toileting Goal 1, OT) adjust/manage clothing;perform perineal hygiene;commode  -AN     [place] Level/Cues Needed (Toileting Goal 1, OT) contact guard required  -AN     Time Frame (Toileting Goal 1, OT) long term goal (LTG);1 week  -AN       Row Name 08/07/24 1430          Therapy Assessment/Plan (OT)    Planned Therapy Interventions (OT) activity tolerance training;adaptive equipment training;BADL retraining;functional balance retraining;occupation/activity based interventions;transfer/mobility retraining;strengthening exercise;patient/caregiver education/training  -AN               User Key  (r) = Recorded By, (t) = Taken By, (c) = Cosigned By      Initials Name Provider Type    AN Kailey Seals, QUIRINO Occupational Therapist                   Clinical Impression       Row Name 08/07/24 1423          Pain Assessment    Pretreatment Pain Rating 0/10 - no pain  -AN     Posttreatment Pain Rating 0/10 - no pain  -AN     Pre/Posttreatment Pain Comment asymptomatic  -AN       Row Name 08/07/24 1423          Plan of Care Review    Plan of Care Reviewed With patient  -AN     Progress no change  -AN     Outcome Evaluation Pt presents below her functional baseline with deficits including L sided weakness, L sided sensory deficits, impaired ADLs and mobility warranting skilled OT services. Pt performed bed mobility with supervision, STS and ambulation using the RW with CGA. Rec IPR at [place].  -AN       Row Name 08/07/24 1425          Therapy Assessment/Plan (OT)    Patient/Family Therapy Goal Statement (OT) Return to PLOF  -AN     Rehab Potential (OT) good, to achieve stated therapy goals  -AN     Criteria for Skilled Therapeutic Interventions  Met (OT) yes;skilled treatment is necessary  -AN     Therapy Frequency (OT) daily  -AN     Predicted Duration of Therapy Intervention (OT) 7 days  -AN       Row Name 08/07/24 1423          Therapy Plan Review/Discharge Plan (OT)    Anticipated Discharge Disposition (OT) inpatient rehabilitation facility  -AN       Row Name 08/07/24 1423          Vital Signs    Post Systolic BP Rehab 127  -AN     Post Treatment Diastolic BP 66  -AN     O2 Delivery Pre Treatment room air  -AN     O2 Delivery Intra Treatment room air  -AN     O2 Delivery Post Treatment room air  -AN     Pre Patient Position Supine  -AN     Intra Patient Position Standing  -AN     Post Patient Position Supine  -AN       [facility] Name 08/07/24 1423          Positioning and Restraints    Pre-Treatment Position in bed  -AN     Post Treatment Position bed  -AN     In Bed notified nsg;supine;call light within reach;exit alarm on;encouraged to call for assist;side rails up x2  seizure pads  -AN               User Key  (r) = Recorded By, (t) = Taken By, (c) = Cosigned By      Initials Name Provider Type    AN Kailey Seals, QUIRINO Occupational Therapist                   Outcome Measures       Row Name 08/07/24 1434          How much help from another is currently needed...    Putting on and taking off regular lower body clothing? 3  -AN     Bathing (including washing, rinsing, and drying) 2  -AN     Toileting (which includes using toilet bed pan or urinal) 3  -AN     Putting on and taking off regular upper body clothing 3  -AN     Taking care of personal grooming (such as brushing teeth) 3  -AN     Eating meals 3  -AN     AM-PAC 6 Clicks Score (OT) 17  -AN       [facility] Name 08/07/24 0847          How much help from another person do you currently need...    Turning from your back to your side while in flat bed without using bedrails? 3  -SS     Moving to and from a bed to a chair (including a wheelchair)? 3  -SS     Standing up from a chair using your arms (e.g.,  wheelchair, bedside chair)? 3  -SS     Climbing 3-5 steps with a railing? 2  -SS     To walk in hospital room? 3  -SS       Row Name 08/07/24 1434 08/07/24 0847       Modified Mi Scale    Pre-Stroke Modified [place] Scale 6 - Unable to determine (UTD) from the medical record documentation  -AN 6 - Unable to determine (UTD) from the medical record documentation  -SS    Modified [place] Scale 2 - Slight disability.  Unable to carry out all previous activities but able to look after own affairs without assistance.  -AN 2 - Slight disability.  Unable to carry out all previous activities but able to look after own affairs without assistance.  -SS      Row Name 08/07/24 1434 08/07/24 0847       Functional Assessment    Outcome Measure Options AM-PAC 6 Clicks Daily Activity (OT);Modified [place]ack[place]  -AN AM-PAC 6 Clicks Basic Mobility (PT);Modified [place]  -SS              User Key  (r) = Recorded By, (t) = Taken By, (c) = Cosigned By      Initials Name Provider Type    Yazmin Gallardo, PT Physical Therapist    Kailey Watkins, OT Occupational Therapist                    Occupational Therapy Education       Title: PT OT SLP Therapies (In Progress)       Topic: Occupational Therapy (In Progress)       Point: ADL training (Done)       Description:   Instruct learner(s) on proper safety adaptation and remediation techniques during self care or transfers.   Instruct in proper use of assistive devices.                  Learning Progress Summary             Patient Acceptance, E, VU by AVERY at 8/7/2024 5264                         Point: Home exercise program (Not Started)       Description:   Instruct learner(s) on appropriate technique for monitoring, assisting and/or progressing therapeutic exercises/activities.                  Learner Progress:  Not documented in this visit.              Point: Precautions (Done)       Description:   Instruct learner(s) on prescribed precautions during self-care and functional transfers.                   Learning Progress Summary             Patient Acceptance, E, VU by AN at 8/7/2024 1434                         Point: Body mechanics (Done)       Description:   Instruct learner(s) on proper positioning and spine alignment during self-care, functional mobility activities and/or exercises.                  Learning Progress Summary             Patient Acceptance, E, VU by AN at 8/7/2024 1434                                         User Key       Initials Effective Dates Name Provider Type Discipline     09/21/21 -  Kailey Seals, OT Occupational Therapist OT                  OT Recommendation and Plan  Planned Therapy Interventions (OT): activity tolerance training, adaptive equipment training, BADL retraining, functional balance retraining, occupation/activity based interventions, transfer/mobility retraining, strengthening exercise, patient/caregiver education/training  Therapy Frequency (OT): daily  Plan of Care Review  Plan of Care Reviewed With: patient  Progress: no change  Outcome Evaluation: Pt presents below her functional baseline with deficits including L sided weakness, L sided sensory deficits, impaired ADLs and mobility warranting skilled OT services. Pt performed bed mobility with supervision, STS and ambulation using the RW with CGA. Rec IPR at [place].     Time Calculation:   Evaluation Complexity (OT)  Review Occupational Profile/Medical/Therapy History Complexity: brief/low complexity  Assessment, Occupational Performance/Identification of Deficit Complexity: 3-5 performance deficits  Clinical Decision Making Complexity (OT): problem focused assessment/low complexity  Overall Complexity of Evaluation (OT): low complexity     Time Calculation- OT       Row Name 08/07/24 1432             Time Calculation- OT    OT Start Time 1318  -AN      OT Received On 08/07/24  -AN      OT Goal Re-Cert Due Date 08/17/24  -AN         Untimed Charges    OT Eval/Re-eval Minutes 46  -AN         Total  Minutes    Untimed Charges Total Minutes 46  -AN       Total Minutes 46  -AN                User Key  (r) = Recorded By, (t) = Taken By, (c) = Cosigned By      Initials Name Provider Type    AN Kailey Seals OT Occupational Therapist                  Therapy Charges for Today       Code Description Service Date Service Provider Modifiers Qty    [id number]  OT EVAL LOW COMPLEXITY 4 8/7/2024 Kailey Seals OT GO 1    [id number]  OT EVAL LOW COMPLEXITY 4 8/7/2024 Kailey Seals OT GO 1                 Kailey Seals OT  8/7/2024

## 2024-08-07 NOTE — CASE MANAGEMENT/SOCIAL WORK
Discharge Planning Assessment  [facility]     Patient Name: Kamila Garcia  MRN: [id number]  Today's Date: 8/7/2024    Admit Date: 8/6/2024    Plan: Rehab   Discharge Needs Assessment       Row Name 08/07/24 1112       Living Environment    People in Home spouse    Current Living Arrangements home    Primary Care Provided by self    Provides Primary Care For no one    Family Caregiver if Needed spouse    Quality of Family Relationships supportive    Able to Return to Prior Arrangements yes       Resource/Environmental Concerns    Transportation Concerns none       Transition Planning    Patient/Family Anticipates Transition to inpatient rehabilitation facility    Transportation Anticipated family or friend will provide       Discharge Needs Assessment    Readmission Within the Last 30 Days no previous admission in last 30 days    Equipment Currently Used at Home cane, straight                   Discharge Plan       Row Name 08/07/24 1115       Plan    Plan Rehab    Patient/Family in Agreement with Plan yes    Plan Comments Spoke with Mrs. Garcia at the bedside. She lives with her Spouse in [facility]. She is independent with ADL's and uses a cane. She does not use any Oxygen or Home Health. She does not have advance directives. Her PCP is Latricia Mishra and she has [employer] insurance. She receives OP PT through [facility]. Therapy is recommending rehab. Discussed with patient and she is agreeable to [facility]. CM will send referral once patient is medically ready for discharge. She will need insurance approval from her [employer] for the rehab. CM will continue to follow up.    Final Discharge Disposition Code 62 - inpatient rehab facility                  Continued Care and Services - Admitted Since 8/6/2024    No active coordination exists for this encounter.       Expected Discharge Date and Time       Expected Discharge Date Expected Discharge Time    Aug 8, 2024             Demographic Summary       Row Name 08/07/24 1112       General Information    Admission Type observation    Arrived From home    Referral Source admission list    Reason for Consult discharge planning    Preferred Language English       Contact Information    Permission Granted to Share Info With                    Functional Status       Row Name 08/07/24 1112       Functional Status, IADL    Medications independent    Meal Preparation independent    Housekeeping independent    Laundry independent    Shopping independent       Mental Status    General Appearance WDL WDL                   Psychosocial    No documentation.                  Abuse/Neglect    No documentation.                  Legal    No documentation.                  Substance Abuse    No documentation.                  Patient Forms    No documentation.                     Kamila Hernández RN

## 2024-08-07 NOTE — THERAPY EVALUATION
Patient Name: Kamila Garcia  : 1970    MRN: [id number]                              Today's Date: 2024       Admit Date: 2024    Visit Dx: No diagnosis found.  Patient Active Problem List   Diagnosis    Acute pulmonary embolism    Chest pain    H/O LLE DVT & PE (2021)    History of pulmonary embolism    Insomnia    Hypertension    Palpitations    Suspected cerebrovascular accident (CVA)    Hypertensive urgency    Obesity (BMI 30-39.9)    ETOH use    Anxiety and depression    Syncope and collapse    Left-sided weakness    Numbness and tingling    Alcohol use     Past Medical History:   Diagnosis Date    Anxiety and depression     Chest pain     Disease of thyroid gland     reports slightly elevated when in hospital    DVT (deep venous thrombosis)     ETOH use 2024    History of pulmonary embolus (PE)     Hypertension     Hypothyroidism 2024    Migraines     Obesity (BMI 30-39.9) 2024    Shortness of breath     with chest pain episodes     Past Surgical History:   Procedure Laterality Date    CARDIAC CATHETERIZATION Left 3/11/2022    Procedure: Left Heart Cath;  Surgeon: Peter Anguiano MD;  Location:  [facility] CATH INVASIVE LOC[facility];  Service: Cardiology;  Laterality: Left;  Hold Eliquis 2 days prior to [facility]    ENDOSCOPY N/A 2022    Procedure: ESOPHAGOGASTRODUODENOSCOPY;  Surgeon: Brunner, Mark I, MD;  Location:  [facility] ENDOSCOPY;  Service: Gastroenterology;  Laterality: N/A;    GASTRIC BYPASS      lap cm en y    IR STENT PLACEMENT Left 10/2021    xin surgical in cath lab placed stent in left leg      General Information       Row Name 24          Physical Therapy Time and Intention    Document Type evaluation  -SS     Mode of Treatment physical therapy  -SS       Row Name 24          General Information    Patient Profile Reviewed yes  -SS     Prior Level of Function independent:;all household mobility;gait;transfer;bed mobility;using  stairs  pt. reports use of single touch cane w/several bouts of LLE giving way, 1 acute fall, limited community ambulator  -     Existing Precautions/Restrictions fall;other (see comments)  LLE decreased sensation/history of giving way  -     Barriers to Rehab medically complex;previous functional deficit  -       Row Name 08/07/24 0830          Living Environment    People in Home spouse  -       Row Name 08/07/24 0830          Home Main Entrance    Number of Stairs, Main Entrance three  -SS     Stair Railings, Main Entrance railings on both sides of stairs  -       Row Name 08/07/24 0830          Stairs Within Home, Primary    Number of Stairs, Within Home, Primary none  -       Row Name 08/07/24 0830          Cognition    Orientation Status (Cognition) oriented x 4  -       Row Name 08/07/24 0830          Safety Issues, Functional Mobility    Safety Issues Affecting Function (Mobility) awareness of need for assistance;insight into deficits/self-awareness;positioning of assistive device;safety precaution awareness;safety precautions follow-through/compliance;sequencing abilities;problem-solving  -     Impairments Affecting Function (Mobility) balance;endurance/activity tolerance;postural/trunk control;range of motion (ROM);sensation/sensory awareness;strength  -               User Key  (r) = Recorded By, (t) = Taken By, (c) = Cosigned By      Initials Name Provider Type    SS Yazmin Young, PT Physical Therapist                   Mobility       Row Name 08/07/24 0833          Bed Mobility    Bed Mobility scooting/bridging;supine-sit;sit-supine  -SS     Scooting/Bridging [place] (Bed Mobility) standby assist;verbal cues  -     Supine-Sit [place] (Bed Mobility) standby assist;verbal cues  -     Sit-Supine [place] (Bed Mobility) standby assist;verbal cues  -     Assistive Device (Bed Mobility) head of bed elevated;bed rails  -     Comment, (Bed Mobility) VC for sequencing   -SS       Row Name 08/07/24 0833          Bed-Chair Transfer    Comment, (Bed-Chair Transfer) pt declined  -SS       Row Name 08/07/24 0833          Sit-Stand Transfer    Sit-Stand [place] (Transfers) contact guard;verbal cues  -     Assistive Device (Sit-Stand Transfers) walker, front-wheeled  -SS     Comment, (Sit-Stand Transfer) VC for hand placement, appropriate alignment, lowering with eccentric control  -       Row Name 08/07/24 0833          Gait/Stairs (Locomotion)    [place] Level (Gait) minimum assist (75% patient effort);verbal cues  -     Assistive Device (Gait) walker, front-wheeled  -SS     Patient was able to Ambulate yes  -     Distance in Feet (Gait) 40  -SS     Deviations/Abnormal Patterns (Gait) bilateral deviations;base of support, narrow;oleg decreased;gait speed decreased;stride length decreased;weight shifting decreased  -     Bilateral Gait Deviations forward flexed posture;heel strike decreased  -     Comment, (Gait/Stairs) Pt. ambulated with a step through gait pattern at a very decreased speed. VC for upright posture, AD management. Activity limited by fatigue, weakness. Pt. asymptomatic but does complain of LLE weakness/feeling like it's going to buckle  -               User Key  (r) = Recorded By, (t) = Taken By, (c) = Cosigned By      Initials Name Provider Type    SS Yazmin Young, PT Physical Therapist                   Obj/Interventions       Row Name 08/07/24 0837          Range of Motion Comprehensive    General Range of Motion bilateral lower extremity ROM WFL  -       Row Name 08/07/24 0837          Strength Comprehensive (MMT)    Comment, General Manual Muscle Testing (MMT) Assessment RLE gross 4-/5, LLE gross 3+/5  -       Row Name 08/07/24 0837          Motor Skills    Motor Skills coordination  -     Coordination WFL;bilateral;lower extremity;heel to [place]  -       Row Name 08/07/24 0837          Balance    Balance Assessment sitting static  balance;sitting dynamic balance;sit to stand dynamic balance;standing static balance;standing dynamic balance  -SS     Static Sitting Balance independent  -SS     Dynamic Sitting Balance modified independence  -SS     Position, Sitting Balance unsupported;sitting edge of bed  -SS     Sit to Stand Dynamic Balance contact guard  -SS     Static Standing Balance contact guard  -SS     Dynamic Standing Balance minimal assist  -SS     Position/Device Used, Standing Balance supported;walker, front-wheeled  -SS     Balance Interventions sitting;standing;sit to stand;supported;static;dynamic  -SS       Row Name 08/07/24 0837          Sensory Assessment (Somatosensory)    Sensory Assessment (Somatosensory) LE sensation intact;other (see comments)  intact but L side diminished/numb  -SS               User Key  (r) = Recorded By, (t) = Taken By, (c) = Cosigned By      Initials Name Provider Type    SS Yazmin Young, PT Physical Therapist                   Goals/Plan       Row Name 08/07/24 0846          Bed Mobility Goal 1 (PT)    Activity/Assistive Device (Bed Mobility Goal 1, PT) bed mobility activities, all  -SS     [place] Level/Cues Needed (Bed Mobility Goal 1, PT) independent  -SS     Time Frame (Bed Mobility Goal 1, PT) short term goal (STG);5 days  -SS       Row Name 08/07/24 0846          Transfer Goal 1 (PT)    Activity/Assistive Device (Transfer Goal 1, PT) sit-to-stand/stand-to-sit;bed-to-chair/chair-to-bed;walker, rolling  -SS     [place] Level/Cues Needed (Transfer Goal 1, PT) modified independence  -SS     Time Frame (Transfer Goal 1, PT) short term goal (STG);5 days  -       Row Name 08/07/24 0846          Gait Training Goal 1 (PT)    Activity/Assistive Device (Gait Training Goal 1, PT) gait (walking locomotion);assistive device use;walker, rolling  -SS     [place] Level (Gait Training Goal 1, PT) modified independence  -SS     Distance (Gait Training Goal 1, PT) 150  -SS     Time Frame (Gait  Training Goal 1, PT) long term goal (LTG);10 days  -       Row Name 08/07/24 0846          Stairs Goal 1 (PT)    Activity/Assistive Device (Stairs Goal 1, PT) ascending stairs;descending stairs;using handrail, left;using handrail, right  -     [place] Level/Cues Needed (Stairs Goal 1, PT) modified independence  -     Number of Stairs (Stairs Goal 1, PT) 3  -SS     Time Frame (Stairs Goal 1, PT) long term goal (LTG);10 days  -       Row Name 08/07/24 0846          Therapy Assessment/Plan (PT)    Planned Therapy Interventions (PT) balance training;bed mobility training;gait training;home exercise program;neuromuscular re-education;patient/family education;postural re-education;ROM (range of motion);stair training;strengthening;stretching;transfer training  -               User Key  (r) = Recorded By, (t) = Taken By, (c) = Cosigned By      Initials Name Provider Type     Yazmin Young, PT Physical Therapist                   Clinical Impression       [facility] Name 08/07/24 0842          Pain    Pretreatment Pain Rating 0/10 - no pain  -     Posttreatment Pain Rating 0/10 - no pain  -     Pain Intervention(s) Repositioned;Ambulation/increased activity;Elevated  -     Additional Documentation Pain Scale: Numbers Pre/Post-Treatment (Group)  -       Row Name 08/07/24 0842          Plan of Care Review    Plan of Care Reviewed With patient  -     Outcome Evaluation Pt. presents below baseline function w/generalized weakness, L sided sensory deficits and balance deficits affecting her ability to safely participate in functional mobility. She performed bed mobility, transfers and ambulated 40' w/front wheeled walker, min assist. Activity limited by fatigue, weakness. Pt. would benefit from acute PT services to address stated deficits.  -       Row Name 08/07/24 0842          Therapy Assessment/Plan (PT)    Patient/Family Therapy Goals Statement (PT) to get stronger  -     Rehab Potential (PT) good,  to achieve stated therapy goals  -     Criteria for Skilled Interventions Met (PT) yes;meets criteria;skilled treatment is necessary  -     Therapy Frequency (PT) daily  -     Predicted Duration of Therapy Intervention (PT) 10 days  -       Row Name 08/07/24 0842          Vital Signs    Pre Systolic BP Rehab 145  -SS     Pre Treatment Diastolic BP 68  pt asymptomatic  -SS     Post Systolic BP Rehab 152  -SS     Post Treatment Diastolic BP 83  pt asymptomatic  -SS     Pretreatment Heart Rate (beats/min) 90  -SS     Posttreatment Heart Rate (beats/min) 98  -SS     Pre SpO2 (%) 99  -SS     O2 Delivery Pre Treatment room air  -SS     Post SpO2 (%) 94  -SS     O2 Delivery Post Treatment room air  -SS     Pre Patient Position Supine  -SS     Post Patient Position Supine  -SS       Row Name 08/07/24 0842          Positioning and Restraints    Pre-Treatment Position in bed  -SS     Post Treatment Position bed  -SS     In Bed notified nsg;fowlers;call light within reach;encouraged to call for assist;exit alarm on;patient within staff view;legs elevated  -SS               User Key  (r) = Recorded By, (t) = Taken By, (c) = Cosigned By      Initials Name Provider Type    SS Yazmin Young, PT Physical Therapist                   Outcome Measures       Row Name 08/07/24 0847 08/07/24 0204       How much help from another person do you currently need...    Turning from your back to your side while in flat bed without using bedrails? 3  -SS 3  -BS    Moving from lying on back to sitting on the side of a flat bed without bedrails? -- 3  -BS    Moving to and from a bed to a chair (including a wheelchair)? 3  -SS 3  -BS    Standing up from a chair using your arms (e.g., wheelchair, bedside chair)? 3  -SS 3  -BS    Climbing 3-5 steps with a railing? 2  - 3  -BS    To walk in hospital room? 3  -SS 3  -BS    AM-PAC 6 Clicks Score (PT) -- 18  -BS    Highest Level of Mobility Goal -- 6 --> Walk 10 steps or more  -      Johnny  Name 08/07/24 0847          Modified [place] Scale    Pre-Stroke Modified [place] Scale 6 - Unable to determine (UTD) from the medical record documentation  -     Modified [place] Scale 2 - Slight disability.  Unable to carry out all previous activities but able to look after own affairs without assistance.  -       Row Name 08/07/24 0847          Functional Assessment    Outcome Measure Options AM-PAC 6 Clicks Basic Mobility (PT);Modified Mi  -SS               User Key  (r) = Recorded By, (t) = Taken By, (c) = Cosigned By      Initials Name Provider Type    Keiko Vital, RN Registered Nurse     Yazmin Young, PT Physical Therapist                                 Physical Therapy Education       Title: PT OT SLP Therapies (In Progress)       Topic: Physical Therapy (In Progress)       Point: Mobility training (Done)       Learning Progress Summary             Patient OTIS Rebolledo, IRV[place],VU,NR by  at 8/7/2024 0847    Comment: Educated pt. safety/technique w/bed mobility, transfers, ambulation, PT POC                         Point: Home exercise program (Not Started)       Learner Progress:  Not documented in this visit.              Point: Body mechanics (Done)       Learning Progress Summary             Patient OTIS Rebolledo, DU,VU,NR by  at 8/7/2024 0847    Comment: Educated pt. safety/technique w/bed mobility, transfers, ambulation, PT POC                         Point: Precautions (Done)       Learning Progress Summary             Patient OTIS Rebolledo, IRV[place],VU,NR by  at 8/7/2024 0847    Comment: Educated pt. safety/technique w/bed mobility, transfers, ambulation, PT POC                                         User Key       Initials Effective Dates Name Provider Type Discipline     06/01/21 -  Yazmin Young, [place] Physical Therapist PT                  PT Recommendation and Plan  Planned Therapy Interventions (PT): balance training, bed mobility training, gait training, home exercise program, neuromuscular  re-education, patient/family education, postural re-education, ROM (range of motion), stair training, strengthening, stretching, transfer training  Plan of Care Reviewed With: patient  Outcome Evaluation: Pt. presents below baseline function w/generalized weakness, L sided sensory deficits and balance deficits affecting her ability to safely participate in functional mobility. She performed bed mobility, transfers and ambulated 40' w/front wheeled walker, min assist. Activity limited by fatigue, weakness. Pt. would benefit from acute PT services to address stated deficits.     Time Calculation:   PT Evaluation Complexity  History, PT Evaluation Complexity: 3 or more personal factors and/or comorbidities  Examination of Body Systems (PT Eval Complexity): total of 4 or more elements  Clinical Presentation (PT Evaluation Complexity): evolving  Clinical Decision Making (PT Evaluation Complexity): low complexity  Overall Complexity (PT Evaluation Complexity): low complexity     PT Charges       Row Name 08/07/24 0848             Time Calculation    Start Time 0803  -SS      PT Received On 08/07/24  -SS      PT Goal Re-Cert Due Date 08/17/24  -SS         Untimed Charges    PT Eval/Re-eval Minutes 48  -SS         Total Minutes    Untimed Charges Total Minutes 48  -SS       Total Minutes 48  -SS                User Key  (r) = Recorded By, (t) = Taken By, (c) = Cosigned By      Initials Name Provider Type    SS Yazmin Young, PT Physical Therapist                  Therapy Charges for Today       Code Description Service Date Service Provider Modifiers Qty    [id number]  PT EVAL LOW COMPLEXITY 4 8/7/2024 Yazmin Young, PT GP 1            PT G-Codes  Outcome Measure Options: AM-PAC 6 Clicks Basic Mobility (PT), Modified [place]  AM-PAC 6 Clicks Score (PT): 18  Modified [place] Scale: 2 - Slight disability.  Unable to carry out all previous activities but able to look after own affairs without assistance.  PT Discharge  Summary  Anticipated Discharge Disposition (PT): inpatient rehabilitation facility    Yazmin Young, PT  8/7/2024

## 2024-08-07 NOTE — H&P
[facility] Medicine Services  HISTORY AND PHYSICAL    Patient Name: Kamila Garcia  : 1970  MRN: [id number]  Primary Care Physician: Latricia Mishra APRN  Date of admission: 2024    Subjective   Subjective     Chief Complaint:  Left sided weakness, numbness and tingling     HPI:  Kamila Garcia is a 54 y.o. female w/ a hx of remote DVT/PE (2021, Eliquis), HTN, anxiety, depression, s/p gastric bypass () who presented to the ED w/ c/o left sided weakness, numbness and tingling.   Pt reports that she developed sudden onset left sided weakness w/ associated numbness and tingling. Onset ~ 16:30pm Monday afternoon. Weakness, numbness/tingling extend from pt's face, arm and down to left leg. Pt denies facial droop, difficulty swallowing, slurred speech. While being assessed in the ED, pt c/o decreased left-sided peripheral vision.   Pt reports that she did have a headache on Monday that went away and has since returned. Headache located frontal region. Denies hx of migraines (although hx of migraines listed in PMH).   Pt evaluated in the ED. MRI Brain, CTA Head/Neck and CT Cerebral Perfusion- all without acute findings. Pt continued to have difficulty ambulating 2/2 LLE weakness while in the ED. Pt admitted to the hospital medicine service for further evaluation.     Review of Systems   Constitutional:  Negative for chills and fever.   HENT:  Negative for congestion, postnasal drip, rhinorrhea, sinus pain and trouble swallowing.    Eyes:  Positive for visual disturbance.   Respiratory: Negative.  Negative for cough and shortness of breath.    Cardiovascular: Negative.  Negative for chest pain, palpitations and leg swelling.   Gastrointestinal: Negative.  Negative for abdominal distention, abdominal pain, diarrhea, nausea and vomiting.   Endocrine: Negative.    Genitourinary: Negative.  Negative for decreased urine volume, difficulty urinating, dysuria and  flank pain.   Musculoskeletal: Negative.  Negative for arthralgias, myalgias and neck pain.   Skin: Negative.  Negative for wound.   Allergic/Immunologic: Negative.  Negative for immunocompromised state.   Neurological:  Positive for weakness, numbness and headaches. Negative for dizziness, tremors, seizures, syncope, facial asymmetry, speech difficulty and light-headedness.   Hematological: Negative.  Does not bruise/bleed easily.   Psychiatric/Behavioral: Negative.  Negative for confusion.    All other systems reviewed and are negative.     Personal History     Past Medical History:   Diagnosis Date    Anxiety and depression     Chest pain     Disease of thyroid gland     reports slightly elevated when in hospital    DVT (deep venous thrombosis)     ETOH use 04/09/2024    History of pulmonary embolus (PE)     Hypertension     Hypothyroidism 04/09/2024    Migraines     Obesity (BMI 30-39.9) 04/09/2024    Shortness of breath     with chest pain episodes     Past Surgical History:   Procedure Laterality Date    CARDIAC CATHETERIZATION Left 3/11/2022    Procedure: Left Heart Cath;  Surgeon: Peter Anguiano MD;  Location:  [facility] CATH INVASIVE LOCATION;  Service: Cardiology;  Laterality: Left;  Hold Eliquis 2 days prior to [facility]    ENDOSCOPY N/A 1/12/2022    Procedure: ESOPHAGOGASTRODUODENOSCOPY;  Surgeon: Brunner, Mark I, MD;  Location:  [facility] ENDOSCOPY;  Service: Gastroenterology;  Laterality: N/A;    GASTRIC BYPASS  2007    lap cm en y    IR STENT PLACEMENT Left 10/2021    xin surgical in cath lab placed stent in left leg     Family History: family history includes Breast cancer in her maternal grandmother; Cancer in her mother; Heart attack (age of onset: 50) in her mother; No Known Problems in her maternal grandfather, paternal grandfather, and paternal grandmother; Stroke in her father.     Social History:  reports that she has never smoked. She has never used smokeless tobacco. She reports current alcohol  use of about 4.0 standard drinks of alcohol per week. She reports that she does not use drugs.  Social History     Social History Narrative    Caffeine: 3 cans soda daily     Medications:  OLANZapine, apixaban, buPROPion XL, cyanocobalamin, eszopiclone, folic acid, lactobacillus acidophilus, losartan, multivitamin with minerals, and thiamine    Allergies   Allergen Reactions    Hydrocodone Hives    Lactose Intolerance (Gi) GI Intolerance     Objective   Objective     Vital Signs:   Temp:  [98.3 °F (36.8 °C)] 98.3 °F (36.8 °C)  Heart Rate:  [57-79] 61  Resp:  [16-18] 16  BP: (102-141)/() 112/63    Physical Exam     Constitutional: Awake, alert; non-toxic appearing   Eyes: PERRLA, sclerae anicteric, no conjunctival injection  HENT: NCAT, mucous membranes moist  Neck: Supple, no thyromegaly, no lymphadenopathy, trachea midline  Respiratory: Clear to auscultation bilaterally, nonlabored respirations   Cardiovascular: RRR, no murmurs, rubs, or gallops, no peripheral edema   Gastrointestinal: Positive bowel sounds, soft, nontender, nondistended  Musculoskeletal: No bilateral ankle edema, no clubbing or cyanosis to extremities  Psychiatric: Appropriate affect, cooperative  Neurologic: Oriented x 3, speech clear, no facial droop, LUE  strength 3/5, RUE  strength 5/5; LLE 4/5 strength, RLE 5/5, decreased sensation- entire left side including left side of face   Skin: No rashes, lesions or wounds     Result Review:  I have personally reviewed the results from the time of this admission to 8/7/2024 01:15 EDT and agree with these findings:  [x]  Laboratory list / accordion  []  Microbiology  [x]  Radiology  [x]  EKG/Telemetry   []  Cardiology/Vascular   []  Pathology  [x]  Old records    LAB RESULTS:      Lab 08/06/24 1939 08/06/24 1934   WBC 5.97  --    HEMOGLOBIN 14.9  --    HEMOGLOBIN, POC  --  15.3   HEMATOCRIT 44.3  --    HEMATOCRIT POC  --  45   PLATELETS 293  --    NEUTROS ABS 2.91  --    IMMATURE GRANS  (ABS) 0.01  --    LYMPHS ABS 2.45  --    MONOS ABS 0.44  --    EOS ABS 0.10  --    MCV 85.7  --    PROTIME  --  14.3   APTT 28.2  --          Lab 08/06/24 1934   CREATININE 1.00   EGFR 66.3             Lab 08/06/24 1939 08/06/24 1934   HSTROP T <6  --    PROTIME  --  14.3   INR  --  1.2                 Brief Urine Lab Results  (Last result in the past 365 days)        Color   Clarity   Blood   Leuk Est   Nitrite   Protein   CREAT   Urine HCG        04/09/24 1002 Yellow   Clear   Trace   Trace   Negative   Negative                 Microbiology Results (last 10 days)       ** No results found for the last 240 hours. **          MRI Brain Without Contrast    Result Date: 8/6/2024  MRI BRAIN WO CONTRAST Date of Exam: 8/6/2024 8:53 PM EDT Indication: Left-sided weakness and numbness, acute stroke suspected.  Comparison: CT head from earlier today and MRI brain from April 9, 2024 Technique:  Routine multiplanar/multisequence sequence images of the brain were obtained without contrast administration. Findings: No acute infarction, intracranial hemorrhage, or extra-axial collection is identified. The ventricles appear normal in caliber, with no evidence of mass effect or midline shift. The basal cisterns appear patent. The midline structures appear intact. The globes and orbits appear intact. The intracranial vascular flow-voids appear patent. Subtle foci of periventricular and subcortical white matter FLAIR hyperintensities are nonspecific, but likely the sequela of minimal chronic small vessel ischemic disease.     Impression: Impression: 1.No acute intracranial process identified. 2.Findings suggestive of minimal chronic small vessel ischemic disease. Electronically Signed: José Pope MD  8/6/2024 9:32 PM EDT  Workstation ID: [id number]    XR Chest 1 View    Result Date: 8/6/2024  XR CHEST 1 VW Date of Exam: 8/6/2024 7:36 PM EDT Indication: Acute Stroke Protocol (onset < 12 hrs) Comparison: April 9, 2024 Findings:  The lungs are clear. The heart and mediastinal contours appear normal. There is no pleural effusion. The pulmonary vasculature appears normal. The osseous structures appear intact.     Impression: Impression: No acute cardiopulmonary process. Electronically Signed: José Pope MD  8/6/2024 8:48 PM EDT  Workstation ID: [id number]    CT Angiogram Head w AI Analysis of LVO    Result Date: 8/6/2024  CT ANGIOGRAM HEAD W AI ANALYSIS OF LVO, CT ANGIOGRAM NECK Date of Exam: 8/6/2024 7:25 PM EDT Indication: stroke. Comparison: CT head from earlier today and CT angiography head and neck from April 9, 2024 Technique: CTA of the head and neck was performed after the uneventful intravenous administration of 75 mL Isovue-370. Reconstructed coronal and sagittal images were also obtained. In addition, a 3-D volume rendered image was created for interpretation. Automated exposure control and iterative reconstruction methods were used. Findings: There is a three-vessel aortic arch. The right common carotid artery is widely patent. The right carotid bifurcation is widely patent. The right internal carotid artery is widely patent. The left common carotid artery is widely patent. There is mild plaque at the left carotid bifurcation that is not hemodynamically significant. The left internal carotid artery is widely patent. The right vertebral artery is widely patent. The left vertebral artery is widely patent. The left vertebral artery is dominant. The bilateral middle cerebral, bilateral anterior cerebral, and anterior communicating arteries are widely patent. The basilar and bilateral posterior cerebral arteries are widely patent. There is a patent left posterior communicating artery. No definite  right posterior communicating artery is identified. No intracranial aneurysm is identified. The visualized portions of the bilateral superior cerebellar, anteroinferior cerebellar, and posterior inferior cerebellar arteries are  unremarkable.     Impression: Impression: Major arterial vasculature within head and neck appears widely patent, with no hemodynamically significant stenosis, dissection, thrombus, or aneurysm. Electronically Signed: José Pope MD  8/6/2024 7:55 PM EDT  Workstation ID: [id number]    CT Angiogram Neck    Result Date: 8/6/2024  CT ANGIOGRAM HEAD W AI ANALYSIS OF LVO, CT ANGIOGRAM NECK Date of Exam: 8/6/2024 7:25 PM EDT Indication: stroke. Comparison: CT head from earlier today and CT angiography head and neck from April 9, 2024 Technique: CTA of the head and neck was performed after the uneventful intravenous administration of 75 mL Isovue-370. Reconstructed coronal and sagittal images were also obtained. In addition, a 3-D volume rendered image was created for interpretation. Automated exposure control and iterative reconstruction methods were used. Findings: There is a three-vessel aortic arch. The right common carotid artery is widely patent. The right carotid bifurcation is widely patent. The right internal carotid artery is widely patent. The left common carotid artery is widely patent. There is mild plaque at the left carotid bifurcation that is not hemodynamically significant. The left internal carotid artery is widely patent. The right vertebral artery is widely patent. The left vertebral artery is widely patent. The left vertebral artery is dominant. The bilateral middle cerebral, bilateral anterior cerebral, and anterior communicating arteries are widely patent. The basilar and bilateral posterior cerebral arteries are widely patent. There is a patent left posterior communicating artery. No definite  right posterior communicating artery is identified. No intracranial aneurysm is identified. The visualized portions of the bilateral superior cerebellar, anteroinferior cerebellar, and posterior inferior cerebellar arteries are unremarkable.     Impression: Impression: Major arterial vasculature within head  and neck appears widely patent, with no hemodynamically significant stenosis, dissection, thrombus, or aneurysm. Electronically Signed: José Pope MD  8/6/2024 7:55 PM EDT  Workstation ID: [id number]    CT CEREBRAL PERFUSION WITH & WITHOUT CONTRAST    Result Date: 8/6/2024  CT CEREBRAL PERFUSION W WO CONTRAST Date of Exam: 8/6/2024 7:25 PM EDT Indication: stroke.  Comparison: CT head from earlier today Technique: Axial CT images of the brain were obtained prior to and after the administration of 50 mL Isovue-370. Core blood volume, core blood flow, mean transit time, and Tmax images were obtained utilizing the Rapid software protocol. A limited CT angiogram of the head was also performed to measure the blood vessel density. The radiation dose reduction device was turned on for each scan per the ALARA (As Low as Reasonably Achievable) protocol. Findings: The intracranial cerebral perfusion appears normal.     Impression: Impression: Examination appears within normal limits. Electronically Signed: José Pope MD  8/6/2024 7:48 PM EDT  Workstation ID: [id number]    CT Head Without Contrast Stroke Protocol    Result Date: 8/6/2024  CT HEAD WO CONTRAST STROKE PROTOCOL Date of Exam: 8/6/2024 7:20 PM EDT Indication: stroke. Comparison: 4/10/2024 Technique: Axial CT images were obtained of the head without contrast administration.  Reconstructed coronal images were also obtained. Automated exposure control and iterative construction methods were used. Scan Time: 1926 Results discussed with  At 1933 on 8/6/2024. Findings: Gray-white matter differentiation is maintained without evidence of an acute infarction. No intracranial mass or mass effect. No extra-axial mass or collection. The ventricles and sulci are normal in size and configuration. The posterior fossa appears normal. Sellar and suprasellar structures are normal. Orbital and paranasal soft tissues are normal. The paranasal sinuses, ethmoid air cells, and  "mastoid air cells are aerated. The bony calvarium appears intact. No acute fractures. No lytic or blastic bony diseases.     Impression: Impression: No acute intracranial pathology. Electronically Signed: Santiago Bacon MD  8/6/2024 7:37 PM EDT  Workstation ID: [id number]     Results for orders placed during the hospital encounter of 04/09/24    Adult Transthoracic Echo Complete W/ Cont if Necessary Per Protocol (With Agitated Saline)    Interpretation Summary    Left ventricular systolic function is normal. Calculated left ventricular EF = 53.6%    Left ventricular wall thickness is consistent with mild concentric hypertrophy.    Saline test results are negative.    Estimated right ventricular systolic pressure from tricuspid regurgitation is normal (<35 mmHg).    The aortic valve exhibits sclerosis.    Assessment & Plan   Assessment & Plan       Left-sided weakness    H/O LLE DVT & PE (July 2021)    Hypertension    Anxiety and depression    Numbness and tingling    Alcohol use    Kamila Garcia is a 54 y.o. female w/ a hx of remote DVT/PE (July 2021, Eliquis), HTN, anxiety, depression, s/p gastric bypass (2007) who presented to the ED w/ c/o left sided weakness, numbness and tingling.     **Left sided weakness, numbness and tingling   -admitted in April 2024 w/ syncope, mild concussion, persistent left sided facial and arm paresthesias and ataxia, B12 deficiency- MRI negative, EEG normal, ECHO normal, followed by Neuro-Stroke and General Neurology \"felt ongoing paresthesias less likely brain ischemic, more likely stroke \"mimic\" including \"functional\" vs atypical migraine\"; started on B12 replacement, daily ASA   -pt developed left sided weakness, numbness/tingling Monday at ~16:30  -NIH 5 in ED  -Stroke Navigator following; Neuro will follow   -CT Head, CTA Head/Neck and CT Perfusion- all without acute findings  -MRI Brain unremarkable   -TTE 4/2024 with negative saline test, no evidence of SARAVANAN/LAE   -s/p " Migraine Cocktail (Reglan, benadryl, IVF) given in ED w/ no improvement in symptoms   -ASA daily   -continue Eliquis  -holding routine Losartan (allow permissive HTN)   -high dose statin   -continue folic acid, MVI, B12, thiamine  -neuro checks and stroke scale per protocol   -bedside dysphagia screen   -tsh, hem A1c, FLP, folate, B12 w/ am labs   -pt/ot and case mgt consult   -fall precautions    **HTN   -holding Losartan   -allow permissive HTN     **Alcohol use  -pt reports that she consumes ~ 5 beers daily  -last drank just prior to arrival   -denies hx of alcohol w/d  -ethanol level 120   -UDS negative  -CIWA scoring per protocol (pt admitted in April, did not require any PRNs)  -folic acid, thiamine, MVI   -fall/sz precautions   -case mgt consult    **Anxiety   **Depression   -continue Wellbutrin, Zyprexa     **Hx of DVT/PE (July 2021)  -continue Eliquis     DVT prophylaxis:      CODE STATUS:    Code Status (Patient has no pulse and is not breathing): CPR (Attempt to Resuscitate)  Medical Interventions (Patient has pulse or is breathing): Full Support    Expected Discharge  Expected Discharge Date: 8/8/2024; Expected Discharge Time:     Signature: Electronically signed by RAFFY Phillips, 08/07/24, 1:15 AM EDT.    Time spent: 55 minutes

## 2024-08-07 NOTE — PROGRESS NOTES
"      [facility] Medicine Services  ADMISSION FOLLOW-UP NOTE          Patient admitted after midnight, H&P by my partner performed earlier on today's date reviewed.  Interim findings, labs, and charting also reviewed.        The [facility] Hospital Problem List has been managed and updated to include any new diagnoses:  Active Hospital Problems    Diagnosis  POA    **Left-sided weakness [R53.1]  Yes    Numbness and tingling [R20.0, R20.2]  Yes    Alcohol use [Z78.9]  Yes    Anxiety and depression [F41.9, F32.A]  Yes    Hypertension [I10]  Yes    H/O LLE DVT & PE (July 2021) [Z86.718]  Not Applicable      Resolved Hospital Problems   No resolved problems to display.     Pt doing ok today.  Still with L sided weakness.  Been using a cane since her last admission back in April.  Sx have been worse more recently.    ADDITIONAL PLAN:  - detailed assessment and plan from admission reviewed  - Kamila Garcia is a 54 y.o.w/ a hx of remote DVT/PE (July 2021, Eliquis), HTN, anxiety, depression, s/p gastric bypass (2007) who presented to the ED w/ c/o left sided weakness, numbness and tingling.      **Left sided weakness, numbness and tingling   -Recent admit in  April 2024 w/ syncope and left sided and facial weakness. Workup unremarkable.  Stroke and General Neurology \"evaluation; started on B12 replacement, daily ASA   -Stroke Neuro will follow   -CT Head, CTA Head/Neck and CT Perfusion- all without acute findings  -MRI Brain unremarkable   -TTE 4/2024 with negative saline test, no evidence of SARAVANAN/LAE   -s/p Migraine Cocktail w/ no improvement in symptoms   -ASA daily   -continue Eliquis  -holding routine Losartan-until ok with neurology  -high dose statin   -continue folic acid, MVI, B12, thiamine  -neuro checks and stroke scale per protocol   -pt/ot and case mgt consult   -fall precautions    Elevated TSH  -- Patient denies any symptoms other than some mild weight gain.  --Briefly discussed " starting some replacement, but she seemed reluctant.  Discussed f/u with PCP.     **HTN   -holding Losartan   -allow permissive HTN      **Alcohol use  -pt reports that she consumes ~ 5 beers daily  -denies hx of alcohol w/d  -UDS negative  -CIWA scoring per protocol   -folic acid, thiamine, MVI   -fall/sz precautions   -case mgt consult     **Anxiety   **Depression   -continue Wellbutrin, Zyprexa      **Hx of DVT/PE (July 2021)  -continue Eliquis     Expected Discharge   Expected Discharge Date: 8/8/2024; Expected Discharge Time:      Natasha Summers MD  08/07/24

## 2024-08-07 NOTE — PROGRESS NOTES
Neurology Note    Patient:  Kamila Garcia    YOB: 1970    REFERRING PHYSICIAN:  Dr. Summers      CHIEF COMPLAINT:    Left-sided numbness    HISTORY OF PRESENT ILLNESS:   The patient reports that she still has left-sided numbness f/a/l and a headache, currently mild.    Past Medical History:  Past Medical History:   Diagnosis Date    Anxiety and depression     Chest pain     Disease of thyroid gland     reports slightly elevated when in hospital    DVT (deep venous thrombosis)     ETOH use 04/09/2024    History of pulmonary embolus (PE)     Hypertension     Hypothyroidism 04/09/2024    Migraines     Obesity (BMI 30-39.9) 04/09/2024    Shortness of breath     with chest pain episodes       Past Surgical History:  Past Surgical History:   Procedure Laterality Date    CARDIAC CATHETERIZATION Left 3/11/2022    Procedure: Left Heart Cath;  Surgeon: Peter Anguiano MD;  Location:  [facility] CATH INVASIVE LOCATION;  Service: Cardiology;  Laterality: Left;  Hold Eliquis 2 days prior to [facility]    ENDOSCOPY N/A 1/12/2022    Procedure: ESOPHAGOGASTRODUODENOSCOPY;  Surgeon: Brunner, Mark I, MD;  Location:  [facility] ENDOSCOPY;  Service: Gastroenterology;  Laterality: N/A;    GASTRIC BYPASS  2007    lap cm en y    IR STENT PLACEMENT Left 10/2021    [facility] surgical in cath lab placed stent in left leg       Social History:   Social History     Socioeconomic History    Marital status:    Tobacco Use    Smoking status: Never    Smokeless tobacco: Never   Vaping Use    Vaping status: Never Used   Substance and Sexual Activity    Alcohol use: Yes     Alcohol/week: 4.0 standard drinks of alcohol     Types: 4 Cans of beer per week     Comment: daily    Drug use: Never    Sexual activity: Defer        Family History:   Family History   Problem Relation Age of Onset    Heart attack Mother 50    Cancer Mother     Stroke Father     Breast cancer Maternal Grandmother     No Known Problems Maternal Grandfather      No Known Problems Paternal Grandmother     No Known Problems Paternal Grandfather        Medications Prior to Admission:    Prior to Admission medications    Medication Sig Start Date End Date Taking? Authorizing Provider   apixaban (ELIQUIS) 5 MG tablet tablet Start On 7/8: Take 1 tablet by mouth Every 12 (Twelve) Hours. 4/13/24   Ysabel Brown APRN   buPROPion XL (WELLBUTRIN XL) 150 MG 24 hr tablet Take 1 tablet by mouth Daily. 4/17/24   Adilene Godinez DO   eszopiclone (LUNESTA) 3 MG tablet Take 1 tablet by mouth Every Night. Take immediately before bedtime    Emergency, Nurse Lindy, RN   folic acid (FOLVITE) 1 MG tablet Take 1 tablet by mouth Daily. 4/17/24   Adilene Godinez DO   lactobacillus acidophilus (RISAQUAD) capsule capsule Take 1 capsule by mouth Daily. 4/17/24   Adilene Godinez DO   losartan (COZAAR) 50 MG tablet Take 1 tablet by mouth Daily. 4/17/24   Adilene Godinez DO   multivitamin with minerals tablet tablet Take 1 tablet by mouth Daily. 4/17/24   Adilene Godinez DO   OLANZapine (zyPREXA) 10 MG tablet Take 1 tablet by mouth Every Night.    Emergency, Nurse Epic, RN   thiamine (VITAMIN B1) 100 MG tablet Take 1 tablet by mouth Daily. 4/16/24   Adilene Godinez DO   vitamin B-12 (VITAMIN B-12) 1000 MCG tablet Take 1 tablet by mouth Daily. 4/17/24   Adilene Godinez DO       Allergies:  Hydrocodone and Lactose intolerance (gi)      Review of system  Review of Systems   Neurological:  Positive for numbness and headaches.   All other systems reviewed and are negative.      Vitals:    08/07/24 1305   BP: 134/77   Pulse: 75   Resp: 16   Temp: 98.1 °F (36.7 °C)   SpO2: 98%       Physical exam  Physical Exam  Eyes:      Extraocular Movements: Extraocular movements intact.      Pupils: Pupils are equal, round, and reactive to light.   Cardiovascular:      Rate and Rhythm: Normal rate and regular rhythm.   Pulmonary:      Effort: Pulmonary effort is normal.   Neurological:       Mental Status: She is alert and oriented to person, place, and time.      Deep Tendon Reflexes: Babinski sign absent on the right side. Babinski sign absent on the left side.      Comments: Speech clear, VFF, no facial droop, no limb drift. Reports decreased light touch sensation on the left f/a/l.           Lab Results   Component Value Date    WBC 5.49 08/07/2024    HGB 13.5 08/07/2024    HCT 40.6 08/07/2024    MCV 86.4 08/07/2024     08/07/2024     Lab Results   Component Value Date    GLUCOSE 90 08/07/2024    BUN 8 08/07/2024    CREATININE 0.79 08/07/2024    EGFRIFNONA 69 01/27/2022    BCR 10.1 08/07/2024    CO2 23.0 08/07/2024    CALCIUM 8.4 (L) 08/07/2024    ALBUMIN 3.4 (L) 08/07/2024    AST 17 08/07/2024    ALT 7 08/07/2024         Radiological Studies:   MRI Brain Without Contrast    Result Date: 8/6/2024  MRI BRAIN WO CONTRAST Date of Exam: 8/6/2024 8:53 PM EDT Indication: Left-sided weakness and numbness, acute stroke suspected.  Comparison: CT head from earlier today and MRI brain from April 9, 2024 Technique:  Routine multiplanar/multisequence sequence images of the brain were obtained without contrast administration. Findings: No acute infarction, intracranial hemorrhage, or extra-axial collection is identified. The ventricles appear normal in caliber, with no evidence of mass effect or midline shift. The basal cisterns appear patent. The midline structures appear intact. The globes and orbits appear intact. The intracranial vascular flow-voids appear patent. Subtle foci of periventricular and subcortical white matter FLAIR hyperintensities are nonspecific, but likely the sequela of minimal chronic small vessel ischemic disease.     Impression: 1.No acute intracranial process identified. 2.Findings suggestive of minimal chronic small vessel ischemic disease. Electronically Signed: José Pope MD  8/6/2024 9:32 PM EDT  Workstation ID: [id number]    XR Chest 1 View    Result Date: 8/6/2024  XR  CHEST 1 VW Date of Exam: 8/6/2024 7:36 PM EDT Indication: Acute Stroke Protocol (onset < 12 hrs) Comparison: April 9, 2024 Findings: The lungs are clear. The heart and mediastinal contours appear normal. There is no pleural effusion. The pulmonary vasculature appears normal. The osseous structures appear intact.     Impression: No acute cardiopulmonary process. Electronically Signed: José Pope MD  8/6/2024 8:48 PM EDT  Workstation ID: [id number]    CT Angiogram Head w AI Analysis of LVO    Result Date: 8/6/2024  CT ANGIOGRAM HEAD W AI ANALYSIS OF LVO, CT ANGIOGRAM NECK Date of Exam: 8/6/2024 7:25 PM EDT Indication: stroke. Comparison: CT head from earlier today and CT angiography head and neck from April 9, 2024 Technique: CTA of the head and neck was performed after the uneventful intravenous administration of 75 mL Isovue-370. Reconstructed coronal and sagittal images were also obtained. In addition, a 3-D volume rendered image was created for interpretation. Automated exposure control and iterative reconstruction methods were used. Findings: There is a three-vessel aortic arch. The right common carotid artery is widely patent. The right carotid bifurcation is widely patent. The right internal carotid artery is widely patent. The left common carotid artery is widely patent. There is mild plaque at the left carotid bifurcation that is not hemodynamically significant. The left internal carotid artery is widely patent. The right vertebral artery is widely patent. The left vertebral artery is widely patent. The left vertebral artery is dominant. The bilateral middle cerebral, bilateral anterior cerebral, and anterior communicating arteries are widely patent. The basilar and bilateral posterior cerebral arteries are widely patent. There is a patent left posterior communicating artery. No definite  right posterior communicating artery is identified. No intracranial aneurysm is identified. The visualized portions of  the bilateral superior cerebellar, anteroinferior cerebellar, and posterior inferior cerebellar arteries are unremarkable.     Impression: Major arterial vasculature within head and neck appears widely patent, with no hemodynamically significant stenosis, dissection, thrombus, or aneurysm. Electronically Signed: José Pope MD  8/6/2024 7:55 PM EDT  Workstation ID: [id number]    CT Angiogram Neck    Result Date: 8/6/2024  CT ANGIOGRAM HEAD W AI ANALYSIS OF LVO, CT ANGIOGRAM NECK Date of Exam: 8/6/2024 7:25 PM EDT Indication: stroke. Comparison: CT head from earlier today and CT angiography head and neck from April 9, 2024 Technique: CTA of the head and neck was performed after the uneventful intravenous administration of 75 mL Isovue-370. Reconstructed coronal and sagittal images were also obtained. In addition, a 3-D volume rendered image was created for interpretation. Automated exposure control and iterative reconstruction methods were used. Findings: There is a three-vessel aortic arch. The right common carotid artery is widely patent. The right carotid bifurcation is widely patent. The right internal carotid artery is widely patent. The left common carotid artery is widely patent. There is mild plaque at the left carotid bifurcation that is not hemodynamically significant. The left internal carotid artery is widely patent. The right vertebral artery is widely patent. The left vertebral artery is widely patent. The left vertebral artery is dominant. The bilateral middle cerebral, bilateral anterior cerebral, and anterior communicating arteries are widely patent. The basilar and bilateral posterior cerebral arteries are widely patent. There is a patent left posterior communicating artery. No definite  right posterior communicating artery is identified. No intracranial aneurysm is identified. The visualized portions of the bilateral superior cerebellar, anteroinferior cerebellar, and posterior inferior  cerebellar arteries are unremarkable.     Impression: Major arterial vasculature within head and neck appears widely patent, with no hemodynamically significant stenosis, dissection, thrombus, or aneurysm. Electronically Signed: José Pope MD  8/6/2024 7:55 PM EDT  Workstation ID: [id number]    CT CEREBRAL PERFUSION WITH & WITHOUT CONTRAST    Result Date: 8/6/2024  CT CEREBRAL PERFUSION W WO CONTRAST Date of Exam: 8/6/2024 7:25 PM EDT Indication: stroke.  Comparison: CT head from earlier today Technique: Axial CT images of the brain were obtained prior to and after the administration of 50 mL Isovue-370. Core blood volume, core blood flow, mean transit time, and Tmax images were obtained utilizing the Rapid software protocol. A limited CT angiogram of the head was also performed to measure the blood vessel density. The radiation dose reduction device was turned on for each scan per the ALARA (As Low as Reasonably Achievable) protocol. Findings: The intracranial cerebral perfusion appears normal.     Impression: Examination appears within normal limits. Electronically Signed: José Pope MD  8/6/2024 7:48 PM EDT  Workstation ID: [id number]    CT Head Without Contrast Stroke Protocol    Result Date: 8/6/2024  CT HEAD WO CONTRAST STROKE PROTOCOL Date of Exam: 8/6/2024 7:20 PM EDT Indication: stroke. Comparison: 4/10/2024 Technique: Axial CT images were obtained of the head without contrast administration.  Reconstructed coronal images were also obtained. Automated exposure control and iterative construction methods were used. Scan Time: 1926 Results discussed with  At 1933 on 8/6/2024. Findings: Gray-white matter differentiation is maintained without evidence of an acute infarction. No intracranial mass or mass effect. No extra-axial mass or collection. The ventricles and sulci are normal in size and configuration. The posterior fossa appears normal. Sellar and suprasellar structures are normal. Orbital and  paranasal soft tissues are normal. The paranasal sinuses, ethmoid air cells, and mastoid air cells are aerated. The bony calvarium appears intact. No acute fractures. No lytic or blastic bony diseases.     Impression: No acute intracranial pathology. Electronically Signed: Santiago Bacon MD  8/6/2024 7:37 PM EDT  Workstation ID: [id number]       During this visit the following were done:  Labs Reviewed [x]    Labs Ordered []    Radiology Reports Reviewed [x]    Radiology Ordered []    EKG, echo, and/or stress test reviewed []    EEG results reviewed  []    EEG reviewed and interpreted per myself   []    Discussed case with neurointerventionalist or neuroradiologist []    Referring Provider Records Reviewed []    ER Records Reviewed []    Hospital Records Reviewed []    History Obtained From Family []    Radiological images view and Interpreted per myself [x]    Case Discussed with referring provider []     Decision to obtain and request outside records  []        Assessment and Plan     Numbness and headache, favor migraine with aura given negative MRI brain, CTA H/N.   - Compazine and Benadryl IV x 4 doses.              Electronically signed by Oscar Silveira MD on 8/7/2024 at 14:30 EDT

## 2024-08-07 NOTE — ED PROVIDER NOTES
Subjective   History of Present Illness  Pt is a pleasant 54 year old female with a history of left sided weakness secondary to a previous similar event in March of this year.  She states that her strength has improved since the event in March, but approximately 1.5 hours prior to presentation to the ED she felt numbness and weakness on the left side of her body.  Denies visual deficit. Denies fever, chills, chest pain, soa, abdominal pain, nausea, vomiting, diarrhea, or other acute complaint.        Review of Systems   All other systems reviewed and are negative.      Past Medical History:   Diagnosis Date    Anxiety and depression     Chest pain     Disease of thyroid gland     reports slightly elevated when in hospital    DVT (deep venous thrombosis)     ETOH use 04/09/2024    History of pulmonary embolus (PE)     Hypertension     Hypothyroidism 04/09/2024    Migraines     Obesity (BMI 30-39.9) 04/09/2024    Shortness of breath     with chest pain episodes       Allergies   Allergen Reactions    Hydrocodone Hives    Lactose Intolerance (Gi) GI Intolerance       Past Surgical History:   Procedure Laterality Date    CARDIAC CATHETERIZATION Left 3/11/2022    Procedure: Left Heart Cath;  Surgeon: Peter Anguiano MD;  Location:  [email or web address] CATH INVASIVE LOCATION;  Service: Cardiology;  Laterality: Left;  Hold Eliquis 2 days prior to [facility]    ENDOSCOPY N/A 1/12/2022    Procedure: ESOPHAGOGASTRODUODENOSCOPY;  Surgeon: Brunner, Mark I, MD;  Location:  [email or web address] ENDOSCOPY;  Service: Gastroenterology;  Laterality: N/A;    GASTRIC BYPASS  2007    lap cm en y    IR STENT PLACEMENT Left 10/2021    xin surgical in cath lab placed stent in left leg       Family History   Problem Relation Age of Onset    Heart attack Mother 50    Cancer Mother     Stroke Father     Breast cancer Maternal Grandmother     No Known Problems Maternal Grandfather     No Known Problems Paternal Grandmother     No Known Problems Paternal Grandfather         Social History     Socioeconomic History    Marital status:    Tobacco Use    Smoking status: Never    Smokeless tobacco: Never   Vaping Use    Vaping status: Never Used   Substance and Sexual Activity    Alcohol use: Yes     Alcohol/week: 4.0 standard drinks of alcohol     Types: 4 Cans of beer per week     Comment: daily    Drug use: Never    Sexual activity: Defer           Objective   Physical Exam  Vitals and nursing note reviewed.   Constitutional:       General: She is not in acute distress.  HENT:      Head: Normocephalic and atraumatic.   Eyes:      Conjunctiva/sclera: Conjunctivae normal.      Pupils: Pupils are equal, round, and reactive to light.   Cardiovascular:      Rate and Rhythm: Normal rate and regular rhythm.      Heart sounds: Normal heart sounds.   Pulmonary:      Effort: No respiratory distress.      Breath sounds: Normal breath sounds.   Abdominal:      Palpations: Abdomen is soft.      Tenderness: There is no abdominal tenderness.   Musculoskeletal:         General: Normal range of motion.      Cervical back: Normal range of motion and neck supple.   Skin:     General: Skin is warm and dry.   Neurological:      Mental Status: She is alert and oriented to person, place, and time.      Cranial Nerves: No cranial nerve deficit.      Sensory: Sensory deficit present.      Motor: Weakness present.      Comments: Left upper and lower extremity weakness.  Numbness to left face, upper, and lower extremity.   Psychiatric:         Behavior: Behavior normal.         Thought Content: Thought content normal.         Procedures           ED Course       Latest Reference Range & Units 08/06/24 19:34 08/06/24 19:39   Total CO2 24 - 29 mmol/L 19 (L)    HS Troponin T <14 ng/L  <6   Sodium 138 - 146 mmol/L 138    Potassium 3.5 - 4.9 mmol/L 4.0    Chloride 98 - 109 mmol/L 106    BUN 8 - 26 mg/dL 7 (L)    Creatinine 0.60 - 1.30 mg/dL 1.00    eGFR >60.0 mL/min/1.73 66.3    Glucose 70 - 130 mg/dL 83     Ionized Calcium 1.20 - 1.32 mmol/L 1.07 (L)    Protime 12.8 - 15.2 seconds 14.3    INR 0.8 - 1.2  1.2    PTT 22.0 - 39.0 seconds  28.2   WBC 3.40 - 10.80 10*3/mm3  5.97   RBC 3.77 - 5.28 10*6/mm3  5.17   Hemoglobin 12.0 - 15.9 g/dL  14.9   Hemoglobin 12.0 - 17.0 g/dL 15.3    Hematocrit 34.0 - 46.6 %  44.3   Hematocrit 38 - 51 % 45    Platelets 140 - 450 10*3/mm3  293   RDW 12.3 - 15.4 %  13.1   MCV 79.0 - 97.0 fL  85.7   MCH 26.6 - 33.0 pg  28.8   MCHC 31.5 - 35.7 g/dL  33.6   MPV 6.0 - 12.0 fL  9.9   RDW-SD 37.0 - 54.0 fl  41.2   Neutrophil Rel % 42.7 - 76.0 %  48.7   Lymphocyte Rel % 19.6 - 45.3 %  41.0   Monocyte Rel % 5.0 - 12.0 %  7.4   Eosinophil Rel % 0.3 - 6.2 %  1.7   Basophil Rel % 0.0 - 1.5 %  1.0   Immature Granulocyte Rel % 0.0 - 0.5 %  0.2   Neutrophils Absolute 1.70 - 7.00 10*3/mm3  2.91   Lymphocytes Absolute 0.70 - 3.10 10*3/mm3  2.45   Monocytes Absolute 0.10 - 0.90 10*3/mm3  0.44   Eosinophils Absolute 0.00 - 0.40 10*3/mm3  0.10   Basophils Absolute 0.00 - 0.20 10*3/mm3  0.06   Immature Grans, Absolute 0.00 - 0.05 10*3/mm3  0.01   nRBC 0.0 - 0.2 /100 WBC  0.0   (L): Data is abnormally low  MRI Brain Without Contrast   Final Result   Impression:   1.No acute intracranial process identified.   2.Findings suggestive of minimal chronic small vessel ischemic disease.            Electronically Signed: José Pope MD     8/6/2024 9:32 PM EDT     Workstation ID: [id number]      XR Chest 1 View   Final Result   Impression:   No acute cardiopulmonary process.         Electronically Signed: José Pope MD     8/6/2024 8:48 PM EDT     Workstation ID: [id number]      CT CEREBRAL PERFUSION WITH & WITHOUT CONTRAST   Final Result   Impression:   Examination appears within normal limits.            Electronically Signed: José Pope MD     8/6/2024 7:48 PM EDT     Workstation ID: [id number]      CT Angiogram Head w AI Analysis of LVO   Final Result   Impression:   Major arterial vasculature within head  and neck appears widely patent, with no hemodynamically significant stenosis, dissection, thrombus, or aneurysm.            Electronically Signed: José Pope MD     8/6/2024 7:55 PM EDT     Workstation ID: [id number]      CT Angiogram Neck   Final Result   Impression:   Major arterial vasculature within head and neck appears widely patent, with no hemodynamically significant stenosis, dissection, thrombus, or aneurysm.            Electronically Signed: José Pope MD     8/6/2024 7:55 PM EDT     Workstation ID: [id number]      CT Head Without Contrast Stroke Protocol   Final Result   Impression: No acute intracranial pathology.               Electronically Signed: Santiago Bacon MD     8/6/2024 7:37 PM EDT     Workstation ID: [id number]        Vitals:    08/08/24 0350 08/08/24 0730 08/08/24 0916 08/08/24 0924   BP: 176/96 170/71  175/90   BP Location: Right arm Right arm     Patient Position: Lying Lying     Pulse: 73 63 75 77   Resp: 16 16     Temp: 97.9 °F (36.6 °C) 98.1 °F (36.7 °C)     TempSrc: Oral Oral     SpO2: 97% 97%     Weight:       Height:         Medications   sodium chloride 0.9 % flush 10 mL (has no administration in time range)   sodium chloride 0.9 % flush 10 mL ( Intravenous Canceled Entry 8/8/24 0918)   sodium chloride 0.9 % flush 10 mL (has no administration in time range)   sodium chloride 0.9 % infusion 40 mL (has no administration in time range)   atorvastatin (LIPITOR) tablet 80 mg (80 mg Oral Given 8/7/24 2107)   aspirin chewable tablet 81 mg (81 mg Oral Given 8/8/24 0916)     Or   aspirin suppository 300 mg ( Rectal Not Given:  See Alt 8/8/24 0916)   apixaban (ELIQUIS) tablet 5 mg (5 mg Oral Given 8/8/24 0916)   buPROPion XL (WELLBUTRIN XL) 24 hr tablet 150 mg (150 mg Oral Given 8/8/24 0916)   folic acid (FOLVITE) tablet 1 mg (1 mg Oral Given 8/8/24 0916)   lactobacillus acidophilus (RISAQUAD) capsule 1 capsule (1 capsule Oral Given 8/8/24 0916)   multivitamin with minerals 1 tablet (1  tablet Oral Given 8/8/24 0924)   OLANZapine (zyPREXA) tablet 10 mg (10 mg Oral Given 8/7/24 2107)   vitamin B-12 (CYANOCOBALAMIN) tablet 1,000 mcg (1,000 mcg Oral Given 8/8/24 0916)   sodium chloride 0.9 % flush 10 mL ( Intravenous Canceled Entry 8/8/24 0917)   sodium chloride 0.9 % flush 10 mL (10 mL Intravenous Given 8/7/24 0236)   sodium chloride 0.9 % infusion 40 mL (has no administration in time range)   acetaminophen (TYLENOL) tablet 650 mg (650 mg Oral Given 8/7/24 1015)   sennosides-docusate (PERICOLACE) 8.6-50 MG per tablet 2 tablet (has no administration in time range)     And   polyethylene glycol (MIRALAX) packet 17 g (has no administration in time range)     And   bisacodyl (DULCOLAX) EC tablet 5 mg (has no administration in time range)     And   bisacodyl (DULCOLAX) suppository 10 mg (has no administration in time range)   ondansetron ODT (ZOFRAN-ODT) disintegrating tablet 4 mg (has no administration in time range)   thiamine (VITAMIN B-1) tablet 100 mg (100 mg Oral Given 8/8/24 0916)   prochlorperazine (COMPAZINE) injection 10 mg (10 mg Intravenous Given 8/8/24 0453)     And   diphenhydrAMINE (BENADRYL) injection 25 mg (25 mg Intravenous Given 8/8/24 0453)   losartan (COZAAR) tablet 50 mg (50 mg Oral Given 8/8/24 0924)   diphenhydrAMINE (BENADRYL) injection 25 mg (25 mg Intravenous Given 8/6/24 2011)   metoclopramide (REGLAN) injection 10 mg (10 mg Intravenous Given 8/6/24 2011)   sodium chloride 0.9 % bolus 1,000 mL (0 mL Intravenous Stopped 8/6/24 2221)   iopamidol (ISOVUE-370) 76 % injection 150 mL (150 mL Intravenous Given 8/6/24 1939)   acetaminophen (TYLENOL) tablet 650 mg (650 mg Oral Given 8/7/24 0052)     ECG/EMG Results (last 24 hours)       Procedure Component Value Units Date/Time    Telemetry Scan [[id number]] Resulted: 08/06/24     Updated: 08/07/24 1012    Telemetry Scan [[id number]] Resulted: 08/06/24     Updated: 08/07/24 1013          ECG 12 Lead Stroke Evaluation   Preliminary Result    Test Reason : Stroke Evaluation   Blood Pressure :   */*   mmHG   Vent. Rate :  72 BPM     Atrial Rate :  72 BPM      P-R Int : 168 ms          QRS Dur :  82 ms       QT Int : 434 ms       P-R-T Axes :  45 -15  24 degrees      QTc Int : 475 ms      Normal sinus rhythm   Possible Anterior infarct (cited on or before 09-APR-2024)   Abnormal ECG   When compared with ECG of 06-AUG-2024 20:03, (Unconfirmed)   T wave amplitude has decreased in Anterior leads      Referred By: NELIA HERNANDEZ           Confirmed By:       ECG 12 Lead Stroke Evaluation   Preliminary Result   Test Reason : Stroke Evaluation   Blood Pressure :   */*   mmHG   Vent. Rate :  59 BPM     Atrial Rate :  59 BPM      P-R Int : 142 ms          QRS Dur :  94 ms       QT Int : 494 ms       P-R-T Axes :  35 -10  31 degrees      QTc Int : 489 ms      Sinus bradycardia   Cannot rule out Anterior infarct (cited on or before 09-APR-2024)   Abnormal ECG   When compared with ECG of 09-APR-2024 08:38,   Previous ECG has undetermined rhythm, needs review      Referred By: ED MD           Confirmed By:       Telemetry Scan   Final Result      Telemetry Scan   Final Result                              Total (NIH Stroke Scale): 3                  Medical Decision Making  Imaging is reassuring. Pt continues to have left sided weakness and difficulty ambulating.  Pt seen and examined by the neurology stroke team.  Given her persistent weakness she will be admitted for further evaluation and management.    Amount and/or Complexity of Data Reviewed  External Data Reviewed: notes.  Labs: ordered. Decision-making details documented in ED Course.  Radiology: ordered and independent interpretation performed. Decision-making details documented in ED Course.     Details: I personally viewed images  ECG/medicine tests: ordered and independent interpretation performed. Decision-making details documented in ED Course.    Risk  OTC drugs.  Prescription drug management.  Decision  regarding hospitalization.        Final diagnoses:   Acute left-sided weakness   Alcoholic intoxication without complication       ED Disposition  ED Disposition       ED Disposition   Decision to Admit    Condition   --    Comment   Level of Care: Telemetry [5]   Diagnosis: Weakness [288897]   Admitting Physician: ROBBIN CAIN [[id number]]   Attending Physician: ROBBIN CAIN [[id number]]                        Levon Garcia DO  08/08/24 0950

## 2024-08-08 LAB — T4 FREE SERPL-MCNC: 1.14 NG/DL (ref 0.92–1.68)

## 2024-08-08 PROCEDURE — 96376 TX/PRO/DX INJ SAME DRUG ADON: CPT

## 2024-08-08 PROCEDURE — 96372 THER/PROPH/DIAG INJ SC/IM: CPT

## 2024-08-08 PROCEDURE — 99214 OFFICE O/P EST MOD 30 MIN: CPT | Performed by: PSYCHIATRY & NEUROLOGY

## 2024-08-08 PROCEDURE — 25010000002 DIPHENHYDRAMINE PER 50 MG: Performed by: PSYCHIATRY & NEUROLOGY

## 2024-08-08 PROCEDURE — 84439 ASSAY OF FREE THYROXINE: CPT | Performed by: INTERNAL MEDICINE

## 2024-08-08 PROCEDURE — G0378 HOSPITAL OBSERVATION PER HR: HCPCS

## 2024-08-08 PROCEDURE — 99232 SBSQ HOSP IP/OBS MODERATE 35: CPT | Performed by: INTERNAL MEDICINE

## 2024-08-08 PROCEDURE — 25010000002 CYANOCOBALAMIN PER 1000 MCG: Performed by: PSYCHIATRY & NEUROLOGY

## 2024-08-08 PROCEDURE — 25010000002 PROCHLORPERAZINE 10 MG/2ML SOLUTION: Performed by: PSYCHIATRY & NEUROLOGY

## 2024-08-08 RX ORDER — CYANOCOBALAMIN 1000 UG/ML
1000 INJECTION, SOLUTION INTRAMUSCULAR; SUBCUTANEOUS DAILY
Status: DISCONTINUED | OUTPATIENT
Start: 2024-08-08 | End: 2024-08-09 | Stop reason: HOSPADM

## 2024-08-08 RX ORDER — HYDRALAZINE HYDROCHLORIDE 25 MG/1
25 TABLET, FILM COATED ORAL EVERY 8 HOURS PRN
Status: DISCONTINUED | OUTPATIENT
Start: 2024-08-08 | End: 2024-08-09 | Stop reason: HOSPADM

## 2024-08-08 RX ORDER — LOSARTAN POTASSIUM 50 MG/1
50 TABLET ORAL
Status: DISCONTINUED | OUTPATIENT
Start: 2024-08-08 | End: 2024-08-09 | Stop reason: HOSPADM

## 2024-08-08 RX ADMIN — APIXABAN 5 MG: 5 TABLET, FILM COATED ORAL at 21:26

## 2024-08-08 RX ADMIN — DIPHENHYDRAMINE HYDROCHLORIDE 25 MG: 50 INJECTION INTRAMUSCULAR; INTRAVENOUS at 04:53

## 2024-08-08 RX ADMIN — APIXABAN 5 MG: 5 TABLET, FILM COATED ORAL at 09:16

## 2024-08-08 RX ADMIN — CYANOCOBALAMIN 1000 MCG: 1000 INJECTION, SOLUTION INTRAMUSCULAR at 16:23

## 2024-08-08 RX ADMIN — DIPHENHYDRAMINE HYDROCHLORIDE 25 MG: 50 INJECTION INTRAMUSCULAR; INTRAVENOUS at 12:40

## 2024-08-08 RX ADMIN — Medication 1 CAPSULE: at 09:16

## 2024-08-08 RX ADMIN — CYANOCOBALAMIN TAB 1000 MCG 1000 MCG: 1000 TAB at 09:16

## 2024-08-08 RX ADMIN — BUPROPION HYDROCHLORIDE 150 MG: 150 TABLET, EXTENDED RELEASE ORAL at 09:16

## 2024-08-08 RX ADMIN — FOLIC ACID 1 MG: 1 TABLET ORAL at 09:16

## 2024-08-08 RX ADMIN — ASPIRIN 81 MG 81 MG: 81 TABLET ORAL at 09:16

## 2024-08-08 RX ADMIN — THIAMINE HCL TAB 100 MG 100 MG: 100 TAB at 09:16

## 2024-08-08 RX ADMIN — LOSARTAN POTASSIUM 50 MG: 50 TABLET, FILM COATED ORAL at 09:24

## 2024-08-08 RX ADMIN — Medication 1 TABLET: at 09:24

## 2024-08-08 RX ADMIN — PROCHLORPERAZINE EDISYLATE 10 MG: 5 INJECTION INTRAMUSCULAR; INTRAVENOUS at 12:40

## 2024-08-08 RX ADMIN — ATORVASTATIN CALCIUM 80 MG: 40 TABLET, FILM COATED ORAL at 21:26

## 2024-08-08 RX ADMIN — Medication 10 ML: at 21:26

## 2024-08-08 RX ADMIN — PROCHLORPERAZINE EDISYLATE 10 MG: 5 INJECTION INTRAMUSCULAR; INTRAVENOUS at 04:53

## 2024-08-08 RX ADMIN — OLANZAPINE 10 MG: 5 TABLET, FILM COATED ORAL at 21:26

## 2024-08-08 NOTE — PLAN OF CARE
Problem: Adult Inpatient Plan of Care  Goal: Plan of Care Review  Outcome: Ongoing, Progressing  Goal: Patient-Specific Goal (Individualized)  Outcome: Ongoing, Progressing  Goal: Absence of Hospital-Acquired Illness or Injury  Outcome: Ongoing, Progressing  Intervention: Identify and Manage Fall Risk  Recent Flowsheet Documentation  Taken 8/7/2024 2000 by Meri Kruger RN  Safety Promotion/Fall Prevention: safety round/check completed  Intervention: Prevent Skin Injury  Recent Flowsheet Documentation  Taken 8/7/2024 2000 by Meri Kruger RN  Body Position: position changed independently  Skin Protection:   tubing/devices free from skin contact   adhesive use limited  Intervention: Prevent and Manage VTE (Venous Thromboembolism) Risk  Recent Flowsheet Documentation  Taken 8/7/2024 2000 by Meri Kruger RN  Activity Management: activity encouraged  VTE Prevention/Management: (SEE MAR) other (see comments)  Range of Motion: active ROM (range of motion) encouraged  Intervention: Prevent Infection  Recent Flowsheet Documentation  Taken 8/7/2024 2000 by Meri Kruger RN  Infection Prevention: rest/sleep promoted  Goal: Optimal Comfort and Wellbeing  Outcome: Ongoing, Progressing  Intervention: Provide Person-Centered Care  Recent Flowsheet Documentation  Taken 8/7/2024 2000 by Meri Kruger RN  Trust Relationship/Rapport:   care explained   choices provided   emotional support provided   empathic listening provided   questions answered   questions encouraged   reassurance provided   thoughts/feelings acknowledged  Goal: Readiness for Transition of Care  Outcome: Ongoing, Progressing     Problem: Skin Injury Risk Increased  Goal: Skin Health and Integrity  Outcome: Ongoing, Progressing  Intervention: Optimize Skin Protection  Recent Flowsheet Documentation  Taken 8/7/2024 2000 by Meri Kruger RN  Pressure Reduction Techniques: frequent weight shift encouraged  Head of Bed (HOB)  Positioning: HOB elevated  Pressure Reduction Devices: pressure-redistributing mattress utilized  Skin Protection:   tubing/devices free from skin contact   adhesive use limited   Goal Outcome Evaluation:

## 2024-08-08 NOTE — PROGRESS NOTES
Neurology Note    Patient:  Kamila Garcia    YOB: 1970    REFERRING PHYSICIAN:  Dr. Kim    CHIEF COMPLAINT:    Numbness    HISTORY OF PRESENT ILLNESS:   The patient reports that her headache has resolved but the left side is still numb. She has not gotten up yet.    Past Medical History:  Past Medical History:   Diagnosis Date    Anxiety and depression     Chest pain     Disease of thyroid gland     reports slightly elevated when in hospital    DVT (deep venous thrombosis)     ETOH use 04/09/2024    History of pulmonary embolus (PE)     Hypertension     Hypothyroidism 04/09/2024    Migraines     Obesity (BMI 30-39.9) 04/09/2024    Shortness of breath     with chest pain episodes       Past Surgical History:  Past Surgical History:   Procedure Laterality Date    CARDIAC CATHETERIZATION Left 3/11/2022    Procedure: Left Heart Cath;  Surgeon: Peter Anguiano MD;  Location:  [facility];  Service: Cardiology;  Laterality: Left;  Hold Eliquis 2 days prior to [facility]    ENDOSCOPY N/A 1/12/2022    Procedure: ESOPHAGOGASTRODUODENOSCOPY;  Surgeon: Brunner, Mark I, MD;  Location:  [facility];  Service: Gastroenterology;  Laterality: N/A;    GASTRIC BYPASS  2007    lap cm en y    IR STENT PLACEMENT Left 10/2021    [facility] in cath lab placed stent in left leg       Social History:   Social History     Socioeconomic History    Marital status:    Tobacco Use    Smoking status: Never    Smokeless tobacco: Never   Vaping Use    Vaping status: Never Used   Substance and Sexual Activity    Alcohol use: Yes     Alcohol/week: 4.0 standard drinks of alcohol     Types: 4 Cans of beer per week     Comment: daily    Drug use: Never    Sexual activity: Defer        Family History:   Family History   Problem Relation Age of Onset    Heart attack Mother 50    Cancer Mother     Stroke Father     Breast cancer Maternal Grandmother     No Known Problems Maternal Grandfather      No Known Problems Paternal Grandmother     No Known Problems Paternal Grandfather        Medications Prior to Admission:    Prior to Admission medications    Medication Sig Start Date End Date Taking? Authorizing Provider   apixaban (ELIQUIS) 5 MG tablet tablet Start On 7/8: Take 1 tablet by mouth Every 12 (Twelve) Hours. 4/13/24   Ysabel Brown APRN   buPROPion XL (WELLBUTRIN XL) 150 MG 24 hr tablet Take 1 tablet by mouth Daily. 4/17/24   Adilene Godinez DO   eszopiclone (LUNESTA) 3 MG tablet Take 1 tablet by mouth Every Night. Take immediately before bedtime    Emergency, Nurse Lindy, RN   folic acid (FOLVITE) 1 MG tablet Take 1 tablet by mouth Daily. 4/17/24   Adilene Godinez DO   lactobacillus acidophilus (RISAQUAD) capsule capsule Take 1 capsule by mouth Daily. 4/17/24   Adilene Godinez DO   losartan (COZAAR) 50 MG tablet Take 1 tablet by mouth Daily. 4/17/24   Adilene Godinez DO   multivitamin with minerals tablet tablet Take 1 tablet by mouth Daily. 4/17/24   Adilene Godinez DO   OLANZapine (zyPREXA) 10 MG tablet Take 1 tablet by mouth Every Night.    Emergency, Nurse Epic, RN   thiamine (VITAMIN B1) 100 MG tablet Take 1 tablet by mouth Daily. 4/16/24   Adilene Godinez DO   vitamin B-12 (VITAMIN B-12) 1000 MCG tablet Take 1 tablet by mouth Daily. 4/17/24   Adilene Godinez DO       Allergies:  Hydrocodone and Milk-related compounds      Review of system  Review of Systems   Neurological:  Positive for numbness.   All other systems reviewed and are negative.      Vitals:    08/08/24 1155   BP: 162/78   Pulse: 76   Resp: 16   Temp: 98.4 °F (36.9 °C)   SpO2: 97%       Physical exam  Physical Exam  Cardiovascular:      Rate and Rhythm: Normal rate and regular rhythm.   Pulmonary:      Effort: Pulmonary effort is normal.   Neurological:      Mental Status: She is alert.      Comments: Speech clear, no facial droop, no limb drift. Reports decreased sensation to touch on the left f/a/l.            Lab Results   Component Value Date    WBC 5.49 08/07/2024    HGB 13.5 08/07/2024    HCT 40.6 08/07/2024    MCV 86.4 08/07/2024     08/07/2024     Lab Results   Component Value Date    GLUCOSE 90 08/07/2024    BUN 8 08/07/2024    CREATININE 0.79 08/07/2024    EGFRIFNONA 69 01/27/2022    BCR 10.1 08/07/2024    CO2 23.0 08/07/2024    CALCIUM 8.4 (L) 08/07/2024    ALBUMIN 3.4 (L) 08/07/2024    AST 17 08/07/2024    ALT 7 08/07/2024     ntains abnormal data Vitamin B12  Order: [id number]  Status: Final result       Visible to patient: No (not released)       Next appt: None    Specimen Information: Blood   0 Result Notes       Component  Ref Range & Units 1 d ago 3 mo ago   Vitamin B-12  211 - 946 pg/mL 198 Low  222          Radiological Studies:   MRI Brain Without Contrast    Result Date: 8/6/2024  MRI BRAIN WO CONTRAST Date of Exam: 8/6/2024 8:53 PM EDT Indication: Left-sided weakness and numbness, acute stroke suspected.  Comparison: CT head from earlier today and MRI brain from April 9, 2024 Technique:  Routine multiplanar/multisequence sequence images of the brain were obtained without contrast administration. Findings: No acute infarction, intracranial hemorrhage, or extra-axial collection is identified. The ventricles appear normal in caliber, with no evidence of mass effect or midline shift. The basal cisterns appear patent. The midline structures appear intact. The globes and orbits appear intact. The intracranial vascular flow-voids appear patent. Subtle foci of periventricular and subcortical white matter FLAIR hyperintensities are nonspecific, but likely the sequela of minimal chronic small vessel ischemic disease.     Impression: 1.No acute intracranial process identified. 2.Findings suggestive of minimal chronic small vessel ischemic disease. Electronically Signed: José Pope MD  8/6/2024 9:32 PM EDT  Workstation ID: [id number]    XR Chest 1 View    Result Date: 8/6/2024  XR CHEST 1 VW  Date of Exam: 8/6/2024 7:36 PM EDT Indication: Acute Stroke Protocol (onset < 12 hrs) Comparison: April 9, 2024 Findings: The lungs are clear. The heart and mediastinal contours appear normal. There is no pleural effusion. The pulmonary vasculature appears normal. The osseous structures appear intact.     Impression: No acute cardiopulmonary process. Electronically Signed: José Pope MD  8/6/2024 8:48 PM EDT  Workstation ID: [id number]    CT Angiogram Head w AI Analysis of LVO    Result Date: 8/6/2024  CT ANGIOGRAM HEAD W AI ANALYSIS OF LVO, CT ANGIOGRAM NECK Date of Exam: 8/6/2024 7:25 PM EDT Indication: stroke. Comparison: CT head from earlier today and CT angiography head and neck from April 9, 2024 Technique: CTA of the head and neck was performed after the uneventful intravenous administration of 75 mL Isovue-370. Reconstructed coronal and sagittal images were also obtained. In addition, a 3-D volume rendered image was created for interpretation. Automated exposure control and iterative reconstruction methods were used. Findings: There is a three-vessel aortic arch. The right common carotid artery is widely patent. The right carotid bifurcation is widely patent. The right internal carotid artery is widely patent. The left common carotid artery is widely patent. There is mild plaque at the left carotid bifurcation that is not hemodynamically significant. The left internal carotid artery is widely patent. The right vertebral artery is widely patent. The left vertebral artery is widely patent. The left vertebral artery is dominant. The bilateral middle cerebral, bilateral anterior cerebral, and anterior communicating arteries are widely patent. The basilar and bilateral posterior cerebral arteries are widely patent. There is a patent left posterior communicating artery. No definite  right posterior communicating artery is identified. No intracranial aneurysm is identified. The visualized portions of the  bilateral superior cerebellar, anteroinferior cerebellar, and posterior inferior cerebellar arteries are unremarkable.     Impression: Major arterial vasculature within head and neck appears widely patent, with no hemodynamically significant stenosis, dissection, thrombus, or aneurysm. Electronically Signed: José Pope MD  8/6/2024 7:55 PM EDT  Workstation ID: [id number]    CT Angiogram Neck    Result Date: 8/6/2024  CT ANGIOGRAM HEAD W AI ANALYSIS OF LVO, CT ANGIOGRAM NECK Date of Exam: 8/6/2024 7:25 PM EDT Indication: stroke. Comparison: CT head from earlier today and CT angiography head and neck from April 9, 2024 Technique: CTA of the head and neck was performed after the uneventful intravenous administration of 75 mL Isovue-370. Reconstructed coronal and sagittal images were also obtained. In addition, a 3-D volume rendered image was created for interpretation. Automated exposure control and iterative reconstruction methods were used. Findings: There is a three-vessel aortic arch. The right common carotid artery is widely patent. The right carotid bifurcation is widely patent. The right internal carotid artery is widely patent. The left common carotid artery is widely patent. There is mild plaque at the left carotid bifurcation that is not hemodynamically significant. The left internal carotid artery is widely patent. The right vertebral artery is widely patent. The left vertebral artery is widely patent. The left vertebral artery is dominant. The bilateral middle cerebral, bilateral anterior cerebral, and anterior communicating arteries are widely patent. The basilar and bilateral posterior cerebral arteries are widely patent. There is a patent left posterior communicating artery. No definite  right posterior communicating artery is identified. No intracranial aneurysm is identified. The visualized portions of the bilateral superior cerebellar, anteroinferior cerebellar, and posterior inferior cerebellar  arteries are unremarkable.     Impression: Major arterial vasculature within head and neck appears widely patent, with no hemodynamically significant stenosis, dissection, thrombus, or aneurysm. Electronically Signed: José Pope MD  8/6/2024 7:55 PM EDT  Workstation ID: [id number]    CT CEREBRAL PERFUSION WITH & WITHOUT CONTRAST    Result Date: 8/6/2024  CT CEREBRAL PERFUSION W WO CONTRAST Date of Exam: 8/6/2024 7:25 PM EDT Indication: stroke.  Comparison: CT head from earlier today Technique: Axial CT images of the brain were obtained prior to and after the administration of 50 mL Isovue-370. Core blood volume, core blood flow, mean transit time, and Tmax images were obtained utilizing the Rapid software protocol. A limited CT angiogram of the head was also performed to measure the blood vessel density. The radiation dose reduction device was turned on for each scan per the ALARA (As Low as Reasonably Achievable) protocol. Findings: The intracranial cerebral perfusion appears normal.     Impression: Examination appears within normal limits. Electronically Signed: José Pope MD  8/6/2024 7:48 PM EDT  Workstation ID: [id number]    CT Head Without Contrast Stroke Protocol    Result Date: 8/6/2024  CT HEAD WO CONTRAST STROKE PROTOCOL Date of Exam: 8/6/2024 7:20 PM EDT Indication: stroke. Comparison: 4/10/2024 Technique: Axial CT images were obtained of the head without contrast administration.  Reconstructed coronal images were also obtained. Automated exposure control and iterative construction methods were used. Scan Time: 1926 Results discussed with  At 1933 on 8/6/2024. Findings: Gray-white matter differentiation is maintained without evidence of an acute infarction. No intracranial mass or mass effect. No extra-axial mass or collection. The ventricles and sulci are normal in size and configuration. The posterior fossa appears normal. Sellar and suprasellar structures are normal. Orbital and paranasal  soft tissues are normal. The paranasal sinuses, ethmoid air cells, and mastoid air cells are aerated. The bony calvarium appears intact. No acute fractures. No lytic or blastic bony diseases.     Impression: No acute intracranial pathology. Electronically Signed: Santiago Bacon MD  8/6/2024 7:37 PM EDT  Workstation ID: [id number]       During this visit the following were done:  Labs Reviewed [x]    Labs Ordered []    Radiology Reports Reviewed []    Radiology Ordered [x]    EKG, echo, and/or stress test reviewed []    EEG results reviewed  []    EEG reviewed and interpreted per myself   []    Discussed case with neurointerventionalist or neuroradiologist []    Referring Provider Records Reviewed []    ER Records Reviewed []    Hospital Records Reviewed []    History Obtained From Family []    Radiological images view and Interpreted per myself []    Case Discussed with referring provider []     Decision to obtain and request outside records  []        Assessment and Plan     Numbness and headache, favor migraine with aura given negative MRI brain, CTA H/N. H/O anxiety and depression. B12 deficiency and hypothyroidism.   - Continue Eliquis, aspirin and statin.   - F/U MRI brain.   - ST, PT, OT.               Electronically signed by Oscar Silveira MD on 8/8/2024 at 13:44 EDT

## 2024-08-08 NOTE — CASE MANAGEMENT/SOCIAL WORK
Continued Stay Note  [facility]     Patient Name: Kamila Garcia  MRN: [id number]  Today's Date: 8/8/2024    Admit Date: 8/6/2024    Plan: IRF   Discharge Plan       Row Name 08/08/24 1022       Plan    Plan IRF    Plan Comments Referral called to Priscilla Leon.  CM will continue to follow.    Final Discharge Disposition Code 62 - inpatient rehab facility                   Discharge Codes    No documentation.                 Expected Discharge Date and Time       Expected Discharge Date Expected Discharge Time    Aug 10, 2024               Julia Martini RN

## 2024-08-08 NOTE — PROGRESS NOTES
[facility] Medicine Services  PROGRESS NOTE    Patient Name: Kamila Garcia  : 1970  MRN: [id number]    Date of Admission: 2024  Primary Care Physician: Latricia Mishra APRN    Subjective   Subjective     CC:  Weakness    HPI:  No headache currently, agreeable to rehab    Objective   Objective     Vital Signs:   Temp:  [97.9 °F (36.6 °C)-98.4 °F (36.9 °C)] 98.4 °F (36.9 °C)  Heart Rate:  [63-83] 76  Resp:  [16-18] 16  BP: (123-184)/(71-96) 162/78     Physical Exam:  Constitutional: No acute distress, awake, alert  HENT: NCAT, mucous membranes moist  Respiratory: Respiratory effort normal   Musculoskeletal: No bilateral ankle edema  Psychiatric: flat affect, cooperative  Neurologic: Oriented x 3, speech clear  Skin: No rashes      Results Reviewed:  LAB RESULTS:      Lab 24   WBC 5.49 5.97  --    HEMOGLOBIN 13.5 14.9  --    HEMOGLOBIN, POC  --   --  15.3   HEMATOCRIT 40.6 44.3  --    HEMATOCRIT POC  --   --  45   PLATELETS 245 293  --    NEUTROS ABS  --  2.91  --    IMMATURE GRANS (ABS)  --  0.01  --    LYMPHS ABS  --  2.45  --    MONOS ABS  --  0.44  --    EOS ABS  --  0.10  --    MCV 86.4 85.7  --    PROTIME  --   --  14.3   APTT  --  28.2  --          Lab 2425 24   SODIUM 139  --    POTASSIUM 4.1  --    CHLORIDE 105  --    CO2 23.0  --    ANION GAP 11.0  --    BUN 8  --    CREATININE 0.79 1.00   EGFR 87.9 66.3   GLUCOSE 90  --    CALCIUM 8.4*  --    MAGNESIUM 2.0  --    HEMOGLOBIN A1C 5.10  --    TSH 9.900*  --          Lab 24   TOTAL PROTEIN 6.2   ALBUMIN 3.4*   GLOBULIN 2.8   ALT (SGPT) 7   AST (SGOT) 17   BILIRUBIN 0.9   ALK PHOS 103         Lab 24   HSTROP T <6  --    PROTIME  --  14.3   INR  --  1.2         Lab 24  0525   CHOLESTEROL 173   LDL CHOL 78   HDL CHOL 81*   TRIGLYCERIDES 76         Lab 24  0525   FOLATE 12.20   VITAMIN B 12 198*          Brief Urine Lab Results  (Last result in the past 365 days)        Color   Clarity   Blood   Leuk Est   Nitrite   Protein   CREAT   Urine HCG        08/07/24 0512 Yellow   Clear   Trace   Negative   Negative   Negative                   Microbiology Results Abnormal       None            MRI Brain Without Contrast    Result Date: 8/6/2024  MRI BRAIN WO CONTRAST Date of Exam: 8/6/2024 8:53 PM EDT Indication: Left-sided weakness and numbness, acute stroke suspected.  Comparison: CT head from earlier today and MRI brain from April 9, 2024 Technique:  Routine multiplanar/multisequence sequence images of the brain were obtained without contrast administration. Findings: No acute infarction, intracranial hemorrhage, or extra-axial collection is identified. The ventricles appear normal in caliber, with no evidence of mass effect or midline shift. The basal cisterns appear patent. The midline structures appear intact. The globes and orbits appear intact. The intracranial vascular flow-voids appear patent. Subtle foci of periventricular and subcortical white matter FLAIR hyperintensities are nonspecific, but likely the sequela of minimal chronic small vessel ischemic disease.     Impression: Impression: 1.No acute intracranial process identified. 2.Findings suggestive of minimal chronic small vessel ischemic disease. Electronically Signed: José Pope MD  8/6/2024 9:32 PM EDT  Workstation ID: [id number]    XR Chest 1 View    Result Date: 8/6/2024  XR CHEST 1 VW Date of Exam: 8/6/2024 7:36 PM EDT Indication: Acute Stroke Protocol (onset < 12 hrs) Comparison: April 9, 2024 Findings: The lungs are clear. The heart and mediastinal contours appear normal. There is no pleural effusion. The pulmonary vasculature appears normal. The osseous structures appear intact.     Impression: Impression: No acute cardiopulmonary process. Electronically Signed: José Pope MD  8/6/2024 8:48 PM EDT  Workstation ID: [id number]    CT  Angiogram Head w AI Analysis of LVO    Result Date: 8/6/2024  CT ANGIOGRAM HEAD W AI ANALYSIS OF LVO, CT ANGIOGRAM NECK Date of Exam: 8/6/2024 7:25 PM EDT Indication: stroke. Comparison: CT head from earlier today and CT angiography head and neck from April 9, 2024 Technique: CTA of the head and neck was performed after the uneventful intravenous administration of 75 mL Isovue-370. Reconstructed coronal and sagittal images were also obtained. In addition, a 3-D volume rendered image was created for interpretation. Automated exposure control and iterative reconstruction methods were used. Findings: There is a three-vessel aortic arch. The right common carotid artery is widely patent. The right carotid bifurcation is widely patent. The right internal carotid artery is widely patent. The left common carotid artery is widely patent. There is mild plaque at the left carotid bifurcation that is not hemodynamically significant. The left internal carotid artery is widely patent. The right vertebral artery is widely patent. The left vertebral artery is widely patent. The left vertebral artery is dominant. The bilateral middle cerebral, bilateral anterior cerebral, and anterior communicating arteries are widely patent. The basilar and bilateral posterior cerebral arteries are widely patent. There is a patent left posterior communicating artery. No definite  right posterior communicating artery is identified. No intracranial aneurysm is identified. The visualized portions of the bilateral superior cerebellar, anteroinferior cerebellar, and posterior inferior cerebellar arteries are unremarkable.     Impression: Impression: Major arterial vasculature within head and neck appears widely patent, with no hemodynamically significant stenosis, dissection, thrombus, or aneurysm. Electronically Signed: José Pope MD  8/6/2024 7:55 PM EDT  Workstation ID: [id number]    CT Angiogram Neck    Result Date: 8/6/2024  CT ANGIOGRAM HEAD W  AI ANALYSIS OF LVO, CT ANGIOGRAM NECK Date of Exam: 8/6/2024 7:25 PM EDT Indication: stroke. Comparison: CT head from earlier today and CT angiography head and neck from April 9, 2024 Technique: CTA of the head and neck was performed after the uneventful intravenous administration of 75 mL Isovue-370. Reconstructed coronal and sagittal images were also obtained. In addition, a 3-D volume rendered image was created for interpretation. Automated exposure control and iterative reconstruction methods were used. Findings: There is a three-vessel aortic arch. The right common carotid artery is widely patent. The right carotid bifurcation is widely patent. The right internal carotid artery is widely patent. The left common carotid artery is widely patent. There is mild plaque at the left carotid bifurcation that is not hemodynamically significant. The left internal carotid artery is widely patent. The right vertebral artery is widely patent. The left vertebral artery is widely patent. The left vertebral artery is dominant. The bilateral middle cerebral, bilateral anterior cerebral, and anterior communicating arteries are widely patent. The basilar and bilateral posterior cerebral arteries are widely patent. There is a patent left posterior communicating artery. No definite  right posterior communicating artery is identified. No intracranial aneurysm is identified. The visualized portions of the bilateral superior cerebellar, anteroinferior cerebellar, and posterior inferior cerebellar arteries are unremarkable.     Impression: Impression: Major arterial vasculature within head and neck appears widely patent, with no hemodynamically significant stenosis, dissection, thrombus, or aneurysm. Electronically Signed: José Pope MD  8/6/2024 7:55 PM EDT  Workstation ID: [id number]    CT CEREBRAL PERFUSION WITH & WITHOUT CONTRAST    Result Date: 8/6/2024  CT CEREBRAL PERFUSION W WO CONTRAST Date of Exam: 8/6/2024 7:25 PM EDT  Indication: stroke.  Comparison: CT head from earlier today Technique: Axial CT images of the brain were obtained prior to and after the administration of 50 mL Isovue-370. Core blood volume, core blood flow, mean transit time, and Tmax images were obtained utilizing the Rapid software protocol. A limited CT angiogram of the head was also performed to measure the blood vessel density. The radiation dose reduction device was turned on for each scan per the ALARA (As Low as Reasonably Achievable) protocol. Findings: The intracranial cerebral perfusion appears normal.     Impression: Impression: Examination appears within normal limits. Electronically Signed: José Pope MD  8/6/2024 7:48 PM EDT  Workstation ID: [id number]    CT Head Without Contrast Stroke Protocol    Result Date: 8/6/2024  CT HEAD WO CONTRAST STROKE PROTOCOL Date of Exam: 8/6/2024 7:20 PM EDT Indication: stroke. Comparison: 4/10/2024 Technique: Axial CT images were obtained of the head without contrast administration.  Reconstructed coronal images were also obtained. Automated exposure control and iterative construction methods were used. Scan Time: 1926 Results discussed with  At 1933 on 8/6/2024. Findings: Gray-white matter differentiation is maintained without evidence of an acute infarction. No intracranial mass or mass effect. No extra-axial mass or collection. The ventricles and sulci are normal in size and configuration. The posterior fossa appears normal. Sellar and suprasellar structures are normal. Orbital and paranasal soft tissues are normal. The paranasal sinuses, ethmoid air cells, and mastoid air cells are aerated. The bony calvarium appears intact. No acute fractures. No lytic or blastic bony diseases.     Impression: Impression: No acute intracranial pathology. Electronically Signed: Santiago Bacon MD  8/6/2024 7:37 PM EDT  Workstation ID: [id number]     Results for orders placed during the hospital encounter of 04/09/24    Adult  Transthoracic Echo Complete W/ Cont if Necessary Per Protocol (With Agitated Saline)    Interpretation Summary    Left ventricular systolic function is normal. Calculated left ventricular EF = 53.6%    Left ventricular wall thickness is consistent with mild concentric hypertrophy.    Saline test results are negative.    Estimated right ventricular systolic pressure from tricuspid regurgitation is normal (<35 mmHg).    The aortic valve exhibits sclerosis.      Current medications:  Scheduled Meds:apixaban, 5 mg, Oral, Q12H  aspirin, 81 mg, Oral, Daily   Or  aspirin, 300 mg, Rectal, Daily  atorvastatin, 80 mg, Oral, Nightly  buPROPion XL, 150 mg, Oral, Daily  prochlorperazine, 10 mg, Intravenous, Q6H   And  diphenhydrAMINE, 25 mg, Intravenous, Q6H  folic acid, 1 mg, Oral, Daily  lactobacillus acidophilus, 1 capsule, Oral, Daily  losartan, 50 mg, Oral, Q24H  multivitamin with minerals, 1 tablet, Oral, Daily  OLANZapine, 10 mg, Oral, Nightly  sodium chloride, 10 mL, Intravenous, Q12H  sodium chloride, 10 mL, Intravenous, Q12H  thiamine, 100 mg, Oral, Daily  cyanocobalamin, 1,000 mcg, Oral, Daily      Continuous Infusions:   PRN Meds:.  acetaminophen    senna-docusate sodium **AND** polyethylene glycol **AND** bisacodyl **AND** bisacodyl    hydrALAZINE    ondansetron ODT    sodium chloride    sodium chloride    sodium chloride    sodium chloride    sodium chloride    Assessment & Plan   Assessment & Plan     Active Hospital Problems    Diagnosis  POA    **Left-sided weakness [R53.1]  Yes    Numbness and tingling [R20.0, R20.2]  Yes    Alcohol use [Z78.9]  Yes    Anxiety and depression [F41.9, F32.A]  Yes    Hypertension [I10]  Yes    H/O LLE DVT & PE (July 2021) [Z86.718]  Not Applicable      Resolved Hospital Problems   No resolved problems to display.        Brief Hospital Course to date:  Kamila Garcia is a 54 y.o. 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  MRI brain negative for acute infarct.  She was evaluated by neurology who thought she had migraine with aura.     Left sided weakness, numbness and tingling   -Recent admit in April 2024 w/ syncope and left sided and facial weakness. Workup unremarkable.    -stroke and General Neurology evaluation; started on B12 replacement, daily ASA   -Stroke Neuro evaluated, MRI brain negative  -CT Head, CTA Head/Neck and CT Perfusion- all without acute findings  -TTE 4/2024 with negative saline test, no evidence of SARAVANAN/LAE   -ASA daily   -continue Eliquis  -Continue statin  -continue folic acid, MVI, B12, thiamine  -Recommending rehab     Elevated TSH  -- Patient denies any symptoms other than some mild weight gain.  --check free t4, although patient prior refused any replacement per notes       HTN   --Restart losartan 50 mg  -- Add as needed hydralazine po    Alcohol use  -pt reports that she consumes ~ 5 beers daily  -denies hx of alcohol w/d  -UDS negative  -CIWA scoring per protocol   -folic acid, thiamine, MVI        Anxiety   Depression   -continue Wellbutrin, Zyprexa      Hx of DVT/PE (July 2021)  -continue Eliquis         Expected Discharge Location and Transportation: [facility]  Expected Discharge   Expected Discharge Date: 8/10/2024; Expected Discharge Time:      VTE Prophylaxis:  Pharmacologic & mechanical VTE prophylaxis orders are present.         AM-PAC 6 Clicks Score (PT): 17 (08/07/24 2000)    CODE STATUS:   Code Status and Medical Interventions: CPR (Attempt to Resuscitate); Full Support   Ordered at: 08/07/24 0057     Code Status (Patient has no pulse and is not breathing):    CPR (Attempt to Resuscitate)     Medical Interventions (Patient has pulse or is breathing):    Full Support       Shara Kim,   08/08/24

## 2024-08-09 VITALS
RESPIRATION RATE: 18 BRPM | TEMPERATURE: 97.7 F | SYSTOLIC BLOOD PRESSURE: 142 MMHG | DIASTOLIC BLOOD PRESSURE: 73 MMHG | BODY MASS INDEX: 36.42 KG/M2 | HEART RATE: 59 BPM | OXYGEN SATURATION: 100 % | WEIGHT: 226.63 LBS | HEIGHT: 66 IN

## 2024-08-09 LAB
ALBUMIN SERPL-MCNC: 3.7 G/DL (ref 3.5–5.2)
ANION GAP SERPL CALCULATED.3IONS-SCNC: 9 MMOL/L (ref 5–15)
BASOPHILS # BLD AUTO: 0.06 10*3/MM3 (ref 0–0.2)
BASOPHILS NFR BLD AUTO: 1.1 % (ref 0–1.5)
BUN SERPL-MCNC: 11 MG/DL (ref 6–20)
BUN/CREAT SERPL: 13.1 (ref 7–25)
CALCIUM SPEC-SCNC: 8.7 MG/DL (ref 8.6–10.5)
CHLORIDE SERPL-SCNC: 108 MMOL/L (ref 98–107)
CO2 SERPL-SCNC: 23 MMOL/L (ref 22–29)
CREAT SERPL-MCNC: 0.84 MG/DL (ref 0.57–1)
DEPRECATED RDW RBC AUTO: 42.9 FL (ref 37–54)
EGFRCR SERPLBLD CKD-EPI 2021: 81.7 ML/MIN/1.73
EOSINOPHIL # BLD AUTO: 0.22 10*3/MM3 (ref 0–0.4)
EOSINOPHIL NFR BLD AUTO: 3.9 % (ref 0.3–6.2)
ERYTHROCYTE [DISTWIDTH] IN BLOOD BY AUTOMATED COUNT: 13.3 % (ref 12.3–15.4)
GLUCOSE SERPL-MCNC: 98 MG/DL (ref 65–99)
HCT VFR BLD AUTO: 37.8 % (ref 34–46.6)
HGB BLD-MCNC: 12.4 G/DL (ref 12–15.9)
IMM GRANULOCYTES # BLD AUTO: 0.01 10*3/MM3 (ref 0–0.05)
IMM GRANULOCYTES NFR BLD AUTO: 0.2 % (ref 0–0.5)
LYMPHOCYTES # BLD AUTO: 1.77 10*3/MM3 (ref 0.7–3.1)
LYMPHOCYTES NFR BLD AUTO: 31.1 % (ref 19.6–45.3)
MCH RBC QN AUTO: 28.8 PG (ref 26.6–33)
MCHC RBC AUTO-ENTMCNC: 32.8 G/DL (ref 31.5–35.7)
MCV RBC AUTO: 87.9 FL (ref 79–97)
MONOCYTES # BLD AUTO: 0.59 10*3/MM3 (ref 0.1–0.9)
MONOCYTES NFR BLD AUTO: 10.4 % (ref 5–12)
NEUTROPHILS NFR BLD AUTO: 3.05 10*3/MM3 (ref 1.7–7)
NEUTROPHILS NFR BLD AUTO: 53.3 % (ref 42.7–76)
NRBC BLD AUTO-RTO: 0 /100 WBC (ref 0–0.2)
PHOSPHATE SERPL-MCNC: 3.4 MG/DL (ref 2.5–4.5)
PLATELET # BLD AUTO: 233 10*3/MM3 (ref 140–450)
PMV BLD AUTO: 10 FL (ref 6–12)
POTASSIUM SERPL-SCNC: 3.8 MMOL/L (ref 3.5–5.2)
RBC # BLD AUTO: 4.3 10*6/MM3 (ref 3.77–5.28)
SODIUM SERPL-SCNC: 140 MMOL/L (ref 136–145)
WBC NRBC COR # BLD AUTO: 5.7 10*3/MM3 (ref 3.4–10.8)

## 2024-08-09 PROCEDURE — 99214 OFFICE O/P EST MOD 30 MIN: CPT | Performed by: PSYCHIATRY & NEUROLOGY

## 2024-08-09 PROCEDURE — 99238 HOSP IP/OBS DSCHRG MGMT 30/<: CPT | Performed by: INTERNAL MEDICINE

## 2024-08-09 PROCEDURE — 96372 THER/PROPH/DIAG INJ SC/IM: CPT

## 2024-08-09 PROCEDURE — 85025 COMPLETE CBC W/AUTO DIFF WBC: CPT | Performed by: INTERNAL MEDICINE

## 2024-08-09 PROCEDURE — 25010000002 CYANOCOBALAMIN PER 1000 MCG: Performed by: PSYCHIATRY & NEUROLOGY

## 2024-08-09 PROCEDURE — 80069 RENAL FUNCTION PANEL: CPT | Performed by: INTERNAL MEDICINE

## 2024-08-09 PROCEDURE — G0378 HOSPITAL OBSERVATION PER HR: HCPCS

## 2024-08-09 RX ADMIN — Medication 1 TABLET: at 09:07

## 2024-08-09 RX ADMIN — FOLIC ACID 1 MG: 1 TABLET ORAL at 09:07

## 2024-08-09 RX ADMIN — BUPROPION HYDROCHLORIDE 150 MG: 150 TABLET, EXTENDED RELEASE ORAL at 09:07

## 2024-08-09 RX ADMIN — CYANOCOBALAMIN TAB 1000 MCG 1000 MCG: 1000 TAB at 09:07

## 2024-08-09 RX ADMIN — ASPIRIN 81 MG 81 MG: 81 TABLET ORAL at 09:07

## 2024-08-09 RX ADMIN — CYANOCOBALAMIN 1000 MCG: 1000 INJECTION, SOLUTION INTRAMUSCULAR at 16:03

## 2024-08-09 RX ADMIN — APIXABAN 5 MG: 5 TABLET, FILM COATED ORAL at 09:07

## 2024-08-09 RX ADMIN — Medication 5 MG: at 02:03

## 2024-08-09 RX ADMIN — THIAMINE HCL TAB 100 MG 100 MG: 100 TAB at 09:07

## 2024-08-09 RX ADMIN — LOSARTAN POTASSIUM 50 MG: 50 TABLET, FILM COATED ORAL at 09:07

## 2024-08-09 RX ADMIN — Medication 1 CAPSULE: at 09:07

## 2024-08-09 NOTE — DISCHARGE SUMMARY
[facility]  DISCHARGE SUMMARY    Patient Name: Kamila Garcia  : 1970  MRN: [id number]    Date of Admission: 2024  6:20 PM  Date of Discharge: 2024  Primary Care Physician: Latricia Mishra APRN    Consults       Date and Time Order Name Status Description    2024  7:55 AM Inpatient Neurology Consult General Completed     2024  7:35 PM Inpatient Neurology Consult Stroke Completed             Hospital Course     Presenting Problem: Left sided weakness    Active Hospital Problems    Diagnosis  POA    **Left-sided weakness [R53.1]  Yes    Numbness and tingling [R20.0, R20.2]  Yes    Alcohol use [Z78.9]  Yes    Anxiety and depression [F41.9, F32.A]  Yes    Hypertension [I10]  Yes    H/O LLE DVT & PE (2021) [Z86.718]  Not Applicable      Resolved Hospital Problems   No resolved problems to display.          Hospital Course:  Kamila Garcia is a 54 y.o. female with history of remote DVT/PE (2021, Eliquis), HTN, anxiety, depression, s/p gastric bypass () who presented to the ED w/ c/o left sided weakness, numbness and tingling.  MRI brain negative for acute infarct.  She was evaluated by neurology who thought she had migraine with aura.     Left sided weakness, numbness and tingling   -Recent admit in 2024 w/ syncope and left sided and facial weakness. Workup unremarkable.    -stroke and General Neurology evaluated, started on B12 replacement, daily ASA   -Stroke Neuro evaluated, MRI brain negative  -CT Head, CTA Head/Neck and CT Perfusion- all without acute findings  -TTE 2024 with negative saline test, no evidence of SARAVANAN/LAE   -ASA daily   -continue Eliquis  -Continue statin  -continue folic acid, MVI, B12, thiamine       Elevated TSH  -- Patient denies any symptoms other than some mild weight gain.  --check free t4, although patient prior refused any replacement per notes        HTN   --Restart losartan 50 mg  --  Add as needed hydralazine po     Alcohol use  -pt reports that she consumes ~ 5 beers daily  -denies hx of alcohol w/d  -UDS negative  -folic acid, thiamine, MVI         Anxiety   Depression   -continue Wellbutrin, Zyprexa      Hx of DVT/PE (July 2021)  -continue Eliquis       Discharge Follow Up Recommendations for outpatient labs/diagnostics:  PCP 1 week post DC    Day of Discharge     HPI:   Feeling okay, no complaints.  See progress note on same day    Review of Systems  Gen- No fevers, chills  CV- No chest pain, palpitations  Resp- No cough, dyspnea  GI- No N/V/D, abd pain      Vital Signs:   Temp:  [97.7 °F (36.5 °C)-98.3 °F (36.8 °C)] 97.7 °F (36.5 °C)  Heart Rate:  [59-76] 59  Resp:  [16-18] 18  BP: (132-159)/(67-95) 142/73      Pertinent  and/or Most Recent Results     LAB RESULTS:      Lab 08/09/24 0503 08/07/24 0525 08/06/24 1939 08/06/24 1934   WBC 5.70 5.49 5.97  --    HEMOGLOBIN 12.4 13.5 14.9  --    HEMOGLOBIN, POC  --   --   --  15.3   HEMATOCRIT 37.8 40.6 44.3  --    HEMATOCRIT POC  --   --   --  45   PLATELETS 233 245 293  --    NEUTROS ABS 3.05  --  2.91  --    IMMATURE GRANS (ABS) 0.01  --  0.01  --    LYMPHS ABS 1.77  --  2.45  --    MONOS ABS 0.59  --  0.44  --    EOS ABS 0.22  --  0.10  --    MCV 87.9 86.4 85.7  --    PROTIME  --   --   --  14.3   APTT  --   --  28.2  --          Lab 08/09/24 0503 08/07/24 0525 08/06/24 1934   SODIUM 140 139  --    POTASSIUM 3.8 4.1  --    CHLORIDE 108* 105  --    CO2 23.0 23.0  --    ANION GAP 9.0 11.0  --    BUN 11 8  --    CREATININE 0.84 0.79 1.00   EGFR 81.7 87.9 66.3   GLUCOSE 98 90  --    CALCIUM 8.7 8.4*  --    MAGNESIUM  --  2.0  --    PHOSPHORUS 3.4  --   --    HEMOGLOBIN A1C  --  5.10  --    TSH  --  9.900*  --          Lab 08/09/24  0503 08/07/24  0525   TOTAL PROTEIN  --  6.2   ALBUMIN 3.7 3.4*   GLOBULIN  --  2.8   ALT (SGPT)  --  7   AST (SGOT)  --  17   BILIRUBIN  --  0.9   ALK PHOS  --  103         Lab 08/06/24  1939 08/06/24  1934    HSTROP T <6  --    PROTIME  --  14.3   INR  --  1.2         Lab 08/07/24  0525   CHOLESTEROL 173   LDL CHOL 78   HDL CHOL 81*   TRIGLYCERIDES 76         Lab 08/07/24  0525   FOLATE 12.20   VITAMIN B 12 198*         Brief Urine Lab Results  (Last result in the past 365 days)        Color   Clarity   Blood   Leuk Est   Nitrite   Protein   CREAT   Urine HCG        08/07/24 0512 Yellow   Clear   Trace   Negative   Negative   Negative                 Microbiology Results (last 10 days)       ** No results found for the last 240 hours. **            MRI Brain Without Contrast    Result Date: 8/6/2024  MRI BRAIN WO CONTRAST Date of Exam: 8/6/2024 8:53 PM EDT Indication: Left-sided weakness and numbness, acute stroke suspected.  Comparison: CT head from earlier today and MRI brain from April 9, 2024 Technique:  Routine multiplanar/multisequence sequence images of the brain were obtained without contrast administration. Findings: No acute infarction, intracranial hemorrhage, or extra-axial collection is identified. The ventricles appear normal in caliber, with no evidence of mass effect or midline shift. The basal cisterns appear patent. The midline structures appear intact. The globes and orbits appear intact. The intracranial vascular flow-voids appear patent. Subtle foci of periventricular and subcortical white matter FLAIR hyperintensities are nonspecific, but likely the sequela of minimal chronic small vessel ischemic disease.     Impression: 1.No acute intracranial process identified. 2.Findings suggestive of minimal chronic small vessel ischemic disease. Electronically Signed: José Pope MD  8/6/2024 9:32 PM EDT  Workstation ID: [id number]    XR Chest 1 View    Result Date: 8/6/2024  XR CHEST 1 VW Date of Exam: 8/6/2024 7:36 PM EDT Indication: Acute Stroke Protocol (onset < 12 hrs) Comparison: April 9, 2024 Findings: The lungs are clear. The heart and mediastinal contours appear normal. There is no pleural  effusion. The pulmonary vasculature appears normal. The osseous structures appear intact.     Impression: No acute cardiopulmonary process. Electronically Signed: José Pope MD  8/6/2024 8:48 PM EDT  Workstation ID: [id number]    CT Angiogram Head w AI Analysis of LVO    Result Date: 8/6/2024  CT ANGIOGRAM HEAD W AI ANALYSIS OF LVO, CT ANGIOGRAM NECK Date of Exam: 8/6/2024 7:25 PM EDT Indication: stroke. Comparison: CT head from earlier today and CT angiography head and neck from April 9, 2024 Technique: CTA of the head and neck was performed after the uneventful intravenous administration of 75 mL Isovue-370. Reconstructed coronal and sagittal images were also obtained. In addition, a 3-D volume rendered image was created for interpretation. Automated exposure control and iterative reconstruction methods were used. Findings: There is a three-vessel aortic arch. The right common carotid artery is widely patent. The right carotid bifurcation is widely patent. The right internal carotid artery is widely patent. The left common carotid artery is widely patent. There is mild plaque at the left carotid bifurcation that is not hemodynamically significant. The left internal carotid artery is widely patent. The right vertebral artery is widely patent. The left vertebral artery is widely patent. The left vertebral artery is dominant. The bilateral middle cerebral, bilateral anterior cerebral, and anterior communicating arteries are widely patent. The basilar and bilateral posterior cerebral arteries are widely patent. There is a patent left posterior communicating artery. No definite  right posterior communicating artery is identified. No intracranial aneurysm is identified. The visualized portions of the bilateral superior cerebellar, anteroinferior cerebellar, and posterior inferior cerebellar arteries are unremarkable.     Impression: Major arterial vasculature within head and neck appears widely patent, with no  hemodynamically significant stenosis, dissection, thrombus, or aneurysm. Electronically Signed: José Pope MD  8/6/2024 7:55 PM EDT  Workstation ID: [id number]    CT Angiogram Neck    Result Date: 8/6/2024  CT ANGIOGRAM HEAD W AI ANALYSIS OF LVO, CT ANGIOGRAM NECK Date of Exam: 8/6/2024 7:25 PM EDT Indication: stroke. Comparison: CT head from earlier today and CT angiography head and neck from April 9, 2024 Technique: CTA of the head and neck was performed after the uneventful intravenous administration of 75 mL Isovue-370. Reconstructed coronal and sagittal images were also obtained. In addition, a 3-D volume rendered image was created for interpretation. Automated exposure control and iterative reconstruction methods were used. Findings: There is a three-vessel aortic arch. The right common carotid artery is widely patent. The right carotid bifurcation is widely patent. The right internal carotid artery is widely patent. The left common carotid artery is widely patent. There is mild plaque at the left carotid bifurcation that is not hemodynamically significant. The left internal carotid artery is widely patent. The right vertebral artery is widely patent. The left vertebral artery is widely patent. The left vertebral artery is dominant. The bilateral middle cerebral, bilateral anterior cerebral, and anterior communicating arteries are widely patent. The basilar and bilateral posterior cerebral arteries are widely patent. There is a patent left posterior communicating artery. No definite  right posterior communicating artery is identified. No intracranial aneurysm is identified. The visualized portions of the bilateral superior cerebellar, anteroinferior cerebellar, and posterior inferior cerebellar arteries are unremarkable.     Impression: Major arterial vasculature within head and neck appears widely patent, with no hemodynamically significant stenosis, dissection, thrombus, or aneurysm. Electronically  Signed: José Pope MD  8/6/2024 7:55 PM EDT  Workstation ID: [id number]    CT CEREBRAL PERFUSION WITH & WITHOUT CONTRAST    Result Date: 8/6/2024  CT CEREBRAL PERFUSION W WO CONTRAST Date of Exam: 8/6/2024 7:25 PM EDT Indication: stroke.  Comparison: CT head from earlier today Technique: Axial CT images of the brain were obtained prior to and after the administration of 50 mL Isovue-370. Core blood volume, core blood flow, mean transit time, and Tmax images were obtained utilizing the Rapid software protocol. A limited CT angiogram of the head was also performed to measure the blood vessel density. The radiation dose reduction device was turned on for each scan per the ALARA (As Low as Reasonably Achievable) protocol. Findings: The intracranial cerebral perfusion appears normal.     Impression: Examination appears within normal limits. Electronically Signed: José Pope MD  8/6/2024 7:48 PM EDT  Workstation ID: [id number]    CT Head Without Contrast Stroke Protocol    Result Date: 8/6/2024  CT HEAD WO CONTRAST STROKE PROTOCOL Date of Exam: 8/6/2024 7:20 PM EDT Indication: stroke. Comparison: 4/10/2024 Technique: Axial CT images were obtained of the head without contrast administration.  Reconstructed coronal images were also obtained. Automated exposure control and iterative construction methods were used. Scan Time: 1926 Results discussed with  At 1933 on 8/6/2024. Findings: Gray-white matter differentiation is maintained without evidence of an acute infarction. No intracranial mass or mass effect. No extra-axial mass or collection. The ventricles and sulci are normal in size and configuration. The posterior fossa appears normal. Sellar and suprasellar structures are normal. Orbital and paranasal soft tissues are normal. The paranasal sinuses, ethmoid air cells, and mastoid air cells are aerated. The bony calvarium appears intact. No acute fractures. No lytic or blastic bony diseases.     Impression: No  acute intracranial pathology. Electronically Signed: Santiago Bacon MD  8/6/2024 7:37 PM EDT  Workstation ID: [id number]     Results for orders placed during the hospital encounter of 04/09/24    Duplex Carotid Ultrasound CAR    Interpretation Summary    Right internal carotid artery demonstrates normal flow without evidence of hemodynamically significant stenosis.    Left internal carotid artery demonstrates normal flow without evidence of hemodynamically significant stenosis.    Bilateral vertebral artery flow is antegrade.      Results for orders placed during the hospital encounter of 04/09/24    Duplex Carotid Ultrasound CAR    Interpretation Summary    Right internal carotid artery demonstrates normal flow without evidence of hemodynamically significant stenosis.    Left internal carotid artery demonstrates normal flow without evidence of hemodynamically significant stenosis.    Bilateral vertebral artery flow is antegrade.      Results for orders placed during the hospital encounter of 04/09/24    Adult Transthoracic Echo Complete W/ Cont if Necessary Per Protocol (With Agitated Saline)    Interpretation Summary    Left ventricular systolic function is normal. Calculated left ventricular EF = 53.6%    Left ventricular wall thickness is consistent with mild concentric hypertrophy.    Saline test results are negative.    Estimated right ventricular systolic pressure from tricuspid regurgitation is normal (<35 mmHg).    The aortic valve exhibits sclerosis.      Plan for Follow-up of Pending Labs/Results:     Discharge Details        Discharge Medications        Continue These Medications        Instructions Start Date   apixaban 5 MG tablet tablet  Commonly known as: ELIQUIS   Start On 7/8: Take 1 tablet by mouth Every 12 (Twelve) Hours.      buPROPion  MG 24 hr tablet  Commonly known as: WELLBUTRIN XL   150 mg, Oral, Daily      cyanocobalamin 1000 MCG tablet  Commonly known as: VITAMIN B-12   1,000 mcg, Oral,  Daily      folic acid 1 MG tablet  Commonly known as: FOLVITE   1 mg, Oral, Daily      lactobacillus acidophilus capsule capsule   1 capsule, Oral, Daily      losartan 50 MG tablet  Commonly known as: COZAAR   50 mg, Oral, Every 24 Hours Scheduled      multivitamin with minerals tablet tablet   1 tablet, Oral, Daily      OLANZapine 10 MG tablet  Commonly known as: zyPREXA   10 mg, Oral, Nightly      thiamine 100 MG tablet  Commonly known as: VITAMIN B1   100 mg, Oral, Daily             Stop These Medications      eszopiclone 3 MG tablet  Commonly known as: LUNESTA              Allergies   Allergen Reactions    Hydrocodone Hives    Milk-Related Compounds GI Intolerance         Discharge Disposition:  Short Term Hospital (DC - External)    Diet:  Hospital:  Diet Order   Procedures    Diet: Regular/House; Fluid Consistency: Thin (IDDSI 0)            Activity:      Restrictions or Other Recommendations:         CODE STATUS:    Code Status and Medical Interventions: CPR (Attempt to Resuscitate); Full Support   Ordered at: 08/07/24 0057     Code Status (Patient has no pulse and is not breathing):    CPR (Attempt to Resuscitate)     Medical Interventions (Patient has pulse or is breathing):    Full Support       No future appointments.              Shara Kim DO  08/09/24      Time Spent on Discharge:  I spent  25  minutes on this discharge activity which included: face-to-face encounter with the patient, reviewing the data in the system, coordination of the care with the nursing staff as well as consultants, documentation, and entering orders.

## 2024-08-09 NOTE — CASE MANAGEMENT/SOCIAL WORK
Case Management Discharge Note      Final Note: Mrs. Garcia has a rehab bed at [facility] today if medically ready. Confirmed with Dolores with facility. Updated Mrs. Garcia and she is agreeable with discharge plan. She will be transported by [employer] wheelchair van at 4:00pm. [facility] van did not have any availability. Please call report to [phone]. If unable to reach the nurse, call the house supervisor at [phone]. CM will fax the discharge summary.         Selected Continued Care - Admitted Since 8/6/2024       Destination Coordination complete.      Service Provider Selected Services Address Phone Fax Patient Preferred    [facility] [address] [address] [phone] --                        Transportation Services  W/C Van:  ([employer] [facility])    Final Discharge Disposition Code: 62 - inpatient rehab facility

## 2024-08-09 NOTE — PROGRESS NOTES
Neurology Note    Patient:  Kamila Garcia    YOB: 1970    REFERRING PHYSICIAN:      CHIEF COMPLAINT:    Numbness, weakness and headache    HISTORY OF PRESENT ILLNESS:   The patient remains neurologically unchanged. Abke to eat, has not been gettig up    Past Medical History:  Past Medical History:   Diagnosis Date    Anxiety and depression     Chest pain     Disease of thyroid gland     reports slightly elevated when in hospital    DVT (deep venous thrombosis)     ETOH use 04/09/2024    History of pulmonary embolus (PE)     Hypertension     Hypothyroidism 04/09/2024    Migraines     Obesity (BMI 30-39.9) 04/09/2024    Shortness of breath     with chest pain episodes       Past Surgical History:  Past Surgical History:   Procedure Laterality Date    CARDIAC CATHETERIZATION Left 3/11/2022    Procedure: Left Heart Cath;  Surgeon: Peter Anguiano MD;  Location:  [facility]ATH [facility];  Service: Cardiology;  Laterality: Left;  Hold Eliquis 2 days prior to [facility]    ENDOSCOPY N/A 1/12/2022    Procedure: ESOPHAGOGASTRODUODENOSCOPY;  Surgeon: Brunner, Mark I, MD;  Location:  [facility] ENDOSCOPY;  Service: Gastroenterology;  Laterality: N/A;    GASTRIC BYPASS  2007    lap cm en y    IR STENT PLACEMENT Left 10/2021    [facility] in cath lab placed stent in left leg       Social History:   Social History     Socioeconomic History    Marital status:    Tobacco Use    Smoking status: Never    Smokeless tobacco: Never   Vaping Use    Vaping status: Never Used   Substance and Sexual Activity    Alcohol use: Yes     Alcohol/week: 4.0 standard drinks of alcohol     Types: 4 Cans of beer per week     Comment: daily    Drug use: Never    Sexual activity: Defer        Family History:   Family History   Problem Relation Age of Onset    Heart attack Mother 50    Cancer Mother     Stroke Father     Breast cancer Maternal Grandmother     No Known Problems Maternal Grandfather     No  Known Problems Paternal Grandmother     No Known Problems Paternal Grandfather        Medications Prior to Admission:    Prior to Admission medications    Medication Sig Start Date End Date Taking? Authorizing Provider   apixaban (ELIQUIS) 5 MG tablet tablet Start On 7/8: Take 1 tablet by mouth Every 12 (Twelve) Hours. 4/13/24   Ysabel Brown APRN   buPROPion XL (WELLBUTRIN XL) 150 MG 24 hr tablet Take 1 tablet by mouth Daily. 4/17/24   Adilene Godinez DO   eszopiclone (LUNESTA) 3 MG tablet Take 1 tablet by mouth Every Night. Take immediately before bedtime    Emergency, Nurse Lindy, RN   folic acid (FOLVITE) 1 MG tablet Take 1 tablet by mouth Daily. 4/17/24   Adilene Godinez DO   lactobacillus acidophilus (RISAQUAD) capsule capsule Take 1 capsule by mouth Daily. 4/17/24   Adilene Godinez DO   losartan (COZAAR) 50 MG tablet Take 1 tablet by mouth Daily. 4/17/24   Adilene Godinez DO   multivitamin with minerals tablet tablet Take 1 tablet by mouth Daily. 4/17/24   Adilene Godinez DO   OLANZapine (zyPREXA) 10 MG tablet Take 1 tablet by mouth Every Night.    Emergency, Nurse Epic, RN   thiamine (VITAMIN B1) 100 MG tablet Take 1 tablet by mouth Daily. 4/16/24   Adilene Godinez DO   vitamin B-12 (VITAMIN B-12) 1000 MCG tablet Take 1 tablet by mouth Daily. 4/17/24   Adilene Godinez DO       Allergies:  Hydrocodone and Milk-related compounds      Review of system  Review of Systems   Neurological:  Positive for numbness.   All other systems reviewed and are negative.      Vitals:    08/09/24 1118   BP: 142/73   Pulse: 59   Resp: 18   Temp: 97.7 °F (36.5 °C)   SpO2: 100%       Physical exam  Physical Exam  Cardiovascular:      Rate and Rhythm: Normal rate.   Pulmonary:      Effort: Pulmonary effort is normal.           Lab Results   Component Value Date    WBC 5.70 08/09/2024    HGB 12.4 08/09/2024    HCT 37.8 08/09/2024    MCV 87.9 08/09/2024     08/09/2024       MRI Brain Without  Contrast    Result Date: 8/6/2024  MRI BRAIN WO CONTRAST Date of Exam: 8/6/2024 8:53 PM EDT Indication: Left-sided weakness and numbness, acute stroke suspected.  Comparison: CT head from earlier today and MRI brain from April 9, 2024 Technique:  Routine multiplanar/multisequence sequence images of the brain were obtained without contrast administration. Findings: No acute infarction, intracranial hemorrhage, or extra-axial collection is identified. The ventricles appear normal in caliber, with no evidence of mass effect or midline shift. The basal cisterns appear patent. The midline structures appear intact. The globes and orbits appear intact. The intracranial vascular flow-voids appear patent. Subtle foci of periventricular and subcortical white matter FLAIR hyperintensities are nonspecific, but likely the sequela of minimal chronic small vessel ischemic disease.     Impression: 1.No acute intracranial process identified. 2.Findings suggestive of minimal chronic small vessel ischemic disease. Electronically Signed: José Pope MD  8/6/2024 9:32 PM EDT  Workstation ID: [id number]    XR Chest 1 View    Result Date: 8/6/2024  XR CHEST 1 VW Date of Exam: 8/6/2024 7:36 PM EDT Indication: Acute Stroke Protocol (onset < 12 hrs) Comparison: April 9, 2024 Findings: The lungs are clear. The heart and mediastinal contours appear normal. There is no pleural effusion. The pulmonary vasculature appears normal. The osseous structures appear intact.     Impression: No acute cardiopulmonary process. Electronically Signed: José Pope MD  8/6/2024 8:48 PM EDT  Workstation ID: [id number]    CT Angiogram Head w AI Analysis of LVO    Result Date: 8/6/2024  CT ANGIOGRAM HEAD W AI ANALYSIS OF LVO, CT ANGIOGRAM NECK Date of Exam: 8/6/2024 7:25 PM EDT Indication: stroke. Comparison: CT head from earlier today and CT angiography head and neck from April 9, 2024 Technique: CTA of the head and neck was performed after the uneventful  intravenous administration of 75 mL Isovue-370. Reconstructed coronal and sagittal images were also obtained. In addition, a 3-D volume rendered image was created for interpretation. Automated exposure control and iterative reconstruction methods were used. Findings: There is a three-vessel aortic arch. The right common carotid artery is widely patent. The right carotid bifurcation is widely patent. The right internal carotid artery is widely patent. The left common carotid artery is widely patent. There is mild plaque at the left carotid bifurcation that is not hemodynamically significant. The left internal carotid artery is widely patent. The right vertebral artery is widely patent. The left vertebral artery is widely patent. The left vertebral artery is dominant. The bilateral middle cerebral, bilateral anterior cerebral, and anterior communicating arteries are widely patent. The basilar and bilateral posterior cerebral arteries are widely patent. There is a patent left posterior communicating artery. No definite  right posterior communicating artery is identified. No intracranial aneurysm is identified. The visualized portions of the bilateral superior cerebellar, anteroinferior cerebellar, and posterior inferior cerebellar arteries are unremarkable.     Impression: Major arterial vasculature within head and neck appears widely patent, with no hemodynamically significant stenosis, dissection, thrombus, or aneurysm. Electronically Signed: José Pope MD  8/6/2024 7:55 PM EDT  Workstation ID: [id number]    CT Angiogram Neck    Result Date: 8/6/2024  CT ANGIOGRAM HEAD W AI ANALYSIS OF LVO, CT ANGIOGRAM NECK Date of Exam: 8/6/2024 7:25 PM EDT Indication: stroke. Comparison: CT head from earlier today and CT angiography head and neck from April 9, 2024 Technique: CTA of the head and neck was performed after the uneventful intravenous administration of 75 mL Isovue-370. Reconstructed coronal and sagittal images  were also obtained. In addition, a 3-D volume rendered image was created for interpretation. Automated exposure control and iterative reconstruction methods were used. Findings: There is a three-vessel aortic arch. The right common carotid artery is widely patent. The right carotid bifurcation is widely patent. The right internal carotid artery is widely patent. The left common carotid artery is widely patent. There is mild plaque at the left carotid bifurcation that is not hemodynamically significant. The left internal carotid artery is widely patent. The right vertebral artery is widely patent. The left vertebral artery is widely patent. The left vertebral artery is dominant. The bilateral middle cerebral, bilateral anterior cerebral, and anterior communicating arteries are widely patent. The basilar and bilateral posterior cerebral arteries are widely patent. There is a patent left posterior communicating artery. No definite  right posterior communicating artery is identified. No intracranial aneurysm is identified. The visualized portions of the bilateral superior cerebellar, anteroinferior cerebellar, and posterior inferior cerebellar arteries are unremarkable.     Impression: Major arterial vasculature within head and neck appears widely patent, with no hemodynamically significant stenosis, dissection, thrombus, or aneurysm. Electronically Signed: José Pope MD  8/6/2024 7:55 PM EDT  Workstation ID: [id number]    CT CEREBRAL PERFUSION WITH & WITHOUT CONTRAST    Result Date: 8/6/2024  CT CEREBRAL PERFUSION W WO CONTRAST Date of Exam: 8/6/2024 7:25 PM EDT Indication: stroke.  Comparison: CT head from earlier today Technique: Axial CT images of the brain were obtained prior to and after the administration of 50 mL Isovue-370. Core blood volume, core blood flow, mean transit time, and Tmax images were obtained utilizing the Rapid software protocol. A limited CT angiogram of the head was also performed to  measure the blood vessel density. The radiation dose reduction device was turned on for each scan per the ALARA (As Low as Reasonably Achievable) protocol. Findings: The intracranial cerebral perfusion appears normal.     Impression: Examination appears within normal limits. Electronically Signed: José Pope MD  8/6/2024 7:48 PM EDT  Workstation ID: [id number]    CT Head Without Contrast Stroke Protocol    Result Date: 8/6/2024  CT HEAD WO CONTRAST STROKE PROTOCOL Date of Exam: 8/6/2024 7:20 PM EDT Indication: stroke. Comparison: 4/10/2024 Technique: Axial CT images were obtained of the head without contrast administration.  Reconstructed coronal images were also obtained. Automated exposure control and iterative construction methods were used. Scan Time: 1926 Results discussed with  At 1933 on 8/6/2024. Findings: Gray-white matter differentiation is maintained without evidence of an acute infarction. No intracranial mass or mass effect. No extra-axial mass or collection. The ventricles and sulci are normal in size and configuration. The posterior fossa appears normal. Sellar and suprasellar structures are normal. Orbital and paranasal soft tissues are normal. The paranasal sinuses, ethmoid air cells, and mastoid air cells are aerated. The bony calvarium appears intact. No acute fractures. No lytic or blastic bony diseases.     Impression: No acute intracranial pathology. Electronically Signed: Santiago Bacon MD  8/6/2024 7:37 PM EDT  Workstation ID: [id number]       During this visit the following were done:  Labs Reviewed []    Labs Ordered []    Radiology Reports Reviewed [x]    Radiology Ordered []    EKG, echo, and/or stress test reviewed []    EEG results reviewed  []    EEG reviewed and interpreted per myself   []    Discussed case with neurointerventionalist or neuroradiologist []    Referring Provider Records Reviewed []    ER Records Reviewed []    Hospital Records Reviewed []    History Obtained From  Family []    Radiological images view and Interpreted per myself []    Case Discussed with referring provider []     Decision to obtain and request outside records  []        Assessment and Plan     Numbness and headache, favor migraine with aura given negative MRI brain, CTA H/N. H/O anxiety and depression. B12 deficiency and hypothyroidism.   - Continue Eliquis, aspirin and statin, replace b2.  - Cancel MRI brain.    - ST, PT, OT    Outpatient neurology n 2-3 months. Csll for question. Thanks.              Electronically signed by Oscar Silveira MD on 8/9/2024 at 13:26 EDT

## 2024-08-09 NOTE — DISCHARGE PLACEMENT REQUEST
"Kamila Stark (54 y.o. Female)       Date of Birth   1970    Social Security Number       Address   [address] [place] [address]    Home Phone   [phone]    MRN   [id number]       Religious   Hoahaoism    Marital Status                               Admission Date   24    Admission Type   Emergency    Admitting Provider   Shara Kim DO    Attending Provider   Shara Kim DO    Department, Room/Bed   [facility] 3F, S327/1       Discharge Date       Discharge Disposition   Short Term Hospital (DC - External)    Discharge Destination                                 Attending Provider: Shara Kim DO    Allergies: Hydrocodone, Milk-related Compounds    Isolation: None   Infection: None   Code Status: CPR    Ht: 167.6 cm (66\")   Wt: 103 kg (226 lb 10.1 oz)    Admission Cmt: None   Principal Problem: Left-sided weakness [R53.1]                   Active Insurance as of 2024       Primary Coverage       Payor Plan Insurance Group Employer/Plan Group    [employer] BLUE CROSS [facility] ePaisa - Payments Anytime | Anywhere BLUE [facility] PPO [id number]       Payor Plan Address Payor Plan Phone Number Payor Plan Fax Number Effective Dates    [address] [phone]  10/18/2020 - None Entered    [facility] [id number]         Subscriber Name Subscriber Birth Date Member ID       JANICE STARK 1975 [id number]                     Emergency Contacts        (Rel.) Home Phone Work Phone Mobile Phone    Janice Stark (Spouse) [phone] -- --                 Discharge Summary        Shara Kim DO at 24 [address] [place] [address] Medicine Services  DISCHARGE SUMMARY    Patient Name: Kamila Stark  : 1970  MRN: [id number]    Date of Admission: 2024  6:20 PM  Date of Discharge: 2024  Primary Care Physician: Latricia Mishra APRN    Consults       Date and Time Order Name " Status Description    8/7/2024  7:55 AM Inpatient Neurology Consult General Completed     8/6/2024  7:35 PM Inpatient Neurology Consult Stroke Completed             Hospital Course     Presenting Problem: Left sided weakness    Active Hospital Problems    Diagnosis  POA    **Left-sided weakness [R53.1]  Yes    Numbness and tingling [R20.0, R20.2]  Yes    Alcohol use [Z78.9]  Yes    Anxiety and depression [F41.9, F32.A]  Yes    Hypertension [I10]  Yes    H/O LLE DVT & PE (July 2021) [Z86.718]  Not Applicable      Resolved Hospital Problems   No resolved problems to display.          Hospital Course:  Kamila Garcia is a 54 y.o. 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  MRI brain negative for acute infarct.  She was evaluated by neurology who thought she had migraine with aura.     Left sided weakness, numbness and tingling   -Recent admit in April 2024 w/ syncope and left sided and facial weakness. Workup unremarkable.    -stroke and General Neurology evaluated, started on B12 replacement, daily ASA   -Stroke Neuro evaluated, MRI brain negative  -CT Head, CTA Head/Neck and CT Perfusion- all without acute findings  -TTE 4/2024 with negative saline test, no evidence of SARAVANAN/LAE   -ASA daily   -continue Eliquis  -Continue statin  -continue folic acid, MVI, B12, thiamine       Elevated TSH  -- Patient denies any symptoms other than some mild weight gain.  --check free t4, although patient prior refused any replacement per notes        HTN   --Restart losartan 50 mg  -- Add as needed hydralazine po     Alcohol use  -pt reports that she consumes ~ 5 beers daily  -denies hx of alcohol w/d  -UDS negative  -folic acid, thiamine, MVI         Anxiety   Depression   -continue Wellbutrin, Zyprexa      Hx of DVT/PE (July 2021)  -continue Eliquis       Discharge Follow Up  Recommendations for outpatient labs/diagnostics:  PCP 1 week post DC    Day of Discharge     HPI:   Feeling okay, no complaints.  See progress note on same day    Review of Systems  Gen- No fevers, chills  CV- No chest pain, palpitations  Resp- No cough, dyspnea  GI- No N/V/D, abd pain      Vital Signs:   Temp:  [97.7 °F (36.5 °C)-98.3 °F (36.8 °C)] 97.7 °F (36.5 °C)  Heart Rate:  [59-76] 59  Resp:  [16-18] 18  BP: (132-159)/(67-95) 142/73      Pertinent  and/or Most Recent Results     LAB RESULTS:      Lab 08/09/24  0503 08/07/24  0525 08/06/24  1939 08/06/24  1934   WBC 5.70 5.49 5.97  --    HEMOGLOBIN 12.4 13.5 14.9  --    HEMOGLOBIN, POC  --   --   --  15.3   HEMATOCRIT 37.8 40.6 44.3  --    HEMATOCRIT POC  --   --   --  45   PLATELETS 233 245 293  --    NEUTROS ABS 3.05  --  2.91  --    IMMATURE GRANS (ABS) 0.01  --  0.01  --    LYMPHS ABS 1.77  --  2.45  --    MONOS ABS 0.59  --  0.44  --    EOS ABS 0.22  --  0.10  --    MCV 87.9 86.4 85.7  --    PROTIME  --   --   --  14.3   APTT  --   --  28.2  --          Lab 08/09/24  0503 08/07/24  0525 08/06/24 1934   SODIUM 140 139  --    POTASSIUM 3.8 4.1  --    CHLORIDE 108* 105  --    CO2 23.0 23.0  --    ANION GAP 9.0 11.0  --    BUN 11 8  --    CREATININE 0.84 0.79 1.00   EGFR 81.7 87.9 66.3   GLUCOSE 98 90  --    CALCIUM 8.7 8.4*  --    MAGNESIUM  --  2.0  --    PHOSPHORUS 3.4  --   --    HEMOGLOBIN A1C  --  5.10  --    TSH  --  9.900*  --          Lab 08/09/24  0503 08/07/24  0525   TOTAL PROTEIN  --  6.2   ALBUMIN 3.7 3.4*   GLOBULIN  --  2.8   ALT (SGPT)  --  7   AST (SGOT)  --  17   BILIRUBIN  --  0.9   ALK PHOS  --  103         Lab 08/06/24  1939 08/06/24  1934   HSTROP T <6  --    PROTIME  --  14.3   INR  --  1.2         Lab 08/07/24  0525   CHOLESTEROL 173   LDL CHOL 78   HDL CHOL 81*   TRIGLYCERIDES 76         Lab 08/07/24  0525   FOLATE 12.20   VITAMIN B 12 198*         Brief Urine Lab Results  (Last result in the past 365 days)        Color   Clarity    Blood   Leuk Est   Nitrite   Protein   CREAT   Urine HCG        08/07/24 0512 Yellow   Clear   Trace   Negative   Negative   Negative                 Microbiology Results (last 10 days)       ** No results found for the last 240 hours. **            MRI Brain Without Contrast    Result Date: 8/6/2024  MRI BRAIN WO CONTRAST Date of Exam: 8/6/2024 8:53 PM EDT Indication: Left-sided weakness and numbness, acute stroke suspected.  Comparison: CT head from earlier today and MRI brain from April 9, 2024 Technique:  Routine multiplanar/multisequence sequence images of the brain were obtained without contrast administration. Findings: No acute infarction, intracranial hemorrhage, or extra-axial collection is identified. The ventricles appear normal in caliber, with no evidence of mass effect or midline shift. The basal cisterns appear patent. The midline structures appear intact. The globes and orbits appear intact. The intracranial vascular flow-voids appear patent. Subtle foci of periventricular and subcortical white matter FLAIR hyperintensities are nonspecific, but likely the sequela of minimal chronic small vessel ischemic disease.     Impression: 1.No acute intracranial process identified. 2.Findings suggestive of minimal chronic small vessel ischemic disease. Electronically Signed: José Pope MD  8/6/2024 9:32 PM EDT  Workstation ID: [id number]    XR Chest 1 View    Result Date: 8/6/2024  XR CHEST 1 VW Date of Exam: 8/6/2024 7:36 PM EDT Indication: Acute Stroke Protocol (onset < 12 hrs) Comparison: April 9, 2024 Findings: The lungs are clear. The heart and mediastinal contours appear normal. There is no pleural effusion. The pulmonary vasculature appears normal. The osseous structures appear intact.     Impression: No acute cardiopulmonary process. Electronically Signed: José Pope MD  8/6/2024 8:48 PM EDT  Workstation ID: [id number]    CT Angiogram Head w AI Analysis of LVO    Result Date: 8/6/2024  CT  ANGIOGRAM HEAD W AI ANALYSIS OF LVO, CT ANGIOGRAM NECK Date of Exam: 8/6/2024 7:25 PM EDT Indication: stroke. Comparison: CT head from earlier today and CT angiography head and neck from April 9, 2024 Technique: CTA of the head and neck was performed after the uneventful intravenous administration of 75 mL Isovue-370. Reconstructed coronal and sagittal images were also obtained. In addition, a 3-D volume rendered image was created for interpretation. Automated exposure control and iterative reconstruction methods were used. Findings: There is a three-vessel aortic arch. The right common carotid artery is widely patent. The right carotid bifurcation is widely patent. The right internal carotid artery is widely patent. The left common carotid artery is widely patent. There is mild plaque at the left carotid bifurcation that is not hemodynamically significant. The left internal carotid artery is widely patent. The right vertebral artery is widely patent. The left vertebral artery is widely patent. The left vertebral artery is dominant. The bilateral middle cerebral, bilateral anterior cerebral, and anterior communicating arteries are widely patent. The basilar and bilateral posterior cerebral arteries are widely patent. There is a patent left posterior communicating artery. No definite  right posterior communicating artery is identified. No intracranial aneurysm is identified. The visualized portions of the bilateral superior cerebellar, anteroinferior cerebellar, and posterior inferior cerebellar arteries are unremarkable.     Impression: Major arterial vasculature within head and neck appears widely patent, with no hemodynamically significant stenosis, dissection, thrombus, or aneurysm. Electronically Signed: José Pope MD  8/6/2024 7:55 PM EDT  Workstation ID: [id number]    CT Angiogram Neck    Result Date: 8/6/2024  CT ANGIOGRAM HEAD W AI ANALYSIS OF LVO, CT ANGIOGRAM NECK Date of Exam: 8/6/2024 7:25 PM EDT  Indication: stroke. Comparison: CT head from earlier today and CT angiography head and neck from April 9, 2024 Technique: CTA of the head and neck was performed after the uneventful intravenous administration of 75 mL Isovue-370. Reconstructed coronal and sagittal images were also obtained. In addition, a 3-D volume rendered image was created for interpretation. Automated exposure control and iterative reconstruction methods were used. Findings: There is a three-vessel aortic arch. The right common carotid artery is widely patent. The right carotid bifurcation is widely patent. The right internal carotid artery is widely patent. The left common carotid artery is widely patent. There is mild plaque at the left carotid bifurcation that is not hemodynamically significant. The left internal carotid artery is widely patent. The right vertebral artery is widely patent. The left vertebral artery is widely patent. The left vertebral artery is dominant. The bilateral middle cerebral, bilateral anterior cerebral, and anterior communicating arteries are widely patent. The basilar and bilateral posterior cerebral arteries are widely patent. There is a patent left posterior communicating artery. No definite  right posterior communicating artery is identified. No intracranial aneurysm is identified. The visualized portions of the bilateral superior cerebellar, anteroinferior cerebellar, and posterior inferior cerebellar arteries are unremarkable.     Impression: Major arterial vasculature within head and neck appears widely patent, with no hemodynamically significant stenosis, dissection, thrombus, or aneurysm. Electronically Signed: José Pope MD  8/6/2024 7:55 PM EDT  Workstation ID: [id number]    CT CEREBRAL PERFUSION WITH & WITHOUT CONTRAST    Result Date: 8/6/2024  CT CEREBRAL PERFUSION W WO CONTRAST Date of Exam: 8/6/2024 7:25 PM EDT Indication: stroke.  Comparison: CT head from earlier today Technique: Axial CT images  of the brain were obtained prior to and after the administration of 50 mL Isovue-370. Core blood volume, core blood flow, mean transit time, and Tmax images were obtained utilizing the Rapid software protocol. A limited CT angiogram of the head was also performed to measure the blood vessel density. The radiation dose reduction device was turned on for each scan per the ALARA (As Low as Reasonably Achievable) protocol. Findings: The intracranial cerebral perfusion appears normal.     Impression: Examination appears within normal limits. Electronically Signed: José Pope MD  8/6/2024 7:48 PM EDT  Workstation ID: [id number]    CT Head Without Contrast Stroke Protocol    Result Date: 8/6/2024  CT HEAD WO CONTRAST STROKE PROTOCOL Date of Exam: 8/6/2024 7:20 PM EDT Indication: stroke. Comparison: 4/10/2024 Technique: Axial CT images were obtained of the head without contrast administration.  Reconstructed coronal images were also obtained. Automated exposure control and iterative construction methods were used. Scan Time: 1926 Results discussed with  At 1933 on 8/6/2024. Findings: Gray-white matter differentiation is maintained without evidence of an acute infarction. No intracranial mass or mass effect. No extra-axial mass or collection. The ventricles and sulci are normal in size and configuration. The posterior fossa appears normal. Sellar and suprasellar structures are normal. Orbital and paranasal soft tissues are normal. The paranasal sinuses, ethmoid air cells, and mastoid air cells are aerated. The bony calvarium appears intact. No acute fractures. No lytic or blastic bony diseases.     Impression: No acute intracranial pathology. Electronically Signed: Santiago Bacon MD  8/6/2024 7:37 PM EDT  Workstation ID: [id number]     Results for orders placed during the hospital encounter of 04/09/24    Duplex Carotid Ultrasound CAR    Interpretation Summary    Right internal carotid artery demonstrates normal flow  without evidence of hemodynamically significant stenosis.    Left internal carotid artery demonstrates normal flow without evidence of hemodynamically significant stenosis.    Bilateral vertebral artery flow is antegrade.      Results for orders placed during the hospital encounter of 04/09/24    Duplex Carotid Ultrasound CAR    Interpretation Summary    Right internal carotid artery demonstrates normal flow without evidence of hemodynamically significant stenosis.    Left internal carotid artery demonstrates normal flow without evidence of hemodynamically significant stenosis.    Bilateral vertebral artery flow is antegrade.      Results for orders placed during the hospital encounter of 04/09/24    Adult Transthoracic Echo Complete W/ Cont if Necessary Per Protocol (With Agitated Saline)    Interpretation Summary    Left ventricular systolic function is normal. Calculated left ventricular EF = 53.6%    Left ventricular wall thickness is consistent with mild concentric hypertrophy.    Saline test results are negative.    Estimated right ventricular systolic pressure from tricuspid regurgitation is normal (<35 mmHg).    The aortic valve exhibits sclerosis.      Plan for Follow-up of Pending Labs/Results:     Discharge Details        Discharge Medications        Continue These Medications        Instructions Start Date   apixaban 5 MG tablet tablet  Commonly known as: ELIQUIS   Start On 7/8: Take 1 tablet by mouth Every 12 (Twelve) Hours.      buPROPion  MG 24 hr tablet  Commonly known as: WELLBUTRIN XL   150 mg, Oral, Daily      cyanocobalamin 1000 MCG tablet  Commonly known as: VITAMIN B-12   1,000 mcg, Oral, Daily      folic acid 1 MG tablet  Commonly known as: FOLVITE   1 mg, Oral, Daily      lactobacillus acidophilus capsule capsule   1 capsule, Oral, Daily      losartan 50 MG tablet  Commonly known as: COZAAR   50 mg, Oral, Every 24 Hours Scheduled      multivitamin with minerals tablet tablet   1 tablet,  Oral, Daily      OLANZapine 10 MG tablet  Commonly known as: zyPREXA   10 mg, Oral, Nightly      thiamine 100 MG tablet  Commonly known as: VITAMIN B1   100 mg, Oral, Daily             Stop These Medications      eszopiclone 3 MG tablet  Commonly known as: LUNESTA              Allergies   Allergen Reactions    Hydrocodone Hives    Milk-Related Compounds GI Intolerance         Discharge Disposition:  Short Term Hospital (DC - External)    Diet:  Hospital:  Diet Order   Procedures    Diet: Regular/House; Fluid Consistency: Thin (IDDSI 0)            Activity:      Restrictions or Other Recommendations:         CODE STATUS:    Code Status and Medical Interventions: CPR (Attempt to Resuscitate); Full Support   Ordered at: 08/07/24 0057     Code Status (Patient has no pulse and is not breathing):    CPR (Attempt to Resuscitate)     Medical Interventions (Patient has pulse or is breathing):    Full Support       No future appointments.              Shara Kim DO  08/09/24      Time Spent on Discharge:  I spent  25  minutes on this discharge activity which included: face-to-face encounter with the patient, reviewing the data in the system, coordination of the care with the nursing staff as well as consultants, documentation, and entering orders.            Electronically signed by Shara Kim DO at 08/09/24 1470

## 2024-08-09 NOTE — PROGRESS NOTES
[facility] Medicine Services  PROGRESS NOTE    Patient Name: Kamila Garcia  : 1970  MRN: [id number]    Date of Admission: 2024  Primary Care Physician: Latricia Mishra APRN    Subjective   Subjective     CC:  Weakness    HPI:  Doing okay, no complaints.  Waiting on rehab      Objective   Objective     Vital Signs:   Temp:  [97.7 °F (36.5 °C)-98.3 °F (36.8 °C)] 97.7 °F (36.5 °C)  Heart Rate:  [59-76] 59  Resp:  [16-18] 18  BP: (132-159)/(67-95) 142/73     Physical Exam:  Constitutional: No acute distress, awake, alert  HENT: NCAT, mucous membranes moist  Respiratory: Respiratory effort normal   Cardiovascular: RRR on tele  Musculoskeletal: No edema  Psychiatric: Appropriate affect, cooperative  Neurologic: Oriented x 3, speech clear  Skin: No rashes      Results Reviewed:  LAB RESULTS:      Lab 24   WBC 5.70 5.49 5.97  --    HEMOGLOBIN 12.4 13.5 14.9  --    HEMOGLOBIN, POC  --   --   --  15.3   HEMATOCRIT 37.8 40.6 44.3  --    HEMATOCRIT POC  --   --   --  45   PLATELETS 233 245 293  --    NEUTROS ABS 3.05  --  2.91  --    IMMATURE GRANS (ABS) 0.01  --  0.01  --    LYMPHS ABS 1.77  --  2.45  --    MONOS ABS 0.59  --  0.44  --    EOS ABS 0.22  --  0.10  --    MCV 87.9 86.4 85.7  --    PROTIME  --   --   --  14.3   APTT  --   --  28.2  --          Lab 24  0503 08/07/24  0525 08/06/24  1934   SODIUM 140 139  --    POTASSIUM 3.8 4.1  --    CHLORIDE 108* 105  --    CO2 23.0 23.0  --    ANION GAP 9.0 11.0  --    BUN 11 8  --    CREATININE 0.84 0.79 1.00   EGFR 81.7 87.9 66.3   GLUCOSE 98 90  --    CALCIUM 8.7 8.4*  --    MAGNESIUM  --  2.0  --    PHOSPHORUS 3.4  --   --    HEMOGLOBIN A1C  --  5.10  --    TSH  --  9.900*  --          Lab 24  0503 24  0525   TOTAL PROTEIN  --  6.2   ALBUMIN 3.7 3.4*   GLOBULIN  --  2.8   ALT (SGPT)  --  7   AST (SGOT)  --  17   BILIRUBIN  --  0.9   ALK PHOS  --  103          Lab 08/06/24  1939 08/06/24  1934   HSTROP T <6  --    PROTIME  --  14.3   INR  --  1.2         Lab 08/07/24  0525   CHOLESTEROL 173   LDL CHOL 78   HDL CHOL 81*   TRIGLYCERIDES 76         Lab 08/07/24  0525   FOLATE 12.20   VITAMIN B 12 198*         Brief Urine Lab Results  (Last result in the past 365 days)        Color   Clarity   Blood   Leuk Est   Nitrite   Protein   CREAT   Urine HCG        08/07/24 0512 Yellow   Clear   Trace   Negative   Negative   Negative                   Microbiology Results Abnormal       None            No radiology results from the last 24 hrs    Results for orders placed during the hospital encounter of 04/09/24    Adult Transthoracic Echo Complete W/ Cont if Necessary Per Protocol (With Agitated Saline)    Interpretation Summary    Left ventricular systolic function is normal. Calculated left ventricular EF = 53.6%    Left ventricular wall thickness is consistent with mild concentric hypertrophy.    Saline test results are negative.    Estimated right ventricular systolic pressure from tricuspid regurgitation is normal (<35 mmHg).    The aortic valve exhibits sclerosis.      Current medications:  Scheduled Meds:apixaban, 5 mg, Oral, Q12H  aspirin, 81 mg, Oral, Daily   Or  aspirin, 300 mg, Rectal, Daily  atorvastatin, 80 mg, Oral, Nightly  buPROPion XL, 150 mg, Oral, Daily  cyanocobalamin, 1,000 mcg, Intramuscular, Daily  folic acid, 1 mg, Oral, Daily  lactobacillus acidophilus, 1 capsule, Oral, Daily  losartan, 50 mg, Oral, Q24H  multivitamin with minerals, 1 tablet, Oral, Daily  OLANZapine, 10 mg, Oral, Nightly  sodium chloride, 10 mL, Intravenous, Q12H  sodium chloride, 10 mL, Intravenous, Q12H  thiamine, 100 mg, Oral, Daily  cyanocobalamin, 1,000 mcg, Oral, Daily      Continuous Infusions:   PRN Meds:.  acetaminophen    senna-docusate sodium **AND** polyethylene glycol **AND** bisacodyl **AND** bisacodyl    hydrALAZINE    melatonin    ondansetron ODT    sodium chloride     sodium chloride    sodium chloride    sodium chloride    sodium chloride    Assessment & Plan   Assessment & Plan     Active Hospital Problems    Diagnosis  POA    **Left-sided weakness [R53.1]  Yes    Numbness and tingling [R20.0, R20.2]  Yes    Alcohol use [Z78.9]  Yes    Anxiety and depression [F41.9, F32.A]  Yes    Hypertension [I10]  Yes    H/O LLE DVT & PE (July 2021) [Z86.718]  Not Applicable      Resolved Hospital Problems   No resolved problems to display.        Brief Hospital Course to date:  Kamila Garcia is a 54 y.o. 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  MRI brain negative for acute infarct.  She was evaluated by neurology who thought she had migraine with aura, MRI brain negative.     Left sided weakness, numbness and tingling   -Recent admit in April 2024 w/ syncope and left sided and facial weakness. Workup unremarkable.    -stroke and General Neurology evaluation; started on B12 replacement, daily ASA   -Stroke Neuro evaluated, MRI brain negative  -CT Head, CTA Head/Neck and CT Perfusion- all without acute findings  -TTE 4/2024 with negative saline test, no evidence of SARAVANAN/LAE   -ASA daily   -continue Eliquis  -Continue statin  -continue folic acid, MVI, B12, thiamine  -Recommending rehab     Elevated TSH  -- Patient denies any symptoms other than some mild weight gain.  --check free t4, although patient prior refused any replacement per notes        HTN   --Restarted losartan 50 mg  -- Add as needed hydralazine po     Alcohol use  -pt reports that she consumes ~ 5 beers daily  -denies hx of alcohol w/d  -UDS negative  -CIWA scoring per protocol   -folic acid, thiamine, MVI         Anxiety   Depression   -continue Wellbutrin, Zyprexa      Hx of DVT/PE (July 2021)  -continue Eliquis        Expected Discharge Location and Transportation:  snf  Expected Discharge   Expected Discharge Date: 8/10/2024; Expected Discharge Time:      VTE Prophylaxis:  Pharmacologic & mechanical VTE prophylaxis orders are present.         AM-PAC 6 Clicks Score (PT): 17 (08/07/24 2000)    CODE STATUS:   Code Status and Medical Interventions: CPR (Attempt to Resuscitate); Full Support   Ordered at: 08/07/24 0057     Code Status (Patient has no pulse and is not breathing):    CPR (Attempt to Resuscitate)     Medical Interventions (Patient has pulse or is breathing):    Full Support       Shara Kim,   08/09/24

## 2024-08-11 LAB
QT INTERVAL: 434 MS
QTC INTERVAL: 475 MS

## 2024-08-15 LAB
QT INTERVAL: 494 MS
QTC INTERVAL: 489 MS

## 2024-08-23 ENCOUNTER — OFFICE VISIT (OUTPATIENT)
Dept: NEUROLOGY | Facility: CLINIC | Age: 54
End: 2024-08-23
Payer: COMMERCIAL

## 2024-08-23 VITALS
SYSTOLIC BLOOD PRESSURE: 128 MMHG | OXYGEN SATURATION: 100 % | HEIGHT: 66 IN | BODY MASS INDEX: 36.6 KG/M2 | DIASTOLIC BLOOD PRESSURE: 76 MMHG | HEART RATE: 67 BPM

## 2024-08-23 DIAGNOSIS — G43.109 COMPLICATED MIGRAINE: Primary | ICD-10-CM

## 2024-08-23 RX ORDER — ASPIRIN 81 MG/1
81 TABLET, CHEWABLE ORAL DAILY
COMMUNITY
Start: 2024-07-25

## 2024-08-23 RX ORDER — LEMBOREXANT 5 MG/1
1 TABLET, FILM COATED ORAL DAILY
COMMUNITY
Start: 2024-06-26

## 2024-08-23 RX ORDER — LAMOTRIGINE 25 MG/1
50 TABLET ORAL DAILY
COMMUNITY
Start: 2024-08-14

## 2024-08-23 RX ORDER — LUMATEPERONE 42 MG/1
1 CAPSULE ORAL DAILY
COMMUNITY
Start: 2024-07-24

## 2024-08-23 NOTE — PROGRESS NOTES
Subjective:    CC: Kamila Garcia is seen today in consultation at the request of RAFFY Day for Headache       HPI:  Patient is a 54-year-old female with past medical history of anxiety, depression, deep vein thrombosis, PE on Eliquis, history of infrequent migraines referred to clinic to establish care for complicated migraines.  She has had 2 recent admissions in 2024.  First time she was admitted in April 2024 because of syncopal episode followed by left-sided weakness.  She was brought to [facility] and received TNKase and was admitted for detailed stroke workup.  MRI brain and CT angiogram of brain and neck were unremarkable without any abnormalities.  She was discharged home with outpatient physical therapy and had improvement with symptoms returning to baseline.  She reports having headache prior to this symptoms.  She reports that she was doing fine until about August 2024 when she had another episode of left-sided weakness and had severe migraine like headache during that episode.  She came to [facility] and was admitted again for stroke workup.  MRI brain and CT angiogram of brain and neck were negative this time as well.  Possible differential at this time was thought to be complicated migraine, MRI negative stroke versus functional neurological disorder.  She reports that she does not get migraines otherwise but did experience migraine type headache with these 2 episodes.  She is currently getting physical therapy and Occupational Therapy and is helping some.  She is currently using walker to walk.        The following portions of the patient's history were reviewed today and updated as of 08/23/2024  : allergies, social history, and problem list.  This document will be scanned to patient's chart.      Current Outpatient Medications:     apixaban (ELIQUIS) 5 MG tablet tablet, Start On 7/8: Take 1 tablet by mouth Every 12 (Twelve) Hours., Disp: 60 tablet, Rfl: 0    aspirin 81 MG  chewable tablet, Chew 1 tablet Daily., Disp: , Rfl:     buPROPion XL (WELLBUTRIN XL) 150 MG 24 hr tablet, Take 1 tablet by mouth Daily., Disp: , Rfl:     Caplyta 42 MG capsule, Take 1 capsule by mouth Daily., Disp: , Rfl:     DayVigo 5 MG tablet, Take 1 tablet by mouth Daily., Disp: , Rfl:     folic acid (FOLVITE) 1 MG tablet, Take 1 tablet by mouth Daily., Disp: , Rfl:     lactobacillus acidophilus (RISAQUAD) capsule capsule, Take 1 capsule by mouth Daily., Disp: , Rfl:     lamoTRIgine (LaMICtal) 25 MG tablet, Take 2 tablets by mouth Daily., Disp: , Rfl:     losartan (COZAAR) 50 MG tablet, Take 1 tablet by mouth Daily., Disp: , Rfl:     multivitamin with minerals tablet tablet, Take 1 tablet by mouth Daily., Disp: , Rfl:     OLANZapine (zyPREXA) 10 MG tablet, Take 1 tablet by mouth Every Night., Disp: , Rfl:     thiamine (VITAMIN B1) 100 MG tablet, Take 1 tablet by mouth Daily., Disp: , Rfl:     vitamin B-12 (VITAMIN B-12) 1000 MCG tablet, Take 1 tablet by mouth Daily., Disp: , Rfl:    Past Medical History:   Diagnosis Date    Anxiety and depression     Chest pain     Disease of thyroid gland     reports slightly elevated when in hospital    DVT (deep venous thrombosis)     ETOH use 04/09/2024    History of pulmonary embolus (PE)     Hypertension     Hypothyroidism 04/09/2024    Migraines     Obesity (BMI 30-39.9) 04/09/2024    Shortness of breath     with chest pain episodes      Past Surgical History:   Procedure Laterality Date    CARDIAC CATHETERIZATION Left 3/11/2022    Procedure: Left Heart Cath;  Surgeon: Peter Anguiano MD;  Location:  [facility] CATH INVASIVE LOCATION;  Service: Cardiology;  Laterality: Left;  Hold Eliquis 2 days prior to [facility]    ENDOSCOPY N/A 1/12/2022    Procedure: ESOPHAGOGASTRODUODENOSCOPY;  Surgeon: Brunner, Mark I, MD;  Location:  [facility] ENDOSCOPY;  Service: Gastroenterology;  Laterality: N/A;    GASTRIC BYPASS  2007    lap cm en y    IR STENT PLACEMENT Left 10/2021    [facility]  "in cath lab placed stent in left leg      Family History   Problem Relation Age of Onset    Heart attack Mother 50    Cancer Mother     Stroke Father     Breast cancer Maternal Grandmother     No Known Problems Maternal Grandfather     No Known Problems Paternal Grandmother     No Known Problems Paternal Grandfather       Review of Systems    All other systems reviewed and are negative     Objective:    /76   Pulse 67   Ht 167.6 cm (65.98\")   SpO2 100%   BMI 36.60 kg/m²     Neurology Exam:    General apperance: NAD.     Mental status: Alert, awake and oriented to time place and person.    Language and Speech: No aphasia or dysarthria.    Naming , Repitition and Comprehension:  Can name objects, repeat a sentence and follow commands. Speech is clear and fluent with good repetition, comprehension, and naming.    Cranial Nerves:   CN II: Visual fields are full. Intact. Fundi - Normal, No papillederma, Pupils - YOLANDA  CN III, IV and VI: Extraocular movements are intact. Normal saccades.   CN V: Facial sensation is intact.   CN VII: Muscles of facial expression reveal no asymmetry. Intact.   CN VIII: Hearing is intact. Whispered voice intact.   CN IX and X: Palate elevates symmetrically. Intact  CN XI: Shoulder shrug is intact.   CN XII: Tongue is midline without evidence of atrophy or fasciculation.     Motor:  Right UE muscle strength 5/5. Normal tone.     Left UE muscle strength 4/5. Normal tone.  Giveaway weakness noted.     Right LE muscle strength 5/5. Normal tone.     Left LE muscle strength 4/5. Normal tone.  Giveaway weakness noted.    Sensory: Normal light touch, vibration and pinprick sensation bilaterally.    DTRs: 2+ bilaterally in upper and lower extremities.    Babinski: Negative bilaterally.    Co-ordination: Normal finger-to-nose, heel to shin B/L.    Rhomberg: Negative.    Gait: Normal.    Cardiovascular: Regular rate and rhythm without murmur, gallop or rub.    Assessment and Plan:  1. " Complicated migraine  Complicated migraine versus functional neurological disorder.  She has had 2 admissions in 2024 with left-sided weakness with negative detailed stroke workup.  She received TNKase in April 2024.  Both times, she reports having headaches and there is a possibility of complicated migraines causing the symptoms.  She denies having migraines otherwise.  Since migraines are infrequent, have given her samples of Ubrelvy to take at the onset of migraine next time when she feels it is coming on and see if it helps reducing intensity and duration of the symptoms.  She was noted to have giveaway weakness on left side on today's examination concerning for functional neurological disorder as well.  Continue with OT, PT as it is continue with Eliquis, aspirin as it is and I will see her back in clinic in 3 to 4 months for follow-up.    Return in about 4 months (around 12/23/2024).     Richard Roberts MD      Note to patient: The 21st Century Cures Act makes medical notes like these available to patients in the interest of transparency. However, be advised this is a medical document. It is intended as peer to peer communication. It is written in medical language and may contain abbreviations or verbiage that are unfamiliar. It may appear blunt or direct. Medical documents are intended to carry relevant information, facts as evident, and the clinical opinion of the physician.

## 2024-09-30 ENCOUNTER — TELEPHONE (OUTPATIENT)
Dept: NEUROLOGY | Facility: CLINIC | Age: 54
End: 2024-09-30

## 2024-09-30 NOTE — TELEPHONE ENCOUNTER
PATIENT CALLING ABOUT UBRELVY SAMPLES SHE HAS TAKEN.  SHE STATES THEY HAVE WORKED WELL AND SHE IS NOT READY FOR A PRESCRIPTION FOR THIS MEDICATION.    HER RX IS:    [facility] #[id number] - [place] - [address]  - [phone]  - [phone] FX

## 2024-10-02 ENCOUNTER — SPECIALTY PHARMACY (OUTPATIENT)
Dept: NEUROLOGY | Facility: CLINIC | Age: 54
End: 2024-10-02
Payer: COMMERCIAL

## 2024-10-02 PROBLEM — G43.109 COMPLICATED MIGRAINE: Status: ACTIVE | Noted: 2024-10-02

## 2024-10-08 ENCOUNTER — SPECIALTY PHARMACY (OUTPATIENT)
Dept: GENERAL RADIOLOGY | Facility: HOSPITAL | Age: 54
End: 2024-10-08
Payer: COMMERCIAL

## 2024-10-08 NOTE — PROGRESS NOTES
Specialty Pharmacy Patient Management Program  Neurology Initial Assessment     Kamila Garcia is a 54 y.o. female with migraines seen by a Neurology provider and enrolled in the Neurology Patient Management program offered by [facility].  An initial outreach was conducted, including assessment of therapy appropriateness and specialty medication education for Ubrelvy. The patient was introduced to services offered by [facility], including: regular assessments, refill coordination, curbside pick-up or mail order delivery options, prior authorization maintenance, and financial assistance programs as applicable. The patient was also provided with contact information for the pharmacy team.     Insurance Coverage & Financial Support  [facility]  Ubrelvy Copay Card    Relevant Past Medical History and Comorbidities  Relevant medical history and concomitant health conditions were discussed with the patient. The patient's chart has been reviewed for relevant past medical history and comorbid health conditions and updated as necessary.   Past Medical History:   Diagnosis Date    Anxiety and depression     Chest pain     Disease of thyroid gland     reports slightly elevated when in hospital    DVT (deep venous thrombosis)     ETOH use 04/09/2024    History of pulmonary embolus (PE)     Hypertension     Hypothyroidism 04/09/2024    Migraines     Obesity (BMI 30-39.9) 04/09/2024    Shortness of breath     with chest pain episodes     Social History     Socioeconomic History    Marital status:    Tobacco Use    Smoking status: Never     Passive exposure: Never    Smokeless tobacco: Never   Vaping Use    Vaping status: Never Used   Substance and Sexual Activity    Alcohol use: Yes     Alcohol/week: 4.0 standard drinks of alcohol     Types: 4 Cans of beer per week     Comment: daily    Drug use: Never    Sexual activity: Defer     Problem list reviewed by  Eliana Acuña RPH on 10/8/2024 at 12:58 PM    Allergies  Known allergies and reactions were discussed with the patient. The patient's chart has been reviewed for  allergy information and updated as necessary.   Allergies   Allergen Reactions    Hydrocodone Hives    Milk-Related Compounds GI Intolerance     Allergies reviewed by Eliana Acuña RPH on 10/8/2024 at 12:58 PM    Relevant Laboratory Values  Common labs          8/6/2024    19:34 8/6/2024    19:39 8/7/2024    05:25 8/9/2024    05:03   Common Labs   Glucose   90  98    BUN   8  11    Creatinine 1.00   0.79  0.84    Sodium   139  140    Potassium   4.1  3.8    Chloride   105  108    Calcium   8.4  8.7    Albumin   3.4  3.7    Total Bilirubin   0.9     Alkaline Phosphatase   103     AST (SGOT)   17     ALT (SGPT)   7     WBC  5.97  5.49  5.70    Hemoglobin 15.3  14.9  13.5  12.4    Hematocrit 45  44.3  40.6  37.8    Platelets  293  245  233    Total Cholesterol   173     Triglycerides   76     HDL Cholesterol   81     LDL Cholesterol    78     Hemoglobin A1C   5.10         Current Medication List  This medication list has been reviewed with the patient and evaluated for any interactions or necessary modifications/recommendations, and updated to include all prescription medications, OTC medications, and supplements the patient is currently taking.  This list reflects what is contained in the patient's profile, which has also been marked as reviewed to communicate to other providers it is the most up to date version of the patient's current medication therapy.     Current Outpatient Medications:     apixaban (ELIQUIS) 5 MG tablet tablet, Start On 7/8: Take 1 tablet by mouth Every 12 (Twelve) Hours., Disp: 60 tablet, Rfl: 0    aspirin 81 MG chewable tablet, Chew 1 tablet Daily., Disp: , Rfl:     buPROPion XL (WELLBUTRIN XL) 150 MG 24 hr tablet, Take 1 tablet by mouth Daily., Disp: , Rfl:     Caplyta 42 MG capsule, Take 1 capsule by mouth Daily.,  Disp: , Rfl:     DayVigo 5 MG tablet, Take 1 tablet by mouth Daily., Disp: , Rfl:     folic acid (FOLVITE) 1 MG tablet, Take 1 tablet by mouth Daily., Disp: , Rfl:     lactobacillus acidophilus (RISAQUAD) capsule capsule, Take 1 capsule by mouth Daily., Disp: , Rfl:     lamoTRIgine (LaMICtal) 25 MG tablet, Take 2 tablets by mouth Daily., Disp: , Rfl:     losartan (COZAAR) 50 MG tablet, Take 1 tablet by mouth Daily., Disp: , Rfl:     multivitamin with minerals tablet tablet, Take 1 tablet by mouth Daily., Disp: , Rfl:     OLANZapine (zyPREXA) 10 MG tablet, Take 1 tablet by mouth Every Night., Disp: , Rfl:     thiamine (VITAMIN B1) 100 MG tablet, Take 1 tablet by mouth Daily., Disp: , Rfl:     ubrogepant (Ubrelvy) 100 MG tablet, Take 1 tablet by mouth Daily As Needed (For migraine. May repeat if needed after 2 hours. Max 200 mg per 24 hours.)., Disp: 10 tablet, Rfl: 11    vitamin B-12 (VITAMIN B-12) 1000 MCG tablet, Take 1 tablet by mouth Daily., Disp: , Rfl:     Medicines reviewed by Eliana Acuña [facility] on 10/8/2024 at 12:58 PM    Drug Interactions  None     Initial Education Provided for Specialty Medication  The patient has been provided with the following education and any applicable administration techniques (i.e. self-injection) have been demonstrated for the therapies indicated. All questions and concerns have been addressed prior to the patient receiving the medication, and the patient has verbalized understanding of the education and any materials provided.  Additional patient education shall be provided and documented upon request by the patient, provider or payer.      Ubrelvy (Ubrogepant)  Medication Expectations   Why am I taking this medication? You are taking this medication to treat acute migraines.   What should I expect while on this medication? You should expect to see a decrease in the frequency and severity of your migraines.   How does the medication work? Ubrelvy is a monoclonal antibody  that binds to calcitonin gene-related peptide (CGRP) and blocks its binding to the receptor decreasing the severity of migraines.   How long will I be on this medication for? The amount of time you will be on this medication will be determined by your doctor and your response to the medication.    How do I take this medication? Take as directed on your prescription label.  Reviewed plan for Ubrelvy 100mg (1 tablet) PO daily prn; may repeat x 1 in 2 hours, if needed. Max dosage = 200mg/24 hours.    What are some possible side effects? Potential side effects including, but not limited to nausea and somnolence. Pt verbalized understanding.   What happens if I miss a dose? This is taken as needed for migraines.     Medication Safety   What are things I should warn my doctor immediately about? Allergic reaction: Itching or hives, swelling in your face or hands, swelling or tingling in your mouth or throat, chest tightness, trouble breathing     What are things that I should be cautious of? Tell your doctor if you are pregnant or breastfeeding, or if you have kidney disease or liver disease.   What are some medications that can interact with this one? Concomitant use of strong CY inhibitors, check with your pharmacist or provider before starting any new medications, avoid grapefruit juice.     Medication Storage/Handling   How should I handle this medication? Keep this medication out of reach of pets/children.   How does this medication need to be stored? Store at a controlled room temperature between 20 and 25 degrees C (68 and 77 degrees F), with excursions permitted between 15 and 30 degrees C (59 and 86 degrees F) away from direct sunlight and moisture.   How should I dispose of this medication? There should not be a need to dispose of this medication unless your provider decides to change the dose or therapy. If that is the case, take to your local police station for proper disposal. Some pharmacies also have  take-back bins for medication drop-off.      Resources/Support   How can I remind myself to take this medication? This is taken as needed for migraines.   Is financial support available?  Yes, [employer] can provide co-pay cards if you have commercial insurance or patient assistance if you have Medicare or no insurance.    Which vaccines are recommended for me? Talk to your doctor about these vaccines: Flu, Coronavirus (COVID-19), Pneumococcal (pneumonia), Tdap, Hepatitis B, Zoster (shingles)         Adherence and Self-Administration  Adherence related to the patient's specialty therapy was discussed with the patient. The Adherence segment of this outreach has been reviewed and updated.   Is there a concern with patient's ability to self administer the medication correctly and without issue?: No  Were any potential barriers to adherence identified during the initial assessment or patient education?: No  Are there any concerns regarding the patient's understanding of the importance of medication adherence?: No  Methods for Supporting Patient Adherence and/or Self-Administration: Not required    Goals of Therapy  Goals related to the patient's specialty therapy were discussed with the patient. The Patient Goals segment of this outreach has been reviewed and updated.   Goals Addressed Today        Specialty Pharmacy General Goal      On Average, Reduce:   Frequency : N/A as only on acute treatment currently, follow up will reveal whether treatment for complicated migraine versus functional neurological disorder is appropriate.  Symptom severity by 50% within 1  hour of taking acute therapy.   Duration of migraines to 2 hours.     Baseline Values/Notes on Enrollment  Frequency: 3-4 MMD  Symptom Severity: Moderate to severe pain  Duration: 4 - 6 hours    Date of Reassessment Notes on Progress Toward Above Goals   10/8/24 Patient give Ubrelvy for treatment of acute migraine. Has history of DVT/PE on Eliquis.  Migraines pretty infrequent at baseline but has received samples of Ubrelvy from provider and they worked very well to treat acute migraine.                                                        Reassessment Plan & Follow-Up  Medication Therapy Changes: Initiate Ubrelvy 100 mg Take one tablet at onset of migraine. May repeat in 2 hours if needed. Max of 2 tablets in 24 hours.  Related Plans, Therapy Recommendations, or Therapy Problems to Be Addressed: Reviewed [facility] and Ubrelvy education. Patient verbalized understanding.   Pharmacist to perform regular reassessments no more than (6) months from the previous assessment.  Care Coordinator to set up future refill outreaches, coordinate prescription delivery, and escalate clinical questions to pharmacist.   Welcome information and patient satisfaction survey to be sent by specialty pharmacy team with patient's initial fill.    Attestation  Therapeutic appropriateness: Appropriate   I attest the patient was actively involved in and has agreed to the above plan of care. If the prescribed therapy is at any point deemed not appropriate based on the current or future assessments, a consultation will be initiated with the patient's specialty care provider to determine the best course of action. The revised plan of therapy will be documented along with any additional patient education provided. Discussed aforementioned material with patient via telemedicine.    Eliana Acuña, PharmD, АННА  [facility] Specialty Pharmacist, Neurology  10/8/2024  13:04 EDT

## 2024-11-22 ENCOUNTER — SPECIALTY PHARMACY (OUTPATIENT)
Dept: NEUROLOGY | Facility: CLINIC | Age: 54
End: 2024-11-22
Payer: COMMERCIAL

## 2024-11-22 NOTE — PROGRESS NOTES
Specialty Pharmacy Refill Coordination Note     Kamila is a 54 y.o. female contacted today regarding refills of  Ubrelvy specialty medication(s).    Reviewed and verified with patient:       Specialty medication(s) and dose(s) confirmed: yes    Refill Questions      Flowsheet Row Most Recent Value   Changes to allergies? No   Changes to medications? No   New conditions or infections since last clinic visit No   Unplanned office visit, urgent care, ED, or hospital admission in the last 4 weeks  No   How does patient/caregiver feel medication is working? Very good   Financial problems or insurance changes  No   Since the previous refill, were any specialty medication doses or scheduled injections missed or delayed?  No   Does this patient require a clinical escalation to a pharmacist? No            Delivery Questions      Flowsheet Row Most Recent Value   Delivery method UPS   Delivery address verified with patient/caregiver? Yes   Delivery address Home   Number of medications in delivery 1   Medication(s) being filled and delivered Ubrogepant (UBRELVY)   Doses left of specialty medications A couple   Copay verified? Yes   Copay amount 0.00   Copay form of payment No copayment ($0)   Ship Date 11/25   Delivery Date 11/26   Signature Required No                   Follow-up: 28 day(s)     Poornima Wong, Pharmacy Technician  Specialty Pharmacy Technician

## 2024-12-18 ENCOUNTER — TELEPHONE (OUTPATIENT)
Dept: NEUROLOGY | Facility: CLINIC | Age: 54
End: 2024-12-18
Payer: COMMERCIAL

## 2024-12-18 ENCOUNTER — SPECIALTY PHARMACY (OUTPATIENT)
Dept: NEUROLOGY | Facility: CLINIC | Age: 54
End: 2024-12-18
Payer: COMMERCIAL

## 2024-12-18 NOTE — PROGRESS NOTES
Specialty Pharmacy Refill Coordination Note     Kamila is a 54 y.o. female contacted today regarding refills of  Ubrelvy specialty medication(s).    Reviewed and verified with patient:       Specialty medication(s) and dose(s) confirmed: yes    Refill Questions      Flowsheet Row Most Recent Value   Changes to allergies? No   Changes to medications? No   New conditions or infections since last clinic visit No   Unplanned office visit, urgent care, ED, or hospital admission in the last 4 weeks  No   How does patient/caregiver feel medication is working? Very good   Financial problems or insurance changes  No   Since the previous refill, were any specialty medication doses or scheduled injections missed or delayed?  No   Does this patient require a clinical escalation to a pharmacist? No            Delivery Questions      Flowsheet Row Most Recent Value   Delivery method UPS   Delivery address verified with patient/caregiver? Yes   Delivery address Home   Number of medications in delivery 1   Medication(s) being filled and delivered Ubrogepant (UBRELVY)   Doses left of specialty medications 4   Copay verified? Yes   Copay amount $0   Copay form of payment No copayment ($0)   Ship Date 12/19/24   Delivery Date 12/20/24   Signature Required No                   Follow-up: 28 day(s)     Eliana Acuña [facility]

## 2024-12-18 NOTE — TELEPHONE ENCOUNTER
Sent 1st text to reschedule appt. Will follow up and call tomorrow. Pt does not have Tele[employer] for messaging.     RODNEY Freedman

## 2025-01-14 ENCOUNTER — SPECIALTY PHARMACY (OUTPATIENT)
Dept: NEUROLOGY | Facility: CLINIC | Age: 55
End: 2025-01-14
Payer: COMMERCIAL

## 2025-01-14 NOTE — PROGRESS NOTES
Specialty Pharmacy Refill Coordination Note     Kamila is a 54 y.o. female contacted today regarding refills of  Ubrelvy specialty medication(s).    Reviewed and verified with patient: 1   Specialty medication(s) and dose(s) confirmed: yes    Refill Questions      Flowsheet Row Most Recent Value   Changes to allergies? No   Changes to medications? No   New conditions or infections since last clinic visit No   Unplanned office visit, urgent care, ED, or hospital admission in the last 4 weeks  No   How does patient/caregiver feel medication is working? Good   Financial problems or insurance changes  No   Since the previous refill, were any specialty medication doses or scheduled injections missed or delayed?  No   Does this patient require a clinical escalation to a pharmacist? No            Delivery Questions      Flowsheet Row Most Recent Value   Delivery method UPS   Delivery address verified with patient/caregiver? Yes   Delivery address Home   Number of medications in delivery 1   Medication(s) being filled and delivered Ubrogepant (UBRELVY)   Doses left of specialty medications 5   Copay verified? Yes   Copay amount $0   Copay form of payment No copayment ($0)   Ship Date 1/14   Delivery Date 1/15   Signature Required No                   Follow-up: 28 day(s)     Santiago Levi, Pharmacy Technician  Specialty Pharmacy Technician

## 2025-01-21 ENCOUNTER — TELEPHONE (OUTPATIENT)
Dept: NEUROLOGY | Facility: CLINIC | Age: 55
End: 2025-01-21
Payer: COMMERCIAL

## 2025-01-21 NOTE — TELEPHONE ENCOUNTER
Returned call to pt, no answer, I lvm asking for her to return call and ask to be transferred to [place], if I am not at my desk I will return her call as soon as I am back.     RODNEY Freedman

## 2025-01-21 NOTE — TELEPHONE ENCOUNTER
Name: Kamila Garcia    Relationship: Self    Best Callback Number: [phone]      HUB PROVIDED THE RELAY MESSAGE FROM THE OFFICE   PATIENT HAS FURTHER QUESTIONS AND WOULD LIKE A CALL BACK AT THE FOLLOWING PHONE NUMBER [phone]    ADDITIONAL INFORMATION: PLEASE CONTACT PATIENT TO SCHEDULE. APPOINTMENT DOES NOT SHOW UP IN MY APPOINTMENT DESK.     PLEASE REVIEW

## 2025-01-21 NOTE — TELEPHONE ENCOUNTER
"Relay     \"[facility] offering appt 1/27/25 at 3pm with Dr. Roberts.     We had to cancel her Dec appt due to provider being out of office, she was on the waitlist for something sooner than the next opening she was scheduled for in March.     ORDNEY Freedman\"                "

## 2025-01-27 ENCOUNTER — OFFICE VISIT (OUTPATIENT)
Dept: NEUROLOGY | Facility: CLINIC | Age: 55
End: 2025-01-27
Payer: COMMERCIAL

## 2025-01-27 VITALS
OXYGEN SATURATION: 94 % | HEIGHT: 66 IN | DIASTOLIC BLOOD PRESSURE: 78 MMHG | BODY MASS INDEX: 36.49 KG/M2 | SYSTOLIC BLOOD PRESSURE: 116 MMHG | WEIGHT: 227.07 LBS | HEART RATE: 94 BPM

## 2025-01-27 DIAGNOSIS — G43.109 COMPLICATED MIGRAINE: Primary | ICD-10-CM

## 2025-01-27 DIAGNOSIS — R51.9 NONINTRACTABLE HEADACHE, UNSPECIFIED CHRONICITY PATTERN, UNSPECIFIED HEADACHE TYPE: ICD-10-CM

## 2025-01-27 PROCEDURE — 99214 OFFICE O/P EST MOD 30 MIN: CPT | Performed by: PSYCHIATRY & NEUROLOGY

## 2025-01-27 RX ORDER — TOPIRAMATE 50 MG/1
50 TABLET, FILM COATED ORAL 2 TIMES DAILY
Qty: 60 TABLET | Refills: 4 | Status: SHIPPED | OUTPATIENT
Start: 2025-01-27

## 2025-01-27 RX ORDER — TOPIRAMATE 25 MG/1
25 TABLET, FILM COATED ORAL 2 TIMES DAILY
Qty: 28 TABLET | Refills: 0 | Status: SHIPPED | OUTPATIENT
Start: 2025-01-27 | End: 2025-02-10

## 2025-01-27 NOTE — PROGRESS NOTES
Subjective:    CC: Kamila Garcia is in clinic today for follow up for complicated migraine.    HPI:  Initial visit: 8/24/2024: Patient is a 54-year-old female with past medical history of anxiety, depression, deep vein thrombosis, PE on Eliquis, history of infrequent migraines referred to clinic to establish care for complicated migraines.  She has had 2 recent admissions in 2024.  First time she was admitted in April 2024 because of syncopal episode followed by left-sided weakness.  She was brought to [facility] and received TNKase and was admitted for detailed stroke workup.  MRI brain and CT angiogram of brain and neck were unremarkable without any abnormalities.  She was discharged home with outpatient physical therapy and had improvement with symptoms returning to baseline.  She reports having headache prior to this symptoms.  She reports that she was doing fine until about August 2024 when she had another episode of left-sided weakness and had severe migraine like headache during that episode.  She came to [facility] and was admitted again for stroke workup.  MRI brain and CT angiogram of brain and neck were negative this time as well.  Possible differential at this time was thought to be complicated migraine, MRI negative stroke versus functional neurological disorder.  She reports that she does not get migraines otherwise but did experience migraine type headache with these 2 episodes.  She is currently getting physical therapy and Occupational Therapy and is helping some.  She is currently using walker to walk.    Follow-up: 1/27/2025: He is in clinic for regular follow-up.  Since her last visit in August 2024, she has not had any episodes of complicated migraine causing left-sided weakness but she is reporting increased frequency of common migraines occurring twice in a week.  She takes Ubrelvy as needed at the onset and it works well.      The following portions of the patient's history were  reviewed and updated as of 01/27/2025: allergies, social history, and problem list.       Current Outpatient Medications:     apixaban (ELIQUIS) 5 MG tablet tablet, Start On 7/8: Take 1 tablet by mouth Every 12 (Twelve) Hours., Disp: 60 tablet, Rfl: 0    aspirin 81 MG chewable tablet, Chew 1 tablet Daily., Disp: , Rfl:     buPROPion XL (WELLBUTRIN XL) 150 MG 24 hr tablet, Take 1 tablet by mouth Daily., Disp: , Rfl:     Caplyta 42 MG capsule, Take 1 capsule by mouth Daily., Disp: , Rfl:     DayVigo 5 MG tablet, Take 1 tablet by mouth Daily., Disp: , Rfl:     folic acid (FOLVITE) 1 MG tablet, Take 1 tablet by mouth Daily., Disp: , Rfl:     lactobacillus acidophilus (RISAQUAD) capsule capsule, Take 1 capsule by mouth Daily., Disp: , Rfl:     lamoTRIgine (LaMICtal) 25 MG tablet, Take 2 tablets by mouth Daily., Disp: , Rfl:     losartan (COZAAR) 50 MG tablet, Take 1 tablet by mouth Daily., Disp: , Rfl:     multivitamin with minerals tablet tablet, Take 1 tablet by mouth Daily., Disp: , Rfl:     OLANZapine (zyPREXA) 10 MG tablet, Take 1 tablet by mouth Every Night., Disp: , Rfl:     thiamine (VITAMIN B1) 100 MG tablet, Take 1 tablet by mouth Daily., Disp: , Rfl:     ubrogepant (Ubrelvy) 100 MG tablet, Take 1 tablet by mouth Daily As Needed (For migraine. May repeat if needed after 2 hours. Max 200 mg per 24 hours.)., Disp: 10 tablet, Rfl: 11    vitamin B-12 (VITAMIN B-12) 1000 MCG tablet, Take 1 tablet by mouth Daily., Disp: , Rfl:     topiramate (Topamax) 25 MG tablet, Take 1 tablet by mouth 2 (Two) Times a Day for 14 days., Disp: 28 tablet, Rfl: 0    topiramate (Topamax) 50 MG tablet, Take 1 tablet by mouth 2 (Two) Times a Day., Disp: 60 tablet, Rfl: 4   Past Medical History:   Diagnosis Date    Anxiety and depression     Chest pain     Disease of thyroid gland     reports slightly elevated when in hospital    DVT (deep venous thrombosis)     ETOH use 04/09/2024    History of pulmonary embolus (PE)     Hypertension      "Hypothyroidism 04/09/2024    Migraines     Obesity (BMI 30-39.9) 04/09/2024    Shortness of breath     with chest pain episodes      Past Surgical History:   Procedure Laterality Date    CARDIAC CATHETERIZATION Left 3/11/2022    Procedure: Left Heart Cath;  Surgeon: Peter Anguiano MD;  Location:  [facility] CATH INVASIVE LOCATION;  Service: Cardiology;  Laterality: Left;  Hold Eliquis 2 days prior to [facility]    ENDOSCOPY N/A 1/12/2022    Procedure: ESOPHAGOGASTRODUODENOSCOPY;  Surgeon: Brunner, Mark I, MD;  Location:  [facility] ENDOSCOPY;  Service: Gastroenterology;  Laterality: N/A;    GASTRIC BYPASS  2007    lap cm en y    IR STENT PLACEMENT Left 10/2021    fayeadi surgical in cath lab placed stent in left leg      Family History   Problem Relation Age of Onset    Heart attack Mother 50    Cancer Mother     Stroke Father     Breast cancer Maternal Grandmother     No Known Problems Maternal Grandfather     No Known Problems Paternal Grandmother     No Known Problems Paternal Grandfather         Review of Systems  Objective:    /78   Pulse 94   Ht 167.6 cm (65.98\")   Wt 103 kg (227 lb 1.2 oz)   SpO2 94%   BMI 36.67 kg/m²     Neurology Exam:  General apperance: NAD.     Mental status: Alert, awake and oriented to time place and person.    Language and Speech: No aphasia or dysarthria.    CN II to XII: Intact.    Opthalmoscopic Exam: No papilledema.    Motor:  Right UE muscle strength 5/5. Normal tone.     Left UE muscle strength 5/5. Normal tone.      Right LE muscle strength 5/5. Normal tone.     Left LE muscle strength 5/5. Normal tone.      Sensory: Normal light touch, vibration and pinprick sensation bilaterally.    DTRs: 2+ bilaterally.    Babinski: Negative bilaterally.    Co-ordination: Normal finger-to-nose, heel to shin B/L.    Rhomberg: Negative.    Gait: Normal.    Cardiovascular: Regular rate and rhythm without murmur, gallop or rub.    Assessment and Plan:  1. Complicated migraine  2. Nonintractable " headache, unspecified chronicity pattern, unspecified headache type  Fortunately, no episodes of complicated migraine causing left-sided weakness but she is reporting 6-8 common migraines in a month.  I will be starting her on Topamax at 25 mg daily slowly increasing to 50 mg twice daily for migraine prevention and she will continue with Ubrelvy as needed as an abortive treatment.  Otherwise I will see her back in clinic in 6 months for follow-up.       I spent 30 minutes in patient care: Reviewing records prior to the visit, entering orders and documentation and spent more than delgado 50% of this time face-to-face in management, instructions and education regarding above mentioned diagnosis and also on counseling and discussing about taking medication regularly, possible side effects with medication use, importance of good sleep hygiene, good hydration and regular exercise.    Return in about 6 months (around 7/27/2025).       Note to patient: The 21st Century Cures Act makes medical notes like these available to patients in the interest of transparency. However, be advised this is a medical document. It is intended as peer to peer communication. It is written in medical language and may contain abbreviations or verbiage that are unfamiliar. It may appear blunt or direct. Medical documents are intended to carry relevant information, facts as evident, and the clinical opinion of the physician.

## 2025-02-05 ENCOUNTER — APPOINTMENT (OUTPATIENT)
Facility: HOSPITAL | Age: 55
End: 2025-02-05
Payer: COMMERCIAL

## 2025-02-05 ENCOUNTER — HOSPITAL ENCOUNTER (EMERGENCY)
Facility: HOSPITAL | Age: 55
Discharge: HOME | End: 2025-02-05
Attending: STUDENT IN AN ORGANIZED HEALTH CARE EDUCATION/TRAINING PROGRAM
Payer: COMMERCIAL

## 2025-02-05 VITALS
WEIGHT: 244.6 LBS | RESPIRATION RATE: 17 BRPM | SYSTOLIC BLOOD PRESSURE: 141 MMHG | TEMPERATURE: 98 F | HEIGHT: 65 IN | HEART RATE: 79 BPM | DIASTOLIC BLOOD PRESSURE: 70 MMHG | OXYGEN SATURATION: 98 % | BODY MASS INDEX: 40.75 KG/M2

## 2025-02-05 DIAGNOSIS — J45.30 MILD PERSISTENT REACTIVE AIRWAY DISEASE WITHOUT COMPLICATION: ICD-10-CM

## 2025-02-05 DIAGNOSIS — J18.9 COMMUNITY ACQUIRED PNEUMONIA OF LEFT LUNG, UNSPECIFIED PART OF LUNG: Primary | ICD-10-CM

## 2025-02-05 PROCEDURE — 94640 AIRWAY INHALATION TREATMENT: CPT

## 2025-02-05 PROCEDURE — 71045 X-RAY EXAM CHEST 1 VIEW: CPT

## 2025-02-05 PROCEDURE — 99283 EMERGENCY DEPT VISIT LOW MDM: CPT

## 2025-02-05 PROCEDURE — 94799 UNLISTED PULMONARY SVC/PX: CPT

## 2025-02-05 RX ORDER — AZITHROMYCIN 250 MG/1
TABLET, FILM COATED ORAL
Qty: 6 TABLET | Refills: 0 | Status: SHIPPED | OUTPATIENT
Start: 2025-02-05

## 2025-02-05 RX ORDER — AMOXICILLIN 875 MG/1
875 TABLET, COATED ORAL 2 TIMES DAILY
Qty: 14 TABLET | Refills: 0 | Status: SHIPPED | OUTPATIENT
Start: 2025-02-05 | End: 2025-02-12

## 2025-02-05 RX ORDER — ALBUTEROL SULFATE 0.83 MG/ML
2.5 SOLUTION RESPIRATORY (INHALATION) ONCE
Status: COMPLETED | OUTPATIENT
Start: 2025-02-05 | End: 2025-02-05

## 2025-02-05 RX ORDER — ALBUTEROL SULFATE 90 UG/1
2 INHALANT RESPIRATORY (INHALATION) EVERY 4 HOURS PRN
Qty: 8 G | Refills: 0 | Status: SHIPPED | OUTPATIENT
Start: 2025-02-05

## 2025-02-05 RX ADMIN — ALBUTEROL SULFATE 2.5 MG: 2.5 SOLUTION RESPIRATORY (INHALATION) at 13:25

## 2025-02-05 NOTE — FSED PROVIDER NOTE
Subjective   History of Present Illness  54-year-old female presents with complaints of persistent cough for an illness that started 2 to 3 weeks ago and has persisted.  Her primary care has sent her in for a chest x-ray and further evaluation.  She has tried antibiotics and cough medicine for a presumed pneumonia concern with the initial onset of symptoms that were negative for COVID and influenza.  Patient states the majority of symptoms have actually improved and mostly resolved but the cough will not get better.  She does not have a history of asthma.  Denies fevers chills or bodyaches in the last several days.  She does have a history of pulmonary embolism and is on anticoagulation currently compliant with that medication.  Denies lower extremity swelling and no chest pain today.    History provided by:  Patient      Review of Systems   All other systems reviewed and are negative.      Past Medical History:   Diagnosis Date    Anxiety and depression     Chest pain     Disease of thyroid gland     reports slightly elevated when in hospital    DVT (deep venous thrombosis)     ETOH use 04/09/2024    History of pulmonary embolus (PE)     Hypertension     Hypothyroidism 04/09/2024    Migraines     Obesity (BMI 30-39.9) 04/09/2024    Shortness of breath     with chest pain episodes       Allergies   Allergen Reactions    Hydrocodone Hives    Milk-Related Compounds GI Intolerance       Past Surgical History:   Procedure Laterality Date    CARDIAC CATHETERIZATION Left 3/11/2022    Procedure: Left Heart Cath;  Surgeon: Peter Anguiano MD;  Location:  [facility];  Service: Cardiology;  Laterality: Left;  Hold Eliquis 2 days prior to [facility]    ENDOSCOPY N/A 1/12/2022    Procedure: ESOPHAGOGASTRODUODENOSCOPY;  Surgeon: Brunner, Mark I, MD;  Location:  [facility];  Service: Gastroenterology;  Laterality: N/A;    GASTRIC BYPASS  2007    lap cm en y    IR STENT PLACEMENT Left 10/2021    xin  surgical in cath lab placed stent in left leg       Family History   Problem Relation Age of Onset    Heart attack Mother 50    Cancer Mother     Stroke Father     Breast cancer Maternal Grandmother     No Known Problems Maternal Grandfather     No Known Problems Paternal Grandmother     No Known Problems Paternal Grandfather        Social History     Socioeconomic History    Marital status:    Tobacco Use    Smoking status: Never     Passive exposure: Never    Smokeless tobacco: Never   Vaping Use    Vaping status: Never Used   Substance and Sexual Activity    Alcohol use: Yes     Alcohol/week: 4.0 standard drinks of alcohol     Types: 4 Cans of beer per week     Comment: daily    Drug use: Never    Sexual activity: Defer           Objective   Physical Exam  Constitutional:       Appearance: Normal appearance.   HENT:      Nose: Nose normal.      Mouth/Throat:      Mouth: Mucous membranes are moist.   Eyes:      Extraocular Movements: Extraocular movements intact.   Cardiovascular:      Rate and Rhythm: Normal rate.   Pulmonary:      Effort: Pulmonary effort is normal.      Breath sounds: Normal breath sounds.   Abdominal:      General: Abdomen is flat.      Palpations: Abdomen is soft.   Musculoskeletal:         General: No swelling.   Skin:     General: Skin is warm and dry.   Neurological:      General: No focal deficit present.      Mental Status: She is alert and oriented to person, place, and time.         Procedures           ED Course                                           Medical Decision Making  Amount and/or Complexity of Data Reviewed  Radiology: ordered.    Risk  Prescription drug management.        Final diagnoses:   Community acquired pneumonia of left lung, unspecified part of lung   Mild persistent reactive airway disease without complication       ED Disposition  ED Disposition       ED Disposition   Discharge    Condition   Stable    Comment   --               No follow-up provider  specified.       Medication List        New Prescriptions      albuterol sulfate  (90 Base) MCG/ACT inhaler  Commonly known as: PROVENTIL HFA;VENTOLIN HFA;PROAIR HFA  Inhale 2 puffs Every 4 (Four) Hours As Needed for Wheezing.     amoxicillin 875 MG tablet  Commonly known as: AMOXIL  Take 1 tablet by mouth 2 (Two) Times a Day for 7 days.     azithromycin 250 MG tablet  Commonly known as: Zithromax Z-Sridhar  Take 2 tablets by mouth on day 1, then 1 tablet daily on days 2-5               Where to Get Your Medications        These medications were sent to [facility]/pharmacy #[id number] - [place], [place] - [address] - [phone]  - [phone]   [address] [facility] [address]      Hours: 24-hours Phone: [phone]   albuterol sulfate  (90 Base) MCG/ACT inhaler  amoxicillin 875 MG tablet  azithromycin 250 MG tablet

## 2025-02-05 NOTE — Clinical Note
[facility] EMERGENCY DEPARTMENT [place]  [address]  [facility] [address]  Phone: [phone]  Fax: [phone]    Kamila Garcia was seen and treated in our emergency department on 2/5/2025.  She may return to work on 02/09/2025.         Thank you for choosing [facility].    Gab Wong MD

## 2025-02-05 NOTE — DISCHARGE INSTRUCTIONS
Continue your cough medicine that you have been using support with as needed albuterol inhaler use and finish the course of antibiotics provided today follow-up with your primary care physician in 2 to 3 days for reevaluation.

## 2025-03-11 ENCOUNTER — SPECIALTY PHARMACY (OUTPATIENT)
Dept: NEUROLOGY | Facility: CLINIC | Age: 55
End: 2025-03-11
Payer: COMMERCIAL

## 2025-03-11 NOTE — PROGRESS NOTES
Specialty Pharmacy Patient Management Program  Refill Outreach     Kamila was contacted today regarding refills of their medication(s).    Refill Questions      Flowsheet Row Most Recent Value   Changes to allergies? No   Changes to medications? No   New conditions or infections since last clinic visit No   Unplanned office visit, urgent care, ED, or hospital admission in the last 4 weeks  No   How does patient/caregiver feel medication is working? Good   Financial problems or insurance changes  No   Since the previous refill, were any specialty medication doses or scheduled injections missed or delayed?  No   Does this patient require a clinical escalation to a pharmacist? No            Delivery Questions      Flowsheet Row Most Recent Value   Delivery method [employer]   Delivery address verified with patient/caregiver? Yes   Delivery address Home   Number of medications in delivery 1   Medication(s) being filled and delivered Ubrogepant (UBRELVY)   Doses left of specialty medications 4   Copay verified? Yes   Copay amount $0   Copay form of payment No copayment ($0)   Delivery Date Selection 03/12/25   Signature Required No                 Follow-up: 28 day(s)     Santiago Levi, Pharmacy Technician  3/11/2025  12:33 EDT

## 2025-04-14 ENCOUNTER — SPECIALTY PHARMACY (OUTPATIENT)
Dept: NEUROLOGY | Facility: CLINIC | Age: 55
End: 2025-04-14
Payer: COMMERCIAL

## 2025-04-14 NOTE — PROGRESS NOTES
Specialty Pharmacy Patient Management Program  Refill Outreach     Kamila was contacted today regarding refills of their medication(s).    Refill Questions      Flowsheet Row Most Recent Value   Changes to allergies? No   Changes to medications? No   New conditions or infections since last clinic visit No   Unplanned office visit, urgent care, ED, or hospital admission in the last 4 weeks  No   How does patient/caregiver feel medication is working? Good   Financial problems or insurance changes  No   Since the previous refill, were any specialty medication doses or scheduled injections missed or delayed?  No   Does this patient require a clinical escalation to a pharmacist? No            Delivery Questions      Flowsheet Row Most Recent Value   Delivery method UPS   Delivery address verified with patient/caregiver? Yes   Delivery address Home   Number of medications in delivery 1   Medication(s) being filled and delivered Ubrogepant (UBRELVY)   Doses left of specialty medications 5   Copay verified? Yes   Copay amount $0   Copay form of payment No copayment ($0)   Delivery Date Selection 04/15/25   Signature Required No                 Follow-up: 28 day(s)     Santiago Levi, Pharmacy Technician  4/14/2025  08:24 EDT

## 2025-04-25 ENCOUNTER — TELEPHONE (OUTPATIENT)
Dept: NEUROLOGY | Facility: CLINIC | Age: 55
End: 2025-04-25
Payer: COMMERCIAL

## 2025-04-25 NOTE — TELEPHONE ENCOUNTER
RECEIVED LONG TERM DISABILITY FORMS FROM [employer]. THEY ARE REQUESTING SOME RECORDS AND FORMS TO BE COMPLETED. PLACED IN DR. VENTURA'S BASKET UP FRONT. PATIENT HAS NOT PAID FOR THE $25 FORM FEE.

## 2025-05-06 NOTE — TELEPHONE ENCOUNTER
"Provider: AUDREY    Caller: ROBERT RUIZ    Relationship to Patient:     Phone Number: [phone]    Reason for Call: [employer] CALLED TO CHECK STATUS, I SPOKE WITH THE OFFICE AND THEY STATED I WAS ALLOWED TO RELAY THE MESSAGE DR. VENTURA PUT INTO THE ENCOUNTER. ADVISED [employer] THAT \"I see her for complicated migraines and she is fairly under good control so I won't be able to fill out paperwork\". [employer] IS REQUESTING FOR THE OFFICE SEND A NOTE BACK STATING HE WILL NOT BE ABLE TO FILL IT OUT.  "

## 2025-07-12 ENCOUNTER — HOSPITAL ENCOUNTER (EMERGENCY)
Facility: HOSPITAL | Age: 55
Discharge: HOME | End: 2025-07-12
Attending: EMERGENCY MEDICINE
Payer: COMMERCIAL

## 2025-07-12 ENCOUNTER — APPOINTMENT (OUTPATIENT)
Facility: HOSPITAL | Age: 55
End: 2025-07-12
Payer: COMMERCIAL

## 2025-07-12 VITALS
HEART RATE: 50 BPM | OXYGEN SATURATION: 99 % | TEMPERATURE: 98.4 F | SYSTOLIC BLOOD PRESSURE: 167 MMHG | HEIGHT: 66 IN | DIASTOLIC BLOOD PRESSURE: 81 MMHG | WEIGHT: 236 LBS | BODY MASS INDEX: 37.93 KG/M2 | RESPIRATION RATE: 20 BRPM

## 2025-07-12 DIAGNOSIS — G43.101 MIGRAINE WITH AURA AND WITH STATUS MIGRAINOSUS, NOT INTRACTABLE: Primary | ICD-10-CM

## 2025-07-12 LAB
HOLD SPECIMEN: NORMAL
WHOLE BLOOD HOLD COAG: NORMAL
WHOLE BLOOD HOLD SPECIMEN: NORMAL

## 2025-07-12 PROCEDURE — 99284 EMERGENCY DEPT VISIT MOD MDM: CPT | Performed by: EMERGENCY MEDICINE

## 2025-07-12 PROCEDURE — 96365 THER/PROPH/DIAG IV INF INIT: CPT

## 2025-07-12 PROCEDURE — 96375 TX/PRO/DX INJ NEW DRUG ADDON: CPT

## 2025-07-12 PROCEDURE — 25810000003 SODIUM CHLORIDE 0.9 % SOLUTION

## 2025-07-12 PROCEDURE — 70450 CT HEAD/BRAIN W/O DYE: CPT

## 2025-07-12 PROCEDURE — 96367 TX/PROPH/DG ADDL SEQ IV INF: CPT

## 2025-07-12 PROCEDURE — 96372 THER/PROPH/DIAG INJ SC/IM: CPT

## 2025-07-12 PROCEDURE — 25010000002 DIPHENHYDRAMINE PER 50 MG

## 2025-07-12 PROCEDURE — 25010000002 MAGNESIUM SULFATE 2 GM/50ML SOLUTION

## 2025-07-12 PROCEDURE — 25010000002 DROPERIDOL PER 5 MG

## 2025-07-12 PROCEDURE — 25010000002 DEXAMETHASONE PER 1 MG

## 2025-07-12 RX ORDER — SUMATRIPTAN 6 MG/.5ML
6 INJECTION, SOLUTION SUBCUTANEOUS ONCE
Status: COMPLETED | OUTPATIENT
Start: 2025-07-12 | End: 2025-07-12

## 2025-07-12 RX ORDER — DEXAMETHASONE SODIUM PHOSPHATE 10 MG/ML
10 INJECTION, SOLUTION INTRA-ARTICULAR; INTRALESIONAL; INTRAMUSCULAR; INTRAVENOUS; SOFT TISSUE ONCE
Status: COMPLETED | OUTPATIENT
Start: 2025-07-12 | End: 2025-07-12

## 2025-07-12 RX ORDER — SODIUM CHLORIDE 0.9 % (FLUSH) 0.9 %
10 SYRINGE (ML) INJECTION AS NEEDED
Status: DISCONTINUED | OUTPATIENT
Start: 2025-07-12 | End: 2025-07-12 | Stop reason: HOSPADM

## 2025-07-12 RX ORDER — DROPERIDOL 2.5 MG/ML
1.25 INJECTION, SOLUTION INTRAMUSCULAR; INTRAVENOUS ONCE
Status: COMPLETED | OUTPATIENT
Start: 2025-07-12 | End: 2025-07-12

## 2025-07-12 RX ORDER — MAGNESIUM SULFATE HEPTAHYDRATE 40 MG/ML
1 INJECTION, SOLUTION INTRAVENOUS ONCE
Status: COMPLETED | OUTPATIENT
Start: 2025-07-12 | End: 2025-07-12

## 2025-07-12 RX ORDER — BUTALBITAL, ACETAMINOPHEN AND CAFFEINE 50; 325; 40 MG/1; MG/1; MG/1
1 TABLET ORAL ONCE
Status: COMPLETED | OUTPATIENT
Start: 2025-07-12 | End: 2025-07-12

## 2025-07-12 RX ORDER — KETAMINE HCL IN NACL, ISO-OSM 100MG/10ML
0.1 SYRINGE (ML) INJECTION ONCE
Status: COMPLETED | OUTPATIENT
Start: 2025-07-12 | End: 2025-07-12

## 2025-07-12 RX ORDER — DIPHENHYDRAMINE HYDROCHLORIDE 50 MG/ML
25 INJECTION, SOLUTION INTRAMUSCULAR; INTRAVENOUS ONCE
Status: COMPLETED | OUTPATIENT
Start: 2025-07-12 | End: 2025-07-12

## 2025-07-12 RX ADMIN — SUMATRIPTAN 6 MG: 6 INJECTION SUBCUTANEOUS at 19:30

## 2025-07-12 RX ADMIN — VALPROATE SODIUM 500 MG: 100 INJECTION, SOLUTION INTRAVENOUS at 20:31

## 2025-07-12 RX ADMIN — DEXAMETHASONE SODIUM PHOSPHATE 10 MG: 10 INJECTION INTRAMUSCULAR; INTRAVENOUS at 20:31

## 2025-07-12 RX ADMIN — SODIUM CHLORIDE 1000 ML: 9 INJECTION, SOLUTION INTRAVENOUS at 17:39

## 2025-07-12 RX ADMIN — DROPERIDOL 1.25 MG: 2.5 INJECTION, SOLUTION INTRAMUSCULAR; INTRAVENOUS at 17:38

## 2025-07-12 RX ADMIN — Medication 10.7 MG: at 20:36

## 2025-07-12 RX ADMIN — DIPHENHYDRAMINE HYDROCHLORIDE 25 MG: 50 INJECTION INTRAMUSCULAR; INTRAVENOUS at 17:38

## 2025-07-12 RX ADMIN — MAGNESIUM SULFATE HEPTAHYDRATE 1 G: 40 INJECTION, SOLUTION INTRAVENOUS at 17:39

## 2025-07-12 RX ADMIN — BUTALBITAL, ACETAMINOPHEN, AND CAFFEINE 1 TABLET: 325; 50; 40 TABLET ORAL at 18:44

## 2025-07-12 NOTE — FSED PROVIDER NOTE
Subjective  History of Present Illness:    Patient is a 55-year-old female past medical history of CVA, on blood thinners, presents with 1 week of a migraine headache that she is unable to relieve with Ubrelvy which is her normal migraine medication.  Patient states the migraine is more severe than her typical migraines.  Patient states she went to urgent care and she was given a shot of Toradol and steroid which did not help at all.  Patient states she has bilateral blurry vision which is normal for her with the migraines.  Patient states she does have left-sided residual deficit sensation and motor from her previous CVA, no changes in this.  Patient denies chest pain, shortness of breath, abdominal pain, nausea, vomiting, visual loss,      Nurses Notes reviewed and agree, including vitals, allergies, social history and prior medical history.     REVIEW OF SYSTEMS: All systems reviewed and not pertinent unless noted.  Review of Systems    Past Medical History:   Diagnosis Date    Anxiety and depression     Chest pain     Disease of thyroid gland     reports slightly elevated when in hospital    DVT (deep venous thrombosis)     ETOH use 04/09/2024    History of pulmonary embolus (PE)     Hypertension     Hypothyroidism 04/09/2024    Migraines     Obesity (BMI 30-39.9) 04/09/2024    Shortness of breath     with chest pain episodes       Allergies:    Hydrocodone and Milk-related compounds      Past Surgical History:   Procedure Laterality Date    CARDIAC CATHETERIZATION Left 3/11/2022    Procedure: Left Heart Cath;  Surgeon: Peter Anguiano MD;  Location:  [facility];  Service: Cardiology;  Laterality: Left;  Hold Eliquis 2 days prior to [facility]    ENDOSCOPY N/A 1/12/2022    Procedure: ESOPHAGOGASTRODUODENOSCOPY;  Surgeon: Brunner, Mark I, MD;  Location:  [facility];  Service: Gastroenterology;  Laterality: N/A;    GASTRIC BYPASS  2007    lap cm en y    IR STENT PLACEMENT Left 10/2021     "xin surgical in cath lab placed stent in left leg         Social History     Socioeconomic History    Marital status:    Tobacco Use    Smoking status: Never     Passive exposure: Never    Smokeless tobacco: Never   Vaping Use    Vaping status: Never Used   Substance and Sexual Activity    Alcohol use: Yes     Alcohol/week: 4.0 standard drinks of alcohol     Types: 4 Cans of beer per week     Comment: daily    Drug use: Never    Sexual activity: Defer         Family History   Problem Relation Age of Onset    Heart attack Mother 50    Cancer Mother     Stroke Father     Breast cancer Maternal Grandmother     No Known Problems Maternal Grandfather     No Known Problems Paternal Grandmother     No Known Problems Paternal Grandfather        Objective  Physical Exam:  /81   Pulse 50   Temp 98.4 °F (36.9 °C) (Oral)   Resp 20   Ht 167.6 cm (66\")   Wt 107 kg (236 lb) Comment: unable to obtain standing weight 2/2 LLE NWB status  SpO2 99%   BMI 38.09 kg/m²      Physical Exam  Constitutional:       Appearance: Well-developed. No acute distress  HENT:      Head: Normocephalic and atraumatic.      Mouth/Throat:      Mouth: Mucous membranes are moist.      Eyes:      Extraocular Movements: Extraocular movements intact.   Cardiovascular:      Rate and Rhythm: Normal rate and regular rhythm.      Heart sounds: Normal heart sounds.   Pulmonary:      Effort: Pulmonary effort is normal. No respiratory distress.      Breath sounds: Normal breath sounds.   Abdominal:      General: There is no distension.      Palpations: Abdomen is soft and nontender. No rebound tenderness or guarding noted  Musculoskeletal:         General: No swelling or tenderness.       Extremities: Moves all 4s   Skin:     General: Skin is warm and dry.  2+ pulses throughout     Capillary Refill: Capillary refill takes less than 2 seconds.   Neurological:      Mental Status:Alert and oriented to person, place, and time.   Mentation is normal "   No changes in motor and sensation deficit of left side  Psychiatric:         Mood and Affect: Mood normal.         Behavior: Behavior normal.    Procedures    ED Course:         Lab Results (last 24 hours)       ** No results found for the last 24 hours. **             CT Head Without Contrast  Result Date: 7/12/2025  CT HEAD WO CONTRAST Date of Exam: 7/12/2025 5:48 PM EDT Indication: worst headache, history of migraines. Comparison: MRI 8/6/2024. Technique: Axial CT images were obtained of the head without contrast administration.  Automated exposure control and iterative construction methods were used. Findings: Gray-white differentiation is maintained and there is no evidence of intracranial hemorrhage, mass or mass effect. The ventricles are normal in size and configuration. The orbits are normal. The paranasal sinuses appear clear. The calvarium is intact.     Impression: Impression: No acute intracranial abnormality. Electronically Signed: Mike Kidd MD  7/12/2025 6:46 PM EDT  Workstation ID: [id number]         MDM     Amount and/or Complexity of Data Reviewed  Tests in the radiology section of CPT®: reviewed        Initial impression of presenting illness: Migraine headache x 1 week, no relief from typical medication of Ubrelvy    DDX: includes but is not limited to: Intracerebral hemorrhage, headache, migraine    Patient arrives vital signs stable with vitals interpreted by myself.     Pertinent results: CT head ordered    Diagnostic information from other sources: Chart review    Interventions / Re-evaluation: Benadryl, droperidol, magnesium, fluids  Patient is already tried Toradol and Decadron without relief and Ubrelvy.  Initial regiment did not help much, states it did help slightly.  Fioricet and magnesium then given, helped minimally as well.  Sumatriptan then given which did not help at all.  Discussed with attending and gave pain dose ketamine in the valproic acid  Upon recheck patient states  she is feeling much better, that she has felt in a week  Upon subsequent re check sitting up on phone feels significantly better     Medications   sodium chloride 0.9 % bolus 1,000 mL (0 mL Intravenous Stopped 7/12/25 2150)   droperidol (INAPSINE) injection 1.25 mg (1.25 mg Intravenous Given 7/12/25 1738)   diphenhydrAMINE (BENADRYL) injection 25 mg (25 mg Intravenous Given 7/12/25 1738)   magnesium sulfate 2g/50 mL (PREMIX) infusion (0 g Intravenous Stopped 7/12/25 1825)   butalbital-acetaminophen-caffeine (FIORICET, ESGIC) -40 MG per tablet 1 tablet (1 tablet Oral Given 7/12/25 1844)   SUMAtriptan (IMITREX) SUBCUTANEOUS injection 6 mg (6 mg Subcutaneous Given 7/12/25 1930)   valproate (DEPACON) 500 mg in sodium chloride 0.9 % 50 mL IVPB (0 mg Intravenous Stopped 7/12/25 2150)   dexAMETHasone (DECADRON) injection 10 mg (10 mg Intravenous Given 7/12/25 2031)   ketamine hcl syringe solution prefilled syringe 10.7 mg (10.7 mg Intravenous Given 7/12/25 2036)       Results/clinical rationale were discussed with patient    Consultations/Discussion of results with other physicians: attending who recommended trying the pain dose ketamine      Data interpreted: Nursing notes reviewed, vital signs reviewed.  Labs independently interpreted by me     Counseling: Discussed the results above with the patient regarding need for admission or discharge.  Patient understands and agrees plan of care.  Discussed with patients the results from today's visit. Discussed with patient strict return precautions and they verbalize understanding. Recommend to them following up with primary care as soon as possible. Patient is discharged hemodynamically stable and comfortable.   Patient states she is following up with neurology soon.  Discussed with patient to also follow-up with primary care and return if worsening or changing symptoms.    -----  ED Disposition       ED Disposition   Discharge    Condition   Stable    Comment   --              Final diagnoses:   Migraine with aura and with status migrainosus, not intractable      Your Follow-Up Providers       Schedule an appointment as soon as possible for a visit  with Latricia Mishra APRN.    Specialty: Family Medicine  [address] [place] [address]  [phone]                       Contact information for after-discharge care    Follow-up information has not been specified.                    Your medication list        CONTINUE taking these medications        Instructions Last Dose Given Next Dose Due   albuterol sulfate  (90 Base) MCG/ACT inhaler  Commonly known as: PROVENTIL HFA;VENTOLIN HFA;PROAIR HFA      Inhale 2 puffs Every 4 (Four) Hours As Needed for Wheezing.       apixaban 5 MG tablet tablet  Commonly known as: ELIQUIS      Start On 7/8: Take 1 tablet by mouth Every 12 (Twelve) Hours.       aspirin 81 MG chewable tablet      Chew 1 tablet Daily.       azithromycin 250 MG tablet  Commonly known as: Zithromax Z-Sridhar      Take 2 tablets by mouth on day 1, then 1 tablet daily on days 2-5       buPROPion  MG 24 hr tablet  Commonly known as: WELLBUTRIN XL      Take 1 tablet by mouth Daily.       Caplyta 42 MG capsule  Generic drug: Lumateperone Tosylate      Take 1 capsule by mouth Daily.       cyanocobalamin 1000 MCG tablet  Commonly known as: VITAMIN B-12      Take 1 tablet by mouth Daily.       DayVigo 5 MG tablet  Generic drug: Lemborexant      Take 1 tablet by mouth Daily.       folic acid 1 MG tablet  Commonly known as: FOLVITE      Take 1 tablet by mouth Daily.       lactobacillus acidophilus capsule capsule      Take 1 capsule by mouth Daily.       lamoTRIgine 25 MG tablet  Commonly known as: LaMICtal      Take 2 tablets by mouth Daily.       losartan 50 MG tablet  Commonly known as: COZAAR      Take 1 tablet by mouth Daily.       multivitamin with minerals tablet tablet      Take 1 tablet by mouth Daily.       OLANZapine 10 MG tablet  Commonly  known as: zyPREXA      Take 1 tablet by mouth Every Night.       thiamine 100 MG tablet  Commonly known as: VITAMIN B1      Take 1 tablet by mouth Daily.       topiramate 25 MG tablet  Commonly known as: Topamax      Take 1 tablet by mouth 2 (Two) Times a Day for 14 days.       topiramate 50 MG tablet  Commonly known as: Topamax      Take 1 tablet by mouth 2 (Two) Times a Day.       Ubrelvy 100 MG tablet  Generic drug: ubrogepant      Take 1 tablet by mouth Daily As Needed (For migraine. May repeat if needed after 2 hours. Max 200 mg per 24 hours.).

## 2025-07-13 NOTE — DISCHARGE INSTRUCTIONS
Please follow-up with primary care and neurology soon as possible.  Please return for worsening or changing symptoms.

## 2025-07-14 RX ORDER — DEXAMETHASONE 1.5 MG/1
1.5 TABLET ORAL 2 TIMES DAILY WITH MEALS
Qty: 6 TABLET | Refills: 0 | Status: SHIPPED | OUTPATIENT
Start: 2025-07-14 | End: 2025-07-17

## 2025-07-14 RX ORDER — DEXAMETHASONE 1 MG
0.5 TABLET ORAL 2 TIMES DAILY WITH MEALS
Qty: 3 TABLET | Refills: 0 | Status: SHIPPED | OUTPATIENT
Start: 2025-07-14 | End: 2025-07-17

## 2025-07-14 RX ORDER — DEXAMETHASONE 0.75 MG/1
0.75 TABLET ORAL 2 TIMES DAILY WITH MEALS
Qty: 6 TABLET | Refills: 0 | Status: SHIPPED | OUTPATIENT
Start: 2025-07-14 | End: 2025-07-17

## 2025-07-14 RX ORDER — DEXAMETHASONE 1 MG
1 TABLET ORAL 2 TIMES DAILY WITH MEALS
Qty: 6 TABLET | Refills: 0 | Status: SHIPPED | OUTPATIENT
Start: 2025-07-14 | End: 2025-07-17

## 2025-07-28 ENCOUNTER — OFFICE VISIT (OUTPATIENT)
Dept: NEUROLOGY | Facility: CLINIC | Age: 55
End: 2025-07-28
Payer: COMMERCIAL

## 2025-07-28 ENCOUNTER — SPECIALTY PHARMACY (OUTPATIENT)
Dept: NEUROLOGY | Facility: CLINIC | Age: 55
End: 2025-07-28
Payer: COMMERCIAL

## 2025-07-28 VITALS — DIASTOLIC BLOOD PRESSURE: 74 MMHG | HEART RATE: 66 BPM | OXYGEN SATURATION: 98 % | SYSTOLIC BLOOD PRESSURE: 120 MMHG

## 2025-07-28 DIAGNOSIS — G43.109 COMPLICATED MIGRAINE: Primary | ICD-10-CM

## 2025-07-28 DIAGNOSIS — G43.009 MIGRAINE WITHOUT AURA AND WITHOUT STATUS MIGRAINOSUS, NOT INTRACTABLE: ICD-10-CM

## 2025-07-28 PROCEDURE — 99214 OFFICE O/P EST MOD 30 MIN: CPT | Performed by: PSYCHIATRY & NEUROLOGY

## 2025-07-28 RX ORDER — TOPIRAMATE 50 MG/1
50 TABLET, FILM COATED ORAL 2 TIMES DAILY
Qty: 180 TABLET | Refills: 1 | Status: SHIPPED | OUTPATIENT
Start: 2025-07-28 | End: 2025-10-26

## 2025-07-28 NOTE — PROGRESS NOTES
Subjective:    CC: Kamila Garcia is in clinic today for follow up for history of complicated migraines and common migraines.    HPI:  Initial visit: 8/24/2024: Patient is a 54-year-old female with past medical history of anxiety, depression, deep vein thrombosis, PE on Eliquis, history of infrequent migraines referred to clinic to establish care for complicated migraines.  She has had 2 recent admissions in 2024.  First time she was admitted in April 2024 because of syncopal episode followed by left-sided weakness.  She was brought to [facility] and received TNKase and was admitted for detailed stroke workup.  MRI brain and CT angiogram of brain and neck were unremarkable without any abnormalities.  She was discharged home with outpatient physical therapy and had improvement with symptoms returning to baseline.  She reports having headache prior to this symptoms.  She reports that she was doing fine until about August 2024 when she had another episode of left-sided weakness and had severe migraine like headache during that episode.  She came to [facility] and was admitted again for stroke workup.  MRI brain and CT angiogram of brain and neck were negative this time as well.  Possible differential at this time was thought to be complicated migraine, MRI negative stroke versus functional neurological disorder.  She reports that she does not get migraines otherwise but did experience migraine type headache with these 2 episodes.  She is currently getting physical therapy and Occupational Therapy and is helping some.  She is currently using walker to walk.    Follow-up: 1/27/2025: She is in clinic for regular follow-up.  Since her last visit in August 2024, she has not had any episodes of complicated migraine causing left-sided weakness but she is reporting increased frequency of common migraines occurring twice in a week.  She takes Ubrelvy as needed at the onset and it works well.    Follow-up: 7/28/2025:  She is in clinic for regular follow-up.  Since her last visit 6 months ago, she had to go to the ER for a severe migraine back in April 2025.  She received IV cocktail and reports that IV Depakote finally helped and breaking the migraine.  Prior to going to the ER, she also went to urgent care and received a Toradol shot which was not beneficial.  After that episode, overall she has done well with Topamax 50 mg twice daily and average migraine frequency remains 2-4 breakthrough migraines in a month.  She is using Ubrelvy as needed as an abortive treatment is working well.  No further episodes of complicated migraines.    The following portions of the patient's history were reviewed and updated as of 07/28/2025: allergies, social history, and problem list.       Current Outpatient Medications:     apixaban (ELIQUIS) 5 MG tablet tablet, Start On 7/8: Take 1 tablet by mouth Every 12 (Twelve) Hours., Disp: 60 tablet, Rfl: 0    aspirin 81 MG chewable tablet, Chew 1 tablet Daily., Disp: , Rfl:     buPROPion XL (WELLBUTRIN XL) 150 MG 24 hr tablet, Take 1 tablet by mouth Daily., Disp: , Rfl:     Caplyta 42 MG capsule, Take 1 capsule by mouth Daily., Disp: , Rfl:     cholecalciferol (VITAMIN D3) 1.25 MG (27417 UT) capsule, Take 1 capsule by mouth Every 7 (Seven) Days., Disp: , Rfl:     folic acid (FOLVITE) 1 MG tablet, Take 1 tablet by mouth Daily., Disp: , Rfl:     lactobacillus acidophilus (RISAQUAD) capsule capsule, Take 1 capsule by mouth Daily., Disp: , Rfl:     lamoTRIgine (LaMICtal) 25 MG tablet, Take 2 tablets by mouth Daily., Disp: , Rfl:     Lemborexant 10 MG tablet, Take 1 tablet by mouth Daily., Disp: , Rfl:     losartan (COZAAR) 50 MG tablet, Take 1 tablet by mouth Daily., Disp: , Rfl:     multivitamin with minerals tablet tablet, Take 1 tablet by mouth Daily., Disp: , Rfl:     OLANZapine (zyPREXA) 5 MG tablet, Take 1 tablet by mouth Every Night., Disp: , Rfl:     thiamine (VITAMIN B1) 100 MG tablet, Take 1  tablet by mouth Daily., Disp: , Rfl:     topiramate (Topamax) 50 MG tablet, Take 1 tablet by mouth 2 (Two) Times a Day for 90 days., Disp: 180 tablet, Rfl: 1    ubrogepant (Ubrelvy) 100 MG tablet, Take 1 tablet by mouth Daily As Needed (For migraine. May repeat if needed after 2 hours. Max 200 mg per 24 hours.)., Disp: 10 tablet, Rfl: 11    vitamin B-12 (VITAMIN B-12) 1000 MCG tablet, Take 1 tablet by mouth Daily., Disp: , Rfl:    Past Medical History:   Diagnosis Date    Anxiety and depression     Chest pain     Disease of thyroid gland     reports slightly elevated when in hospital    DVT (deep venous thrombosis)     ETOH use 04/09/2024    History of pulmonary embolus (PE)     Hypertension     Hypothyroidism 04/09/2024    Migraines     Obesity (BMI 30-39.9) 04/09/2024    Shortness of breath     with chest pain episodes    Stroke 4/9/2024      Past Surgical History:   Procedure Laterality Date    CARDIAC CATHETERIZATION Left 3/11/2022    Procedure: Left Heart Cath;  Surgeon: Peter Anguiano MD;  Location:  [facility];  Service: Cardiology;  Laterality: Left;  Hold Eliquis 2 days prior to [facility]    ENDOSCOPY N/A 1/12/2022    Procedure: ESOPHAGOGASTRODUODENOSCOPY;  Surgeon: Brunner, Mark I, MD;  Location:  [facility];  Service: Gastroenterology;  Laterality: N/A;    GASTRIC BYPASS  2007    lap cm en y    IR STENT PLACEMENT Left 10/2021    xin surgical in cath lab placed stent in left leg      Family History   Problem Relation Age of Onset    Heart attack Mother 50    Cancer Mother     Stroke Father     Breast cancer Maternal Grandmother     No Known Problems Maternal Grandfather     No Known Problems Paternal Grandmother     No Known Problems Paternal Grandfather         Review of Systems  Objective:    /74   Pulse 66   SpO2 98%     Neurology Exam:  General apperance: NAD.     Mental status: Alert, awake and oriented to time place and person.    Language and Speech: No aphasia or  dysarthria.    CN II to XII: Intact.    Opthalmoscopic Exam: No papilledema.    Motor:  Right UE muscle strength 5/5. Normal tone.     Left UE muscle strength 5/5. Normal tone.      Right LE muscle strength 5/5. Normal tone.     Left LE muscle strength 5/5. Normal tone.      Sensory: Normal light touch, vibration and pinprick sensation bilaterally.    DTRs: 2+ bilaterally.    Babinski: Negative bilaterally.    Co-ordination: Normal finger-to-nose, heel to shin B/L.    Rhomberg: Negative.    Gait: Normal.    Cardiovascular: Regular rate and rhythm without murmur, gallop or rub.    Assessment and Plan:  1. Complicated migraine  2. Migraine without aura and without status migrainosus, not intractable  Overall stable with Topamax 50 mg twice daily.  She had to go to ER for IV cocktail back in April 2025.  She reports that IV Depakote helped her the most and breaking the migraine.  Currently migraine frequency and intensity disease stable with Topamax but in future, if it gets worse then Depakote can be tried as an alternative.  Ubrelvy is working well as an abortive treatment so it will be continued and I will see her back in clinic in 6 months for follow-up.       I spent 30 minutes in patient care: Reviewing records prior to the visit, entering orders and documentation and spent more than delgado 50% of this time face-to-face in management, instructions and education regarding above mentioned diagnosis and also on counseling and discussing about taking medication regularly, possible side effects with medication use, importance of good sleep hygiene, good hydration and regular exercise.    Return in about 6 months (around 1/28/2026).       Note to patient: The 21st Century Cures Act makes medical notes like these available to patients in the interest of transparency. However, be advised this is a medical document. It is intended as peer to peer communication. It is written in medical language and may contain abbreviations  or verbiage that are unfamiliar. It may appear blunt or direct. Medical documents are intended to carry relevant information, facts as evident, and the clinical opinion of the physician.

## 2025-07-30 ENCOUNTER — SPECIALTY PHARMACY (OUTPATIENT)
Dept: NEUROLOGY | Facility: CLINIC | Age: 55
End: 2025-07-30
Payer: COMMERCIAL

## 2025-07-31 ENCOUNTER — SPECIALTY PHARMACY (OUTPATIENT)
Dept: GENERAL RADIOLOGY | Facility: HOSPITAL | Age: 55
End: 2025-07-31
Payer: COMMERCIAL

## 2025-07-31 NOTE — PROGRESS NOTES
Specialty Pharmacy Patient Management Program  Neurology Initial Assessment     Kamila Garcia is a 55 y.o. female with migraines seen by a Neurology provider and enrolled in the Neurology Patient Management program offered by [facility].  An initial outreach was conducted, including assessment of therapy appropriateness and specialty medication education for Ubrelvy. The patient was introduced to services offered by [facility], including: regular assessments, refill coordination, curbside pick-up or mail order delivery options, prior authorization maintenance, and financial assistance programs as applicable. The patient was also provided with contact information for the pharmacy team.     Insurance Coverage & Financial Support  [facility]  Ubrelvy Copay Card    Relevant Past Medical History and Comorbidities  Relevant medical history and concomitant health conditions were discussed with the patient. The patient's chart has been reviewed for relevant past medical history and comorbid health conditions and updated as necessary.   Past Medical History:   Diagnosis Date    Anxiety and depression     Chest pain     Disease of thyroid gland     reports slightly elevated when in hospital    DVT (deep venous thrombosis)     ETOH use 04/09/2024    History of pulmonary embolus (PE)     Hypertension     Hypothyroidism 04/09/2024    Migraines     Obesity (BMI 30-39.9) 04/09/2024    Shortness of breath     with chest pain episodes    Stroke 4/9/2024     Social History     Socioeconomic History    Marital status:    Tobacco Use    Smoking status: Never     Passive exposure: Never    Smokeless tobacco: Never   Vaping Use    Vaping status: Never Used   Substance and Sexual Activity    Alcohol use: Yes     Alcohol/week: 4.0 standard drinks of alcohol     Types: 4 Cans of beer per week     Comment: daily    Drug use: Never    Sexual activity: Defer     Problem list  reviewed by Eliana Acuña RPH on 7/31/2025 at  2:45 PM    Allergies  Known allergies and reactions were discussed with the patient. The patient's chart has been reviewed for  allergy information and updated as necessary.   Allergies   Allergen Reactions    Hydrocodone Hives    Milk-Related Compounds GI Intolerance     Allergies reviewed by Eliana Acuña RPH on 7/31/2025 at  2:45 PM    Relevant Laboratory Values  Common labs          8/6/2024    19:34 8/6/2024    19:39 8/7/2024    05:25 8/9/2024    05:03   Common Labs   Glucose   90  98    BUN   8  11    Creatinine 1.00   0.79  0.84    Sodium   139  140    Potassium   4.1  3.8    Chloride   105  108    Calcium   8.4  8.7    Albumin   3.4  3.7    Total Bilirubin   0.9     Alkaline Phosphatase   103     AST (SGOT)   17     ALT (SGPT)   7     WBC  5.97  5.49  5.70    Hemoglobin 15.3  14.9  13.5  12.4    Hematocrit 45  44.3  40.6  37.8    Platelets  293  245  233    Total Cholesterol   173     Triglycerides   76     HDL Cholesterol   81     LDL Cholesterol    78     Hemoglobin A1C   5.10         Current Medication List  This medication list has been reviewed with the patient and evaluated for any interactions or necessary modifications/recommendations, and updated to include all prescription medications, OTC medications, and supplements the patient is currently taking.  This list reflects what is contained in the patient's profile, which has also been marked as reviewed to communicate to other providers it is the most up to date version of the patient's current medication therapy.     Current Outpatient Medications:     apixaban (ELIQUIS) 5 MG tablet tablet, Start On 7/8: Take 1 tablet by mouth Every 12 (Twelve) Hours., Disp: 60 tablet, Rfl: 0    aspirin 81 MG chewable tablet, Chew 1 tablet Daily., Disp: , Rfl:     buPROPion XL (WELLBUTRIN XL) 150 MG 24 hr tablet, Take 1 tablet by mouth Daily., Disp: , Rfl:     Caplyta 42 MG capsule, Take 1 capsule by mouth  Daily., Disp: , Rfl:     cholecalciferol (VITAMIN D3) 1.25 MG (44737 UT) capsule, Take 1 capsule by mouth Every 7 (Seven) Days., Disp: , Rfl:     folic acid (FOLVITE) 1 MG tablet, Take 1 tablet by mouth Daily., Disp: , Rfl:     lactobacillus acidophilus (RISAQUAD) capsule capsule, Take 1 capsule by mouth Daily., Disp: , Rfl:     lamoTRIgine (LaMICtal) 25 MG tablet, Take 2 tablets by mouth Daily., Disp: , Rfl:     Lemborexant 10 MG tablet, Take 1 tablet by mouth Daily., Disp: , Rfl:     losartan (COZAAR) 50 MG tablet, Take 1 tablet by mouth Daily., Disp: , Rfl:     multivitamin with minerals tablet tablet, Take 1 tablet by mouth Daily., Disp: , Rfl:     OLANZapine (zyPREXA) 5 MG tablet, Take 1 tablet by mouth Every Night., Disp: , Rfl:     thiamine (VITAMIN B1) 100 MG tablet, Take 1 tablet by mouth Daily., Disp: , Rfl:     topiramate (Topamax) 50 MG tablet, Take 1 tablet by mouth 2 (Two) Times a Day for 90 days., Disp: 180 tablet, Rfl: 1    ubrogepant (Ubrelvy) 100 MG tablet, Take 1 tablet by mouth Daily As Needed (For migraine. May repeat if needed after 2 hours. Max 200 mg per 24 hours.)., Disp: 10 tablet, Rfl: 11    vitamin B-12 (VITAMIN B-12) 1000 MCG tablet, Take 1 tablet by mouth Daily., Disp: , Rfl:   No current facility-administered medications for this visit.    Medicines reviewed by Eliana Acuña [facility] on 7/31/2025 at  2:45 PM    Drug Interactions  None     Initial Education Provided for Specialty Medication  The patient has been provided with the following education and any applicable administration techniques (i.e. self-injection) have been demonstrated for the therapies indicated. All questions and concerns have been addressed prior to the patient receiving the medication, and the patient has verbalized understanding of the education and any materials provided.  Additional patient education shall be provided and documented upon request by the patient, provider or payer.      Ubrelvy  (Ubrogepant)  Medication Expectations   Why am I taking this medication? You are taking this medication to treat acute migraines.   What should I expect while on this medication? You should expect to see a decrease in the frequency and severity of your migraines.   How does the medication work? Ubrelvy is a monoclonal antibody that binds to calcitonin gene-related peptide (CGRP) and blocks its binding to the receptor decreasing the severity of migraines.   How long will I be on this medication for? The amount of time you will be on this medication will be determined by your doctor and your response to the medication.    How do I take this medication? Take as directed on your prescription label.  Reviewed plan for Ubrelvy 100mg (1 tablet) PO daily prn; may repeat x 1 in 2 hours, if needed. Max dosage = 200mg/24 hours.    What are some possible side effects? Potential side effects including, but not limited to nausea and somnolence. Pt verbalized understanding.   What happens if I miss a dose? This is taken as needed for migraines.     Medication Safety   What are things I should warn my doctor immediately about? Allergic reaction: Itching or hives, swelling in your face or hands, swelling or tingling in your mouth or throat, chest tightness, trouble breathing     What are things that I should be cautious of? Tell your doctor if you are pregnant or breastfeeding, or if you have kidney disease or liver disease.   What are some medications that can interact with this one? Concomitant use of strong CY inhibitors, check with your pharmacist or provider before starting any new medications, avoid grapefruit juice.     Medication Storage/Handling   How should I handle this medication? Keep this medication out of reach of pets/children.   How does this medication need to be stored? Store at a controlled room temperature between 20 and 25 degrees C (68 and 77 degrees F), with excursions permitted between 15 and 30 degrees  C (59 and 86 degrees F) away from direct sunlight and moisture.   How should I dispose of this medication? There should not be a need to dispose of this medication unless your provider decides to change the dose or therapy. If that is the case, take to your local police station for proper disposal. Some pharmacies also have take-back bins for medication drop-off.      Resources/Support   How can I remind myself to take this medication? This is taken as needed for migraines.   Is financial support available?  Yes, [employer] can provide co-pay cards if you have commercial insurance or patient assistance if you have Medicare or no insurance.    Which vaccines are recommended for me? Talk to your doctor about these vaccines: Flu, Coronavirus (COVID-19), Pneumococcal (pneumonia), Tdap, Hepatitis B, Zoster (shingles)          Adherence and Self-Administration  Adherence related to the patient's specialty therapy was discussed with the patient. The Adherence segment of this outreach has been reviewed and updated.   Is there a concern with patient's ability to self administer the medication correctly and without issue?: No  Were any potential barriers to adherence identified during the initial assessment or patient education?: No  Are there any concerns regarding the patient's understanding of the importance of medication adherence?: No  Methods for Supporting Patient Adherence and/or Self-Administration: None    Goals of Therapy  Goals related to the patient's specialty therapy were discussed with the patient. The Patient Goals segment of this outreach has been reviewed and updated.   Goals Addressed Today        Specialty Pharmacy General Goal      On Average, Reduce:   Frequency : N/A as only on acute treatment currently, follow up will reveal whether treatment for complicated migraine versus functional neurological disorder is appropriate.  Symptom severity by 50% within 1  hour of taking acute therapy.    Duration of migraines to 2 hours.     Baseline Values/Notes on Enrollment  Frequency: 3-4 MMD  Symptom Severity: Moderate to severe pain  Duration: 4 - 6 hours    Date of Reassessment Notes on Progress Toward Above Goals   10/8/24 Patient give Ubrelvy for treatment of acute migraine. Has history of DVT/PE on Eliquis. Migraines pretty infrequent at baseline but has received samples of Ubrelvy from provider and they worked very well to treat acute migraine.    7/31/25 Continue Ubrelvy, still using very infrequently.                                                    Reassessment Plan & Follow-Up  Medication Therapy Changes: Continue Ubrelvy 100 mg Take one tablet at onset of migraine. May repeat in 2 hours if needed. Max of 2 tablets in 24 hours  Related Plans, Therapy Recommendations, or Therapy Problems to Be Addressed: Reviewed  Specialty Pharmacy Services.  Pharmacist to perform regular reassessments no more than (6) months from the previous assessment.  Care Coordinator to set up future refill outreaches, coordinate prescription delivery, and escalate clinical questions to pharmacist.   Welcome information and patient satisfaction survey to be sent by specialty pharmacy team with patient's initial fill.    Attestation  Therapeutic appropriateness: Appropriate   I attest the patient was actively involved in and has agreed to the above plan of care. If the prescribed therapy is at any point deemed not appropriate based on the current or future assessments, a consultation will be initiated with the patient's specialty care provider to determine the best course of action. The revised plan of therapy will be documented along with any additional patient education provided. Discussed aforementioned material with patient via telemedicine.    Eliana Acuña, PharmD, АННА  [facility] Specialty Pharmacist, Neurology  7/31/2025  14:46 EDT

## 2025-07-31 NOTE — PROGRESS NOTES
2   Specialty Pharmacy Patient Management Program  Refill Outreach     Kamila was contacted today regarding refills of their medication(s).    Refill Questions      Flowsheet Row Most Recent Value   Changes to allergies? No   Changes to medications? No   New conditions or infections since last clinic visit No   Unplanned office visit, urgent care, ED, or hospital admission in the last 4 weeks  No   How does patient/caregiver feel medication is working? Very good   Financial problems or insurance changes  No   Since the previous refill, were any specialty medication doses or scheduled injections missed or delayed?  No   Does this patient require a clinical escalation to a pharmacist? No            Delivery Questions      Flowsheet Row Most Recent Value   Delivery method [employer]   Delivery address verified with patient/caregiver? Yes   Delivery address Home   Number of medications in delivery 1   Medication(s) being filled and delivered Ubrogepant (UBRELVY)   Doses left of specialty medications 0   Copay verified? Yes   Copay amount $0   Copay form of payment No copayment ($0)   Delivery Date Selection 08/01/25   Signature Required No                 Follow-up: 28 day(s)     Eliana Acuña [facility]  7/31/2025  14:47 EDT

## (undated) DEVICE — CATH DIAG EXPO .045 FL3  5F 100CM

## (undated) DEVICE — DEV COMP RAD PRELUDESYNC 24CM

## (undated) DEVICE — LUBE GEL ENDOGLIDE 1.1OZ

## (undated) DEVICE — TUBING, SUCTION, 1/4" X 10', STRAIGHT: Brand: MEDLINE

## (undated) DEVICE — SOL IRR H2O BTL 1000ML STRL

## (undated) DEVICE — SPNG ENDO BEDSIDE TUB ENZYM

## (undated) DEVICE — SYR LUERLOK 50ML

## (undated) DEVICE — HYBRID CO2 TUBING/CAP SET FOR OLYMPUS® SCOPES & CO2 SOURCE: Brand: ERBE

## (undated) DEVICE — MODEL AT P65, P/N 701554-001KIT CONTENTS: HAND CONTROLLER, 3-WAY HIGH-PRESSURE STOPCOCK WITH ROTATING END AND PREMIUM HIGH-PRESSURE TUBING: Brand: ANGIOTOUCH® KIT

## (undated) DEVICE — GLIDESHEATH SLENDER STAINLESS STEEL KIT: Brand: GLIDESHEATH SLENDER

## (undated) DEVICE — CATH DIAG EXPO M/ PK 5F FL4/FR4 PIG

## (undated) DEVICE — GW PERIPH GUIDERIGHT STD/EXCHNG/J/TIP SS 0.035IN 5X260CM

## (undated) DEVICE — NDL ANGIOGR ADV THN SMOTH SGLWALL 21G 1.5

## (undated) DEVICE — PK CATH CARD 10

## (undated) DEVICE — MODEL BT2000 P/N 700287-012KIT CONTENTS: MANIFOLD WITH SALINE AND CONTRAST PORTS, SALINE TUBING WITH SPIKE AND HAND SYRINGE, TRANSDUCER: Brand: BT2000 AUTOMATED MANIFOLD KIT

## (undated) DEVICE — CONTN GRAD MEAS TRIANG 32OZ BLK

## (undated) DEVICE — THE BITE BLOCK MAXI, LATEX FREE STRAP IS USED TO PROTECT THE ENDOSCOPE INSERTION TUBE FROM BEING BITTEN BY THE PATIENT.

## (undated) DEVICE — INTRO ACCSR BLNT TP

## (undated) DEVICE — KT ORCA ORCAPOD DISP STRL